# Patient Record
Sex: FEMALE | Race: WHITE | NOT HISPANIC OR LATINO | Employment: UNEMPLOYED | URBAN - METROPOLITAN AREA
[De-identification: names, ages, dates, MRNs, and addresses within clinical notes are randomized per-mention and may not be internally consistent; named-entity substitution may affect disease eponyms.]

---

## 2017-01-12 ENCOUNTER — OFFICE VISIT (OUTPATIENT)
Dept: SURGERY | Facility: CLINIC | Age: 47
End: 2017-01-12
Payer: COMMERCIAL

## 2017-01-12 VITALS
TEMPERATURE: 98 F | DIASTOLIC BLOOD PRESSURE: 81 MMHG | HEIGHT: 68 IN | HEART RATE: 85 BPM | BODY MASS INDEX: 34.86 KG/M2 | WEIGHT: 230 LBS | SYSTOLIC BLOOD PRESSURE: 122 MMHG

## 2017-01-12 DIAGNOSIS — K31.84 GASTROPARESIS: Primary | ICD-10-CM

## 2017-01-12 PROCEDURE — 99999 PR PBB SHADOW E&M-EST. PATIENT-LVL III: CPT | Mod: PBBFAC,,, | Performed by: SURGERY

## 2017-01-12 PROCEDURE — 99024 POSTOP FOLLOW-UP VISIT: CPT | Mod: S$GLB,,, | Performed by: SURGERY

## 2017-01-12 RX ORDER — OXYBUTYNIN CHLORIDE 5 MG/1
TABLET ORAL
COMMUNITY
Start: 2016-06-13 | End: 2017-05-11

## 2017-01-12 RX ORDER — ALBUTEROL SULFATE 90 UG/1
AEROSOL, METERED RESPIRATORY (INHALATION)
COMMUNITY
Start: 2016-07-31 | End: 2017-08-07 | Stop reason: SDUPTHER

## 2017-01-12 RX ORDER — ALPRAZOLAM 1 MG/1
TABLET ORAL
COMMUNITY
Start: 2016-07-31 | End: 2018-03-21

## 2017-01-12 RX ORDER — MOMETASONE FUROATE 50 UG/1
2 SPRAY, METERED NASAL DAILY PRN
COMMUNITY
Start: 2016-07-31 | End: 2019-07-02

## 2017-01-12 RX ORDER — CARISOPRODOL 350 MG/1
TABLET ORAL
COMMUNITY
Start: 2016-07-31 | End: 2017-05-11

## 2017-01-12 RX ORDER — OXYCODONE AND ACETAMINOPHEN 5; 325 MG/1; MG/1
TABLET ORAL
COMMUNITY
Start: 2016-08-03 | End: 2017-05-11

## 2017-01-12 RX ORDER — GABAPENTIN 300 MG/1
CAPSULE ORAL
COMMUNITY
Start: 2016-08-01 | End: 2017-05-11

## 2017-01-12 RX ORDER — CYCLOBENZAPRINE HCL 10 MG
TABLET ORAL
COMMUNITY
Start: 2016-07-01 | End: 2017-05-11

## 2017-01-12 RX ORDER — DEXLANSOPRAZOLE 60 MG/1
CAPSULE, DELAYED RELEASE ORAL
COMMUNITY
Start: 2016-07-14 | End: 2017-05-11

## 2017-01-12 RX ORDER — PANTOPRAZOLE SODIUM 40 MG/1
TABLET, DELAYED RELEASE ORAL
COMMUNITY
Start: 2016-07-02 | End: 2017-05-11

## 2017-01-12 RX ORDER — SIMVASTATIN 20 MG/1
TABLET, FILM COATED ORAL
COMMUNITY
Start: 2016-07-16 | End: 2017-05-11

## 2017-01-12 RX ORDER — GABAPENTIN 100 MG/1
CAPSULE ORAL
COMMUNITY
Start: 2016-07-05 | End: 2017-05-11

## 2017-01-12 RX ORDER — HYDROCODONE BITARTRATE AND ACETAMINOPHEN 5; 325 MG/1; MG/1
TABLET ORAL
COMMUNITY
Start: 2016-06-24 | End: 2017-05-11

## 2017-01-12 RX ORDER — FLAVOXATE HYDROCHLORIDE 100 MG/1
TABLET ORAL
COMMUNITY
Start: 2016-08-04 | End: 2017-05-11

## 2017-01-12 RX ORDER — LOPERAMIDE HYDROCHLORIDE 2 MG/1
CAPSULE ORAL
COMMUNITY
Start: 2016-06-27 | End: 2017-05-11

## 2017-01-12 RX ORDER — PROMETHAZINE HYDROCHLORIDE 25 MG/1
TABLET ORAL
COMMUNITY
Start: 2016-07-31 | End: 2017-05-11

## 2017-01-12 RX ORDER — BACLOFEN 10 MG/1
TABLET ORAL
Refills: 0 | COMMUNITY
Start: 2016-12-15 | End: 2017-05-11

## 2017-01-12 NOTE — PROGRESS NOTES
"Nan Betts is a 46 y.o. female patient.   1. Gastroparesis      Past Medical History   Diagnosis Date    Abnormal Pap smear of vagina     Anxiety     Cataract     Gastroparesis      Idiopathic    GERD (gastroesophageal reflux disease)     H/O abnormal cervical Papanicolaou smear     Hypercholesteremia 6/21/2016    Hyperlipidemia     Internal hemorrhoids     Interstitial cystitis     Irritable bowel syndrome (IBS)     Irritable bowel syndrome with diarrhea 6/21/2016    Spastic colon     TMJ (temporomandibular joint disorder)     TMJ (temporomandibular joint syndrome) 6/21/2016     No past surgical history pertinent negatives on file.  Scheduled Meds:  Continuous Infusions:  PRN Meds:    Review of patient's allergies indicates:   Allergen Reactions    Morphine Anaphylaxis    Toradol [ketorolac] Shortness Of Breath    Ultram [tramadol] Shortness Of Breath    Buprenorphine hcl     Codeine Itching    Flexeril [cyclobenzaprine] Other (See Comments)     GI BLEEDING    Hydrocodone     Hydrocodone-acetaminophen     Ibuprofen     Ketorolac tromethamine     Metoclopramide     Nsaids (non-steroidal anti-inflammatory drug) Other (See Comments)     GI BLEEDING    Reglan [metoclopramide hcl] Other (See Comments)     Headaches and insomnia      Tramadol hcl      There are no hospital problems to display for this patient.    Blood pressure 122/81, pulse 85, temperature 98.1 °F (36.7 °C), height 5' 8" (1.727 m), weight 104.3 kg (230 lb).    Subjective S/p gastric stimulator 11/2016 (prior pyloroplasty).  She is taking pain medication for jaw surgery but is having no other pain.  Her nausea, vomiting and bloating are mild (2/4) and controlled with phenergan.  She does better on small portion meals.  She is on a soft diet for her jaw surgery and is mostly eating soups.  Objective abdomen benign, stimulator interrogated and settings unchanged.   Assessment & Plan She is happy with the stimulator procedure but " concerned that she will have more abdominal pain when she gets off narcotics.  Follow up 3 months.  She wants home health for PT and I have asked her to get that from her pcp as she is 2 months after my procedure.       David Celeste MD  1/12/2017

## 2017-01-12 NOTE — LETTER
January 12, 2017        Rosi Carias MD  [address] [facility] [address]  [place] [address]             [facility] - General Surgery  [address]  [place] [address]  Phone: [phone]   Patient: Nan Betts   MR Number: [id number]   YOB: 1970   Date of Visit: 1/12/2017       Dear Dr. Carias:    Thank you for referring Nan Betts to me for evaluation. Below are the relevant portions of my assessment and plan of care.    Assessment & Plan She is happy with the stimulator procedure but concerned that she will have more abdominal pain when she gets off narcotics.  Follow up 3 months.         If you have questions, please do not hesitate to call me. I look forward to following Nan along with you.    Sincerely,      MD TAMRA Diaz MD James W Smith, MD

## 2017-01-15 ENCOUNTER — PATIENT MESSAGE (OUTPATIENT)
Dept: SURGERY | Facility: CLINIC | Age: 47
End: 2017-01-15

## 2017-01-19 DIAGNOSIS — K52.9 CHRONIC DIARRHEA: ICD-10-CM

## 2017-01-19 RX ORDER — LOPERAMIDE HYDROCHLORIDE 2 MG/1
2 CAPSULE ORAL DAILY PRN
Qty: 90 CAPSULE | Refills: 3 | Status: SHIPPED | OUTPATIENT
Start: 2017-01-19 | End: 2017-10-02 | Stop reason: SDUPTHER

## 2017-01-20 DIAGNOSIS — R11.0 POSTOPERATIVE NAUSEA: ICD-10-CM

## 2017-01-20 DIAGNOSIS — Z98.890 POSTOPERATIVE NAUSEA: ICD-10-CM

## 2017-01-20 RX ORDER — PROMETHAZINE HYDROCHLORIDE 25 MG/1
25 TABLET ORAL EVERY 6 HOURS PRN
Qty: 180 TABLET | Refills: 2 | Status: SHIPPED | OUTPATIENT
Start: 2017-01-20 | End: 2017-05-11

## 2017-03-14 ENCOUNTER — PATIENT MESSAGE (OUTPATIENT)
Dept: SURGERY | Facility: CLINIC | Age: 47
End: 2017-03-14

## 2017-04-13 ENCOUNTER — HOSPITAL ENCOUNTER (OUTPATIENT)
Dept: CARDIOLOGY | Facility: CLINIC | Age: 47
Discharge: HOME | End: 2017-04-13
Payer: COMMERCIAL

## 2017-04-13 ENCOUNTER — OFFICE VISIT (OUTPATIENT)
Dept: SURGERY | Facility: CLINIC | Age: 47
End: 2017-04-13
Payer: COMMERCIAL

## 2017-04-13 ENCOUNTER — TELEPHONE (OUTPATIENT)
Dept: ENDOSCOPY | Facility: HOSPITAL | Age: 47
End: 2017-04-13

## 2017-04-13 VITALS
TEMPERATURE: 98 F | DIASTOLIC BLOOD PRESSURE: 71 MMHG | WEIGHT: 231 LBS | HEART RATE: 67 BPM | BODY MASS INDEX: 35.01 KG/M2 | HEIGHT: 68 IN | SYSTOLIC BLOOD PRESSURE: 120 MMHG

## 2017-04-13 DIAGNOSIS — K31.84 GASTROPARESIS: Primary | ICD-10-CM

## 2017-04-13 DIAGNOSIS — R07.9 CHEST PAIN, UNSPECIFIED TYPE: ICD-10-CM

## 2017-04-13 PROCEDURE — 93000 ELECTROCARDIOGRAM COMPLETE: CPT | Mod: S$GLB,,, | Performed by: INTERNAL MEDICINE

## 2017-04-13 PROCEDURE — 99213 OFFICE O/P EST LOW 20 MIN: CPT | Mod: S$GLB,,, | Performed by: SURGERY

## 2017-04-13 PROCEDURE — 1160F RVW MEDS BY RX/DR IN RCRD: CPT | Mod: S$GLB,,, | Performed by: SURGERY

## 2017-04-13 PROCEDURE — 99999 PR PBB SHADOW E&M-EST. PATIENT-LVL III: CPT | Mod: PBBFAC,,, | Performed by: SURGERY

## 2017-04-13 RX ORDER — GABAPENTIN 250 MG/5ML
SOLUTION ORAL
Refills: 0 | COMMUNITY
Start: 2017-03-17 | End: 2018-03-21

## 2017-04-13 NOTE — LETTER
[facility] - General Surgery  [address]  [facility] [address]  Phone: [phone] April 13, 2017      Rosi Carias MD  [address] [place] [address]    Patient: Nan Betts   MR Number: [id number]   YOB: 1970   Date of Visit: 4/13/2017     Dear Dr. Carias:    Thank you for referring Nan Betts to me for evaluation. Below are the relevant portions of my assessment and plan of care.    1. Gastroparesis    Possibly worsened by narcotics with epigastric burning, possible ulcer. One lead is likely not working correctly.  2. Chest pain with palpitations and sob. Possible heart disease  3. Swelling in hands and fingers    PLAN: EKG, EGD. RTC after studies. May have to replace lead.  To see PCP re: swelling in hands.    If you have questions, please do not hesitate to call me. I look forward to following Nan along with you.    Sincerely,      David Celeste MD   Section Head - General, Laparoscopic, Bariatric  Acute Care and Oncologic Surgery   - Surgical Weight Loss Program  [facility]    WSR/bakarik    CC  MD Bethany Sánchez MD

## 2017-04-13 NOTE — PROGRESS NOTES
Subjective:       Patient ID: Nan Betts is a 47 y.o. female.    Chief Complaint: Follow-up (gastroparesis / stimulator check)    HPI S/p gastric stimulator 11/2016 (prior pyloroplasty).  Overall she is 70% improved.  She has moderate nausea that is worsening, gerd and some fullness.  She has no vomiting.  She is taking phenergan two to three times a day (increasing amounts), ppi bid and narcotics with muscle relaxants (for jaw pain). She gets IBS with diarrhea and this has worsened as well.  Some weight gain with morning swelling in hands but not ankles.  Review of Systems   Constitutional: Negative for fever.   Respiratory: Negative for chest tightness.    Cardiovascular: Positive for chest pain.        With palpitations, left arm pain and sob once (last week).  She has had a recent negative heart work up prior to this pain.   Gastrointestinal: Negative for blood in stool.        Has hemorrhoids and this is likely causing bleeding   Genitourinary: Negative for difficulty urinating.   Hematological: Does not bruise/bleed easily.       Objective:      Physical Exam   Constitutional: She appears well-developed and well-nourished.   Abdominal: Soft. Bowel sounds are normal.   Skin: Skin is warm and dry.   Psychiatric: She has a normal mood and affect. Her behavior is normal. Judgment and thought content normal.   Vitals reviewed.      V 2 us 450 hz 14 c & 2 >00752 c & 3 333 2 & 3 >48590.    Assessment:       1. Gastroparesis      Possibly worsened by narcotics with epigastric burning, possible ulcer.  One lead is likely not working correctly.  2. Chest pain with palpitations and sob.  Possible heart disease  3. Swelling in hands and fingers  Plan:       EKG, egd.  Rtc after studies.  May have to replace lead.   To see pcp re: swelling in hands.

## 2017-04-14 ENCOUNTER — PATIENT MESSAGE (OUTPATIENT)
Dept: SURGERY | Facility: CLINIC | Age: 47
End: 2017-04-14

## 2017-04-17 ENCOUNTER — PATIENT MESSAGE (OUTPATIENT)
Dept: GASTROENTEROLOGY | Facility: CLINIC | Age: 47
End: 2017-04-17

## 2017-04-17 NOTE — TELEPHONE ENCOUNTER
Received message to call patient because she is requesting a sooner appointment.  Called patient to inform her that 5/2/17 was the earliest appointment available.  Instructed her that Dr Lehman wanted her on clear liquids for 24 hours prior to the procedure. Stated understanding.

## 2017-04-18 ENCOUNTER — PATIENT MESSAGE (OUTPATIENT)
Dept: SURGERY | Facility: CLINIC | Age: 47
End: 2017-04-18

## 2017-04-19 ENCOUNTER — PATIENT MESSAGE (OUTPATIENT)
Dept: SURGERY | Facility: CLINIC | Age: 47
End: 2017-04-19

## 2017-04-21 ENCOUNTER — PATIENT MESSAGE (OUTPATIENT)
Dept: SURGERY | Facility: CLINIC | Age: 47
End: 2017-04-21

## 2017-04-21 ENCOUNTER — TELEPHONE (OUTPATIENT)
Dept: SURGERY | Facility: CLINIC | Age: 47
End: 2017-04-21

## 2017-04-21 DIAGNOSIS — K31.84 GASTROPARESIS: Primary | ICD-10-CM

## 2017-04-21 NOTE — TELEPHONE ENCOUNTER
Patient is wanting sx scheduled for 5/15/17 , also needs pre op visit on 5/11/17 @ 9 am. She needs the rep from [employer] to speak with dr. Celeste and her on that date as well.

## 2017-04-24 ENCOUNTER — PATIENT MESSAGE (OUTPATIENT)
Dept: SURGERY | Facility: CLINIC | Age: 47
End: 2017-04-24

## 2017-04-25 ENCOUNTER — PATIENT MESSAGE (OUTPATIENT)
Dept: SURGERY | Facility: CLINIC | Age: 47
End: 2017-04-25

## 2017-04-26 ENCOUNTER — PATIENT MESSAGE (OUTPATIENT)
Dept: SURGERY | Facility: CLINIC | Age: 47
End: 2017-04-26

## 2017-04-26 ENCOUNTER — PATIENT MESSAGE (OUTPATIENT)
Dept: GASTROENTEROLOGY | Facility: CLINIC | Age: 47
End: 2017-04-26

## 2017-04-27 ENCOUNTER — PATIENT MESSAGE (OUTPATIENT)
Dept: SURGERY | Facility: CLINIC | Age: 47
End: 2017-04-27

## 2017-05-02 ENCOUNTER — ANESTHESIA EVENT (OUTPATIENT)
Dept: ENDOSCOPY | Facility: HOSPITAL | Age: 47
End: 2017-05-02
Payer: COMMERCIAL

## 2017-05-02 ENCOUNTER — SURGERY (OUTPATIENT)
Age: 47
End: 2017-05-02

## 2017-05-02 ENCOUNTER — PATIENT MESSAGE (OUTPATIENT)
Dept: SURGERY | Facility: CLINIC | Age: 47
End: 2017-05-02

## 2017-05-02 ENCOUNTER — HOSPITAL ENCOUNTER (OUTPATIENT)
Facility: HOSPITAL | Age: 47
Discharge: HOME | End: 2017-05-02
Attending: INTERNAL MEDICINE | Admitting: INTERNAL MEDICINE
Payer: COMMERCIAL

## 2017-05-02 ENCOUNTER — ANESTHESIA (OUTPATIENT)
Dept: ENDOSCOPY | Facility: HOSPITAL | Age: 47
End: 2017-05-02
Payer: COMMERCIAL

## 2017-05-02 VITALS
WEIGHT: 233 LBS | BODY MASS INDEX: 35.31 KG/M2 | OXYGEN SATURATION: 96 % | HEIGHT: 68 IN | TEMPERATURE: 98 F | RESPIRATION RATE: 17 BRPM | SYSTOLIC BLOOD PRESSURE: 107 MMHG | RESPIRATION RATE: 18 BRPM | HEART RATE: 74 BPM | DIASTOLIC BLOOD PRESSURE: 57 MMHG

## 2017-05-02 DIAGNOSIS — K31.84 GASTROPARESIS: Primary | ICD-10-CM

## 2017-05-02 PROCEDURE — 88305 TISSUE EXAM BY PATHOLOGIST: CPT | Mod: 26,,, | Performed by: PATHOLOGY

## 2017-05-02 PROCEDURE — 25000003 PHARM REV CODE 250: Performed by: NURSE ANESTHETIST, CERTIFIED REGISTERED

## 2017-05-02 PROCEDURE — 63600175 PHARM REV CODE 636 W HCPCS: Performed by: NURSE ANESTHETIST, CERTIFIED REGISTERED

## 2017-05-02 PROCEDURE — D9220A PRA ANESTHESIA: Mod: ANES,,, | Performed by: ANESTHESIOLOGY

## 2017-05-02 PROCEDURE — 37000008 HC ANESTHESIA 1ST 15 MINUTES: Performed by: INTERNAL MEDICINE

## 2017-05-02 PROCEDURE — 27201012 HC FORCEPS, HOT/COLD, DISP: Performed by: INTERNAL MEDICINE

## 2017-05-02 PROCEDURE — 25000003 PHARM REV CODE 250: Performed by: ANESTHESIOLOGY

## 2017-05-02 PROCEDURE — 43239 EGD BIOPSY SINGLE/MULTIPLE: CPT | Mod: 59,,, | Performed by: INTERNAL MEDICINE

## 2017-05-02 PROCEDURE — 88305 TISSUE EXAM BY PATHOLOGIST: CPT | Performed by: PATHOLOGY

## 2017-05-02 PROCEDURE — 43239 EGD BIOPSY SINGLE/MULTIPLE: CPT | Performed by: INTERNAL MEDICINE

## 2017-05-02 PROCEDURE — 37000009 HC ANESTHESIA EA ADD 15 MINS: Performed by: INTERNAL MEDICINE

## 2017-05-02 PROCEDURE — 43245 EGD DILATE STRICTURE: CPT | Mod: ,,, | Performed by: INTERNAL MEDICINE

## 2017-05-02 PROCEDURE — D9220A PRA ANESTHESIA: Mod: CRNA,,, | Performed by: NURSE ANESTHETIST, CERTIFIED REGISTERED

## 2017-05-02 PROCEDURE — 25000003 PHARM REV CODE 250: Performed by: INTERNAL MEDICINE

## 2017-05-02 PROCEDURE — 43245 EGD DILATE STRICTURE: CPT | Performed by: INTERNAL MEDICINE

## 2017-05-02 RX ORDER — SODIUM CHLORIDE 9 MG/ML
INJECTION, SOLUTION INTRAVENOUS CONTINUOUS
Status: DISCONTINUED | OUTPATIENT
Start: 2017-05-02 | End: 2017-05-02 | Stop reason: HOSPADM

## 2017-05-02 RX ORDER — HEPARIN 100 UNIT/ML
5 SYRINGE INTRAVENOUS ONCE
Status: COMPLETED | OUTPATIENT
Start: 2017-05-02 | End: 2017-05-02

## 2017-05-02 RX ORDER — LIDOCAINE HCL/PF 100 MG/5ML
SYRINGE (ML) INTRAVENOUS
Status: DISCONTINUED | OUTPATIENT
Start: 2017-05-02 | End: 2017-05-02

## 2017-05-02 RX ORDER — SODIUM CHLORIDE 9 MG/ML
INJECTION, SOLUTION INTRAVENOUS CONTINUOUS
Status: CANCELLED | OUTPATIENT
Start: 2017-05-02

## 2017-05-02 RX ORDER — ONDANSETRON 2 MG/ML
INJECTION INTRAMUSCULAR; INTRAVENOUS
Status: DISCONTINUED | OUTPATIENT
Start: 2017-05-02 | End: 2017-05-02

## 2017-05-02 RX ORDER — PROPOFOL 10 MG/ML
VIAL (ML) INTRAVENOUS
Status: DISCONTINUED | OUTPATIENT
Start: 2017-05-02 | End: 2017-05-02

## 2017-05-02 RX ADMIN — PROPOFOL 40 MG: 10 INJECTION, EMULSION INTRAVENOUS at 02:05

## 2017-05-02 RX ADMIN — LIDOCAINE HYDROCHLORIDE 100 MG: 20 INJECTION, SOLUTION INTRAVENOUS at 02:05

## 2017-05-02 RX ADMIN — SODIUM CHLORIDE: 0.9 INJECTION, SOLUTION INTRAVENOUS at 02:05

## 2017-05-02 RX ADMIN — HEPARIN 500 UNITS: 100 SYRINGE at 03:05

## 2017-05-02 RX ADMIN — PROPOFOL 30 MG: 10 INJECTION, EMULSION INTRAVENOUS at 02:05

## 2017-05-02 RX ADMIN — PROPOFOL 130 MG: 10 INJECTION, EMULSION INTRAVENOUS at 02:05

## 2017-05-02 RX ADMIN — ONDANSETRON 4 MG: 2 INJECTION INTRAMUSCULAR; INTRAVENOUS at 03:05

## 2017-05-02 NOTE — TRANSFER OF CARE
"Anesthesia Transfer of Care Note    Patient: Nan Betts    Procedure(s) Performed: Procedure(s) (LRB):  ESOPHAGOGASTRODUODENOSCOPY (EGD) (N/A)    Patient location: GI    Anesthesia Type: general    Transport from OR: Transported from OR on room air with adequate spontaneous ventilation    Post pain: adequate analgesia    Post assessment: no apparent anesthetic complications and tolerated procedure well    Post vital signs: stable    Level of consciousness: awake and alert    Nausea/Vomiting: no nausea/vomiting    Complications: none          Last vitals:   Visit Vitals    /82 (BP Location: Right arm, Patient Position: Lying, BP Method: Automatic)    Pulse 84    Temp 36.9 °C (98.4 °F) (Oral)    Resp 12    Ht 5' 8" (1.727 m)    Wt 105.7 kg (233 lb)    SpO2 96%    Breastfeeding No    BMI 35.43 kg/m2     "

## 2017-05-02 NOTE — H&P (VIEW-ONLY)
Subjective:       Patient ID: Nan Betts is a 47 y.o. female.    Chief Complaint: Follow-up (gastroparesis / stimulator check)    HPI S/p gastric stimulator 11/2016 (prior pyloroplasty).  Overall she is 70% improved.  She has moderate nausea that is worsening, gerd and some fullness.  She has no vomiting.  She is taking phenergan two to three times a day (increasing amounts), ppi bid and narcotics with muscle relaxants (for jaw pain). She gets IBS with diarrhea and this has worsened as well.  Some weight gain with morning swelling in hands but not ankles.  Review of Systems   Constitutional: Negative for fever.   Respiratory: Negative for chest tightness.    Cardiovascular: Positive for chest pain.        With palpitations, left arm pain and sob once (last week).  She has had a recent negative heart work up prior to this pain.   Gastrointestinal: Negative for blood in stool.        Has hemorrhoids and this is likely causing bleeding   Genitourinary: Negative for difficulty urinating.   Hematological: Does not bruise/bleed easily.       Objective:      Physical Exam   Constitutional: She appears well-developed and well-nourished.   Abdominal: Soft. Bowel sounds are normal.   Skin: Skin is warm and dry.   Psychiatric: She has a normal mood and affect. Her behavior is normal. Judgment and thought content normal.   Vitals reviewed.      V 2 us 450 hz 14 c & 2 >01620 c & 3 333 2 & 3 >77025.    Assessment:       1. Gastroparesis      Possibly worsened by narcotics with epigastric burning, possible ulcer.  One lead is likely not working correctly.  2. Chest pain with palpitations and sob.  Possible heart disease  3. Swelling in hands and fingers  Plan:       EKG, egd.  Rtc after studies.  May have to replace lead.   To see pcp re: swelling in hands.

## 2017-05-02 NOTE — DISCHARGE INSTRUCTIONS

## 2017-05-02 NOTE — PATIENT INSTRUCTIONS
Discharge Summary/Instructions for after EGD with Biopsy  Patient Name: Nan Betts  Patient MRN: [id number]  Patient YOB: 1970  Tuesday, May 02, 2017    Zack Lehman MD  RESTRICTIONS ON ACTIVITY:  - DO NOT drive a car or operate machinery until the day after the   procedure.  - Following Day:  Return to full activities including work.  - Diet:  Eat and drink normally unless instructed otherwise.  TREATMENT FOR COMMON SIDE EFFECTS:  - Sore Throat - treat with throat lozenges, gargle with warm salt water.  - Mild abdominal pain & bloating - rest and take liquids only.  SYMPTOMS TO WATCH FOR AND REPORT TO YOUR PHYSICIAN:  1.  Chills or fever occurring within 24 hours after procedure.  2.  Pain in chest.  3.  SEVERE abdominal pain or bloating.  4.  Rectal bleeding which would show as maroon or black stools.  Your doctor recommends these additional instructions:  If any biopsies were performed, my office will call you in 5 to 6 business   days with any results.  - Patient has a contact number available for emergencies.  The signs and   symptoms of potential delayed complications were discussed with the   patient.  Return to normal activities tomorrow.  Written discharge   instructions were provided to the patient.   - Discharge patient to home (ambulatory).   - Resume previous diet.   - Continue present medications.   - Await pathology results.   - Return to GI clinic as previously scheduled.  You have a contact number available for emergencies.  The signs and symptoms   of potential delayed complications were discussed with you.  You may return   to normal activities tomorrow.  Written discharge instructions were   provided to you.   You are being discharged to home.   Resume your previous diet.   Continue your present medications.   We are waiting for your pathology results.   Return to your GI clinic as previously scheduled.  None  If you have any questions or problems, please call your physician.  EMERGENCY  PHONE NUMBER: [phone]  LAB RESULTS: [phone]         Zack Lehman MD  5/2/2017 3:02:06 PM  This report has been verified and signed electronically.

## 2017-05-02 NOTE — ANESTHESIA POSTPROCEDURE EVALUATION
"Anesthesia Post Evaluation    Patient: Nan Betts    Procedure(s) Performed: Procedure(s) (LRB):  ESOPHAGOGASTRODUODENOSCOPY (EGD) (N/A)    Final Anesthesia Type: general  Patient location during evaluation: PACU  Patient participation: Yes- Able to Participate  Level of consciousness: awake and alert  Post-procedure vital signs: reviewed and stable  Pain management: adequate  Airway patency: patent  PONV status at discharge: No PONV  Anesthetic complications: no      Cardiovascular status: blood pressure returned to baseline  Respiratory status: unassisted  Hydration status: euvolemic  Follow-up not needed.        Visit Vitals    /62 (BP Location: Right arm, Patient Position: Lying, BP Method: Automatic)    Pulse 72    Temp 36.4 °C (97.5 °F) (Oral)    Resp 16    Ht 5' 8" (1.727 m)    Wt 105.7 kg (233 lb)    SpO2 (!) 94%    Breastfeeding No    BMI 35.43 kg/m2       Pain/Chelsy Score: Pain Assessment Performed: Yes (5/2/2017  3:22 PM)  Presence of Pain: denies (5/2/2017  3:22 PM)  Chelsy Score: 10 (5/2/2017  3:22 PM)      "

## 2017-05-02 NOTE — ANESTHESIA PREPROCEDURE EVALUATION
05/02/2017  Nan Betts is a 47 y.o., female.    Anesthesia Evaluation    I have reviewed the Patient Summary Reports.        Review of Systems  Anesthesia Hx:   Denies Personal Hx of Anesthesia complications.   Hepatic/GI:   GERD        Physical Exam  General:  Obesity    Airway/Jaw/Neck:  Airway Findings: Mouth Opening: Normal Tongue: Normal  General Airway Assessment: Adult  Mallampati: III  Improves to III with phonation.  TM Distance: Normal, at least 6 cm      Dental:  Dental Findings: In tact   Chest/Lungs:  Chest/Lungs Clear    Heart/Vascular:  Heart Findings: Normal            Anesthesia Plan  Type of Anesthesia, risks & benefits discussed:  Anesthesia Type:  general  Patient's Preference:   Intra-op Monitoring Plan:   Intra-op Monitoring Plan Comments:   Post Op Pain Control Plan:   Post Op Pain Control Plan Comments:   Induction:   IV  Beta Blocker:  Patient is not currently on a Beta-Blocker (No further documentation required).       Informed Consent: Patient understands risks and agrees with Anesthesia plan.  Questions answered. Anesthesia consent signed with patient.  ASA Score: 2     Day of Surgery Review of History & Physical:  There are no significant changes.          Ready For Surgery From Anesthesia Perspective.

## 2017-05-04 ENCOUNTER — PATIENT MESSAGE (OUTPATIENT)
Dept: SURGERY | Facility: CLINIC | Age: 47
End: 2017-05-04

## 2017-05-09 ENCOUNTER — TELEPHONE (OUTPATIENT)
Dept: ENDOSCOPY | Facility: HOSPITAL | Age: 47
End: 2017-05-09

## 2017-05-10 ENCOUNTER — PATIENT MESSAGE (OUTPATIENT)
Dept: GASTROENTEROLOGY | Facility: CLINIC | Age: 47
End: 2017-05-10

## 2017-05-10 ENCOUNTER — PATIENT MESSAGE (OUTPATIENT)
Dept: SURGERY | Facility: CLINIC | Age: 47
End: 2017-05-10

## 2017-05-11 ENCOUNTER — TELEPHONE (OUTPATIENT)
Dept: GASTROENTEROLOGY | Facility: CLINIC | Age: 47
End: 2017-05-11

## 2017-05-11 ENCOUNTER — OFFICE VISIT (OUTPATIENT)
Dept: SURGERY | Facility: CLINIC | Age: 47
End: 2017-05-11
Payer: COMMERCIAL

## 2017-05-11 VITALS
BODY MASS INDEX: 35.09 KG/M2 | TEMPERATURE: 99 F | WEIGHT: 231.5 LBS | HEART RATE: 88 BPM | HEIGHT: 68 IN | SYSTOLIC BLOOD PRESSURE: 122 MMHG | DIASTOLIC BLOOD PRESSURE: 82 MMHG

## 2017-05-11 DIAGNOSIS — K31.84 GASTROPARESIS: Primary | ICD-10-CM

## 2017-05-11 PROCEDURE — 99213 OFFICE O/P EST LOW 20 MIN: CPT | Mod: S$GLB,,, | Performed by: SURGERY

## 2017-05-11 PROCEDURE — 99999 PR PBB SHADOW E&M-EST. PATIENT-LVL III: CPT | Mod: PBBFAC,,, | Performed by: SURGERY

## 2017-05-11 PROCEDURE — 1160F RVW MEDS BY RX/DR IN RCRD: CPT | Mod: S$GLB,,, | Performed by: SURGERY

## 2017-05-11 RX ORDER — PROMETHAZINE HYDROCHLORIDE 6.25 MG/5ML
SYRUP ORAL
Refills: 0 | Status: ON HOLD | COMMUNITY
Start: 2017-04-26 | End: 2017-05-15 | Stop reason: HOSPADM

## 2017-05-11 NOTE — LETTER
[facility] - General Surgery  [address]  [facility] [address]  Phone: [phone] May 11, 2017      Rosi Carias MD  [address] [place] [address]    Patient: Nan Betts   MR Number: [id number]   YOB: 1970   Date of Visit: 5/11/2017     Dear Dr. Carias:    Thank you for referring Nan Betts to me for evaluation. Below are the relevant portions of my assessment and plan of care.    1. Gastroparesis        PLAN:  Obtain CT films to see if lead has fractured.  She states there is a one year warranty. She wants it replaced as it is malfunctioning and she is concerned it won't be paid for at a later date (after the warranty expires). I have contacted our representative who says that one of the near lead has pulled out of the box and we will repair this on Monday. I have told her she needs to work on getting off narcotics.     If you have questions, please do not hesitate to call me. I look forward to following Nan along with you.    Sincerely,      David Celeste MD   Section Head - General, Laparoscopic, Bariatric  Acute Care and Oncologic Surgery   - Surgical Weight Loss Program  [facility]    WSR/belinda    CC  Zack Lehman MD

## 2017-05-11 NOTE — TELEPHONE ENCOUNTER
----- Message from Zack Lehman MD sent at 5/11/2017  7:20 AM CDT -----  Please call, stomach biopsy was normal, nothing of concern

## 2017-05-11 NOTE — PROGRESS NOTES
Subjective:       Patient ID: Nan Betts is a 47 y.o. female.    Chief Complaint: Pre-op Exam    HPI S/p gastric stimulator 11/2016 (prior pyloroplasty). Overall she is 70% improved.  At her last visit one of her leads was found to be malfunctioning on interrogation.  She has severe pain, nausea and vomiting.  For pain she is taking percoset tid.  She is also taking phenergan iv qid (zofran doesn't work), ppi bid, and is not on promotility agents.  She has been to the ER twice since I last saw her.  She denies having any falls or trauma.  Review of Systems   Constitutional: Negative for fever.   Respiratory: Positive for shortness of breath. Negative for chest tightness.    Cardiovascular: Negative for chest pain.   Genitourinary: Negative for difficulty urinating and dysuria.       Objective:       CT ok by report    Physical Exam   Constitutional: She is oriented to person, place, and time. She appears well-developed and well-nourished.   Abdominal: Soft.   Neurological: She is alert and oriented to person, place, and time.   Skin: Skin is warm and dry.   Psychiatric: She has a normal mood and affect. Her behavior is normal. Judgment and thought content normal.   Vitals reviewed.      I have discussed this with [employer] and had them help me through the proper adjustment.  Lead 2 was turned off and is malfunctioning.  Imp 372 volt 5.5 cur 14.8 pw 330 rate 14 on 0.1 off 5.  She says she feels a clicking with the change so I changed the setting to volt 4.5 curr 12.1 pw 330 rate 14 on 1 off 4    Assessment:       1. Gastroparesis        Plan:       Obtain ct films to see if lead has fractured.   She states there is a one year warranty.  She wants it replaced as it is malfunctioning and she is concerned it won't be paid for at a later date (after the warranty expires).  I have contacted our rep who says that one of the near lead has pulled out of the box and we will repair this on Monday.  I have told her she needs to  work on getting off narcotics.

## 2017-05-12 ENCOUNTER — TELEPHONE (OUTPATIENT)
Dept: SURGERY | Facility: CLINIC | Age: 47
End: 2017-05-12

## 2017-05-12 RX ORDER — CARISOPRODOL 350 MG/1
350 TABLET ORAL 3 TIMES DAILY PRN
COMMUNITY
End: 2018-03-21

## 2017-05-12 RX ORDER — DEXLANSOPRAZOLE 60 MG/1
60 CAPSULE, DELAYED RELEASE ORAL 2 TIMES DAILY
COMMUNITY
End: 2017-12-09 | Stop reason: SDUPTHER

## 2017-05-13 NOTE — PRE-PROCEDURE INSTRUCTIONS
Spoke with Patient.  NPO, medication, and pre-op instructions reviewed.  Prior history of PONV with Anesthesia.   Verbalized understanding of instructions.

## 2017-05-15 ENCOUNTER — ANESTHESIA EVENT (OUTPATIENT)
Dept: SURGERY | Facility: HOSPITAL | Age: 47
End: 2017-05-15
Payer: COMMERCIAL

## 2017-05-15 ENCOUNTER — ANESTHESIA (OUTPATIENT)
Dept: SURGERY | Facility: HOSPITAL | Age: 47
End: 2017-05-15
Payer: COMMERCIAL

## 2017-05-15 ENCOUNTER — HOSPITAL ENCOUNTER (OUTPATIENT)
Facility: HOSPITAL | Age: 47
Discharge: HOME | End: 2017-05-15
Attending: SURGERY | Admitting: SURGERY
Payer: COMMERCIAL

## 2017-05-15 ENCOUNTER — SURGERY (OUTPATIENT)
Age: 47
End: 2017-05-15

## 2017-05-15 VITALS
HEIGHT: 68 IN | HEART RATE: 82 BPM | OXYGEN SATURATION: 97 % | TEMPERATURE: 98 F | RESPIRATION RATE: 17 BRPM | DIASTOLIC BLOOD PRESSURE: 84 MMHG | SYSTOLIC BLOOD PRESSURE: 132 MMHG

## 2017-05-15 DIAGNOSIS — K31.84 GASTROPARESIS: Primary | ICD-10-CM

## 2017-05-15 PROCEDURE — 71000033 HC RECOVERY, INTIAL HOUR: Performed by: SURGERY

## 2017-05-15 PROCEDURE — 63600175 PHARM REV CODE 636 W HCPCS: Performed by: NURSE ANESTHETIST, CERTIFIED REGISTERED

## 2017-05-15 PROCEDURE — 27201423 OPTIME MED/SURG SUP & DEVICES STERILE SUPPLY: Performed by: SURGERY

## 2017-05-15 PROCEDURE — 36000708 HC OR TIME LEV III 1ST 15 MIN: Performed by: SURGERY

## 2017-05-15 PROCEDURE — 63600175 PHARM REV CODE 636 W HCPCS: Performed by: ANESTHESIOLOGY

## 2017-05-15 PROCEDURE — 88300 SURGICAL PATH GROSS: CPT | Mod: 26,,, | Performed by: PATHOLOGY

## 2017-05-15 PROCEDURE — 37000009 HC ANESTHESIA EA ADD 15 MINS: Performed by: SURGERY

## 2017-05-15 PROCEDURE — D9220A PRA ANESTHESIA: Mod: ANES,,, | Performed by: ANESTHESIOLOGY

## 2017-05-15 PROCEDURE — 64590 INS/RPL PRPH SAC/GSTR NPG/R: CPT | Mod: ,,, | Performed by: SURGERY

## 2017-05-15 PROCEDURE — 25000003 PHARM REV CODE 250: Performed by: SURGERY

## 2017-05-15 PROCEDURE — 63600175 PHARM REV CODE 636 W HCPCS: Performed by: SURGERY

## 2017-05-15 PROCEDURE — D9220A PRA ANESTHESIA: Mod: CRNA,,, | Performed by: NURSE ANESTHETIST, CERTIFIED REGISTERED

## 2017-05-15 PROCEDURE — 63600175 PHARM REV CODE 636 W HCPCS: Performed by: STUDENT IN AN ORGANIZED HEALTH CARE EDUCATION/TRAINING PROGRAM

## 2017-05-15 PROCEDURE — C1767 GENERATOR, NEURO NON-RECHARG: HCPCS | Performed by: SURGERY

## 2017-05-15 PROCEDURE — 27000221 HC OXYGEN, UP TO 24 HOURS

## 2017-05-15 PROCEDURE — 71000015 HC POSTOP RECOV 1ST HR: Performed by: SURGERY

## 2017-05-15 PROCEDURE — 94760 N-INVAS EAR/PLS OXIMETRY 1: CPT

## 2017-05-15 PROCEDURE — 88300 SURGICAL PATH GROSS: CPT | Performed by: PATHOLOGY

## 2017-05-15 PROCEDURE — 25000003 PHARM REV CODE 250: Performed by: NURSE ANESTHETIST, CERTIFIED REGISTERED

## 2017-05-15 PROCEDURE — 37000008 HC ANESTHESIA 1ST 15 MINUTES: Performed by: SURGERY

## 2017-05-15 PROCEDURE — 25000003 PHARM REV CODE 250: Performed by: ANESTHESIOLOGY

## 2017-05-15 PROCEDURE — 36000709 HC OR TIME LEV III EA ADD 15 MIN: Performed by: SURGERY

## 2017-05-15 PROCEDURE — 71000039 HC RECOVERY, EACH ADD'L HOUR: Performed by: SURGERY

## 2017-05-15 DEVICE — GENERATOR NEUROSTIM GASTRIC: Type: IMPLANTABLE DEVICE | Site: STOMACH | Status: FUNCTIONAL

## 2017-05-15 RX ORDER — OXYCODONE HCL 5 MG/5 ML
5 SOLUTION, ORAL ORAL EVERY 4 HOURS PRN
Qty: 473 ML | Refills: 0 | Status: SHIPPED | OUTPATIENT
Start: 2017-05-15 | End: 2017-08-07

## 2017-05-15 RX ORDER — NEOSTIGMINE METHYLSULFATE 1 MG/ML
INJECTION, SOLUTION INTRAVENOUS
Status: DISCONTINUED | OUTPATIENT
Start: 2017-05-15 | End: 2017-05-15

## 2017-05-15 RX ORDER — FLAVOXATE HYDROCHLORIDE 100 MG/1
100 TABLET ORAL 3 TIMES DAILY
COMMUNITY
End: 2017-12-06

## 2017-05-15 RX ORDER — GLYCOPYRROLATE 0.2 MG/ML
INJECTION INTRAMUSCULAR; INTRAVENOUS
Status: DISCONTINUED | OUTPATIENT
Start: 2017-05-15 | End: 2017-05-15

## 2017-05-15 RX ORDER — ROCURONIUM BROMIDE 10 MG/ML
INJECTION, SOLUTION INTRAVENOUS
Status: DISCONTINUED | OUTPATIENT
Start: 2017-05-15 | End: 2017-05-15

## 2017-05-15 RX ORDER — SUCCINYLCHOLINE CHLORIDE 20 MG/ML
INJECTION INTRAMUSCULAR; INTRAVENOUS
Status: DISCONTINUED | OUTPATIENT
Start: 2017-05-15 | End: 2017-05-15

## 2017-05-15 RX ORDER — DEXAMETHASONE SODIUM PHOSPHATE 4 MG/ML
INJECTION, SOLUTION INTRA-ARTICULAR; INTRALESIONAL; INTRAMUSCULAR; INTRAVENOUS; SOFT TISSUE
Status: DISCONTINUED | OUTPATIENT
Start: 2017-05-15 | End: 2017-05-15

## 2017-05-15 RX ORDER — PROMETHAZINE HYDROCHLORIDE 25 MG/ML
25 INJECTION, SOLUTION INTRAMUSCULAR; INTRAVENOUS EVERY 4 HOURS
COMMUNITY
End: 2017-06-08

## 2017-05-15 RX ORDER — SODIUM CHLORIDE 9 MG/ML
INJECTION, SOLUTION INTRAVENOUS CONTINUOUS
Status: DISCONTINUED | OUTPATIENT
Start: 2017-05-15 | End: 2017-05-15 | Stop reason: HOSPADM

## 2017-05-15 RX ORDER — MIDAZOLAM HYDROCHLORIDE 1 MG/ML
INJECTION, SOLUTION INTRAMUSCULAR; INTRAVENOUS
Status: DISCONTINUED | OUTPATIENT
Start: 2017-05-15 | End: 2017-05-15

## 2017-05-15 RX ORDER — FENTANYL CITRATE 50 UG/ML
INJECTION, SOLUTION INTRAMUSCULAR; INTRAVENOUS
Status: DISCONTINUED | OUTPATIENT
Start: 2017-05-15 | End: 2017-05-15

## 2017-05-15 RX ORDER — ONDANSETRON 2 MG/ML
INJECTION INTRAMUSCULAR; INTRAVENOUS
Status: DISCONTINUED | OUTPATIENT
Start: 2017-05-15 | End: 2017-05-15

## 2017-05-15 RX ORDER — PROMETHAZINE HYDROCHLORIDE 25 MG/ML
6.25 INJECTION, SOLUTION INTRAMUSCULAR; INTRAVENOUS
Status: DISCONTINUED | OUTPATIENT
Start: 2017-05-15 | End: 2017-05-15

## 2017-05-15 RX ORDER — HYDROMORPHONE HYDROCHLORIDE 1 MG/ML
0.2 INJECTION, SOLUTION INTRAMUSCULAR; INTRAVENOUS; SUBCUTANEOUS EVERY 5 MIN PRN
Status: DISCONTINUED | OUTPATIENT
Start: 2017-05-15 | End: 2017-05-15 | Stop reason: HOSPADM

## 2017-05-15 RX ORDER — BUPIVACAINE HYDROCHLORIDE 2.5 MG/ML
INJECTION, SOLUTION EPIDURAL; INFILTRATION; INTRACAUDAL
Status: DISCONTINUED | OUTPATIENT
Start: 2017-05-15 | End: 2017-05-15 | Stop reason: HOSPADM

## 2017-05-15 RX ORDER — OXYCODONE AND ACETAMINOPHEN 5; 325 MG/1; MG/1
2 TABLET ORAL EVERY 4 HOURS PRN
Status: DISCONTINUED | OUTPATIENT
Start: 2017-05-15 | End: 2017-05-15

## 2017-05-15 RX ORDER — OXYCODONE AND ACETAMINOPHEN 10; 325 MG/1; MG/1
1 TABLET ORAL 4 TIMES DAILY PRN
Qty: 41 TABLET | Refills: 0 | Status: SHIPPED | OUTPATIENT
Start: 2017-05-15 | End: 2017-05-15 | Stop reason: HOSPADM

## 2017-05-15 RX ORDER — SCOLOPAMINE TRANSDERMAL SYSTEM 1 MG/1
1 PATCH, EXTENDED RELEASE TRANSDERMAL
Status: DISCONTINUED | OUTPATIENT
Start: 2017-05-15 | End: 2017-05-15 | Stop reason: HOSPADM

## 2017-05-15 RX ORDER — DIPHENHYDRAMINE HYDROCHLORIDE 50 MG/ML
INJECTION INTRAMUSCULAR; INTRAVENOUS
Status: DISCONTINUED | OUTPATIENT
Start: 2017-05-15 | End: 2017-05-15

## 2017-05-15 RX ORDER — DIPHENHYDRAMINE HYDROCHLORIDE 50 MG/ML
25 INJECTION INTRAMUSCULAR; INTRAVENOUS EVERY 6 HOURS PRN
Status: DISCONTINUED | OUTPATIENT
Start: 2017-05-15 | End: 2017-05-15 | Stop reason: HOSPADM

## 2017-05-15 RX ORDER — AZITHROMYCIN 200 MG/5ML
500 POWDER, FOR SUSPENSION ORAL DAILY
COMMUNITY
End: 2017-08-07

## 2017-05-15 RX ORDER — OXYCODONE AND ACETAMINOPHEN 10; 325 MG/1; MG/1
2 TABLET ORAL EVERY 4 HOURS PRN
Status: DISCONTINUED | OUTPATIENT
Start: 2017-05-15 | End: 2017-05-15

## 2017-05-15 RX ORDER — OXYCODONE HCL 5 MG/5 ML
10 SOLUTION, ORAL ORAL ONCE
Status: COMPLETED | OUTPATIENT
Start: 2017-05-15 | End: 2017-05-15

## 2017-05-15 RX ORDER — FENTANYL CITRATE 50 UG/ML
25 INJECTION, SOLUTION INTRAMUSCULAR; INTRAVENOUS EVERY 5 MIN PRN
Status: COMPLETED | OUTPATIENT
Start: 2017-05-15 | End: 2017-05-15

## 2017-05-15 RX ORDER — LIDOCAINE HCL/PF 100 MG/5ML
SYRINGE (ML) INTRAVENOUS
Status: DISCONTINUED | OUTPATIENT
Start: 2017-05-15 | End: 2017-05-15

## 2017-05-15 RX ORDER — LIDOCAINE HYDROCHLORIDE 10 MG/ML
1 INJECTION, SOLUTION EPIDURAL; INFILTRATION; INTRACAUDAL; PERINEURAL ONCE
Status: DISCONTINUED | OUTPATIENT
Start: 2017-05-15 | End: 2017-05-15 | Stop reason: HOSPADM

## 2017-05-15 RX ORDER — PROPOFOL 10 MG/ML
VIAL (ML) INTRAVENOUS
Status: DISCONTINUED | OUTPATIENT
Start: 2017-05-15 | End: 2017-05-15

## 2017-05-15 RX ADMIN — DIPHENHYDRAMINE HYDROCHLORIDE 12.5 MG: 50 INJECTION, SOLUTION INTRAMUSCULAR; INTRAVENOUS at 10:05

## 2017-05-15 RX ADMIN — FENTANYL CITRATE 100 MCG: 50 INJECTION, SOLUTION INTRAMUSCULAR; INTRAVENOUS at 09:05

## 2017-05-15 RX ADMIN — ROCURONIUM BROMIDE 5 MG: 10 INJECTION, SOLUTION INTRAVENOUS at 09:05

## 2017-05-15 RX ADMIN — BUPIVACAINE HYDROCHLORIDE 7 ML: 2.5 INJECTION, SOLUTION EPIDURAL; INFILTRATION; INTRACAUDAL; PERINEURAL at 09:05

## 2017-05-15 RX ADMIN — FENTANYL CITRATE 25 MCG: 50 INJECTION INTRAMUSCULAR; INTRAVENOUS at 10:05

## 2017-05-15 RX ADMIN — HYDROMORPHONE HYDROCHLORIDE 0.2 MG: 1 INJECTION, SOLUTION INTRAMUSCULAR; INTRAVENOUS; SUBCUTANEOUS at 11:05

## 2017-05-15 RX ADMIN — CEFAZOLIN SODIUM 150 ML: 2 SOLUTION INTRAVENOUS at 09:05

## 2017-05-15 RX ADMIN — PROPOFOL 200 MG: 10 INJECTION, EMULSION INTRAVENOUS at 09:05

## 2017-05-15 RX ADMIN — ONDANSETRON 4 MG: 2 INJECTION INTRAMUSCULAR; INTRAVENOUS at 09:05

## 2017-05-15 RX ADMIN — PROMETHAZINE HYDROCHLORIDE 6.25 MG: 25 INJECTION INTRAMUSCULAR; INTRAVENOUS at 01:05

## 2017-05-15 RX ADMIN — GLYCOPYRROLATE 0.6 MG: 0.2 INJECTION, SOLUTION INTRAMUSCULAR; INTRAVENOUS at 10:05

## 2017-05-15 RX ADMIN — SODIUM CHLORIDE: 0.9 INJECTION, SOLUTION INTRAVENOUS at 08:05

## 2017-05-15 RX ADMIN — LIDOCAINE HYDROCHLORIDE 50 MG: 20 INJECTION, SOLUTION INTRAVENOUS at 09:05

## 2017-05-15 RX ADMIN — DEXAMETHASONE SODIUM PHOSPHATE 8 MG: 4 INJECTION, SOLUTION INTRAMUSCULAR; INTRAVENOUS at 09:05

## 2017-05-15 RX ADMIN — SUCCINYLCHOLINE CHLORIDE 140 MG: 20 INJECTION, SOLUTION INTRAMUSCULAR; INTRAVENOUS at 09:05

## 2017-05-15 RX ADMIN — DIPHENHYDRAMINE HYDROCHLORIDE 12.5 MG: 50 INJECTION, SOLUTION INTRAMUSCULAR; INTRAVENOUS at 09:05

## 2017-05-15 RX ADMIN — DIPHENHYDRAMINE HYDROCHLORIDE 25 MG: 50 INJECTION, SOLUTION INTRAMUSCULAR; INTRAVENOUS at 12:05

## 2017-05-15 RX ADMIN — PROPOFOL 70 MG: 10 INJECTION, EMULSION INTRAVENOUS at 09:05

## 2017-05-15 RX ADMIN — ROCURONIUM BROMIDE 15 MG: 10 INJECTION, SOLUTION INTRAVENOUS at 09:05

## 2017-05-15 RX ADMIN — NEOSTIGMINE METHYLSULFATE 5 MG: 1 INJECTION INTRAVENOUS at 10:05

## 2017-05-15 RX ADMIN — OXYCODONE HYDROCHLORIDE 10 MG: 5 SOLUTION ORAL at 12:05

## 2017-05-15 RX ADMIN — HYDROMORPHONE HYDROCHLORIDE 0.2 MG: 1 INJECTION, SOLUTION INTRAMUSCULAR; INTRAVENOUS; SUBCUTANEOUS at 12:05

## 2017-05-15 RX ADMIN — SCOPALAMINE 1.5 MG: 1 PATCH, EXTENDED RELEASE TRANSDERMAL at 08:05

## 2017-05-15 RX ADMIN — ROCURONIUM BROMIDE 20 MG: 10 INJECTION, SOLUTION INTRAVENOUS at 09:05

## 2017-05-15 RX ADMIN — PROMETHAZINE HYDROCHLORIDE 6.25 MG: 25 INJECTION INTRAMUSCULAR; INTRAVENOUS at 10:05

## 2017-05-15 RX ADMIN — MIDAZOLAM HYDROCHLORIDE 2 MG: 1 INJECTION, SOLUTION INTRAMUSCULAR; INTRAVENOUS at 09:05

## 2017-05-15 RX ADMIN — PROMETHAZINE HYDROCHLORIDE 6.25 MG: 25 INJECTION INTRAMUSCULAR; INTRAVENOUS at 11:05

## 2017-05-15 NOTE — DISCHARGE SUMMARY
[facility]  General Surgery  Discharge Summary      Patient Name: Nan Betts  MRN: [id number]  Admission Date: 5/15/2017  Hospital Length of Stay: 0 days  Discharge Date and Time:  05/15/2017 10:33 AM  Attending Physician: David Celeste MD   Discharging Provider: Betty Sheth MD  Primary Care Provider: Rosi Carias MD     HPI: Ms. Betts is a 48 yo W with gastroparesis s/p gastric stimulator 11/2016 (prior pyloroplasty). Overall she is 70% improved. At her last visit one of her leads was found to be malfunctioning on interrogation. She has severe pain, nausea and vomiting. For pain she is taking percoset tid. She is also taking phenergan iv qid (zofran doesn't work), ppi bid, and is not on promotility agents. She has been to the ER twice since I last saw her. She denies having any falls or trauma.    Procedure(s) (LRB):  Exchange of interstim battery   (N/A)     Hospital Course: She underwent gastric stimulator battery replacement and tolerated the procedure well. Once she recovers from anesthesia, she will be discharged home.    Consults:     Significant Diagnostic Studies: none    Pending Diagnostic Studies:     None        Final Active Diagnoses:    Diagnosis Date Noted POA    PRINCIPAL PROBLEM:  Gastroparesis [K31.84] 03/10/2016 Yes      Problems Resolved During this Admission:    Diagnosis Date Noted Date Resolved POA      Discharged Condition: good    Disposition: Home or Self Care    Follow Up:  Follow-up Information     Follow up with David Celeste MD. Schedule an appointment as soon as possible for a visit in 2 weeks.    Specialties:  General Surgery, Bariatrics    Why:  For wound re-check    Contact information:    [address] [facility] [address]  [phone]          Patient Instructions:     Diet general     Activity as tolerated     Lifting restrictions   Order Comments: No lifting weights greater than 10 pounds for 2 weeks after surgery.      Weight bearing restrictions (specify)   Order Comments: No carrying weights greater than 10 pounds for 2 weeks after surgery.     Call MD for:  temperature >100.4     Call MD for:  persistent nausea and vomiting or diarrhea     Call MD for:  severe uncontrolled pain     Call MD for:  redness, tenderness, or signs of infection (pain, swelling, redness, odor or green/yellow discharge around incision site)     Call MD for:  worsening rash     Call MD for:  increased confusion or weakness     Remove dressing in 48 hours     Shower on day dressing removed (No bath)   Order Comments: Leave abdominal binder on as needed post-operatively.       Medications:  Reconciled Home Medications:   Current Discharge Medication List      START taking these medications    Details   oxycodone (ROXICODONE) 5 mg/5 mL Soln Take 5 mLs (5 mg total) by mouth every 4 (four) hours as needed (pain).  Qty: 473 mL, Refills: 0         CONTINUE these medications which have NOT CHANGED    Details   alprazolam (XANAX) 1 MG tablet       azithromycin 200 mg/5 ml (ZITHROMAX) 200 mg/5 mL suspension Take 500 mg by mouth once daily.      carisoprodol (SOMA) 350 MG tablet Take 350 mg by mouth 3 (three) times daily as needed for Muscle spasms.      dexlansoprazole (DEXILANT) 60 mg capsule Take 60 mg by mouth 2 (two) times daily.      diphenhydrAMINE (BENADRYL) 50 MG tablet Take 50 mg by mouth every 4 (four) hours as needed for Itching.      !! estradiol (ESTRACE) 1 MG tablet Take 3 mg by mouth once daily.      !! estradiol (ESTRACE) 2 MG tablet       flavoxate (URISPAS) 100 mg Tab Take 100 mg by mouth 3 (three) times daily.      gabapentin (NEURONTIN) 250 mg/5 mL solution take 5 milliliters by mouth three times a day  Refills: 0      loperamide (IMODIUM) 2 mg capsule Take 1 capsule (2 mg total) by mouth daily as needed.  Qty: 90 capsule, Refills: 3    Associated Diagnoses: Chronic diarrhea      mometasone (NASONEX) 50 mcg/actuation nasal spray        pentosan polysulfate (ELMIRON) 100 mg Cap Take 100 mg by mouth 3 (three) times daily.      promethazine (PHENERGAN) 25 mg/mL injection Inject 25 mg into the muscle every 4 (four) hours.      simvastatin (ZOCOR) 40 MG tablet       !! albuterol (PROAIR HFA) 90 mcg/actuation inhaler       !! PROAIR HFA 90 mcg/actuation inhaler every 6 (six) hours as needed.   Refills: 0       !! - Potential duplicate medications found. Please discuss with provider.      STOP taking these medications       oxycodone-acetaminophen (PERCOCET)  mg per tablet Comments:   Reason for Stopping:         promethazine (PHENERGAN) 6.25 mg/5 mL syrup Comments:   Reason for Stopping:               Betty Sheth MD  General Surgery  [facility]

## 2017-05-15 NOTE — TRANSFER OF CARE
"Anesthesia Transfer of Care Note    Patient: Nan Betts    Procedure(s) Performed: Procedure(s) (LRB):  Exchange of interstim battery   (N/A)    Patient location: PACU    Anesthesia Type: general    Transport from OR: Transported from OR on 6-10 L/min O2 by face mask with adequate spontaneous ventilation    Post pain: adequate analgesia    Post assessment: no apparent anesthetic complications and tolerated procedure well    Post vital signs: stable    Level of consciousness: awake    Nausea/Vomiting: nausea and vomiting    Complications: none    Transfer of care protocol was followed      Last vitals:   Visit Vitals    BP (!) 153/91    Pulse 89    Temp 36.4 °C (97.5 °F) (Temporal)    Resp 18    Ht 5' 8" (1.727 m)    SpO2 100%    Breastfeeding No     "

## 2017-05-15 NOTE — PLAN OF CARE
"Discharge instructions reviewed with pt and spouse, handouts and prescription given,  verbalized understanding with no further questions at this time. Post op appointment scheduled per AVS sheet with MD telephone number provided. VSS on RA, pain medication administered in pacu states "6/10" on pain scale, nausea much better with medication administered in PACU- says ok to go home, PICC line left in place (came in with prior to discharge),  Tolerating sips of fluids without difficulty, no other complaints noted. Fall precautions reviewed, consents in chart.   "

## 2017-05-15 NOTE — ANESTHESIA POSTPROCEDURE EVALUATION
"Anesthesia Post Evaluation    Patient: Nan Betts    Procedure(s) Performed: Procedure(s) (LRB):  Exchange of interstim battery   (N/A)    Final Anesthesia Type: general  Patient location during evaluation: PACU  Patient participation: Yes- Able to Participate  Level of consciousness: awake and alert  Post-procedure vital signs: reviewed and stable  Pain management: adequate  Airway patency: patent  PONV status at discharge: No PONV  Anesthetic complications: no      Cardiovascular status: blood pressure returned to baseline  Respiratory status: unassisted, spontaneous ventilation and room air  Hydration status: euvolemic  Follow-up not needed.        Visit Vitals    /89    Pulse 89    Temp 36.5 °C (97.7 °F) (Oral)    Resp 18    Ht 5' 8" (1.727 m)    SpO2 95%    Breastfeeding No       Pain/Chelsy Score: Pain Assessment Performed: Yes (5/15/2017 11:45 AM)  Presence of Pain: complains of pain/discomfort (5/15/2017 11:45 AM)  Pain Rating Prior to Med Admin: 7 (5/15/2017 12:37 PM)  Chelsy Score: 10 (5/15/2017 11:45 AM)      "

## 2017-05-15 NOTE — H&P (VIEW-ONLY)
Subjective:       Patient ID: Nan Betts is a 47 y.o. female.    Chief Complaint: Pre-op Exam    HPI S/p gastric stimulator 11/2016 (prior pyloroplasty). Overall she is 70% improved.  At her last visit one of her leads was found to be malfunctioning on interrogation.  She has severe pain, nausea and vomiting.  For pain she is taking percoset tid.  She is also taking phenergan iv qid (zofran doesn't work), ppi bid, and is not on promotility agents.  She has been to the ER twice since I last saw her.  She denies having any falls or trauma.  Review of Systems   Constitutional: Negative for fever.   Respiratory: Positive for shortness of breath. Negative for chest tightness.    Cardiovascular: Negative for chest pain.   Genitourinary: Negative for difficulty urinating and dysuria.       Objective:       CT ok by report    Physical Exam   Constitutional: She is oriented to person, place, and time. She appears well-developed and well-nourished.   Abdominal: Soft.   Neurological: She is alert and oriented to person, place, and time.   Skin: Skin is warm and dry.   Psychiatric: She has a normal mood and affect. Her behavior is normal. Judgment and thought content normal.   Vitals reviewed.      I have discussed this with [employer] and had them help me through the proper adjustment.  Lead 2 was turned off and is malfunctioning.  Imp 372 volt 5.5 cur 14.8 pw 330 rate 14 on 0.1 off 5.  She says she feels a clicking with the change so I changed the setting to volt 4.5 curr 12.1 pw 330 rate 14 on 1 off 4    Assessment:       1. Gastroparesis        Plan:       Obtain ct films to see if lead has fractured.   She states there is a one year warranty.  She wants it replaced as it is malfunctioning and she is concerned it won't be paid for at a later date (after the warranty expires).  I have contacted our rep who says that one of the near lead has pulled out of the box and we will repair this on Monday.  I have told her she needs to  work on getting off narcotics.

## 2017-05-15 NOTE — INTERVAL H&P NOTE
The patient has been examined and the H&P has been reviewed:    I concur with the findings and no changes have occurred since H&P was written.    Anesthesia/Surgery risks, benefits and alternative options discussed and understood by patient/family.          Active Hospital Problems    Diagnosis  POA    Gastroparesis [K31.84]  Yes      Resolved Hospital Problems    Diagnosis Date Resolved POA   No resolved problems to display.

## 2017-05-15 NOTE — OP NOTE
DATE OF PROCEDURE:  05/15/2017.    DIAGNOSIS:  Malfunctioning gastric stimulator.    PROCEDURE PERFORMED:  Replacement of gastric stimulator.    SURGEON:  David Celeste M.D.    ASSISTANT:  Sigifredo Sheth M.D. (RES).    ANESTHESIA:  General.    PROCEDURE IN DETAIL:  The patient was placed under general anesthesia.  The   abdomen was prepped and draped in the usual manner.  The area was preinjected   with Marcaine.  Incision was made with scalpel.  Subcutaneous tissues were   divided with the Bovie down to the stimulator, which was pulled out on to the   abdominal wall.  We checked it and it was still not functioning.  I took the   leads out of the stimulator and replaced them with a screwdriver and rechecked   and the numbers were consistent with what they were previously with one of the   leads still malfunctioning with CN II and CN III totals greater than 2000.  I   then replaced the stimulator with a new stimulator and rechecked it.  The   impedance was now in the normal range.  I reattached it to the fascia with 2-0   Vicryl sutures and then programmed and turned on the new stimulator.  Impedance   was 529, voltage 4.5, current 8.5, pulse width 330, rate 14, cycle on 1, cycle   off 4.  The area was inspected for hemostasis, closed with 3-0 and then 4-0   Vicryl.  Mastisol, Steri-Strips, sterile dressing and a binder was placed.  The   patient tolerated the procedure well and was brought to Recovery Room in stable   condition.  Sponge and needle counts were correct at the end of the case.    Again, the leads appeared to be appropriately placed in the old stimulator and   then again in the new stimulator and the aspect of the device that was not   functioning almost certainly was the generator itself.    ESTIMATED BLOOD LOSS:  Minimal.    COMPLICATIONS:  None.    PATHOLOGY:  Stimulator, which will be sent to Pathology and then on to   [facility].      SHA/SANDI  dd: 05/15/2017 10:11:26 (CDT)  td: 05/15/2017  12:28:48 (CDT)  Doc ID   #[id number]  Job ID #[id number]    CC:

## 2017-05-15 NOTE — IP AVS SNAPSHOT
[facility]  [address]  [facility] [address]  Phone: [phone]           Patient Discharge Instructions   Our goal is to set you up for success. This packet includes information on your condition, medications, and your home care.  It will help you care for yourself to prevent having to return to the hospital.     Please ask your nurse if you have any questions.      There are many details to remember when preparing to leave the hospital. Here is what you will need to do:    1. Take your medicine. If you are prescribed medications, review your Medication List on the following pages. You may have new medications to  at the pharmacy and others that you'll need to stop taking. Review the instructions for how and when to take your medications. Talk with your doctor or nurses if you are unsure of what to do.     2. Go to your follow-up appointments. Specific follow-up information is listed in the following pages. Your may be contacted by a nurse or clinical provider about future appointments. Be sure we have all of the phone numbers to reach you. Please contact your provider's office if you are unable to make an appointment.     3. Watch for warning signs. Your doctor or nurse will give you detailed warning signs to watch for and when to call for assistance. These instructions may also include educational information about your condition. If you experience any of warning signs to your health, call your doctor.           [facility]  Unless otherwise directed by your provider, please   contact [facility], our nurse care line   that is available for 24/7 assistance.     [phone] (toll-free)     Registered nurses in the [facility]   provide: appointment scheduling, clinical advisement, health education, and other advisory services.                  ** Verify the list of medication(s) below is accurate and up to date. Carry this with you in case of  emergency. If your medications have changed, please notify your healthcare provider.             Medication List      START taking these medications        Additional Info                      oxycodone 5 mg/5 mL Soln   Commonly known as:  ROXICODONE   Quantity:  473 mL   Refills:  0   Dose:  5 mg    Last time this was given:  10 mg on 5/15/2017 12:37 PM   Instructions:  Take 5 mLs (5 mg total) by mouth every 4 (four) hours as needed (pain).     Begin Date    AM    Noon    PM    Bedtime         CHANGE how you take these medications        Additional Info                      PHENERGAN 25 mg/mL injection   Refills:  0   Dose:  25 mg   Indications:  Nausea and Vomiting   What changed:  Another medication with the same name was removed. Continue taking this medication, and follow the directions you see here.   Generic drug:  promethazine    Last time this was given:  6.25 mg on 5/15/2017  1:45 PM   Instructions:  Inject 25 mg into the muscle every 4 (four) hours.     Begin Date    AM    Noon    PM    Bedtime         CONTINUE taking these medications        Additional Info                      alprazolam 1 MG tablet   Commonly known as:  XANAX   Refills:  0      Begin Date    AM    Noon    PM    Bedtime       azithromycin 200 mg/5 ml 200 mg/5 mL suspension   Commonly known as:  ZITHROMAX   Refills:  0   Dose:  500 mg    Instructions:  Take 500 mg by mouth once daily.     Begin Date    AM    Noon    PM    Bedtime       carisoprodol 350 MG tablet   Commonly known as:  SOMA   Refills:  0   Dose:  350 mg    Instructions:  Take 350 mg by mouth 3 (three) times daily as needed for Muscle spasms.     Begin Date    AM    Noon    PM    Bedtime       DEXILANT 60 mg capsule   Refills:  0   Dose:  60 mg   Generic drug:  dexlansoprazole    Instructions:  Take 60 mg by mouth 2 (two) times daily.     Begin Date    AM    Noon    PM    Bedtime       diphenhydrAMINE 50 MG tablet   Commonly known as:  BENADRYL   Refills:  0   Dose:  50 mg     Instructions:  Take 50 mg by mouth every 4 (four) hours as needed for Itching.     Begin Date    AM    Noon    PM    Bedtime       * estradiol 1 MG tablet   Commonly known as:  ESTRACE   Refills:  0   Dose:  3 mg    Instructions:  Take 3 mg by mouth once daily.     Begin Date    AM    Noon    PM    Bedtime       * estradiol 2 MG tablet   Commonly known as:  ESTRACE   Refills:  0      Begin Date    AM    Noon    PM    Bedtime       flavoxate 100 mg Tab   Commonly known as:  URISPAS   Refills:  0   Dose:  100 mg   Indications:  Interstical Cystitis and Bladder spasams    Instructions:  Take 100 mg by mouth 3 (three) times daily.     Begin Date    AM    Noon    PM    Bedtime       gabapentin 250 mg/5 mL solution   Commonly known as:  NEURONTIN   Refills:  0    Instructions:  take 5 milliliters by mouth three times a day     Begin Date    AM    Noon    PM    Bedtime       loperamide 2 mg capsule   Commonly known as:  IMODIUM   Quantity:  90 capsule   Refills:  3   Dose:  2 mg    Instructions:  Take 1 capsule (2 mg total) by mouth daily as needed.     Begin Date    AM    Noon    PM    Bedtime       mometasone 50 mcg/actuation nasal spray   Commonly known as:  NASONEX   Refills:  0      Begin Date    AM    Noon    PM    Bedtime       pentosan polysulfate 100 mg Cap   Commonly known as:  ELMIRON   Refills:  0   Dose:  100 mg   Indications:  Interstitial Cystitis    Instructions:  Take 100 mg by mouth 3 (three) times daily.     Begin Date    AM    Noon    PM    Bedtime       * PROAIR HFA 90 mcg/actuation inhaler   Refills:  0   Generic drug:  albuterol      Begin Date    AM    Noon    PM    Bedtime       * PROAIR HFA 90 mcg/actuation inhaler   Refills:  0   Generic drug:  albuterol    Instructions:  every 6 (six) hours as needed.     Begin Date    AM    Noon    PM    Bedtime       simvastatin 40 MG tablet   Commonly known as:  ZOCOR   Refills:  0      Begin Date    AM    Noon    PM    Bedtime       * Notice:  This list  has 4 medication(s) that are the same as other medications prescribed for you. Read the directions carefully, and ask your doctor or other care provider to review them with you.      STOP taking these medications     oxycodone-acetaminophen  mg per tablet   Commonly known as:  PERCOCET            Where to Get Your Medications      You can get these medications from any pharmacy     Bring a paper prescription for each of these medications     oxycodone 5 mg/5 mL Soln                  Please bring to all follow up appointments:    1. A copy of your discharge instructions.  2. All medicines you are currently taking in their original bottles.  3. Identification and insurance card.    Please arrive 15 minutes ahead of scheduled appointment time.    Please call 24 hours in advance if you must reschedule your appointment and/or time.        Your Scheduled Appointments     May 17, 2017  8:00 AM CDT   GASTROENTEROLOGY ESTABLISHED PATIENT with MD Gabino Sánchez [facility] - Gastroenterology ([facility] )    [address]  [place] [address]   [phone]            May 30, 2017 11:00 AM CDT   Post OP with MD Gabino Diaz - General Surgery ([facility] )    [address]  [place] [address]   [phone]              Follow-up Information     Follow up with David Celeste MD. Schedule an appointment as soon as possible for a visit in 2 weeks.    Specialties:  General Surgery, Bariatrics    Why:  For wound re-check    Contact information:    [address] [facility] [address]  [phone]          Discharge Instructions     Future Orders    Activity as tolerated     Call MD for:  increased confusion or weakness     Call MD for:  persistent nausea and vomiting or diarrhea     Call MD for:  redness, tenderness, or signs of infection (pain, swelling, redness, odor or green/yellow discharge around incision site)     Call MD for:  severe  "uncontrolled pain     Call MD for:  temperature >100.4     Call MD for:  worsening rash     Diet general     Questions:    Total calories:      Fat restriction, if any:      Protein restriction, if any:      Na restriction, if any:      Fluid restriction:      Additional restrictions:      Lifting restrictions     Comments:    No lifting weights greater than 10 pounds for 2 weeks after surgery.    Shower on day dressing removed (No bath)     Comments:    Leave abdominal binder on as needed post-operatively.    Weight bearing restrictions (specify)     Comments:    No carrying weights greater than 10 pounds for 2 weeks after surgery.        Primary Diagnosis     Your primary diagnosis was:  Digestive System Disorder      Admission Information     Date & Time Provider Department CSN    5/15/2017  6:26 AM David Celeste MD [facility] [id number]      Care Providers     Provider Role Specialty Primary office phone    David Celeste MD Attending Provider General Surgery [phone]    David Celeste MD Surgeon  General Surgery [phone]      Your Vitals Were     BP Pulse Temp Resp Height SpO2    127/85 (BP Location: Right arm, Patient Position: Sitting, BP Method: Automatic) 84 97.5 °F (36.4 °C) (Oral) 16 5' 8" (1.727 m) 97%      Recent Lab Values     No lab values to display.      Pending Labs     Order Current Status    Specimen to Pathology - Surgery In process      Allergies as of 5/15/2017        Reactions    Morphine Anaphylaxis    Toradol [Ketorolac] Shortness Of Breath    Ultram [Tramadol] Shortness Of Breath    Buprenorphine Hcl     Codeine Itching    Flexeril [Cyclobenzaprine] Other (See Comments)    GI BLEEDING    Hydrocodone     Hydrocodone-acetaminophen     Ibuprofen     Ketorolac Tromethamine     Metoclopramide     Nsaids (Non-steroidal Anti-inflammatory Drug) Other (See Comments)    GI BLEEDING    Opioids - Morphine Analogues     Reglan [Metoclopramide Hcl] " Other (See Comments)    Headaches and insomnia    Tramadol Hcl       Advance Directives     An advance directive is a document which, in the event you are no longer able to make decisions for yourself, tells your healthcare team what kind of treatment you do or do not want to receive, or who you would like to make those decisions for you.  If you do not currently have an advance directive, [facility] encourages you to create one.  For more information call:  [phone]), [phone] ([phone]),  or log on to [email or web address].        Smoking Cessation     If you would like to quit smoking:   You may be eligible for free services if you are a [place] resident and started smoking cigarettes before September 1, 1988.  Call the Smoking Cessation Trust (SCT) toll free at (280) 624-4305 or (470) 263-5657.   Call 5-746-QUIT-NOW if you do not meet the above criteria.   Contact us via email: [email or web address]   View our website for more information: [email or web address]        Language Assistance Services     ATTENTION: Language assistance services are available, free of charge. Please call [phone].      ATENCIÓN: Si habla unique, tiene a wan disposición servicios gratuitos de asistencia lingüística. Llame al [phone].     CHÚ Ý: N?u b?n nói Ti?ng Vi?t, có các d?ch v? h? tr? ngôn ng? mi?n phí dành cho b?n. G?i s? [phone].         [facility] complies with applicable Federal civil rights laws and does not discriminate on the basis of race, color, national origin, age, disability, or sex.

## 2017-05-15 NOTE — BRIEF OP NOTE
Operative Note       Surgery Date: 5/15/2017     Surgeon(s) and Role:     * Betty Sheth MD - Resident - Assisting     * David Celeste MD - Primary    Pre-op Diagnosis:  Gastroparesis, malfunctioning gastric neurostimlator    Post-op Diagnosis:  Gastroparesis, malfunctioning gastric neurostimulator    Procedure:  Replacement gastric neurostimulator generator  Intraoperative programming gastric neurostimulator    Anesthesia: General    Procedure in Detail/Findings:  Battery replaced and now functioning.  It did not seem as if the leads were the problem or their attachement to the old stimulator.    Estimated Blood Loss: Minimal           Specimens     None        Implants:   Implant Name Type Inv. Item Serial No.  Lot No. LRB No. Used   GENERATOR NEUROSTIM GASTRIC - [id number]   GENERATOR NEUROSTIM GASTRIC [id number] [employer] [facility]   N/A 1              Disposition: PACU - hemodynamically stable.           Condition: Good    Attestation:  I was present and scrubbed for the entire procedure.

## 2017-05-15 NOTE — ANESTHESIA RELEASE NOTE
"Anesthesia Release from PACU Note    Patient: Nan Betts    Procedure(s) Performed: Procedure(s) (LRB):  Exchange of interstim battery   (N/A)    Anesthesia type: GEN    Post pain: Adequate analgesia reported    Post assessment: no apparent anesthetic complications, tolerated procedure well and no evidence of recall    Post vital signs: /89  Pulse 89  Temp 36.5 °C (97.7 °F) (Oral)   Resp 18  Ht 5' 8" (1.727 m)  SpO2 95%  Breastfeeding? No    Level of consciousness: awake, alert and oriented    Nausea/Vomiting: no nausea/no vomiting    Complications: none    Airway Patency: patent    Respiratory: unassisted, spontaneous ventilation, room air    Cardiovascular: stable and blood pressure at baseline    Hydration: euvolemic    "

## 2017-05-15 NOTE — ANESTHESIA PREPROCEDURE EVALUATION
05/15/2017  Nan Betts is a 47 y.o., female.    Anesthesia Evaluation    I have reviewed the Patient Summary Reports.     I have reviewed the Medications.     Review of Systems      Physical Exam  General:  Well nourished    Airway/Jaw/Neck:  Airway Findings: Mouth Opening: Normal Mallampati: II  Improves to II with phonation.  TM Distance: Normal, at least 6 cm      Dental:  Dental Findings: In tact   Chest/Lungs:  Chest/Lungs Findings: Normal Respiratory Rate     Heart/Vascular:  Heart Findings: Rate: Normal  Rhythm: Regular Rhythm        Mental Status:  Mental Status Findings:  Cooperative         Anesthesia Plan  Type of Anesthesia, risks & benefits discussed:  Anesthesia Type:  general  Patient's Preference:   Intra-op Monitoring Plan:   Intra-op Monitoring Plan Comments:   Post Op Pain Control Plan:   Post Op Pain Control Plan Comments:   Induction:   IV  Beta Blocker:  Patient is not currently on a Beta-Blocker (No further documentation required).       Informed Consent: Patient understands risks and agrees with Anesthesia plan.  Questions answered. Anesthesia consent signed with patient.  ASA Score: 2     Day of Surgery Review of History & Physical:    H&P update referred to the surgeon.     Anesthesia Plan Notes: Patient Active Problem List:     Gastroesophageal reflux disease without esophagitis     Gastroparesis     Urge urinary incontinence     Interstitial cystitis     Anxiety     Vaginal atrophy     Vaginal pain     Dyspareunia     Intractable nausea and vomiting     Hypercholesteremia     Irritable bowel syndrome with diarrhea     Left lower quadrant pain     Diarrhea              Ready For Surgery From Anesthesia Perspective.

## 2017-05-16 ENCOUNTER — PATIENT MESSAGE (OUTPATIENT)
Dept: GASTROENTEROLOGY | Facility: CLINIC | Age: 47
End: 2017-05-16

## 2017-05-30 ENCOUNTER — PATIENT MESSAGE (OUTPATIENT)
Dept: GASTROENTEROLOGY | Facility: CLINIC | Age: 47
End: 2017-05-30

## 2017-06-01 ENCOUNTER — TELEPHONE (OUTPATIENT)
Dept: GASTROENTEROLOGY | Facility: CLINIC | Age: 47
End: 2017-06-01

## 2017-06-01 NOTE — TELEPHONE ENCOUNTER
----- Message from Lily Lew sent at 6/1/2017  1:27 PM CDT -----  Contact: [phone] - silvia Lehman - returning your call re labwork - please call patient at

## 2017-06-01 NOTE — TELEPHONE ENCOUNTER
----- Message from Zack Lehman MD sent at 6/1/2017 12:45 PM CDT -----  Contact: self - [phone]  i will not have anything next week, already overbooked  ----- Message -----  From: Bernice Blackburn MA  Sent: 5/31/2017  11:33 AM  To: Zack Lehman MD    2nd message.  She would like to be seen 6/7.  ----- Message -----  From: Lily Lew  Sent: 5/31/2017  11:08 AM  To: Doimnik Lehman - needs to resched appt - is in hospital in [place] - states sent over info on [facility] home health - please call patient at

## 2017-06-01 NOTE — TELEPHONE ENCOUNTER
----- Message from Zack Lehman MD sent at 6/1/2017 12:46 PM CDT -----  Contact: self - [phone]  i will look for records  ----- Message -----  From: Bernice Balckburn MA  Sent: 5/31/2017  11:29 AM  To: Zack Lehman MD    Was admitted this AM thru ED at home for SOB, CP, dizziness, L arm and L leg hurting. Stated she is not being given fluids.  Being asked to drink fluids.  Stated she is waiting for orders to be sent to her home health for fluids.  Stated you asked her if she was being given fluids with the phenergan and she is not.  She will have the hospital where she has been admitted to send the medical records.  ----- Message -----  From: Lily Lew  Sent: 5/31/2017  11:08 AM  To: Dominik Lehman - needs to resched appt - is in hospital in [place] - states sent over info on [facility] health - please call patient at

## 2017-06-02 ENCOUNTER — PATIENT MESSAGE (OUTPATIENT)
Dept: GASTROENTEROLOGY | Facility: CLINIC | Age: 47
End: 2017-06-02

## 2017-06-05 ENCOUNTER — PATIENT MESSAGE (OUTPATIENT)
Dept: GASTROENTEROLOGY | Facility: CLINIC | Age: 47
End: 2017-06-05

## 2017-06-05 ENCOUNTER — TELEPHONE (OUTPATIENT)
Dept: GASTROENTEROLOGY | Facility: CLINIC | Age: 47
End: 2017-06-05

## 2017-06-05 ENCOUNTER — PATIENT MESSAGE (OUTPATIENT)
Dept: SURGERY | Facility: CLINIC | Age: 47
End: 2017-06-05

## 2017-06-05 NOTE — TELEPHONE ENCOUNTER
----- Message from Zack Lehman MD sent at 6/5/2017 12:40 PM CDT -----  Contact: Self- [phone]  rx written, can fax in  ----- Message -----  From: Bernice Blackburn MA  Sent: 6/5/2017  10:45 AM  To: MD Leda Sánchez, if you decide to start IV fluids the home health agency said we would have to send them an order with what you want given.    ----- Message -----  From: Marisol Saldivar  Sent: 6/5/2017  10:38 AM  To: Dominik Lehman- pt called to speak with Angela about getting her iv fluid started- says she is having a difficult time keeping liquids down now- please call pt back at [phone]

## 2017-06-06 ENCOUNTER — TELEPHONE (OUTPATIENT)
Dept: GASTROENTEROLOGY | Facility: CLINIC | Age: 47
End: 2017-06-06

## 2017-06-06 NOTE — TELEPHONE ENCOUNTER
Aware we sent her back a message yesterday. I did fax to her home health agency the orders from Dr.James Lehman for fluids.

## 2017-06-06 NOTE — TELEPHONE ENCOUNTER
----- Message from Zack Lehman MD sent at 6/6/2017  1:55 PM CDT -----  Contact: Yudy Dominik with [facility] [phone]  Yes, let's eliminate thepotssium, jsut 1 liter of 1/2 NS  ----- Message -----  From: Bernice Blackburn MA  Sent: 6/6/2017   1:48 PM  To: Zack Lehman MD    Stated they received your order for fluid with Potassium.  They can do the fluids but do not administer the Potassium in the home.  Wanted to know if you want to change the order?

## 2017-06-06 NOTE — TELEPHONE ENCOUNTER
----- Message from Kinza Bray MA sent at 6/6/2017 11:43 AM CDT -----  Contact: [phone]  Dominik  Following up on a message she left yesterday, said no one has called her back yet.    [phone]

## 2017-06-08 ENCOUNTER — OFFICE VISIT (OUTPATIENT)
Dept: SURGERY | Facility: CLINIC | Age: 47
End: 2017-06-08
Payer: COMMERCIAL

## 2017-06-08 VITALS
BODY MASS INDEX: 35.61 KG/M2 | WEIGHT: 235 LBS | DIASTOLIC BLOOD PRESSURE: 74 MMHG | HEIGHT: 68 IN | SYSTOLIC BLOOD PRESSURE: 112 MMHG | HEART RATE: 81 BPM | TEMPERATURE: 99 F

## 2017-06-08 DIAGNOSIS — K31.84 GASTROPARESIS: Primary | ICD-10-CM

## 2017-06-08 PROCEDURE — 99024 POSTOP FOLLOW-UP VISIT: CPT | Mod: S$GLB,,, | Performed by: SURGERY

## 2017-06-08 PROCEDURE — 99999 PR PBB SHADOW E&M-EST. PATIENT-LVL III: CPT | Mod: PBBFAC,,, | Performed by: SURGERY

## 2017-06-08 RX ORDER — GABAPENTIN 250 MG/5ML
250 SOLUTION ORAL 3 TIMES DAILY
Qty: 450 ML | Refills: 11 | Status: SHIPPED | OUTPATIENT
Start: 2017-06-08 | End: 2017-08-07 | Stop reason: SDUPTHER

## 2017-06-08 NOTE — PROGRESS NOTES
"Nan Betts is a 47 y.o. female patient.   1. Gastroparesis      Past Medical History:   Diagnosis Date    Abnormal Pap smear of vagina     Anxiety     Cataract     Epilepsy     as a child    Gastroparesis     Idiopathic    GERD (gastroesophageal reflux disease)     H/O abnormal cervical Papanicolaou smear     Hypercholesteremia 6/21/2016    Hyperlipidemia     Internal hemorrhoids     Interstitial cystitis     Irritable bowel syndrome (IBS)     Irritable bowel syndrome with diarrhea 6/21/2016    Spastic colon     TMJ (temporomandibular joint disorder)     TMJ (temporomandibular joint syndrome) 6/21/2016     No past surgical history pertinent negatives on file.  Scheduled Meds:  Continuous Infusions:  PRN Meds:    Review of patient's allergies indicates:   Allergen Reactions    Morphine Anaphylaxis    Toradol [ketorolac] Shortness Of Breath    Ultram [tramadol] Shortness Of Breath    Buprenorphine hcl     Codeine Itching    Flexeril [cyclobenzaprine] Other (See Comments)     GI BLEEDING    Hydrocodone     Hydrocodone-acetaminophen     Ibuprofen     Ketorolac tromethamine     Metoclopramide     Nsaids (non-steroidal anti-inflammatory drug) Other (See Comments)     GI BLEEDING    Opioids - morphine analogues     Reglan [metoclopramide hcl] Other (See Comments)     Headaches and insomnia      Tramadol hcl      There are no hospital problems to display for this patient.    Blood pressure 112/74, pulse 81, temperature 98.6 °F (37 °C), height 5' 8" (1.727 m), weight 106.6 kg (235 lb).    Subjective S/p replacement gastric stimulator replacement 5/15/17.  She has 4/10 pain in her incision.  She also has mild improvement in her gastroparesis symptoms with the change (bloating and nausea particularly).  She takes phenergan 6 times a day.  She went to the ER with pain and SOB and workup was negative: cxr, cta chest, u/s legs, cmp, bmp.  She is on narcotics for her jaw pain.  Objective  Wound " clear, abdomen benign, stimulator adjustment: imp 548 volt 5.5 curr 10 pw 330 rate 14 on 1 off 4   Assessment & Plan She is doing well with surgery but only has mild improvement.  Wear binder for pain and will start neurontin.  Rtc one month.  She will have to get further narcotics from her jaw surgeon or pain management doctor.       David Celeste MD  6/8/2017

## 2017-06-08 NOTE — LETTER
[facility]  [address]  [facility] [address]  Phone: [phone] June 8, 2017      Rosi Carias MD  [address] [place] [address]    Patient: Nan Betts   MR Number: [id number]   YOB: 1970   Date of Visit: 6/8/2017     Dear Dr. Carias:    Thank you for referring Nan Betts to me for evaluation. Below are the relevant portions of my assessment and plan of care.    Nan Betts is a 47-year-old female patient status post replacement gastric stimulator replacement 5/15/17.  She has 4/10 pain in her incision.  She also has mild improvement in her gastroparesis symptoms with the change (bloating and nausea particularly).  She takes Phenergan 6 times a day.  She went to the ER with pain and shortness of breath and workup was negative: CXR, CTA chest, ultrasound legs, CMP,  And BMP.  She is on narcotics for her jaw pain.  1. Gastroparesis      PLAN: She is doing well with surgery, but only has mild improvement. To wear binder for pain and will start Neurontin.  RTC one month.  She will have to get further narcotics from her jaw surgeon or pain management doctor.     If you have questions, please do not hesitate to call me. I look forward to following Nan along with you.    Sincerely,      David Celeste MD   Section Head - General, Laparoscopic, Bariatric  Acute Care and Oncologic Surgery   - Surgical Weight Loss Program  [facility]    WSR/belinda    CC  Zack Lehman MD

## 2017-06-26 ENCOUNTER — PATIENT MESSAGE (OUTPATIENT)
Dept: SURGERY | Facility: CLINIC | Age: 47
End: 2017-06-26

## 2017-06-28 ENCOUNTER — PATIENT MESSAGE (OUTPATIENT)
Dept: SURGERY | Facility: CLINIC | Age: 47
End: 2017-06-28

## 2017-06-29 ENCOUNTER — OFFICE VISIT (OUTPATIENT)
Dept: SURGERY | Facility: CLINIC | Age: 47
End: 2017-06-29
Payer: COMMERCIAL

## 2017-06-29 VITALS — TEMPERATURE: 98 F | BODY MASS INDEX: 36.68 KG/M2 | HEIGHT: 68 IN | WEIGHT: 242 LBS

## 2017-06-29 DIAGNOSIS — K31.84 GASTROPARESIS: Primary | ICD-10-CM

## 2017-06-29 PROCEDURE — 99999 PR PBB SHADOW E&M-EST. PATIENT-LVL II: CPT | Mod: PBBFAC,,, | Performed by: SURGERY

## 2017-06-29 PROCEDURE — 99213 OFFICE O/P EST LOW 20 MIN: CPT | Mod: S$GLB,,, | Performed by: SURGERY

## 2017-06-29 RX ORDER — OXYCODONE AND ACETAMINOPHEN 10; 325 MG/1; MG/1
TABLET ORAL EVERY 12 HOURS PRN
COMMUNITY
Start: 2017-06-10 | End: 2018-03-21

## 2017-06-29 RX ORDER — OXYCODONE AND ACETAMINOPHEN 5; 325 MG/1; MG/1
TABLET ORAL
COMMUNITY
Start: 2017-06-10 | End: 2017-12-06

## 2017-06-29 NOTE — LETTER
[facility] - General Surgery  [address]  [facility] [address]  Phone: [phone] June 29, 2017      Rosi Carias MD  [address] [place] [address]    Patient: Nan Betts   MR Number: [id number]   YOB: 1970   Date of Visit: 6/29/2017     Dear Dr. Carias:    Thank you for referring Nan Betts to me for evaluation. Below are the relevant portions of my assessment and plan of care.    Patient is status post replacement gastric stimulator replacement 5/15/17 and robotic pyloroplasty 3/16.  Her main complaint is pain at her stimulator site after eating.  She finds it debilitating as it comes on fast and sharp, lasting 30 minutes to all night.  She has almost fallen with the pain.    PLAN:  She is having pain in her stimulator site and we will turn it off for a week and see if that makes her better.  If not consider pocket revision.     If you have questions, please do not hesitate to call me. I look forward to following Nan along with you.    Sincerely,      David Celeste MD   Section Head - General, Laparoscopic, Bariatric  Acute Care and Oncologic Surgery   - Surgical Weight Loss Program  [facility]    WSR/belinda    CC:  Zack Lehman MD

## 2017-06-29 NOTE — PROGRESS NOTES
"Nan Betts is a 47 y.o. female patient.   1. Gastroparesis      Past Medical History:   Diagnosis Date    Abnormal Pap smear of vagina     Anxiety     Cataract     Epilepsy     as a child    Gastroparesis     Idiopathic    GERD (gastroesophageal reflux disease)     H/O abnormal cervical Papanicolaou smear     Hypercholesteremia 6/21/2016    Hyperlipidemia     Internal hemorrhoids     Interstitial cystitis     Irritable bowel syndrome (IBS)     Irritable bowel syndrome with diarrhea 6/21/2016    Spastic colon     TMJ (temporomandibular joint disorder)     TMJ (temporomandibular joint syndrome) 6/21/2016     No past surgical history pertinent negatives on file.  Scheduled Meds:  Continuous Infusions:  PRN Meds:    Review of patient's allergies indicates:   Allergen Reactions    Morphine Anaphylaxis    Toradol [ketorolac] Shortness Of Breath    Ultram [tramadol] Shortness Of Breath    Buprenorphine hcl     Codeine Itching    Flexeril [cyclobenzaprine] Other (See Comments)     GI BLEEDING    Hydrocodone     Hydrocodone-acetaminophen     Ibuprofen     Ketorolac tromethamine     Metoclopramide     Nsaids (non-steroidal anti-inflammatory drug) Other (See Comments)     GI BLEEDING    Opioids - morphine analogues     Reglan [metoclopramide hcl] Other (See Comments)     Headaches and insomnia      Tramadol hcl      There are no hospital problems to display for this patient.    Temperature 98.3 °F (36.8 °C), height 5' 8" (1.727 m), weight 109.8 kg (242 lb).    Subjective S/p replacement gastric stimulator replacement 5/15/17 and robotic pyloroplasty 3/16.  Her main complaint is pain at her stimulator site after eating.  She finds it debilitating as it comes on fast and sharp lasting 30 minutes to all night.  She has almost fallen with the pain.  Objective Abdomen benign, wounds clear.  She has no tenderness at the battery site and it is not floating in the pocket.  She has slowed speech and " she is walking slowly. Stimulator turned off, imp 547 volt 5.5 curr 10.1 pw 330 rate 14 on 1 off 4.   Assessment & Plan  She is having pain in her stimulator site and we will turn it off for a week and see if that makes her better.  If not consider pocket revision.       David Celeste MD  6/29/2017

## 2017-06-29 NOTE — MEDICAL/APP STUDENT
Subjective:       Patient ID: Nan Betts is a 47 y.o. female.    Chief Complaint: Follow-up    HPI Patient complaining of bloating, SOB, and incision pain after eating or drinking. Patient is still suffering from sever incisional pain. She is concerned because she did not have this pain with her previous insertion site. The pain is occasionally  brought on by eating. Pain is debilitating. Patient is taking neurotin and percocet for other problems and is finding they help, but she still has this pain. Pain lasts anywhere from 30 minutes to all night long. Bloating is so sever is causes SOB.     Review of Systems   Respiratory: Positive for shortness of breath.    Gastrointestinal: Positive for abdominal distention and nausea. Negative for constipation, diarrhea and vomiting.        Taking IV phenergan for nausea.        Objective:      Physical Exam    Assessment:       1. Gastroparesis        Plan:

## 2017-07-06 ENCOUNTER — OFFICE VISIT (OUTPATIENT)
Dept: SURGERY | Facility: CLINIC | Age: 47
End: 2017-07-06
Payer: COMMERCIAL

## 2017-07-06 VITALS
HEIGHT: 68 IN | SYSTOLIC BLOOD PRESSURE: 121 MMHG | DIASTOLIC BLOOD PRESSURE: 93 MMHG | HEART RATE: 94 BPM | WEIGHT: 240 LBS | BODY MASS INDEX: 36.37 KG/M2 | TEMPERATURE: 98 F

## 2017-07-06 DIAGNOSIS — K31.84 GASTROPARESIS: Primary | ICD-10-CM

## 2017-07-06 PROCEDURE — 99999 PR PBB SHADOW E&M-EST. PATIENT-LVL III: CPT | Mod: PBBFAC,,, | Performed by: SURGERY

## 2017-07-06 PROCEDURE — 95982 IO GA N-STIM SUBSQ W/REPROG: CPT | Mod: S$GLB,,, | Performed by: SURGERY

## 2017-07-06 NOTE — LETTER
[facility] - General Surgery  [address]  [facility] [address]  Phone: [phone] June 6, 2017      Rosi Carias MD  [address] [place] [address]    Patient: Nan Betts   MR Number: [id number]   YOB: 1970   Date of Visit: 7/6/2017     Dear Dr. Carias:    Thank you for referring Nan Betts to me for evaluation. Below are the relevant portions of my assessment and plan of care.    Patient is status post replacement gastric stimulator replacement 5/15/17 and robotic pyloroplasty 3/16.  Turning off the stimulator has resolved the incisional pain, but her dyspepsia has been worse, along with other gastroparesis symptoms.    PLAN: If pain recurs we will further lower the voltage and increase other settings.  If she needs to increase stimulation for gastroparesis symptoms we will see her in a month.    If you have questions, please do not hesitate to call me. I look forward to following Nan along with you.    Sincerely,      David Celeste MD   Section Head - General, Laparoscopic, Bariatric  Acute Care and Oncologic Surgery   - Surgical Weight Loss Program  [facility]    WSR/belinda    CC  Zack Lehman MD

## 2017-07-06 NOTE — PROGRESS NOTES
"Nan Betts is a 47 y.o. female patient.   1. Gastroparesis      Past Medical History:   Diagnosis Date    Abnormal Pap smear of vagina     Anxiety     Cataract     Epilepsy     as a child    Gastroparesis     Idiopathic    GERD (gastroesophageal reflux disease)     H/O abnormal cervical Papanicolaou smear     Hypercholesteremia 6/21/2016    Hyperlipidemia     Internal hemorrhoids     Interstitial cystitis     Irritable bowel syndrome (IBS)     Irritable bowel syndrome with diarrhea 6/21/2016    Spastic colon     TMJ (temporomandibular joint disorder)     TMJ (temporomandibular joint syndrome) 6/21/2016     No past surgical history pertinent negatives on file.  Scheduled Meds:  Continuous Infusions:  PRN Meds:    Review of patient's allergies indicates:   Allergen Reactions    Morphine Anaphylaxis    Toradol [ketorolac] Shortness Of Breath    Ultram [tramadol] Shortness Of Breath    Buprenorphine hcl     Codeine Itching    Flexeril [cyclobenzaprine] Other (See Comments)     GI BLEEDING    Hydrocodone     Hydrocodone-acetaminophen     Ibuprofen     Ketorolac tromethamine     Metoclopramide     Nsaids (non-steroidal anti-inflammatory drug) Other (See Comments)     GI BLEEDING    Opioids - morphine analogues     Reglan [metoclopramide hcl] Other (See Comments)     Headaches and insomnia      Tramadol hcl      There are no hospital problems to display for this patient.    Blood pressure (!) 121/93, pulse 94, temperature 98.3 °F (36.8 °C), height 5' 8" (1.727 m), weight 108.9 kg (240 lb).    Subjective S/p replacement gastric stimulator replacement 5/15/17 and robotic pyloroplasty 3/16.  Turning off the stimulator has resolved the incisional pain but her dyspepsia has been worse, along with other gastroparesis symptoms.  Objective Stimulator adjustment: turned on, imp 536, volt 4.5, curr 8.4 pw 330 rate 28 on 1 off 4   Assessment & Plan If pain recurs we will further lower the voltage " and increase other settings.  If she needs to increase stimulation for gastroparesis symptoms we will see her in a month.       David Celeste MD  7/6/2017

## 2017-08-07 ENCOUNTER — OFFICE VISIT (OUTPATIENT)
Dept: GASTROENTEROLOGY | Facility: CLINIC | Age: 47
End: 2017-08-07
Payer: COMMERCIAL

## 2017-08-07 ENCOUNTER — HOSPITAL ENCOUNTER (OUTPATIENT)
Dept: RADIOLOGY | Facility: HOSPITAL | Age: 47
Discharge: HOME | End: 2017-08-07
Attending: INTERNAL MEDICINE
Payer: COMMERCIAL

## 2017-08-07 VITALS
SYSTOLIC BLOOD PRESSURE: 121 MMHG | HEIGHT: 68 IN | WEIGHT: 244.69 LBS | HEART RATE: 97 BPM | BODY MASS INDEX: 37.08 KG/M2 | DIASTOLIC BLOOD PRESSURE: 82 MMHG

## 2017-08-07 DIAGNOSIS — K31.84 GASTROPARESIS: ICD-10-CM

## 2017-08-07 DIAGNOSIS — K31.84 GASTROPARESIS: Primary | ICD-10-CM

## 2017-08-07 DIAGNOSIS — G89.29 CHRONIC GENERALIZED ABDOMINAL PAIN: ICD-10-CM

## 2017-08-07 DIAGNOSIS — R10.84 CHRONIC GENERALIZED ABDOMINAL PAIN: ICD-10-CM

## 2017-08-07 PROCEDURE — 71020 XR CHEST PA AND LATERAL: CPT | Mod: TC

## 2017-08-07 PROCEDURE — 99999 PR PBB SHADOW E&M-EST. PATIENT-LVL III: CPT | Mod: PBBFAC,,, | Performed by: INTERNAL MEDICINE

## 2017-08-07 PROCEDURE — 99215 OFFICE O/P EST HI 40 MIN: CPT | Mod: S$GLB,,, | Performed by: INTERNAL MEDICINE

## 2017-08-07 PROCEDURE — 71020 XR CHEST PA AND LATERAL: CPT | Mod: 26,,, | Performed by: RADIOLOGY

## 2017-08-07 PROCEDURE — 3008F BODY MASS INDEX DOCD: CPT | Mod: S$GLB,,, | Performed by: INTERNAL MEDICINE

## 2017-08-07 NOTE — PROGRESS NOTES
Subjective:       Patient ID: Nan Betts is a 47 y.o. female.    Chief Complaint: Follow-up (gastroparesis)    HPI  Review of Systems   Constitutional: Positive for activity change, appetite change and fatigue. Negative for chills, diaphoresis, fever and unexpected weight change.   HENT: Positive for dental problem. Negative for congestion, ear pain, mouth sores, nosebleeds, postnasal drip, rhinorrhea, sinus pressure, sore throat, trouble swallowing and voice change.    Eyes: Negative for pain.   Respiratory: Positive for shortness of breath. Negative for cough and wheezing.    Cardiovascular: Negative for chest pain, palpitations and leg swelling.   Genitourinary: Positive for difficulty urinating and dysuria. Negative for flank pain, hematuria and menstrual problem.   Musculoskeletal: Positive for arthralgias and myalgias. Negative for back pain, gait problem, joint swelling and neck pain.   Skin: Negative for rash.        Hair falling out   Neurological: Negative for dizziness, tremors, syncope, numbness and headaches.   Hematological: Negative for adenopathy. Does not bruise/bleed easily.   Psychiatric/Behavioral: Negative for agitation, behavioral problems, confusion, decreased concentration and dysphoric mood. The patient is not nervous/anxious.        Objective:      Physical Exam   Constitutional: She is oriented to person, place, and time. She appears well-developed and well-nourished. No distress.   HENT:   Head: Normocephalic and atraumatic.   Right Ear: External ear normal.   Left Ear: External ear normal.   Nose: Nose normal.   Mouth/Throat: Oropharynx is clear and moist. No oropharyngeal exudate.   Eyes: Conjunctivae are normal. Pupils are equal, round, and reactive to light. No scleral icterus.   Neck: Normal range of motion. Neck supple. No thyromegaly present.   Cardiovascular: Normal rate, regular rhythm, normal heart sounds and intact distal pulses.  Exam reveals no gallop.    No murmur  heard.  Pulmonary/Chest: Effort normal and breath sounds normal. She has no wheezes. She has no rales.   Abdominal: Soft. Normal appearance and bowel sounds are normal. She exhibits no distension, no fluid wave and no mass. There is no hepatosplenomegaly. There is generalized tenderness.   Musculoskeletal: Normal range of motion. She exhibits no edema or tenderness.   Lymphadenopathy:     She has no cervical adenopathy.   Neurological: She is alert and oriented to person, place, and time. No cranial nerve deficit.   Skin: Skin is warm and dry. No rash noted.   Psychiatric: She has a normal mood and affect. Her behavior is normal. Judgment and thought content normal.       Assessment:       1. Gastroparesis    2. Chronic nonspecific abdominal pain        Plan:         HISTORY OF PRESENT ILLNESS:  This is a followup visit and counseling session for   Nan Roxane.  She is a 47-year-old lady that was first referred to me as a second   opinion in March 2016.  I noted at that time, she had lifelong nearly GI   problems.  She had multiple abdominal and pelvic surgeries maybe 10 at all.  She   experienced chronic pain.  She has been diagnosed with gastroparesis, but had   side effects with Reglan.  My impression at that time was gastroparesis, IBS,   and reflux.  We confirmed the gastroparesis with a gastric emptying scan.  I   discussed with her experimental medicines for that, but she preferred a surgical   approach, so in 2016, she had a laparoscopic pyloromyotomy.  That did not give   her much relief.  So in November 2016, she underwent laparoscopic gastric   pacemaker placement.  She did have problems with the pacemaker, both in local   pain as well as function and so in May 2017, she had a surgical exchange of the   battery.    I have had a chance to review her symptoms.  Since her last surgery, she had   problems with localized pain at the battery site.  This is worse if there is   activity or pain.  Initially, it was  a shocking pain, but that shocking pain   resolved with reduction in voltage.  She continues with diffuse abdominal pain.    This is very similar what she has had in the past a generalized abdominal pain.    It does not radiate.  No particular pattern of the pain.  She still has nausea   on a daily basis.  She requires IV Phenergan for this.  The nausea is worse   after eating.  She did feel like the pacemaker was starting to help before its   malfunction.  She also complains of a lot of bloating.  In fact, she says the   bloating is so severe that she feels short of breath sometimes.  She is on   Dexilant for reflux.  She feels the reflux is fairly well controlled with though   some breakthrough symptoms.    SOCIAL HISTORY:  She is on a PICC line.  She is getting some home care with IV   fluids and IV Phenergan.    I interrogated the pacemaker battery.  The voltage is 4.5.  It is cycle 1 on and   4 seconds off.    She requested admission to the hospital the day, but I told her that she really   does not meet any criteria, although I will check labs today and see if there   are any electrolyte abnormalities that could benefit.    ASSESSMENT AND DECISION MAKIN.  Gastroparesis.  She has had now the two different surgical procedures for   her gastroparesis, she continues with symptoms, but the symptoms are more than   just the gastroparesis.  I am not sure at this point, there is much I can do to   help them besides use the medications as she is using.  I do not think any   further intervention will help.  I really do not see any value in any   medications helping her either.  2.  Chronic abdominal pain.  This is very difficult.  I do not think it is all   related to the gastroparesis.  I think it is more complicated that.  She has had   you know pain for many years and I am not sure there is really much I can do to   help that.  Other factors are making this worse as far as her deconditioning,   chronic pain  medication use, and obesity.  I am concerned about the possibility   for electrolyte abnormalities.  I also wonder about vitamin and micronutrient   abnormalities with her hair falling out.  I am going to order labs today.  I   think a Nutrition consult is reasonable.  I think getting liquid vitamins might   be beneficial as well.  I wonder whether with the bloating, there could be a   component of pancreatic insufficiency, so we will try some Creon for now.  In   the future, we might even try some rifaximin.  3.  Chronic pain medicine use.  I wonder if she might benefit from a liquid pain   medicine or even a cutaneous fentanyl patch, maybe that will be better absorbed   with the gastroparesis, she might be having trouble with the absorption of her   medicines.    RECOMMENDATIONS:  1.  Forms filled out today.  2.  In the meantime, I have helped coordinate her homecare.  3.  CBC, CMP, magnesium, phosphorus.  4.  Chest x-ray today because of shortness of breath.  5.  Consider Nutrition consult.  6.  Prescription for Creon.  7.  Consider future prescription rifaximin.  8.  Recommend that she talk to her pharmacist about liquid vitamin that includes   minerals.  9.  Copy of this letter to the pain specialist to see if they might change her   pain formulation.    This was an one-hour appointment with greater than half the time face-to-face   counseling with significant time in coordination of care as well.  Future   appointments should be one hour.      RANDA  dd: 08/07/2017 15:45:23 (CDT)  td: 08/08/2017 02:03:14 (CDT)  Doc ID   #[id number]  Job ID #[id number]    CC:

## 2017-08-07 NOTE — LETTER
August 8, 2017        Jorje Verma  [address] [place] [address]             [facility] - Gastroenterology  [address]  [place] [address]  Phone: [phone]  Fax: [phone]   Patient: Nan Betts   MR Number: [id number]   YOB: 1970   Date of Visit: 8/7/2017       Dear Dr. Verma:    Thank you for referring Nan Betts to me for evaluation. Attached you will find relevant portions of my assessment and plan of care.    If you have questions, please do not hesitate to call me. I look forward to following Nan Betts along with you.    Sincerely,      Zack Lehman MD          CC  No Recipients    Enclosure

## 2017-08-08 ENCOUNTER — PATIENT MESSAGE (OUTPATIENT)
Dept: GASTROENTEROLOGY | Facility: CLINIC | Age: 47
End: 2017-08-08

## 2017-08-08 NOTE — TELEPHONE ENCOUNTER
Creon Rx was sent and confirmed to [facility] pharmacy listed in [employer].

## 2017-08-10 ENCOUNTER — OFFICE VISIT (OUTPATIENT)
Dept: SURGERY | Facility: CLINIC | Age: 47
End: 2017-08-10
Payer: COMMERCIAL

## 2017-08-10 ENCOUNTER — TELEPHONE (OUTPATIENT)
Dept: ENDOSCOPY | Facility: HOSPITAL | Age: 47
End: 2017-08-10

## 2017-08-10 VITALS
WEIGHT: 244 LBS | HEART RATE: 84 BPM | BODY MASS INDEX: 36.98 KG/M2 | SYSTOLIC BLOOD PRESSURE: 122 MMHG | HEIGHT: 68 IN | TEMPERATURE: 99 F | DIASTOLIC BLOOD PRESSURE: 80 MMHG

## 2017-08-10 DIAGNOSIS — R11.0 NAUSEA: ICD-10-CM

## 2017-08-10 DIAGNOSIS — K31.84 GASTROPARESIS: Primary | ICD-10-CM

## 2017-08-10 PROCEDURE — 99024 POSTOP FOLLOW-UP VISIT: CPT | Mod: S$GLB,,, | Performed by: SURGERY

## 2017-08-10 PROCEDURE — 99999 PR PBB SHADOW E&M-EST. PATIENT-LVL IV: CPT | Mod: PBBFAC,,, | Performed by: SURGERY

## 2017-08-10 NOTE — LETTER
[facility] - General Surgery  [address]  [facility] [address]  Phone: [phone] August 10, 2017      Rosi Carias MD  [address] [place] [address] [place] [address]    Patient: Nan Betts   MR Number: [id number]   YOB: 1970   Date of Visit: 8/10/2017     Dear Dr. Carias:    Thank you for referring Nan Betts to me for evaluation. Below are the relevant portions of my assessment and plan of care.    Assessment:       1. Gastroparesis      Shocking resolved but there is pain in the stimulator site.  Symptoms only slightly   improved.  Plan:   Adjusted today.   RTC in one month.  I don't think there is much I can do about the pain in the pocket.  Will obtain EGD with possible dilation of pylorus.    If you have questions, please do not hesitate to call me. I look forward to following Nan along with you.    Sincerely,      David Celeste MD   Section Head - General, Laparoscopic, Bariatric  Acute Care and Oncologic Surgery   - Surgical Weight Loss Program  [facility]    WSR/belinda    CC  Zack Lehman MD

## 2017-08-10 NOTE — PROGRESS NOTES
Subjective:       Patient ID: Nan Betts is a 47 y.o. female.    Chief Complaint: Follow-up    HPI  S/p replacement gastric stimulator replacement 5/15/17 and robotic pyloroplasty 3/16.  Her shocking sensations have resolved but she has increased nausea, bloating and pain over the stimulator site.  Regarding her gastroparesis symptoms she is still improved from preop.  Review of Systems   Constitutional: Negative for fever and unexpected weight change.   Respiratory: Negative for chest tightness and shortness of breath.    Cardiovascular: Negative for chest pain.   Gastrointestinal: Positive for diarrhea.        Has ibs       Objective:      Physical Exam   Constitutional: She appears well-developed and well-nourished.   Abdominal: Soft. Bowel sounds are normal. There is no tenderness.   Vitals reviewed.      Stimulator adjustment: imp 502 volt 4.5 cur 9 pw 330 rate 28 on 2 off 3    Assessment:       1. Gastroparesis      Shocking resolved but there is pain in the stimulator site.  Symptoms only slightly improved.  Plan:       Adjusted today.   Rtc one month.  I don't think there is much I can do about the pain in the pocket.  Will obtain egd with possible dilation of pylorus.

## 2017-08-15 ENCOUNTER — PATIENT MESSAGE (OUTPATIENT)
Dept: ENDOSCOPY | Facility: HOSPITAL | Age: 47
End: 2017-08-15

## 2017-08-22 DIAGNOSIS — K31.1 PYLORIC STENOSIS: Primary | ICD-10-CM

## 2017-08-31 ENCOUNTER — ANESTHESIA EVENT (OUTPATIENT)
Dept: ENDOSCOPY | Facility: HOSPITAL | Age: 47
End: 2017-08-31
Payer: COMMERCIAL

## 2017-08-31 ENCOUNTER — ANESTHESIA (OUTPATIENT)
Dept: ENDOSCOPY | Facility: HOSPITAL | Age: 47
End: 2017-08-31
Payer: COMMERCIAL

## 2017-08-31 ENCOUNTER — SURGERY (OUTPATIENT)
Age: 47
End: 2017-08-31

## 2017-08-31 ENCOUNTER — HOSPITAL ENCOUNTER (OUTPATIENT)
Facility: HOSPITAL | Age: 47
Discharge: HOME | End: 2017-08-31
Attending: INTERNAL MEDICINE | Admitting: INTERNAL MEDICINE
Payer: COMMERCIAL

## 2017-08-31 VITALS
SYSTOLIC BLOOD PRESSURE: 122 MMHG | RESPIRATION RATE: 16 BRPM | HEIGHT: 68 IN | WEIGHT: 243 LBS | HEART RATE: 82 BPM | DIASTOLIC BLOOD PRESSURE: 64 MMHG | OXYGEN SATURATION: 96 % | BODY MASS INDEX: 36.83 KG/M2 | TEMPERATURE: 98 F

## 2017-08-31 VITALS — RESPIRATION RATE: 66 BRPM

## 2017-08-31 DIAGNOSIS — K31.84 GASTROPARESIS: Primary | ICD-10-CM

## 2017-08-31 PROCEDURE — 27201693 HC INFLATION SYSTEM, NVISION ULE: Performed by: INTERNAL MEDICINE

## 2017-08-31 PROCEDURE — 43245 EGD DILATE STRICTURE: CPT | Mod: ,,, | Performed by: INTERNAL MEDICINE

## 2017-08-31 PROCEDURE — 25000003 PHARM REV CODE 250: Performed by: INTERNAL MEDICINE

## 2017-08-31 PROCEDURE — 63600175 PHARM REV CODE 636 W HCPCS: Performed by: NURSE ANESTHETIST, CERTIFIED REGISTERED

## 2017-08-31 PROCEDURE — C1726 CATH, BAL DIL, NON-VASCULAR: HCPCS | Performed by: INTERNAL MEDICINE

## 2017-08-31 PROCEDURE — 43245 EGD DILATE STRICTURE: CPT | Performed by: INTERNAL MEDICINE

## 2017-08-31 PROCEDURE — 37000009 HC ANESTHESIA EA ADD 15 MINS: Performed by: INTERNAL MEDICINE

## 2017-08-31 PROCEDURE — D9220A PRA ANESTHESIA: Mod: ANES,,, | Performed by: ANESTHESIOLOGY

## 2017-08-31 PROCEDURE — D9220A PRA ANESTHESIA: Mod: CRNA,,, | Performed by: NURSE ANESTHETIST, CERTIFIED REGISTERED

## 2017-08-31 PROCEDURE — 37000008 HC ANESTHESIA 1ST 15 MINUTES: Performed by: INTERNAL MEDICINE

## 2017-08-31 RX ORDER — SODIUM CHLORIDE 9 MG/ML
INJECTION, SOLUTION INTRAVENOUS CONTINUOUS
Status: DISCONTINUED | OUTPATIENT
Start: 2017-08-31 | End: 2017-08-31 | Stop reason: HOSPADM

## 2017-08-31 RX ORDER — PROPOFOL 10 MG/ML
VIAL (ML) INTRAVENOUS
Status: DISCONTINUED | OUTPATIENT
Start: 2017-08-31 | End: 2017-08-31

## 2017-08-31 RX ORDER — LIDOCAINE HCL/PF 100 MG/5ML
SYRINGE (ML) INTRAVENOUS
Status: DISCONTINUED | OUTPATIENT
Start: 2017-08-31 | End: 2017-08-31

## 2017-08-31 RX ADMIN — PROPOFOL 50 MG: 10 INJECTION, EMULSION INTRAVENOUS at 11:08

## 2017-08-31 RX ADMIN — LIDOCAINE HYDROCHLORIDE 100 MG: 20 INJECTION, SOLUTION INTRAVENOUS at 11:08

## 2017-08-31 RX ADMIN — SODIUM CHLORIDE: 0.9 INJECTION, SOLUTION INTRAVENOUS at 11:08

## 2017-08-31 RX ADMIN — PROPOFOL 150 MG: 10 INJECTION, EMULSION INTRAVENOUS at 11:08

## 2017-08-31 NOTE — ANESTHESIA PREPROCEDURE EVALUATION
08/31/2017  Nan Betts is a 47 y.o., female.    Anesthesia Evaluation    I have reviewed the Patient Summary Reports.        Review of Systems  Anesthesia Hx:  No problems with previous Anesthesia    Social:  Smoker    Hematology/Oncology:  Hematology Normal   Oncology Normal     EENT/Dental:EENT/Dental Normal   Cardiovascular:  Cardiovascular Normal     Pulmonary:  Pulmonary Normal    Renal/:  Renal/ Normal     Hepatic/GI:   GERD Gastroparesis   Musculoskeletal:  Musculoskeletal Normal    Neurological:  Neurology Normal    Endocrine:  Endocrine Normal    Dermatological:  Skin Normal    Psych:   anxiety          Physical Exam  General:  Well nourished    Airway/Jaw/Neck:  Airway Findings: Mouth Opening: Normal Tongue: Normal  General Airway Assessment: Adult  Mallampati: II  Improves to II with phonation.  TM Distance: Normal, at least 6 cm  Jaw/Neck Findings:  Neck ROM: Normal ROM      Dental:  Dental Findings: In tact   Chest/Lungs:  Chest/Lungs Findings: Clear to auscultation, Normal Respiratory Rate     Heart/Vascular:  Heart Findings: Rate: Normal  Rhythm: Regular Rhythm  Sounds: Normal             Anesthesia Plan  Type of Anesthesia, risks & benefits discussed:  Anesthesia Type:  general  Patient's Preference: General  Intra-op Monitoring Plan:   Intra-op Monitoring Plan Comments:   Post Op Pain Control Plan:   Post Op Pain Control Plan Comments:   Induction:   IV  Beta Blocker:  Patient is not currently on a Beta-Blocker (No further documentation required).       Informed Consent: Patient understands risks and agrees with Anesthesia plan.  Questions answered. Anesthesia consent signed with patient.  ASA Score: 3     Day of Surgery Review of History & Physical: I have interviewed and examined the patient. I have reviewed the patient's H&P dated:  There are no significant changes.          Ready  For Surgery From Anesthesia Perspective.

## 2017-08-31 NOTE — PLAN OF CARE
Discharge instructions reviewed with patient and spouse.  PICC line flushed and hep locked per patient's request and patient's heparin flush supply.

## 2017-08-31 NOTE — PATIENT INSTRUCTIONS
Discharge Summary/Instructions after an Endoscopic Procedure  Patient Name: Nan Betts  Patient MRN: [id number]  Patient YOB: 1970 Thursday, August 31, 2017  Zack Lehman MD  RESTRICTIONS:  During your procedure today, you received medications for sedation.  These   medications may affect your judgment, balance and coordination.  Therefore,   for 24 hours, you have the following restrictions:   - DO NOT drive a car, operate machinery, make legal/financial decisions,   sign important papers or drink alcohol.    ACTIVITY:  The following day: return to full activity including work, except no heavy   lifting, straining or running for 3 days if polyps were removed.  DIET:  Eat and drink normally unless instructed otherwise.  TREATMENT FOR COMMON SIDE EFFECTS:  - Mild abdominal pain, belching, bloating or excessive gas: rest, eat   lightly and use a heating pad.  - Sore Throat: treat with throat lozenges and/or gargle with warm salt   water.  SYMPTOMS TO WATCH FOR AND REPORT TO YOUR PHYSICIAN:  1. Abdominal pain or bloating, other than gas cramps.  2. Chest pain.  3. Back pain.  4. Chills or fever occurring within 24 hours after the procedure.  5. Rectal bleeding, which would show as bright red, maroon, or black stools.   (A tablespoon of blood from the rectum is not serious, especially if   hemorrhoids are present.)  6. Vomiting.  7. Weakness or dizziness.  8. Because air was used during the procedure, expelling large amounts of air   from your rectum or belching is normal.  9. If a bowel prep was taken, you may not have a bowel movement for 1-3   days.  This is normal.  GO DIRECTLY TO THE EMERGENCY ROOM IF YOU HAVE ANY OF THE FOLLOWING:   Difficulty breathing   Chills and/or fever over 101 F   Persistent vomiting and/or vomiting blood   Severe abdominal pain   Severe chest pain   Black, tarry stools   Bleeding- more than one tablespoon  Your doctor recommends these additional instructions:  If any biopsies  were taken, your doctors clinic will call you in 1 to 2   weeks with any results.  You have a contact number available for emergencies.  The signs and symptoms   of potential delayed complications were discussed with you.  You may return   to normal activities tomorrow.  Written discharge instructions were   provided to you.   You are being discharged to home.   Resume your previous diet.   Continue your present medications.   Return to your GI clinic as previously scheduled.  For questions, problems or results please call your physician - Zack Lehman MD at Work:  [phone].  [facility], EMERGENCY ROOM PHONE NUMBER: [phone]  IF A COMPLICATION OR EMERGENCY SITUATION ARISES AND YOU ARE UNABLE TO REACH   YOUR PHYSICIAN - GO DIRECTLY TO THE EMERGENCY ROOM.  Zack Lehman MD  8/31/2017 11:56:16 AM  This report has been verified and signed electronically.

## 2017-08-31 NOTE — TRANSFER OF CARE
"Anesthesia Transfer of Care Note    Patient: Nan Betts    Procedure(s) Performed: Procedure(s) (LRB):  ESOPHAGOGASTRODUODENOSCOPY (EGD) w/ poss. pyloric stenosis (N/A)    Patient location: PACU    Anesthesia Type: general    Transport from OR: Transported from OR on room air with adequate spontaneous ventilation    Post pain: adequate analgesia    Post assessment: no apparent anesthetic complications    Post vital signs: stable    Level of consciousness: awake    Nausea/Vomiting: no nausea/vomiting    Complications: none    Transfer of care protocol was followed      Last vitals:   Visit Vitals  BP (!) 148/67 (BP Location: Right arm, Patient Position: Lying)   Pulse 92   Temp 36.8 °C (98.3 °F) (Temporal)   Resp 20   Ht 5' 8" (1.727 m)   Wt 110.2 kg (243 lb)   SpO2 (!) 94%   Breastfeeding? No   BMI 36.95 kg/m²     "

## 2017-08-31 NOTE — PROGRESS NOTES
Dr Bella and Dr Reagan aware of patient vomiting 1hour PTA, ok to proceed with procedure on 4th floor

## 2017-08-31 NOTE — ANESTHESIA POSTPROCEDURE EVALUATION
"Anesthesia Post Evaluation    Patient: Nan Betts    Procedure(s) Performed: Procedure(s) (LRB):  ESOPHAGOGASTRODUODENOSCOPY (EGD) w/ poss. pyloric stenosis (N/A)    Final Anesthesia Type: general  Patient location during evaluation: PACU  Patient participation: Yes- Able to Participate  Level of consciousness: awake and alert  Post-procedure vital signs: reviewed and stable  Pain management: adequate  Airway patency: patent  PONV status at discharge: No PONV  Anesthetic complications: no      Cardiovascular status: blood pressure returned to baseline and stable  Respiratory status: unassisted  Hydration status: euvolemic  Follow-up not needed.        Visit Vitals  /64 (BP Location: Left arm, Patient Position: Lying)   Pulse 82   Temp 36.7 °C (98.1 °F) (Temporal)   Resp 16   Ht 5' 8" (1.727 m)   Wt 110.2 kg (243 lb)   SpO2 96%   Breastfeeding? No   BMI 36.95 kg/m²       Pain/Chelsy Score: Pain Assessment Performed: Yes (8/31/2017 12:25 PM)  Presence of Pain: denies (8/31/2017 12:25 PM)  Chelsy Score: 10 (8/31/2017 12:25 PM)      "

## 2017-08-31 NOTE — H&P
Short Stay Endoscopy History and Physical    PCP - Rosi Carias MD  Referring Physician - David Celeste MD  [address] [place] [address]    Procedure - EGD with possible dilation  ASA - per anesthesia  Mallampati - per anesthesia  History of Anesthesia problems - see anesthesia note  Family history Anesthesia problems -  see anesthesia note  Plan of anesthesia - as per anesthesia    HPI  47 y.o. female    Reason for procedure: gastroparesis, nausea/vomiting, pyloric stenosis     Abdominal/epigastric pain: No  Reflux: No   Dysphagia: No  Change in bowel habits: No    ROS:  Constitutional: No fevers, chills  CV: No chest pain  Pulm: No shortness of breath  GI: see HPI    Medical History:  has a past medical history of Abnormal Pap smear of vagina; Anxiety; Cataract; Chronic pain; Colon polyp; Epilepsy; Gastroparesis; GERD (gastroesophageal reflux disease); H/O abnormal cervical Papanicolaou smear; Hypercholesteremia (6/21/2016); Hyperlipidemia; IC (interstitial cystitis); Internal hemorrhoids; Interstitial cystitis; Irritable bowel syndrome (IBS); Irritable bowel syndrome with diarrhea (6/21/2016); Spastic colon; TMJ (temporomandibular joint disorder); and TMJ (temporomandibular joint syndrome) (6/21/2016).    Surgical History:  has a past surgical history that includes Cholecystectomy; Appendectomy; Colonoscopy; Upper gastrointestinal endoscopy; Cataract extraction; LASIK (Bilateral, 2006); Total abdominal hysterectomy (2005); Tonsillectomy; Pyloroplasty (03/2016); Mandible surgery (Bilateral, 10/17/2016); gastric stimulator placement; Temporomandibular joint surgery (12/2016); and Stomach surgery.    Family History: family history includes Coronary artery disease in her father, mother, and paternal grandfather; Glaucoma in her maternal grandmother; Hodgkin's lymphoma in her mother; Lymphoma in her sister; Pancreatic cancer in her paternal aunt.. Otherwise no colon cancer, inflammatory  bowel disease, or GI malignancies.    Social History:  reports that she has been smoking.  She has a 10.00 pack-year smoking history. She has never used smokeless tobacco. She reports that she does not drink alcohol or use drugs.    Review of patient's allergies indicates:   Allergen Reactions    Ketorolac tromethamine Shortness Of Breath and Swelling    Morphine Anaphylaxis    Toradol [ketorolac] Shortness Of Breath    Ultram [tramadol] Shortness Of Breath    Buprenorphine hcl     Codeine Itching    Flexeril [cyclobenzaprine] Other (See Comments)     GI BLEEDING    Hydrocodone     Hydrocodone-acetaminophen     Ibuprofen     Metoclopramide     Nsaids (non-steroidal anti-inflammatory drug) Other (See Comments)     GI BLEEDING    Opioids - morphine analogues     Reglan [metoclopramide hcl] Other (See Comments)     Headaches and insomnia      Tramadol hcl        Medications:   Prescriptions Prior to Admission   Medication Sig Dispense Refill Last Dose    alprazolam (XANAX) 1 MG tablet    8/31/2017 at Unknown time    carisoprodol (SOMA) 350 MG tablet Take 350 mg by mouth 3 (three) times daily as needed for Muscle spasms.   8/31/2017 at Unknown time    dexlansoprazole (DEXILANT) 60 mg capsule Take 60 mg by mouth 2 (two) times daily.   8/31/2017 at Unknown time    diphenhydrAMINE (BENADRYL) 50 MG tablet Take 50 mg by mouth every 4 (four) hours as needed for Itching.   8/31/2017 at Unknown time    estradiol (ESTRACE) 1 MG tablet Take 3 mg by mouth once daily.   8/30/2017 at Unknown time    estradiol (ESTRACE) 2 MG tablet    8/30/2017 at Unknown time    flavoxate (URISPAS) 100 mg Tab Take 100 mg by mouth 3 (three) times daily.   8/31/2017 at Unknown time    gabapentin (NEURONTIN) 250 mg/5 mL solution take 5 milliliters by mouth three times a day  0 8/31/2017 at Unknown time    loperamide (IMODIUM) 2 mg capsule Take 1 capsule (2 mg total) by mouth daily as needed. 90 capsule 3 8/31/2017 at Unknown  time    mometasone (NASONEX) 50 mcg/actuation nasal spray    8/31/2017 at Unknown time    oxycodone-acetaminophen (PERCOCET)  mg per tablet    8/31/2017 at Unknown time    oxycodone-acetaminophen (PERCOCET) 5-325 mg per tablet    8/30/2017 at Unknown time    pentosan polysulfate (ELMIRON) 100 mg Cap Take 100 mg by mouth 3 (three) times daily.   8/31/2017 at Unknown time    simvastatin (ZOCOR) 40 MG tablet    8/30/2017 at Unknown time    SODIUM CHLORIDE 0.45 % IV Inject into the vein once daily.   8/31/2017 at Unknown time    sodium chloride 0.9% 0.9 % SolP 50 mL with promethazine 25 mg/mL Soln Inject 25 mg into the vein every 4 (four) hours. Thru IV   8/31/2017 at 0930    lipase-protease-amylase 24,000-76,000-120,000 units (CREON) 24,000-76,000 -120,000 unit capsule Take 1 capsule by mouth 3 (three) times daily with meals. 90 capsule 11 Unknown at Unknown time    PROAIR HFA 90 mcg/actuation inhaler every 6 (six) hours as needed.   0 More than a month at Unknown time       Physical Exam:    Vital Signs:   Vitals:    08/31/17 1104   BP: (!) 148/67   Pulse: 92   Resp: 20   Temp: 98.3 °F (36.8 °C)       General Appearance: Well appearing in no acute distress  Lungs: No respiratory distress.   Heart:  Regular rate, S1, S2 normal.  Abdomen: Soft, non tender, non distended with normal bowel sounds, no masses    Labs:  Lab Results   Component Value Date    WBC 8.58 08/07/2017    HGB 13.3 08/07/2017    HCT 38.8 08/07/2017    MCV 91 08/07/2017     08/07/2017     No results found for: INR  No results found for: IRON, FERRITIN, TIBC, FESATURATED  Lab Results   Component Value Date     08/07/2017    K 3.6 08/07/2017     08/07/2017    CO2 22 (L) 08/07/2017    BUN 12 08/07/2017    CREATININE 0.8 08/07/2017       I have explained the risks and benefits of this endoscopic procedure to the patient/family including but not limited to bleeding, inflammation, infection, perforation, hypoxia/respiratory  failure, and death.       Bhargav Lopez MD  Gastroenterology & Hepatology Fellow  Pager: [phone]

## 2017-09-07 ENCOUNTER — PATIENT MESSAGE (OUTPATIENT)
Dept: GASTROENTEROLOGY | Facility: CLINIC | Age: 47
End: 2017-09-07

## 2017-09-07 ENCOUNTER — OFFICE VISIT (OUTPATIENT)
Dept: SURGERY | Facility: CLINIC | Age: 47
End: 2017-09-07
Payer: COMMERCIAL

## 2017-09-07 ENCOUNTER — TELEPHONE (OUTPATIENT)
Dept: ENDOSCOPY | Facility: HOSPITAL | Age: 47
End: 2017-09-07

## 2017-09-07 VITALS
HEART RATE: 85 BPM | HEIGHT: 68 IN | BODY MASS INDEX: 37.28 KG/M2 | DIASTOLIC BLOOD PRESSURE: 83 MMHG | SYSTOLIC BLOOD PRESSURE: 133 MMHG | TEMPERATURE: 99 F | WEIGHT: 246 LBS

## 2017-09-07 DIAGNOSIS — K31.84 GASTROPARESIS: Primary | ICD-10-CM

## 2017-09-07 PROCEDURE — 3008F BODY MASS INDEX DOCD: CPT | Mod: S$GLB,,, | Performed by: SURGERY

## 2017-09-07 PROCEDURE — 99999 PR PBB SHADOW E&M-EST. PATIENT-LVL III: CPT | Mod: PBBFAC,,, | Performed by: SURGERY

## 2017-09-07 PROCEDURE — 99213 OFFICE O/P EST LOW 20 MIN: CPT | Mod: S$GLB,,, | Performed by: SURGERY

## 2017-09-07 NOTE — PROGRESS NOTES
I have seen the patient, reviewed the Resident's history and physical, assessment and plan. I have personally interviewed and examined the patient at bedside and: agree with the findings.     S/p replacement gastric stimulator replacement 5/15/17 and robotic pyloroplasty 3/16.  She had a pyloric dilation 8/31/17.  Stimulator adjustment last month: imp 502 volt 4.5 cur 9 pw 330 rate 28 on 2 off 3.  Since the dilation her bloating is less severe and her gerd is minimal.  Her interstitial cystitis is worse than usual and may be contributing to her severe nausea (she is going to have a bladder procedure).  She has no vomiting and no epigastric pain.  She takes phenergan every 4 hours, not taking zofran, ppi bid, and is taking percoset for pain 2-3 times a day.    Plan: If she does well with the dilation Dr. Lehman will send her back to me to consider re-do pyloroplasty.  She is also getting a bladder procedure.  Will wait on stimulator adjustment for now.

## 2017-09-07 NOTE — LETTER
[facility] - General Surgery  [address]  [facility] [address]  Phone: [phone] September 7, 2017      oRsi Carias MD  [address] [place] [address] [place] [address]    Patient: Nan Betts   MR Number: [id number]   YOB: 1970   Date of Visit: 9/7/2017     Dear Dr. Carias:    Thank you for referring Nan Betts to me for evaluation. Below are the relevant portions of my assessment and plan of care.     Assessment: Gastroparesis - Symptoms have not noticeably improved.    Plan:      Monitor for symptom improvement following dilation of pylorus. Follow with symptom sheet. Dr. Lehman will see her again in three months and determine whether he recommends a re-do pyloroplasty from his perspective    If you have questions, please do not hesitate to call me. I look forward to following Nan along with you.    Sincerely,      David Celeste MD   Section Head - General, Laparoscopic, Bariatric  Acute Care and Oncologic Surgery   - Surgical Weight Loss Program  [facility]    WSR/belinda    CC  Zack Lehman MD

## 2017-09-07 NOTE — PROGRESS NOTES
"Subjective:       Patient ID: Nan Betts is a 47 y.o. female.    Chief Complaint: Follow-up    HPI   Patient is a 47 y.o. female with gastroparesis s/p robotic pyloroplasty (3/30/16), gastric stimulator placement (11/23/16), and gastric stimulator replacement (5/15/17) after stimulator malfunction. She reports continued progressive increase in nausea and bloating. She underwent an EGD with dilation of the pylorus last week (8/31/17) and reports no meaningful improvement in her symptoms to date. She does report that her nausea is often compounded during episodes of her interstitial cystitis due to increased pain, which she is dealing with presently.    Review of Systems   Constitutional: Negative for fever and unexpected weight change.   Respiratory: Negative for cough, chest tightness and shortness of breath.    Cardiovascular: Negative for chest pain and palpitations.   Gastrointestinal: Positive for abdominal distention, abdominal pain and nausea. Negative for constipation and diarrhea.        Has IBS       Objective:     /83   Pulse 85   Temp 98.6 °F (37 °C)   Ht 5' 8" (1.727 m)   Wt 111.6 kg (246 lb)   BMI 37.40 kg/m²     Physical Exam   Constitutional: She is oriented to person, place, and time. She appears well-developed and well-nourished. No distress.   HENT:   Head: Normocephalic and atraumatic.   Eyes: EOM are normal. No scleral icterus.   Neck: Normal range of motion. No JVD present.   Cardiovascular: Normal rate and intact distal pulses.    Pulmonary/Chest: Effort normal. No respiratory distress. She has no wheezes.   Abdominal: Soft. She exhibits no distension. There is no tenderness.   Musculoskeletal: She exhibits no edema or deformity.   Neurological: She is alert and oriented to person, place, and time.   Skin: Skin is warm and dry. No rash noted. She is not diaphoretic. No erythema.   Psychiatric: She has a normal mood and affect. Her behavior is normal.   Nursing note and vitals " reviewed.      Assessment:       1. Gastroparesis    Symptoms have not noticeably improved    Plan:     Monitor for symptom improvement following dilation of pylorus. Follow with symptom sheet. Dr. Lehman will see her again in 3 months and determine whether he recommends a re-do pyloroplasty from his perspective.

## 2017-09-11 ENCOUNTER — TELEPHONE (OUTPATIENT)
Dept: GASTROENTEROLOGY | Facility: CLINIC | Age: 47
End: 2017-09-11

## 2017-09-11 DIAGNOSIS — R10.84 ABDOMINAL PAIN, GENERALIZED: Primary | ICD-10-CM

## 2017-09-11 RX ORDER — DICYCLOMINE HYDROCHLORIDE 10 MG/1
10 CAPSULE ORAL 3 TIMES DAILY PRN
Qty: 60 CAPSULE | Refills: 3 | Status: SHIPPED | OUTPATIENT
Start: 2017-09-11 | End: 2017-10-11

## 2017-09-11 NOTE — TELEPHONE ENCOUNTER
"----- Message from Zack Lehman MD sent at 9/11/2017  2:07 PM CDT -----  Contact: Mobile: [phone]   Ok,sent to roc baez  ----- Message -----  From: Bernice Blackburn MA  Sent: 9/11/2017   2:03 PM  To: Zack Lehman MD    She does not think she has tried Bentyl.  ----- Message -----  From: Zack Lehman MD  Sent: 9/11/2017   1:51 PM  To: Bernice Blackburn MA    Has she ever been on or tried bentyl or levsin  Could l try either one  If she has one at home can try it now  Or I could call in  ----- Message -----  From: Bernice Blackburn MA  Sent: 9/11/2017  12:21 PM  To: Zack Lehman MD    Spoke with patient. She states it is not the same pain she has had in the past.  Stated her home health nurse advised her to contact you.   ----- Message -----  From: Zack Lehman MD  Sent: 9/11/2017  12:13 PM  To: Bernice Blackburn MA    It isn't a complication, as I would have known that at the time  I think it just represents the symptoms that go along with her disease  ----- Message -----  From: Bernice Blackburn MA  Sent: 9/11/2017  10:21 AM  To: Zack Lehman MD        ----- Message -----  From: Kinza Bray MA  Sent: 9/11/2017  10:04 AM  To: Dominik Lehman  Pt states that she has been having mild stomach pain on and off since her procedure on 8/31 but over the weekend the pain became much worse and her home health nurse suggested she call because it may have something to do with the  "dilation" Please call pt at Mobile: [phone]             "

## 2017-09-18 ENCOUNTER — TELEPHONE (OUTPATIENT)
Dept: GASTROENTEROLOGY | Facility: CLINIC | Age: 47
End: 2017-09-18

## 2017-09-18 NOTE — TELEPHONE ENCOUNTER
----- Message from Zack Lehman MD sent at 9/18/2017 12:41 PM CDT -----  Contact: Dr Martines [facility] [phone]   have called twice , message left  ----- Message -----  From: Bernice Blackburn MA  Sent: 9/14/2017   2:59 PM  To: Zack Lehman MD        ----- Message -----  From: Zainab Paniagua  Sent: 9/14/2017   2:55 PM  To: Dominik BASS Staff    Dr Martines would like to speak with Dr Lehman regarding the pt.

## 2017-09-24 ENCOUNTER — PATIENT MESSAGE (OUTPATIENT)
Dept: GASTROENTEROLOGY | Facility: CLINIC | Age: 47
End: 2017-09-24

## 2017-10-02 DIAGNOSIS — K52.9 CHRONIC DIARRHEA: ICD-10-CM

## 2017-10-02 RX ORDER — LOPERAMIDE HYDROCHLORIDE 2 MG/1
2 CAPSULE ORAL DAILY PRN
Qty: 180 CAPSULE | Refills: 3 | Status: SHIPPED | OUTPATIENT
Start: 2017-10-02 | End: 2017-12-06 | Stop reason: SDUPTHER

## 2017-10-04 ENCOUNTER — PATIENT MESSAGE (OUTPATIENT)
Dept: GASTROENTEROLOGY | Facility: CLINIC | Age: 47
End: 2017-10-04

## 2017-10-04 ENCOUNTER — TELEPHONE (OUTPATIENT)
Dept: GASTROENTEROLOGY | Facility: CLINIC | Age: 47
End: 2017-10-04

## 2017-10-19 ENCOUNTER — PATIENT MESSAGE (OUTPATIENT)
Dept: GASTROENTEROLOGY | Facility: CLINIC | Age: 47
End: 2017-10-19

## 2017-12-06 ENCOUNTER — OFFICE VISIT (OUTPATIENT)
Dept: GASTROENTEROLOGY | Facility: CLINIC | Age: 47
End: 2017-12-06
Payer: COMMERCIAL

## 2017-12-06 ENCOUNTER — LAB VISIT (OUTPATIENT)
Dept: LAB | Facility: HOSPITAL | Age: 47
End: 2017-12-06
Attending: INTERNAL MEDICINE
Payer: COMMERCIAL

## 2017-12-06 VITALS
BODY MASS INDEX: 37.39 KG/M2 | DIASTOLIC BLOOD PRESSURE: 83 MMHG | WEIGHT: 246.69 LBS | SYSTOLIC BLOOD PRESSURE: 141 MMHG | HEIGHT: 68 IN | HEART RATE: 86 BPM

## 2017-12-06 DIAGNOSIS — K31.84 GASTROPARESIS: Primary | ICD-10-CM

## 2017-12-06 DIAGNOSIS — K52.9 CHRONIC DIARRHEA: ICD-10-CM

## 2017-12-06 DIAGNOSIS — K58.0 IRRITABLE BOWEL SYNDROME WITH DIARRHEA: ICD-10-CM

## 2017-12-06 DIAGNOSIS — K76.0 FATTY LIVER: ICD-10-CM

## 2017-12-06 LAB
ANION GAP SERPL CALC-SCNC: 10 MMOL/L
BUN SERPL-MCNC: 21 MG/DL
CALCIUM SERPL-MCNC: 9.2 MG/DL
CHLORIDE SERPL-SCNC: 109 MMOL/L
CO2 SERPL-SCNC: 20 MMOL/L
CREAT SERPL-MCNC: 0.8 MG/DL
EST. GFR  (AFRICAN AMERICAN): >60 ML/MIN/1.73 M^2
EST. GFR  (NON AFRICAN AMERICAN): >60 ML/MIN/1.73 M^2
GLUCOSE SERPL-MCNC: 94 MG/DL
POTASSIUM SERPL-SCNC: 3.8 MMOL/L
SODIUM SERPL-SCNC: 139 MMOL/L

## 2017-12-06 PROCEDURE — 99999 PR PBB SHADOW E&M-EST. PATIENT-LVL II: CPT | Mod: PBBFAC,,, | Performed by: INTERNAL MEDICINE

## 2017-12-06 PROCEDURE — 99215 OFFICE O/P EST HI 40 MIN: CPT | Mod: S$GLB,,, | Performed by: INTERNAL MEDICINE

## 2017-12-06 PROCEDURE — 36415 COLL VENOUS BLD VENIPUNCTURE: CPT

## 2017-12-06 PROCEDURE — 80048 BASIC METABOLIC PNL TOTAL CA: CPT

## 2017-12-06 RX ORDER — TOPIRAMATE 100 MG/1
100 TABLET, FILM COATED ORAL 2 TIMES DAILY
COMMUNITY
End: 2018-04-12

## 2017-12-06 RX ORDER — LOPERAMIDE HYDROCHLORIDE 2 MG/1
4 CAPSULE ORAL DAILY PRN
Qty: 180 CAPSULE | Refills: 3 | Status: SHIPPED | OUTPATIENT
Start: 2017-12-06 | End: 2018-10-05 | Stop reason: SDUPTHER

## 2017-12-06 NOTE — PROGRESS NOTES
HISTORY OF PRESENT ILLNESS:  This is a followup visit and counseling session for   Nan Betts.  She is here with her .  I saw her in early  as a second   opinion.  She had lifelong GI symptoms.  She had diagnosis of IBS and   gastroparesis.  We documented an abnormal gastric emptying scan.  She therefore   went for a laparoscopic pyloromyotomy.  She had incomplete relief of symptoms;   therefore, went for a laparoscopic gastric stimulator placement.  In May of this   year, she had a battery reinsertion.  I have done three EGDs, the last two with   pyloric dilations.    She comes in today and she has multiple GI complaints.  She complains of   worsening reflux and regurgitation despite taking Dexilant twice a day.  She   complains of abdominal bloating.  She feels dehydrated.  She has intermittent   diarrhea.  She feels like the diarrhea is worse now than it has been in the   past.  She has associated abdominal cramps.  She actually feels like emptying   out her stomach after meals is okay.  She does have occasional nausea.  No   recent vomiting.    She takes loperamide and that has given her some relief from diarrhea.  She   takes a GI cocktail from her doctor in [place] and that gives her dramatic relief   of her reflux symptoms.    REVIEW OF SYSTEMS:  Positive for a number of things including headache, chronic   jaw pain that necessitates opiates and anxiety.    PHYSICAL EXAMINATION:  ABDOMEN:  Soft and nontender.  The battery was checked and it is functioning   normal.  The voltage is 4.5 and is 2 seconds on with 3 seconds off cycle time.    ASSESSMENT AND DECISION MAKIN.  Gastroparesis.  We had a long talk today about gastroparesis.  She wanted to   have another surgery to open up the pylorus more.  I explained that on my   recent endoscopy, I think the pylorus is appropriately open after her surgery.    I cannot imagine that any other surgical intervention would benefit her or make   the pylorus  more open.  2.  Gastroesophageal reflux.  She has been maximally treated with Dexilant twice   a day.  I am not sure what else can be done to help with this.  I think it is   more of a functional heartburn rather than true acid reflux as I think that the   acid is being totally treated.  3.  IBS.  She does have IBS that probably explains the diarrhea.  She says the   diarrhea is worse than usual.  I think we should check for C. difficile.  She   wants to have IV fluids at home prescribed by me on a regular basis.  She does   have an indwelling PICC line.  She says this is because she is frequently   dehydrated.  I told her I could really only prescribe that if I have good   documentation that she is dehydrated.    RECOMMENDATION:  1.  She may need some help with anxiety as she is being weaned off her Xanax.  2.  I do not want to use Lomotil for her diarrhea because of her other opiates.  3.  BMP today to check for dehydration.  4.  I can refill the Magic Mouthwash.  5.  Check for C. difficile.  6.  New order for loperamide to be taken two per day.  7.  I will check for when she is due for a colonoscopy as she says she has had   polyps before and has a strong family history of colon polyps.  8.  She should probably look into lactose-free milk as I wonder if that could be   contributing to her bloating.    This was an over one-hour appointment with greater than half the time   face-to-face counseling, which does include the outside communications which   probably brings the appointment time to two hours.  Future appointments with our   nurse practitioner here should probably be one hour.  .      RANDA  dd: 12/06/2017 12:19:59 (CST)  td: 12/07/2017 10:13:00 (CST)  Doc ID   #[id number]  Job ID #[id number]    CC:

## 2017-12-07 ENCOUNTER — PATIENT MESSAGE (OUTPATIENT)
Dept: GASTROENTEROLOGY | Facility: CLINIC | Age: 47
End: 2017-12-07

## 2017-12-07 ENCOUNTER — TELEPHONE (OUTPATIENT)
Dept: GASTROENTEROLOGY | Facility: CLINIC | Age: 47
End: 2017-12-07

## 2017-12-07 ENCOUNTER — PATIENT MESSAGE (OUTPATIENT)
Dept: OBSTETRICS AND GYNECOLOGY | Facility: CLINIC | Age: 47
End: 2017-12-07

## 2017-12-07 NOTE — TELEPHONE ENCOUNTER
----- Message from Mimi Dalton MA sent at 12/7/2017 10:45 AM CST -----  Contact: self  [phone]  States the mouth wash was not sent to the pharmacy.  States she will wait.

## 2017-12-09 RX ORDER — DEXLANSOPRAZOLE 60 MG/1
60 CAPSULE, DELAYED RELEASE ORAL 2 TIMES DAILY
Qty: 180 CAPSULE | Refills: 3 | Status: SHIPPED | OUTPATIENT
Start: 2017-12-09 | End: 2018-10-22 | Stop reason: SDUPTHER

## 2017-12-11 ENCOUNTER — TELEPHONE (OUTPATIENT)
Dept: OBSTETRICS AND GYNECOLOGY | Facility: CLINIC | Age: 47
End: 2017-12-11

## 2017-12-11 NOTE — TELEPHONE ENCOUNTER
----- Message from Gely Roque MD sent at 12/8/2017  3:20 PM CST -----  Please call this pt and make appointment for HRT and yearly exam.

## 2017-12-28 ENCOUNTER — TELEPHONE (OUTPATIENT)
Dept: OBSTETRICS AND GYNECOLOGY | Facility: CLINIC | Age: 47
End: 2017-12-28

## 2017-12-28 NOTE — TELEPHONE ENCOUNTER
----- Message from Maddy Rowe sent at 12/28/2017  2:33 PM CST -----  Contact: self  x_  1st Request  _  2nd Request  _  3rd Request    Who: pt    Why: pt returning call... Please advise    What Number to Call Back: [phone]    When to Expect a call back: (Before the end of the day)   -- if call after 3:00 call back will be tomorrow.

## 2018-01-05 ENCOUNTER — TELEPHONE (OUTPATIENT)
Dept: OBSTETRICS AND GYNECOLOGY | Facility: CLINIC | Age: 48
End: 2018-01-05

## 2018-01-05 DIAGNOSIS — Z12.31 VISIT FOR SCREENING MAMMOGRAM: Primary | ICD-10-CM

## 2018-01-05 NOTE — TELEPHONE ENCOUNTER
----- Message from Dwayne Ruiz sent at 1/4/2018  3:17 PM CST -----  Contact: Pt  X_ 1st Request  _ 2nd Request  _ 3rd Request    Who: JENN BERMUDEZ [[id number]]    Why: Returning a call.    What Number to Call Back: [phone]    When to Expect a call back: (Before the end of the day)  -- if call after 3:00 call back will be tomorrow.

## 2018-01-05 NOTE — TELEPHONE ENCOUNTER
----- Message from Dwayne Ruiz sent at 1/4/2018  3:17 PM CST -----  Contact: Pt  X_ 1st Request  _ 2nd Request  _ 3rd Request    Who: JENN BERMUDEZ [[id number]]    Why: Returning a call.    What Number to Call Back: [phone]    When to Expect a call back: (Before the end of the day)  -- if call after 3:00 call back will be tomorrow.

## 2018-01-15 ENCOUNTER — PATIENT MESSAGE (OUTPATIENT)
Dept: GASTROENTEROLOGY | Facility: CLINIC | Age: 48
End: 2018-01-15

## 2018-01-19 ENCOUNTER — PATIENT MESSAGE (OUTPATIENT)
Dept: OBSTETRICS AND GYNECOLOGY | Facility: CLINIC | Age: 48
End: 2018-01-19

## 2018-01-29 ENCOUNTER — PATIENT MESSAGE (OUTPATIENT)
Dept: GASTROENTEROLOGY | Facility: CLINIC | Age: 48
End: 2018-01-29

## 2018-01-30 NOTE — TELEPHONE ENCOUNTER
Her appointment has been cancelled at her request.  Did try to call, no answer, left message. Will reschedule appointment when I speak with her on the phone.

## 2018-02-05 ENCOUNTER — TELEPHONE (OUTPATIENT)
Dept: GASTROENTEROLOGY | Facility: CLINIC | Age: 48
End: 2018-02-05

## 2018-02-05 NOTE — TELEPHONE ENCOUNTER
----- Message from Marisol Saldivar sent at 2/5/2018  2:39 PM CST -----  Contact: Self- [phone]  Dominik- pt states she is returning a missed call to Angela- please call pt back at [phone]

## 2018-02-08 ENCOUNTER — TELEPHONE (OUTPATIENT)
Dept: GASTROENTEROLOGY | Facility: CLINIC | Age: 48
End: 2018-02-08

## 2018-02-08 NOTE — TELEPHONE ENCOUNTER
----- Message from Marisol Saldivar sent at 2/5/2018  2:39 PM CST -----  Contact: Self- [phone]  Dominik- pt states she is returning a missed call to Angela- please call pt back at [phone]

## 2018-02-26 ENCOUNTER — TELEPHONE (OUTPATIENT)
Dept: INTERNAL MEDICINE | Facility: CLINIC | Age: 48
End: 2018-02-26

## 2018-02-26 NOTE — TELEPHONE ENCOUNTER
----- Message from Mary Glasgow sent at 2/26/2018  3:34 PM CST -----  Contact: Irina from [facility] [phone]  Irina was calling to speak with nurse about Pt care.    Irina would like a call back at [phone].    Thank You

## 2018-02-26 NOTE — TELEPHONE ENCOUNTER
Mary Baxter Staff   Caller: Irina from [facility] [phone] (Today,  3:34 PM)             Irina was calling to speak with nurse about Pt care.     Irina would like a call back at [phone].     Thank You            shara Orellana MA

## 2018-02-27 NOTE — TELEPHONE ENCOUNTER
Spoke with Irina from [facility].  Wanted to clarify if patient was one that Dr Johnston had seen.

## 2018-03-02 ENCOUNTER — TELEPHONE (OUTPATIENT)
Dept: SURGERY | Facility: CLINIC | Age: 48
End: 2018-03-02

## 2018-03-02 ENCOUNTER — TELEPHONE (OUTPATIENT)
Dept: GASTROENTEROLOGY | Facility: CLINIC | Age: 48
End: 2018-03-02

## 2018-03-02 ENCOUNTER — PATIENT MESSAGE (OUTPATIENT)
Dept: GASTROENTEROLOGY | Facility: CLINIC | Age: 48
End: 2018-03-02

## 2018-03-02 DIAGNOSIS — K31.84 GASTROPARESIS: Primary | ICD-10-CM

## 2018-03-02 NOTE — TELEPHONE ENCOUNTER
----- Message from Marisol Yariel sent at 3/2/2018 12:17 PM CST -----  Contact: Dr. Stevie Verma- [phone] or [phone]  Dominik- Dr. Johnson called to speak with Dr. Lehman regarding the pts gastroparesis- trying to determine her long term care for the pt since she has a pic line- please call Dr. Verma back at [phone] or [phone]

## 2018-03-21 ENCOUNTER — OFFICE VISIT (OUTPATIENT)
Dept: OBSTETRICS AND GYNECOLOGY | Facility: CLINIC | Age: 48
End: 2018-03-21
Payer: COMMERCIAL

## 2018-03-21 ENCOUNTER — OFFICE VISIT (OUTPATIENT)
Dept: GASTROENTEROLOGY | Facility: CLINIC | Age: 48
End: 2018-03-21
Payer: COMMERCIAL

## 2018-03-21 ENCOUNTER — HOSPITAL ENCOUNTER (OUTPATIENT)
Dept: RADIOLOGY | Facility: HOSPITAL | Age: 48
Discharge: HOME | End: 2018-03-21
Attending: OBSTETRICS & GYNECOLOGY
Payer: COMMERCIAL

## 2018-03-21 ENCOUNTER — OFFICE VISIT (OUTPATIENT)
Dept: PAIN MEDICINE | Facility: CLINIC | Age: 48
End: 2018-03-21
Attending: ANESTHESIOLOGY
Payer: COMMERCIAL

## 2018-03-21 ENCOUNTER — HOSPITAL ENCOUNTER (OUTPATIENT)
Dept: RADIOLOGY | Facility: HOSPITAL | Age: 48
Discharge: HOME | End: 2018-03-21
Attending: NURSE PRACTITIONER
Payer: COMMERCIAL

## 2018-03-21 VITALS
TEMPERATURE: 97 F | HEART RATE: 85 BPM | DIASTOLIC BLOOD PRESSURE: 83 MMHG | BODY MASS INDEX: 37.46 KG/M2 | WEIGHT: 247.13 LBS | SYSTOLIC BLOOD PRESSURE: 132 MMHG | HEIGHT: 68 IN

## 2018-03-21 VITALS
SYSTOLIC BLOOD PRESSURE: 140 MMHG | BODY MASS INDEX: 37.32 KG/M2 | HEIGHT: 68 IN | WEIGHT: 246.25 LBS | DIASTOLIC BLOOD PRESSURE: 86 MMHG

## 2018-03-21 VITALS
BODY MASS INDEX: 37.59 KG/M2 | WEIGHT: 248 LBS | DIASTOLIC BLOOD PRESSURE: 72 MMHG | HEIGHT: 68 IN | SYSTOLIC BLOOD PRESSURE: 112 MMHG | HEART RATE: 86 BPM

## 2018-03-21 DIAGNOSIS — R68.84 JAW PAIN: ICD-10-CM

## 2018-03-21 DIAGNOSIS — K21.9 GASTROESOPHAGEAL REFLUX DISEASE WITHOUT ESOPHAGITIS: ICD-10-CM

## 2018-03-21 DIAGNOSIS — K58.0 IRRITABLE BOWEL SYNDROME WITH DIARRHEA: ICD-10-CM

## 2018-03-21 DIAGNOSIS — Z79.899 CHRONIC PRESCRIPTION BENZODIAZEPINE USE: ICD-10-CM

## 2018-03-21 DIAGNOSIS — Z01.419 WELL WOMAN EXAM WITH ROUTINE GYNECOLOGICAL EXAM: Primary | ICD-10-CM

## 2018-03-21 DIAGNOSIS — M62.830 MUSCLE SPASM OF BACK: ICD-10-CM

## 2018-03-21 DIAGNOSIS — G43.909 MIGRAINE WITHOUT STATUS MIGRAINOSUS, NOT INTRACTABLE, UNSPECIFIED MIGRAINE TYPE: ICD-10-CM

## 2018-03-21 DIAGNOSIS — K31.84 GASTROPARESIS: ICD-10-CM

## 2018-03-21 DIAGNOSIS — Z12.4 PAP SMEAR FOR CERVICAL CANCER SCREENING: ICD-10-CM

## 2018-03-21 DIAGNOSIS — Z12.31 VISIT FOR SCREENING MAMMOGRAM: ICD-10-CM

## 2018-03-21 DIAGNOSIS — Z96.89 PRESENCE OF GASTRIC PACEMAKER: ICD-10-CM

## 2018-03-21 DIAGNOSIS — Z78.0 MENOPAUSE: ICD-10-CM

## 2018-03-21 DIAGNOSIS — M79.10 MYALGIA: ICD-10-CM

## 2018-03-21 DIAGNOSIS — M54.6 THORACIC SPINE PAIN: ICD-10-CM

## 2018-03-21 DIAGNOSIS — R14.0 ABDOMINAL BLOATING: ICD-10-CM

## 2018-03-21 DIAGNOSIS — S03.00XS DISLOCATION OF TEMPOROMANDIBULAR JOINT, SEQUELA: ICD-10-CM

## 2018-03-21 DIAGNOSIS — Z01.419 PAP TEST, AS PART OF ROUTINE GYNECOLOGICAL EXAMINATION: ICD-10-CM

## 2018-03-21 DIAGNOSIS — G89.4 CHRONIC PAIN DISORDER: Primary | ICD-10-CM

## 2018-03-21 DIAGNOSIS — K31.84 GASTROPARESIS: Primary | ICD-10-CM

## 2018-03-21 PROCEDURE — 77063 BREAST TOMOSYNTHESIS BI: CPT | Mod: 26,,, | Performed by: RADIOLOGY

## 2018-03-21 PROCEDURE — 77067 SCR MAMMO BI INCL CAD: CPT | Mod: TC,PO

## 2018-03-21 PROCEDURE — 74019 RADEX ABDOMEN 2 VIEWS: CPT | Mod: TC

## 2018-03-21 PROCEDURE — 77067 SCR MAMMO BI INCL CAD: CPT | Mod: 26,,, | Performed by: RADIOLOGY

## 2018-03-21 PROCEDURE — 88175 CYTOPATH C/V AUTO FLUID REDO: CPT

## 2018-03-21 PROCEDURE — 99999 PR PBB SHADOW E&M-EST. PATIENT-LVL V: CPT | Mod: PBBFAC,,, | Performed by: NURSE PRACTITIONER

## 2018-03-21 PROCEDURE — 99999 PR PBB SHADOW E&M-EST. PATIENT-LVL III: CPT | Mod: PBBFAC,,, | Performed by: ANESTHESIOLOGY

## 2018-03-21 PROCEDURE — 99215 OFFICE O/P EST HI 40 MIN: CPT | Mod: S$GLB,,, | Performed by: NURSE PRACTITIONER

## 2018-03-21 PROCEDURE — 20553 NJX 1/MLT TRIGGER POINTS 3/>: CPT | Mod: S$GLB,,, | Performed by: ANESTHESIOLOGY

## 2018-03-21 PROCEDURE — 74019 RADEX ABDOMEN 2 VIEWS: CPT | Mod: 26,,, | Performed by: RADIOLOGY

## 2018-03-21 PROCEDURE — 99205 OFFICE O/P NEW HI 60 MIN: CPT | Mod: 25,S$GLB,, | Performed by: ANESTHESIOLOGY

## 2018-03-21 PROCEDURE — 99396 PREV VISIT EST AGE 40-64: CPT | Mod: S$GLB,,, | Performed by: OBSTETRICS & GYNECOLOGY

## 2018-03-21 PROCEDURE — 99999 PR PBB SHADOW E&M-EST. PATIENT-LVL II: CPT | Mod: PBBFAC,,, | Performed by: OBSTETRICS & GYNECOLOGY

## 2018-03-21 RX ORDER — FLAVOXATE HYDROCHLORIDE 100 MG/1
100 TABLET ORAL 3 TIMES DAILY PRN
COMMUNITY
Start: 2017-12-12 | End: 2018-11-19 | Stop reason: SDUPTHER

## 2018-03-21 RX ORDER — ESTRADIOL 1 MG/1
3 TABLET ORAL DAILY
Qty: 90 TABLET | Refills: 3 | Status: SHIPPED | OUTPATIENT
Start: 2018-03-21 | End: 2018-08-07 | Stop reason: SDUPTHER

## 2018-03-21 RX ORDER — ESTRADIOL 2 MG/1
2 TABLET ORAL DAILY
Qty: 90 TABLET | Refills: 3 | Status: SHIPPED | OUTPATIENT
Start: 2018-03-21 | End: 2018-08-07 | Stop reason: SDUPTHER

## 2018-03-21 RX ORDER — TIZANIDINE 4 MG/1
4 TABLET ORAL EVERY 8 HOURS PRN
Qty: 90 TABLET | Refills: 5 | Status: SHIPPED | OUTPATIENT
Start: 2018-03-21 | End: 2018-09-17

## 2018-03-21 RX ORDER — BUPIVACAINE HYDROCHLORIDE 5 MG/ML
5 INJECTION, SOLUTION EPIDURAL; INTRACAUDAL
Status: COMPLETED | OUTPATIENT
Start: 2018-03-21 | End: 2018-03-21

## 2018-03-21 RX ORDER — GABAPENTIN 300 MG/1
CAPSULE ORAL
Qty: 120 CAPSULE | Refills: 5 | Status: SHIPPED | OUTPATIENT
Start: 2018-03-21 | End: 2018-04-12

## 2018-03-21 RX ADMIN — BUPIVACAINE HYDROCHLORIDE 25 MG: 5 INJECTION, SOLUTION EPIDURAL; INTRACAUDAL at 01:03

## 2018-03-21 NOTE — PROGRESS NOTES
"    [facility] Established Patient Visit    Reason for Visit:  The primary encounter diagnosis was Gastroparesis. Diagnoses of Presence of gastric pacemaker, Irritable bowel syndrome with diarrhea, Abdominal bloating, and Gastroesophageal reflux disease without esophagitis were also pertinent to this visit.    PCP: Rosi Carias    HPI:  This is a 48 y.o. female here for f/u gastroparesis  Followed by Dr. Lehman. Last visit with him in 12/2017.  Has been doing overall well.    Nausea. Well controlled with phenergan 25 mg q4 hrs via  picc at home. No vomiting. Has gastric stimulator. Concerned that leads may have migrated or malfunction d/t was feeling shocking sensations to skin over stimulator few weeks back     Abdominal pain- Has not been having problems with abd pain. Reports occasional bloating-once weekly usually associated with diarrhea. Some tenderness at site of gastric stimulator if bloated.   Consumes diary. Does not consume artifical sweeteners.     Bowel habits- h/o ibs-D. Usually with 3 formed BM/day. Loose stools (4-5 loose BM/day) once every two weeks.Takes imodium 1-2 tabs PRN with improvement, but can lead to constipation at times.    Reflux - + h/o GERD. Has been well controlled with Dexilant 60 mg BID. And GI cocktail PRN.  Dysphagia/odynophagia - no   GI bleeding - denies hematochezia, hematemesis, melena, BRBPR, black/tarry stools, and coffee ground emesis  NSAID usage - none    Entera interegated:   Amplitude: 4.5V   Pulse width 330 us  Rate 28Hz  On 2s  Off 3s  Battery "okay"    Is weaning off of narcotic pain meds (neck and back pain). Is s/p injections per pain management.     ROS:  Constitutional: No fevers, no chills, No unintentional weight loss, no fatigue,   ENT: No allergies  CV: No chest pain, no palpitations, no perif. edema, no sob on exertion  Pulm: No cough, No shortness of breath, no wheezes, no sputum  Ophtho: No vision changes  GI: see HPI; no change in " appetite  Derm: No rash  Heme: No lymphadenopathy, No bruising  MSK: No arthritis, no muscle pain, no muscle weakness  : No dysuria, No hematuria  Endo: No hot or cold intolerance  Neuro: No syncope, No seizure,     PMHX:  has a past medical history of Abnormal Pap smear of vagina; Anxiety; Cataract; Chronic pain; Colon polyp; Epilepsy; Gastroparesis; GERD (gastroesophageal reflux disease); H/O abnormal cervical Papanicolaou smear; Hypercholesteremia (6/21/2016); Hyperlipidemia; IC (interstitial cystitis); Internal hemorrhoids; Interstitial cystitis; Irritable bowel syndrome (IBS); Irritable bowel syndrome with diarrhea (6/21/2016); Spastic colon; TMJ (temporomandibular joint disorder); and TMJ (temporomandibular joint syndrome) (6/21/2016).    PSHX:  has a past surgical history that includes Cholecystectomy; Appendectomy; Colonoscopy; Upper gastrointestinal endoscopy; Cataract extraction; LASIK (Bilateral, 2006); Total abdominal hysterectomy (2005); Tonsillectomy; Pyloroplasty (03/2016); Mandible surgery (Bilateral, 10/17/2016); gastric stimulator placement; Temporomandibular joint surgery (12/2016); Stomach surgery; and Breast biopsy (Bilateral).    The patient's social and family histories were reviewed by me and updated in the appropriate section of the electronic medical record.    Review of patient's allergies indicates:   Allergen Reactions    Ketorolac tromethamine Shortness Of Breath and Swelling    Morphine Anaphylaxis    Toradol [ketorolac] Shortness Of Breath    Ultram [tramadol] Shortness Of Breath    Buprenorphine hcl     Codeine Itching    Flexeril [cyclobenzaprine] Other (See Comments)     GI BLEEDING    Hydrocodone     Hydrocodone-acetaminophen     Ibuprofen     Metoclopramide     Nsaids (non-steroidal anti-inflammatory drug) Other (See Comments)     GI BLEEDING    Opioids - morphine analogues     Reglan [metoclopramide hcl] Other (See Comments)     Headaches and insomnia       "Topamax [topiramate] Other (See Comments)     Ulcers in the mouth and dry mouth    Tramadol hcl        Current Outpatient Prescriptions   Medication Sig    dexlansoprazole (DEXILANT) 60 mg capsule Take 1 capsule (60 mg total) by mouth 2 (two) times daily.    diphenhydrAMINE (BENADRYL) 50 MG tablet Take 50 mg by mouth every 4 (four) hours as needed for Itching.    estradiol (ESTRACE) 1 MG tablet Take 3 tablets (3 mg total) by mouth once daily. Take one 1mg pill daily along with the 2mg pill daily for total of 3mg daily    estradiol (ESTRACE) 2 MG tablet Take 1 tablet (2 mg total) by mouth once daily. Take one 2mg tab and one 1mg tab for total of 3 daily    flavoxATE (URISPAS) 100 mg Tab     gabapentin (NEURONTIN) 300 MG capsule Take 300 mg QAM and QNoon, and 600 mg QHS    lipase-protease-amylase 24,000-76,000-120,000 units (CREON) 24,000-76,000 -120,000 unit capsule Take 1 capsule by mouth 3 (three) times daily with meals.    loperamide (IMODIUM) 2 mg capsule Take 2 capsules (4 mg total) by mouth daily as needed.    mometasone (NASONEX) 50 mcg/actuation nasal spray     pentosan polysulfate (ELMIRON) 100 mg Cap Take 100 mg by mouth 3 (three) times daily.    PROAIR HFA 90 mcg/actuation inhaler every 6 (six) hours as needed.     simvastatin (ZOCOR) 40 MG tablet     SODIUM CHLORIDE 0.45 % IV Inject into the vein once daily.    sodium chloride 0.9% 0.9 % SolP 50 mL with promethazine 25 mg/mL Soln Inject 25 mg into the vein every 4 (four) hours. Thru IV    tiZANidine (ZANAFLEX) 4 MG tablet Take 1 tablet (4 mg total) by mouth every 8 (eight) hours as needed (pain).    topiramate (TOPAMAX) 100 MG tablet Take 100 mg by mouth 2 (two) times daily.     No current facility-administered medications for this visit.          Objective Findings:    Vital Signs:  /72   Pulse 86   Ht 5' 8" (1.727 m)   Wt 112.5 kg (248 lb 0.3 oz)   BMI 37.71 kg/m²  Body mass index is 37.71 kg/m².    Physical Exam:  General " Appearance: Well appearing in no acute distress  Head:   Normocephalic, without obvious abnormality  Eyes:    No scleral icterus, EOMI  Throat: Lips, mucosa, and tongue normal; teeth and gums normal  Lungs: CTA bilaterally in anterior and posterior fields, no wheezes, no crackles.  Heart:  Regular rate and rhythm, S1, S2 normal, no murmurs heard  Abdomen: Soft, non tender, non distended with positive bowel sounds in all four quadrants. No hepatosplenomegaly, ascites, or mass  Extremities:    2+ radial pulses, no clubbing, cyanosis or edema  Skin: No rash to exposed areas  Neurologic: A&Ox4      Labs:pt brought in recent labs which were reviewed during clinic visit today: CBC good, CMP good, vitals and temp good. Sent to be scanned into pt EMR.    Lab Results   Component Value Date    WBC 8.58 08/07/2017    HGB 13.3 08/07/2017    HCT 38.8 08/07/2017     08/07/2017    ALT 33 08/07/2017    AST 40 08/07/2017     12/06/2017    K 3.8 12/06/2017     12/06/2017    CREATININE 0.8 12/06/2017    BUN 21 (H) 12/06/2017    CO2 20 (L) 12/06/2017    TSH 2.086 11/07/2016       Endoscopy:   8/31/2017 EGD  5/2/2017 EGD  1/15/2016 EGD  6/22/2016 flex sig  Assessment:    1. Gastroparesis    2. Presence of gastric pacemaker    3. Irritable bowel syndrome with diarrhea    4. Abdominal bloating    5. Gastroesophageal reflux disease without esophagitis          Recommendations:  1,2. gastroparesis- good control with IV phenergan and gastric stimulator. Setting and battery good. abd xray d/t pt concerned for lead migration. GP diet as per handout provided.  3,4.  IBS-D- with abd bloating. Send out sibo testing. Lactose elimination trial as per handout provided.  5. GERD- well controlled. Continue Dexilant BID and gi cocktail PRN.    Follow-up in about 2 months (around 5/21/2018) for gastroparesis wtih Dr. Lehman.    Order summary:  Orders Placed This Encounter    X-Ray Abdomen Flat And Erect     Visit time: 45 minutes with  greater than 50% of the time spent in face to face pt  discussing the aforementioned diagnoses, recommendations, test results, and answering pt questions.    Thank you for allowing me to participate in the care of MILDRED Domingo, FNP-C

## 2018-03-21 NOTE — PROGRESS NOTES
Subjective:       Patient ID: Nan Betts is a 48 y.o. female.    Chief Complaint:  Well Woman      History of Present Illness  HPI  This 48 yr old P0 female is here with history of hyst supracervical then had to have cervix removed for pre cancer cells so will do pap yearly for while.  She is on estradiol 3mg. Still some hot flashes but not as bad. Was on testosterone but made her grow hair.  Sees urogyn for bladder issues.  Mammogram is ordered needs scheduled.  She has been taken off of her pain meds and xanax and has new pain regimen.    She has multiple aches and pains and  Had recent ultrasound bilaterally for breast pain.  Neg ultrasound and recommned get routine mammogram    GYN & OB History  No LMP recorded. Patient has had a hysterectomy.   Date of Last Pap: 2016    OB History    Para Term  AB Living   0 0 0 0 0 0   SAB TAB Ectopic Multiple Live Births   0 0 0 0               Review of Systems  Review of Systems   Constitutional: Positive for fatigue. Negative for chills and fever.   Respiratory: Negative for shortness of breath.    Cardiovascular: Negative for chest pain.   Gastrointestinal: Negative for abdominal pain, nausea and vomiting.   Genitourinary: Negative for difficulty urinating, dyspareunia, genital sores, menstrual problem, pelvic pain, vaginal bleeding, vaginal discharge and vaginal pain.   Musculoskeletal: Positive for arthralgias.   Skin: Negative for wound.   Hematological: Negative for adenopathy.           Objective:    Physical Exam:   Constitutional: She is oriented to person, place, and time. She appears well-developed and well-nourished.    HENT:   Head: Normocephalic.    Eyes: EOM are normal.    Neck: Normal range of motion.    Cardiovascular: Normal rate.     Pulmonary/Chest: Effort normal. She exhibits no mass and no tenderness. Right breast exhibits no inverted nipple, no mass, no skin change and no tenderness. Left breast exhibits no inverted nipple, no  mass, no skin change and no tenderness.        Abdominal: Soft. She exhibits no distension. There is no tenderness.     Genitourinary: Vagina normal. There is no rash, tenderness or lesion on the right labia. There is no rash, tenderness or lesion on the left labia. Uterus is absent. Uterus is not tender. Cervix is normal. Right adnexum displays no mass, no tenderness and no fullness. Left adnexum displays no mass, no tenderness and no fullness. Cervix exhibits no discharge.           Musculoskeletal: Normal range of motion.       Neurological: She is alert and oriented to person, place, and time.    Skin: Skin is warm and dry.    Psychiatric: She has a normal mood and affect.          Assessment:        1. Well woman exam with routine gynecological exam    2. Pap smear for cervical cancer screening    3. Menopause               Plan:      Continue ERT  Pap yearly  mammogram

## 2018-03-21 NOTE — PATIENT INSTRUCTIONS
Lactose Intolerance Tips    The testing for lactose intolerance is not very reliable. The best indicator if you are lactose intolerant is by cutting all lactose out of your diet for 2 weeks and noting if you see less gas, bloating, cramps, and diarrhea.    Alternatives: Lactose free milk (lactaid), almond milk, coconut milk, rice milk, hemp milk lactose free cottage cheese. Yogurt with live and active cultures may slowly be reintroduced after all lactose has been eliminated if tolerated.    Avoid: Ice cream, milk, condensed milk, soft cheeses (cottage cheese), butter, buttermilk, cream, whey products    Low lactose cheeses: Swiss, Parmesan, Gouda, Juan, Provolone, Cheddar, [place], [place], and Maximo Cid.         More detailed information courtesy of [facility] Website:    [facility]   General Guidelines for Managing Lactose Intolerance    Lactose is a sugar found in certain dairy products. Many adults have trouble digesting foods containing lactose. This is often due to low levels of the enzyme (lactase) in the intestine that is needed to break down lactose. Patients with intestinal disease or injury may also experience lactose intolerance. Symptoms of lactose intolerance include nausea, bloating, gas, diarrhea and/or abdominal pain/cramping. These symptoms generally occur 30 minutes to 2 hours after eating a food containing lactose.      The amount of lactose an individual can tolerate varies from person to person. Many people with lactose intolerance can tolerate some lactose-containing foods by adjusting the type, amount and timing of these foods. Some patients may need to (or may choose to) limit or eliminate these foods completely. If you wish to include lactose-containing foods in your diet, the following suggestions may help. Always talk with your doctor before making changes to your prescribed diet.   ??Add new foods one at a time; decrease the  amount, or eliminate the food, if symptoms occur.   ??Most people with lactose intolerance do not need to avoid all dairy products, for example:   o Cultured yogurt contains live cultures that naturally help digest lactose. Many people with lactose intolerance tolerate cultured yogurt well. Check labels to see if a yogurt contains live cultures.   o Hard cheese is low in lactose and is usually well tolerated   ??If you wish to drink milk, try taking small amounts (1/2 cup at a time). Many people can tolerate up to 2 cups of milk per day when taken in smaller servings spread out over the course of the day.   ??Foods that contain lactose may be better tolerated if they are eaten with a meal.      For those who need to limit or eliminate lactose, the [facility] handout on Lactose Content of Common Foods (available at [email or web address]) can help identify sources of lactose. Note that many people with lactose intolerance can tolerate 12 grams or more of lactose per day, particularly if the suggestions above are followed.      Foods made from certain dairy products (such as pudding, cream soups, cream or cheese sauces, etc.) also contain lactose. The amount of lactose in a product will depend on the amount of dairy products used. Other foods such as baked items, instant mixes, salad dressings, etc. may also contain lactose. The following ingredients suggest a product contains lactose:   Butter   Caseinates   Cheese   Cream  Curds   Dry milk solids   Lactose   Milk  Milk by-products   Milk solids   Milk sugar   Non-fat dry milk powder  Skim milk solids   Whey   Yogurt        ?Lactose can also be found in medications. Check for lactose on the label, although it does not have to be listed; if you are very sensitive to lactose and have persistent symptoms, ask your pharmacist to help you. Ask your doctor to prescribe a lactose-free alternative if one exists.      Specialty Products: If you are not able to tolerate  lactose-containing foods using the above suggestions, special products are available. Keep in mind, not everyone with lactose intolerance needs special products; many people can tolerate regular dairy products by adjusting the type and amount consumed. You may want to try the tips provided in this handout before trying the more costly specialty products.   100% Lactose Reduced Milk    100% lactose reduced milk is available in the dairy section of most grocery stores.    Available in nonfat, 1%, 2%, and whole milk varieties.    Lactose reduced milk contains the same nutrition, including calcium and vitamin D as regular milk.    Lactose reduced milk does cost a bit more than regular milk.    Lactose reduced milk may taste sweeter than regular milk.     Lactase Enzyme Supplements    These products contain the enzyme lactase, which is needed for the digestion of lactose.    Available in caplet or chewable form.    Lactase enzyme supplements may not be needed with some dairy products, such as cultured yogurt and cheese.    Most products recommend a dose of 9000 lactase units be taken with each dairy product. This amount may not always be needed; you may want to start with a smaller dose and increase only if symptoms persist.    Both brand name and store brand varieties are available. Store brands are often more cost-effective than name brands, however, prices will vary depending on store, location, and quantity purchased.     Other Products    Soy milk, rice milk and almond milk are lactose free. If you plan to use these products as an alternative calcium and/or vitamin D source, read labels carefully and choose a brand which specifically states it contains these nutrients and in what amounts.     Calcium and Vitamin D    If you are on a low lactose diet, discuss your calcium and vitamin D intake with your physician or dietitian. Studies have shown that individuals with lactose intolerance often do not take  in enough of these nutrients. Inadequate calcium and vitamin D intake increases the risk of osteoporosis. A dietitian can help you determine whether you are getting enough of these nutrients in your diet.

## 2018-03-21 NOTE — PROGRESS NOTES
Subjective:     Patient ID: Nan Betts is a 48 y.o. female.    Chief Complaint: Pain    Consulted by: Self referral     Disclaimer: This note was generated using voice recognition software.  There may be a typographical errors that were missed during proofreading.      HPI:    Nan Betts is a 48 y.o. female who presents today with chest pain x 3 months, Jaw pain since she was 22 years old.  This pain is described in detail below.    Pain located ribs and upper back. Described as aching, and is intermittent. She reports this pain began after bronchitis. Worse with bending, touching and lifting. No alleviating factors. Took Zanaflex 2mg - helped a little bit.     Pain located in jaw is described as sharp, tight, and is constant. Worse with coughing, sneezing, eating and talking. Surgery in 2015 for TMJ on the right side - removal of disc with subsequent graft. Left side improved after surgery. Has TMJ injections in the past which made pain worse.     She is also diagnosed with interstitial cystitis    Physical Therapy: no    Non-pharmacologic Treatment:     · Ice/Heat: both - mild relief  · TENS: does not use this  · Massage: none  · Chiropractic care: none  · Acupuncture: none  · Other: none         Pain Medications:         · Currently taking: Zanaflex 2mg, Xanax, Soma, Percocet 10/325mg BID (Dr. Serrano - pain management in [place], was given 14 day supply and told he would not continue this medication), Gabapentin 250 mg/5 mL TID    · Has tried in the past:  NSAIDs - naproxen; flexeril & naproxen combination - helpful in the past, but patient had GI bleeds.  Topamax (caused other problems)     · Has not tried: TCAs, SNRIs, topical creams    Blood thinners: none    Interventional Therapies: injections to TMJ - made pain worse     Relevant Surgeries: surgery in 2015    Affecting sleep? Yes, her sleep schedule is completely switched    Affecting daily activities? Yes    Depressive symptoms? Not addressed today           · SI/HI? No    Work status: Not currently working due to surgeries starting in 2016.    Prescription Monitoring Program database:  Reviewed and consistent with medication use as prescribed.    Pain Scales  Best: 4/10  Worst: 10/10  Usually: 6/10  Today: 6/10    Facial Pain    The pain radiates to the right jaw. This is a chronic problem. The current episode started more than 1 year ago. The problem occurs constantly. The problem has been gradually worsening. The quality of the pain is described as aching, sharp and tightness. The pain is at a severity of 5/10. The symptoms are aggravated by bending, coughing, eating and lifting. She has tried oral narcotics for the symptoms.       Last 3 PDI Scores 3/21/2018   Pain Disability Index (PDI) 59       Review of Systems   Constitution: Positive for malaise/fatigue.   Musculoskeletal: Positive for back pain, muscle cramps, myalgias and neck pain.   Gastrointestinal: Positive for abdominal pain and diarrhea.   Neurological: Positive for headaches.   All other systems reviewed and are negative.            Past Medical History:   Diagnosis Date    Abnormal Pap smear of vagina     Anxiety     Cataract     Chronic pain     TMJ and abdomen    Colon polyp     Epilepsy     as a child    Gastroparesis     Idiopathic    GERD (gastroesophageal reflux disease)     H/O abnormal cervical Papanicolaou smear     Hypercholesteremia 6/21/2016    Hyperlipidemia     IC (interstitial cystitis)     Internal hemorrhoids     Interstitial cystitis     Irritable bowel syndrome (IBS)     Irritable bowel syndrome with diarrhea 6/21/2016    Spastic colon     TMJ (temporomandibular joint disorder)     TMJ (temporomandibular joint syndrome) 6/21/2016       Past Surgical History:   Procedure Laterality Date    APPENDECTOMY      BREAST BIOPSY Bilateral     CATARACT EXTRACTION      CHOLECYSTECTOMY      COLONOSCOPY      gastric stimulator placement      LASIK Bilateral 2006     MANDIBLE SURGERY Bilateral 10/17/2016    PYLOROPLASTY  03/2016    STOMACH SURGERY      gastric pacemaker    TEMPOROMANDIBULAR JOINT SURGERY  12/2016    TONSILLECTOMY      TOTAL ABDOMINAL HYSTERECTOMY  2005    EUGENIA/BSO, Trachelectomy 7 years ago    UPPER GASTROINTESTINAL ENDOSCOPY         Review of patient's allergies indicates:   Allergen Reactions    Ketorolac tromethamine Shortness Of Breath and Swelling    Morphine Anaphylaxis    Toradol [ketorolac] Shortness Of Breath    Ultram [tramadol] Shortness Of Breath    Buprenorphine hcl     Codeine Itching    Flexeril [cyclobenzaprine] Other (See Comments)     GI BLEEDING    Hydrocodone     Hydrocodone-acetaminophen     Ibuprofen     Metoclopramide     Nsaids (non-steroidal anti-inflammatory drug) Other (See Comments)     GI BLEEDING    Opioids - morphine analogues     Reglan [metoclopramide hcl] Other (See Comments)     Headaches and insomnia      Tramadol hcl        Current Outpatient Prescriptions   Medication Sig Dispense Refill    dexlansoprazole (DEXILANT) 60 mg capsule Take 1 capsule (60 mg total) by mouth 2 (two) times daily. 180 capsule 3    diphenhydrAMINE (BENADRYL) 50 MG tablet Take 50 mg by mouth every 4 (four) hours as needed for Itching.      lipase-protease-amylase 24,000-76,000-120,000 units (CREON) 24,000-76,000 -120,000 unit capsule Take 1 capsule by mouth 3 (three) times daily with meals. 90 capsule 11    loperamide (IMODIUM) 2 mg capsule Take 2 capsules (4 mg total) by mouth daily as needed. 180 capsule 3    mometasone (NASONEX) 50 mcg/actuation nasal spray       pentosan polysulfate (ELMIRON) 100 mg Cap Take 100 mg by mouth 3 (three) times daily.      PROAIR HFA 90 mcg/actuation inhaler every 6 (six) hours as needed.   0    simvastatin (ZOCOR) 40 MG tablet       SODIUM CHLORIDE 0.45 % IV Inject into the vein once daily.      sodium chloride 0.9% 0.9 % SolP 50 mL with promethazine 25 mg/mL Soln Inject 25 mg into the  "vein every 4 (four) hours. Thru IV      topiramate (TOPAMAX) 100 MG tablet Take 100 mg by mouth 2 (two) times daily.      estradiol (ESTRACE) 1 MG tablet Take 3 tablets (3 mg total) by mouth once daily. Take one 1mg pill daily along with the 2mg pill daily for total of 3mg daily 90 tablet 3    estradiol (ESTRACE) 2 MG tablet Take 1 tablet (2 mg total) by mouth once daily. Take one 2mg tab and one 1mg tab for total of 3 daily 90 tablet 3    flavoxATE (URISPAS) 100 mg Tab       gabapentin (NEURONTIN) 300 MG capsule Take 300 mg QAM and QNoon, and 600 mg  capsule 5    tiZANidine (ZANAFLEX) 4 MG tablet Take 1 tablet (4 mg total) by mouth every 8 (eight) hours as needed (pain). 90 tablet 5     No current facility-administered medications for this visit.        Family History   Problem Relation Age of Onset    Coronary artery disease Mother     Hodgkin's lymphoma Mother     Coronary artery disease Father     Lymphoma Sister     Coronary artery disease Paternal Grandfather     Glaucoma Maternal Grandmother     Pancreatic cancer Paternal Aunt     Colon cancer Neg Hx     Breast cancer Neg Hx     Ovarian cancer Neg Hx     Cervical cancer Neg Hx     Endometrial cancer Neg Hx     Vaginal cancer Neg Hx        Social History     Social History    Marital status:      Spouse name: N/A    Number of children: N/A    Years of education: N/A     Occupational History    Not on file.     Social History Main Topics    Smoking status: Current Every Day Smoker     Packs/day: 0.50     Years: 20.00    Smokeless tobacco: Never Used    Alcohol use No    Drug use: No    Sexual activity: Yes     Partners: Male     Other Topics Concern    Not on file     Social History Narrative    No narrative on file       Objective:     Vitals:    03/21/18 0735   BP: 132/83   Pulse: 85   Temp: 97.3 °F (36.3 °C)   Weight: 112.1 kg (247 lb 2.2 oz)   Height: 5' 8" (1.727 m)   PainSc:   5        GEN:  Well developed, " well nourished.  No acute distress. no pain behavior.  HEENT:  No trauma.  Mucous membranes moist.  Nares patent bilaterally.  PSYCH: Normal affect. Thought content appropriate.  CHEST:  Breathing symmetric.  No audible wheezing.  ABD: Soft, non-distended.  SKIN:  Warm, pink, dry.  No rash on exposed areas.    EXT:  No cyanosis, clubbing, or edema.  No color change or changes in nail or hair growth.  NEURO/MUSCULOSKELETAL:  Fully alert, oriented, and appropriate. Speech normal abran. No cranial nerve deficits.   Gait: ambulates independently  Motor Strength: 5/5 motor strength throughout upper and lower extremities.   Sensory: no sensory deficit in the lower extremities.   Reflexes:  2+ and symmetric throughout.  Downgoing Babinski's bilaterally.  No clonus or spasticity.  T-Spine:  Mildly limited ROM with pain at extremes of twisting.   + TTP over thoracic paraspinals left > right      Imaging:        The imaging studies listed below were independently reviewed by me, and I agree with the findings as documented below.     CXR:  Findings:  X-ray beam attenuation and scatter occur in generous overlying soft tissues.  Intrathoracic structures are magnified by the patient's thick body wall.      PICC line placed in the LEFT upper extremity has its tip in the cephalad aspect of the RIGHT atrium.    Lung volumes are normal and symmetric.  Mediastinal structures are midline.    I detect no convincing evidence of pulmonary disease, pleural fluid, lymph node enlargement, cardiac decompensation, pneumothorax, pneumomediastinum, pneumoperitoneum or significant osseous abnormality.   Impression       No convincing evidence of intrathoracic disease identified.         Assessment:     Encounter Diagnoses   Name Primary?    Chronic pain disorder Yes    Muscle spasm of back     Thoracic spine pain     Jaw pain     Dislocation of temporomandibular joint, sequela     Chronic prescription benzodiazepine use     Migraine  without status migrainosus, not intractable, unspecified migraine type     Myalgia        Plan:     Diagnoses and all orders for this visit:    Chronic pain disorder  -     gabapentin (NEURONTIN) 300 MG capsule; Take 300 mg QAM and QNoon, and 600 mg QHS    Muscle spasm of back  -     tiZANidine (ZANAFLEX) 4 MG tablet; Take 1 tablet (4 mg total) by mouth every 8 (eight) hours as needed (pain).  -     gabapentin (NEURONTIN) 300 MG capsule; Take 300 mg QAM and QNoon, and 600 mg QHS    Thoracic spine pain  -     gabapentin (NEURONTIN) 300 MG capsule; Take 300 mg QAM and QNoon, and 600 mg QHS    Jaw pain  -     gabapentin (NEURONTIN) 300 MG capsule; Take 300 mg QAM and QNoon, and 600 mg QHS    Dislocation of temporomandibular joint, sequela  -     gabapentin (NEURONTIN) 300 MG capsule; Take 300 mg QAM and QNoon, and 600 mg QHS    Chronic prescription benzodiazepine use    Migraine without status migrainosus, not intractable, unspecified migraine type  -     gabapentin (NEURONTIN) 300 MG capsule; Take 300 mg QAM and QNoon, and 600 mg QHS  -     Ambulatory consult to Neurology    Myalgia  -     bupivacaine (PF) 0.5% (5 mg/ml) injection 25 mg; Inject 5 mLs (25 mg total) into the muscle one time.         Her pain is consistent with the above.    She presents with a complex past pain history.  She has multiple diagnoses along the fibromyalgia spectrum, including TMJ, migraines, and interstitial cystitis.  She does not have a history of psychological treatment.  I do think she would benefit from this in the future, though I did not discuss this with her today.      She presents with a past pain clinic notes suggesting possible misuse of her narcotic medications.  This would not change my management in any way, though it is helpful to have.  I do not recommend opioids for the chronic treatment of her non-malignant pain at this time.  Short courses for acute flares are appropriate.  This is especially important as I suspect she  has an underlying central pain syndrome.  If that is the case, narcotics would make this worse.  She endorses not wanting to take these medications.  I did not discuss this with her today.    She has multiple complaints today, the worst of which is her TMJ pain, which I do not treat.  Any medications would be symptom management, but she really needs someone who can help with the underlying disease.      She reports a past history of GI bleed, presumably due to NSAIDs.  I do see reports of hematochezia in 2016.  I will confer with her GI specialist, Dr. Lehman, Re: Is reinitiation of NSAID therapy would be possible at this point.    We discussed the assessment and recommendations.  All available images were reviewed. We discussed the disease process, prognosis, treatment plan, and risks and benefits. The patient is aware of the risks and benefits of the medications being prescribed, common side effects, and proper usage. The following is the plan we agreed on:     1. Had long discussion with patient regarding her pain complaints. We discussed that we do not recommend her current regimen of Percocet, Soma and Xanax. We mentioned that this is a dangerous combination of medications and none of which are indicated for long term use in chronic pain.   2. I personally do not treat TMJ. Will discuss with St[facility] Gonzalez and [facility] to see if they offer an interventional procedure for patient's facial pain; however, [facility] or dentists usually treat this condition.  3. Referral to neurology for treatment for her migraines.  We were unable to schedule this consult visit today as she had to run to another appointment.  4. No documentation of GI bleed exists. Will ask PCP if patient is able to take NSAID as Naproxen was very helpful in the past. If so, will give NSAID with less GI side effects.   5. Zanaflex 4mg TID PRN rx given today  6. Increase gabapentin from current dose. Patient to take 300mg qAM and noon with 600mg qHS. Will  increase as tolerated.   7. Trigger point injections given today  8. Consider psychology referral in the future  9. She will call to schedule appointment that is convenient with her other appointments, given the distance that she has to drive      Trigger Point Injection:   The procedure was discussed with the patient including complications of damage, bleeding, infection, and failure of pain relief.     All medications, allergies, and relevant histories were reviewed. No recent antibiotics or infections.  A time-out was taken to verify the correct patient, procedure, laterality, and appropriate medications/allergies.    Trigger points were identified by palpation and marked. CHG prep of sites done. A 27-gauge needle was advanced to the point of maximal tenderness, and 1 mL of a mixture of 0.5% bupivacaine with betamethasone 3 mg was injected after negative aspiration. All sites done in the same manner. Patient tolerated the procedure well and without complications. Sites injected included: 2x left thoracic paraspinals; 1x right thoracic paraspinal      The patient tolerated the procedure well and was discharged in excellent condition.    Thank you for allowing me to participate in the care of this patient.   Please do not hesitate to call me at [phone] with any questions or concerns.    Greater than 60 minutes spent in total in todays visit with the patient, with more than half that time direct face to face counseling and education with the patient today. We discussed the disease process, prognosis, treatment plan, and risks and benefits.    I have seen the patient with the fellow physician.  I have performed my own history and physical exam and we have come up with the above plan.  The patient is in agreement with our plan.    The above plan and management options were discussed at length with patient. Patient is in agreement with the above and verbalized understanding. It will be communicated with the  referring physician via electronic record, fax, or mail.

## 2018-03-21 NOTE — Clinical Note
Dr. Lehman, I saw Mrs. Betts today for her pain complaints.  She reports that, in the past, she received great relief with naproxen.  However, she was told to stop these due to a GI bleed per her report.  Does she have a contraindication to NSAID therapy?  If not, would you be okay with me reinitiating this class of medications, possibly considering meloxicam,  as it is commonly more well tolerated then naproxen?    Thank you for your time,  Sayra Ross

## 2018-03-22 ENCOUNTER — PATIENT MESSAGE (OUTPATIENT)
Dept: PAIN MEDICINE | Facility: CLINIC | Age: 48
End: 2018-03-22

## 2018-03-26 ENCOUNTER — PATIENT MESSAGE (OUTPATIENT)
Dept: PAIN MEDICINE | Facility: CLINIC | Age: 48
End: 2018-03-26

## 2018-04-02 ENCOUNTER — PATIENT MESSAGE (OUTPATIENT)
Dept: GASTROENTEROLOGY | Facility: CLINIC | Age: 48
End: 2018-04-02

## 2018-04-06 ENCOUNTER — PATIENT MESSAGE (OUTPATIENT)
Dept: PAIN MEDICINE | Facility: CLINIC | Age: 48
End: 2018-04-06

## 2018-04-06 DIAGNOSIS — Z87.19 HISTORY OF GASTRITIS: Primary | ICD-10-CM

## 2018-04-09 RX ORDER — NABUMETONE 500 MG/1
500 TABLET, FILM COATED ORAL 2 TIMES DAILY PRN
Qty: 30 TABLET | Refills: 3 | Status: SHIPPED | OUTPATIENT
Start: 2018-04-09 | End: 2018-11-14

## 2018-04-09 NOTE — TELEPHONE ENCOUNTER
Victorina, can you call her to ask her if she is taking the dexlansoprazole (Dexilant)?  If so, she does not need the Nexium as well.  Thanks! LW

## 2018-04-10 ENCOUNTER — PATIENT MESSAGE (OUTPATIENT)
Dept: GASTROENTEROLOGY | Facility: CLINIC | Age: 48
End: 2018-04-10

## 2018-04-11 DIAGNOSIS — K58.0 IRRITABLE BOWEL SYNDROME WITH DIARRHEA: Primary | ICD-10-CM

## 2018-04-12 ENCOUNTER — OFFICE VISIT (OUTPATIENT)
Dept: PAIN MEDICINE | Facility: CLINIC | Age: 48
End: 2018-04-12
Attending: ANESTHESIOLOGY
Payer: COMMERCIAL

## 2018-04-12 VITALS
DIASTOLIC BLOOD PRESSURE: 85 MMHG | WEIGHT: 241.63 LBS | BODY MASS INDEX: 36.62 KG/M2 | HEART RATE: 73 BPM | HEIGHT: 68 IN | TEMPERATURE: 98 F | SYSTOLIC BLOOD PRESSURE: 131 MMHG

## 2018-04-12 DIAGNOSIS — R68.84 JAW PAIN: ICD-10-CM

## 2018-04-12 DIAGNOSIS — M79.10 MYALGIA: ICD-10-CM

## 2018-04-12 DIAGNOSIS — G89.4 CHRONIC PAIN DISORDER: Primary | ICD-10-CM

## 2018-04-12 DIAGNOSIS — S03.00XS DISLOCATION OF TEMPOROMANDIBULAR JOINT, SEQUELA: ICD-10-CM

## 2018-04-12 DIAGNOSIS — M62.838 CERVICAL PARASPINAL MUSCLE SPASM: ICD-10-CM

## 2018-04-12 DIAGNOSIS — M62.830 MUSCLE SPASM OF BACK: ICD-10-CM

## 2018-04-12 DIAGNOSIS — M54.6 THORACIC SPINE PAIN: ICD-10-CM

## 2018-04-12 DIAGNOSIS — G43.909 MIGRAINE WITHOUT STATUS MIGRAINOSUS, NOT INTRACTABLE, UNSPECIFIED MIGRAINE TYPE: ICD-10-CM

## 2018-04-12 DIAGNOSIS — G89.0 CENTRAL PAIN SYNDROME: ICD-10-CM

## 2018-04-12 PROCEDURE — 99999 PR PBB SHADOW E&M-EST. PATIENT-LVL IV: CPT | Mod: PBBFAC,,, | Performed by: ANESTHESIOLOGY

## 2018-04-12 PROCEDURE — 99215 OFFICE O/P EST HI 40 MIN: CPT | Mod: 25,S$GLB,, | Performed by: ANESTHESIOLOGY

## 2018-04-12 PROCEDURE — 20552 NJX 1/MLT TRIGGER POINT 1/2: CPT | Mod: S$GLB,,, | Performed by: ANESTHESIOLOGY

## 2018-04-12 RX ORDER — BUPIVACAINE HYDROCHLORIDE 2.5 MG/ML
4.5 INJECTION, SOLUTION EPIDURAL; INFILTRATION; INTRACAUDAL ONCE
Status: COMPLETED | OUTPATIENT
Start: 2018-04-12 | End: 2018-04-12

## 2018-04-12 RX ORDER — PREGABALIN 25 MG/1
25 CAPSULE ORAL 3 TIMES DAILY
Qty: 90 CAPSULE | Refills: 5 | Status: SHIPPED | OUTPATIENT
Start: 2018-04-12 | End: 2018-11-14

## 2018-04-12 RX ORDER — BETAMETHASONE SODIUM PHOSPHATE AND BETAMETHASONE ACETATE 3; 3 MG/ML; MG/ML
3 INJECTION, SUSPENSION INTRA-ARTICULAR; INTRALESIONAL; INTRAMUSCULAR; SOFT TISSUE
Status: COMPLETED | OUTPATIENT
Start: 2018-04-12 | End: 2018-04-12

## 2018-04-12 RX ORDER — PREGABALIN 25 MG/1
25 CAPSULE ORAL 3 TIMES DAILY
Qty: 21 CAPSULE | Refills: 0 | Status: SHIPPED | OUTPATIENT
Start: 2018-04-12 | End: 2018-04-12 | Stop reason: SDUPTHER

## 2018-04-12 RX ORDER — NYSTATIN 100000 [USP'U]/ML
SUSPENSION ORAL NIGHTLY PRN
COMMUNITY
Start: 2018-01-21 | End: 2019-07-02

## 2018-04-12 RX ORDER — ALBUTEROL SULFATE 0.83 MG/ML
2.5 SOLUTION RESPIRATORY (INHALATION)
COMMUNITY
Start: 2018-01-31 | End: 2018-12-03 | Stop reason: CLARIF

## 2018-04-12 RX ADMIN — BETAMETHASONE SODIUM PHOSPHATE AND BETAMETHASONE ACETATE 3 MG: 3; 3 INJECTION, SUSPENSION INTRA-ARTICULAR; INTRALESIONAL; INTRAMUSCULAR; SOFT TISSUE at 11:04

## 2018-04-12 RX ADMIN — BUPIVACAINE HYDROCHLORIDE 11.25 MG: 2.5 INJECTION, SOLUTION EPIDURAL; INFILTRATION; INTRACAUDAL at 11:04

## 2018-04-12 NOTE — PROGRESS NOTES
Subjective:     Patient ID: Nan Betts is a 48 y.o. female.    Chief Complaint: Pain    Consulted by: Self referral     Disclaimer: This note was generated using voice recognition software.  There may be a typographical errors that were missed during proofreading.      HPI:    Nan Betts is a 48 y.o. female who presents today with chest pain x 3 months, Jaw pain since she was 22 years old.  This pain is described in detail below.    Pain located ribs and upper back. Described as aching, and is intermittent. She reports this pain began after bronchitis. Worse with bending, touching and lifting. No alleviating factors. Took Zanaflex 2mg - helped a little bit.     Pain located in jaw is described as sharp, tight, and is constant. Worse with coughing, sneezing, eating and talking. Surgery in 2015 for TMJ on the right side - removal of disc with subsequent graft. Left side improved after surgery. Has TMJ injections in the past which made pain worse.     She is also diagnosed with interstitial cystitis    Interval History (4/12/2018):  She returns today for follow up.  She reports that she stop the gabapentin due to the increased dose causing suicidal ideations.  She has not noticed a benefit with nabumetone, but then she is not taking the gabapentin either.  She did take some last night of the liquid form, which was helpful.     Physical Therapy: no    Non-pharmacologic Treatment:     · Ice/Heat: both - mild relief  · TENS: does not use this  · Massage: none  · Chiropractic care: none  · Acupuncture: none  · Other: none         Pain Medications:         · Currently taking: Nabumetone 500 mg BID, Zanaflex 2mg, Soma, Gabapentin 250 mg/5 mL TID (taking infrequently due to side effects)    · Has tried in the past:  NSAIDs - naproxen; flexeril & naproxen combination - helpful in the past, but patient had GI distress.  Topamax (caused other problems) Percocet 10/325mg BID (Dr. Serrano - pain management in [place], was  given 14 day supply and told he would not continue this medication), Xanax     · Has not tried: TCAs, SNRIs, topical creams    Blood thinners: none    Interventional Therapies:  · injections to TMJ - made pain worse   · Thoracic TPI's: 70-80% relief following 2 days of worsening pain    Relevant Surgeries: surgery in 2015    Affecting sleep? Yes, her sleep schedule is completely switched    Affecting daily activities? Yes    Depressive symptoms? Not addressed today          · SI/HI? No    Work status: Not currently working due to surgeries starting in 2016.    Prescription Monitoring Program database:  Reviewed and consistent with medication use as prescribed.    Pain Scales  Best: 2/10  Worst: 9/10  Usually: 5/10  Today: 7/10    Facial Pain    The pain radiates to the right jaw. This is a chronic problem. The current episode started more than 1 year ago. The problem occurs constantly. The problem has been gradually worsening. The quality of the pain is described as aching, sharp and tightness. The pain is at a severity of 5/10. The symptoms are aggravated by bending, coughing, eating and lifting. She has tried oral narcotics for the symptoms.   Back Pain   Associated symptoms include abdominal pain and headaches.   Neck Pain    Associated symptoms include headaches.       Last 3 PDI Scores 4/12/2018 3/21/2018   Pain Disability Index (PDI) 53 59       Review of Systems   Constitution: Positive for malaise/fatigue.   Musculoskeletal: Positive for back pain, muscle cramps, myalgias and neck pain.   Gastrointestinal: Positive for abdominal pain and diarrhea.   Neurological: Positive for headaches.   All other systems reviewed and are negative.            Past Medical History:   Diagnosis Date    Abnormal Pap smear of vagina     Anxiety     Cataract     Chronic pain     TMJ and abdomen    Colon polyp     Epilepsy     as a child    Gastroparesis     Idiopathic    GERD (gastroesophageal reflux disease)     H/O  abnormal cervical Papanicolaou smear     Hypercholesteremia 6/21/2016    Hyperlipidemia     IC (interstitial cystitis)     Internal hemorrhoids     Interstitial cystitis     Irritable bowel syndrome (IBS)     Irritable bowel syndrome with diarrhea 6/21/2016    Spastic colon     TMJ (temporomandibular joint disorder)     TMJ (temporomandibular joint syndrome) 6/21/2016       Past Surgical History:   Procedure Laterality Date    APPENDECTOMY      BREAST BIOPSY Bilateral     CATARACT EXTRACTION      CHOLECYSTECTOMY      COLONOSCOPY      gastric stimulator placement      LASIK Bilateral 2006    MANDIBLE SURGERY Bilateral 10/17/2016    PYLOROPLASTY  03/2016    STOMACH SURGERY      gastric pacemaker    TEMPOROMANDIBULAR JOINT SURGERY  12/2016    TONSILLECTOMY      TOTAL ABDOMINAL HYSTERECTOMY  2005    EUGENIA/BSO, Trachelectomy 7 years ago    UPPER GASTROINTESTINAL ENDOSCOPY         Review of patient's allergies indicates:   Allergen Reactions    Ketorolac tromethamine Shortness Of Breath and Swelling    Morphine Anaphylaxis    Toradol [ketorolac] Shortness Of Breath    Ultram [tramadol] Shortness Of Breath    Buprenorphine hcl     Codeine Itching    Flexeril [cyclobenzaprine] Other (See Comments)     GI BLEEDING    Hydrocodone     Hydrocodone-acetaminophen     Ibuprofen     Metoclopramide     Nsaids (non-steroidal anti-inflammatory drug) Other (See Comments)     GI BLEEDING    Opioids - morphine analogues     Reglan [metoclopramide hcl] Other (See Comments)     Headaches and insomnia      Tramadol hcl        Current Outpatient Prescriptions   Medication Sig Dispense Refill    albuterol (PROVENTIL) 2.5 mg /3 mL (0.083 %) nebulizer solution       dexlansoprazole (DEXILANT) 60 mg capsule Take 1 capsule (60 mg total) by mouth 2 (two) times daily. 180 capsule 3    diphenhydrAMINE (BENADRYL) 50 MG tablet Take 50 mg by mouth every 4 (four) hours as needed for Itching.      estradiol  (ESTRACE) 1 MG tablet Take 3 tablets (3 mg total) by mouth once daily. Take one 1mg pill daily along with the 2mg pill daily for total of 3mg daily 90 tablet 3    estradiol (ESTRACE) 2 MG tablet Take 1 tablet (2 mg total) by mouth once daily. Take one 2mg tab and one 1mg tab for total of 3 daily 90 tablet 3    flavoxATE (URISPAS) 100 mg Tab       loperamide (IMODIUM) 2 mg capsule Take 2 capsules (4 mg total) by mouth daily as needed. 180 capsule 3    mometasone (NASONEX) 50 mcg/actuation nasal spray       nabumetone (RELAFEN) 500 MG tablet Take 1 tablet (500 mg total) by mouth 2 (two) times daily as needed for Pain. 30 tablet 3    nystatin (MYCOSTATIN) 100,000 unit/mL suspension       pentosan polysulfate (ELMIRON) 100 mg Cap Take 100 mg by mouth 3 (three) times daily.      PROAIR HFA 90 mcg/actuation inhaler every 6 (six) hours as needed.   0    rifAXIMin (XIFAXAN) 550 mg Tab Take 1 tablet (550 mg total) by mouth 3 (three) times daily. 42 tablet 3    simvastatin (ZOCOR) 40 MG tablet       SODIUM CHLORIDE 0.45 % IV Inject into the vein once daily.      sodium chloride 0.9% 0.9 % SolP 50 mL with promethazine 25 mg/mL Soln Inject 25 mg into the vein every 4 (four) hours. Thru IV      tiZANidine (ZANAFLEX) 4 MG tablet Take 1 tablet (4 mg total) by mouth every 8 (eight) hours as needed (pain). 90 tablet 5    pregabalin (LYRICA) 25 MG capsule Take 1 capsule (25 mg total) by mouth 3 (three) times daily. 90 capsule 5     No current facility-administered medications for this visit.        Family History   Problem Relation Age of Onset    Coronary artery disease Mother     Hodgkin's lymphoma Mother     Coronary artery disease Father     Lymphoma Sister     Coronary artery disease Paternal Grandfather     Glaucoma Maternal Grandmother     Pancreatic cancer Paternal Aunt     Colon cancer Neg Hx     Breast cancer Neg Hx     Ovarian cancer Neg Hx     Cervical cancer Neg Hx     Endometrial cancer Neg Hx   "   Vaginal cancer Neg Hx        Social History     Social History    Marital status:      Spouse name: N/A    Number of children: N/A    Years of education: N/A     Occupational History    Not on file.     Social History Main Topics    Smoking status: Current Every Day Smoker     Packs/day: 0.50     Years: 20.00    Smokeless tobacco: Never Used    Alcohol use No    Drug use: No    Sexual activity: Yes     Partners: Male     Other Topics Concern    Not on file     Social History Narrative    No narrative on file       Objective:     Vitals:    04/12/18 1101   BP: 131/85   Pulse: 73   Temp: 98 °F (36.7 °C)   Weight: 109.6 kg (241 lb 10 oz)   Height: 5' 8" (1.727 m)   PainSc:   7        GEN:  Well developed, well nourished.  No acute distress. no pain behavior.  HEENT:  No trauma.  Mucous membranes moist.  Nares patent bilaterally.  PSYCH: Normal affect. Thought content appropriate.  CHEST:  Breathing symmetric.  No audible wheezing.  ABD: Soft, non-distended.  SKIN:  Warm, pink, dry.  No rash on exposed areas.    EXT:  No cyanosis, clubbing, or edema.  No color change or changes in nail or hair growth.  NEURO/MUSCULOSKELETAL:  Fully alert, oriented, and appropriate. Speech normal abran. No cranial nerve deficits.   Gait: ambulates independently  Motor Strength: 5/5 motor strength throughout upper and lower extremities.   Sensory: no sensory deficit in the lower extremities.   Reflexes:  2+ and symmetric throughout.  Downgoing Babinski's bilaterally.  No clonus or spasticity.  T-Spine:  Mildly limited ROM with pain at extremes of twisting.   + TTP over thoracic paraspinals right > left  Exquisite TTP over cervical paraspinals on the right    Widespread pain index: 6  Symptom severity score 7    Imaging:        The imaging studies listed below were independently reviewed by me, and I agree with the findings as documented below.     CXR:  Findings:  X-ray beam attenuation and scatter occur in " generous overlying soft tissues.  Intrathoracic structures are magnified by the patient's thick body wall.      PICC line placed in the LEFT upper extremity has its tip in the cephalad aspect of the RIGHT atrium.    Lung volumes are normal and symmetric.  Mediastinal structures are midline.    I detect no convincing evidence of pulmonary disease, pleural fluid, lymph node enlargement, cardiac decompensation, pneumothorax, pneumomediastinum, pneumoperitoneum or significant osseous abnormality.   Impression       No convincing evidence of intrathoracic disease identified.         Assessment:     Encounter Diagnoses   Name Primary?    Chronic pain disorder Yes    Central pain syndrome     Muscle spasm of back     Cervical paraspinal muscle spasm     Myalgia     Thoracic spine pain     Migraine without status migrainosus, not intractable, unspecified migraine type     Dislocation of temporomandibular joint, sequela     Jaw pain        Plan:     Nan was seen today for back pain, jaw pain and neck pain.    Diagnoses and all orders for this visit:    Chronic pain disorder  -     Discontinue: pregabalin (LYRICA) 25 MG capsule; Take 1 capsule (25 mg total) by mouth 3 (three) times daily.  -     pregabalin (LYRICA) 25 MG capsule; Take 1 capsule (25 mg total) by mouth 3 (three) times daily.  -     Ambulatory consult to Psychology  -     Ambulatory consult to Physical Therapy    Central pain syndrome  -     Discontinue: pregabalin (LYRICA) 25 MG capsule; Take 1 capsule (25 mg total) by mouth 3 (three) times daily.  -     pregabalin (LYRICA) 25 MG capsule; Take 1 capsule (25 mg total) by mouth 3 (three) times daily.  -     Ambulatory consult to Psychology  -     Ambulatory consult to Physical Therapy    Muscle spasm of back  -     Discontinue: pregabalin (LYRICA) 25 MG capsule; Take 1 capsule (25 mg total) by mouth 3 (three) times daily.  -     pregabalin (LYRICA) 25 MG capsule; Take 1 capsule (25 mg total) by mouth 3  (three) times daily.  -     Ambulatory consult to Psychology  -     Ambulatory consult to Physical Therapy    Cervical paraspinal muscle spasm  -     Discontinue: pregabalin (LYRICA) 25 MG capsule; Take 1 capsule (25 mg total) by mouth 3 (three) times daily.  -     pregabalin (LYRICA) 25 MG capsule; Take 1 capsule (25 mg total) by mouth 3 (three) times daily.  -     Ambulatory consult to Psychology  -     Ambulatory consult to Physical Therapy    Myalgia  -     Discontinue: pregabalin (LYRICA) 25 MG capsule; Take 1 capsule (25 mg total) by mouth 3 (three) times daily.  -     pregabalin (LYRICA) 25 MG capsule; Take 1 capsule (25 mg total) by mouth 3 (three) times daily.  -     Ambulatory consult to Psychology  -     Ambulatory consult to Physical Therapy  -     betamethasone acetate-betamethasone sodium phosphate injection 3 mg; Inject 0.5 mLs (3 mg total) into the muscle one time.  -     bupivacaine (PF) 0.25% (2.5 mg/ml) injection 11.25 mg; Inject 4.5 mLs (11.25 mg total) into the muscle once.    Thoracic spine pain  -     Discontinue: pregabalin (LYRICA) 25 MG capsule; Take 1 capsule (25 mg total) by mouth 3 (three) times daily.  -     pregabalin (LYRICA) 25 MG capsule; Take 1 capsule (25 mg total) by mouth 3 (three) times daily.  -     Ambulatory consult to Psychology  -     Ambulatory consult to Physical Therapy    Migraine without status migrainosus, not intractable, unspecified migraine type  -     Discontinue: pregabalin (LYRICA) 25 MG capsule; Take 1 capsule (25 mg total) by mouth 3 (three) times daily.  -     pregabalin (LYRICA) 25 MG capsule; Take 1 capsule (25 mg total) by mouth 3 (three) times daily.  -     Ambulatory consult to Psychology  -     Ambulatory consult to Physical Therapy    Dislocation of temporomandibular joint, sequela  -     Ambulatory consult to Psychology  -     Ambulatory consult to Physical Therapy    Jaw pain  -     Ambulatory consult to Psychology         Her pain is consistent  with the above.    She presents with a complex past pain history.  She has multiple diagnoses along the fibromyalgia spectrum, including TMJ, migraines, and interstitial cystitis.  She does not have a history of psychological treatment.   I had an extensive discussion with her today regarding the role of psychological treatment for her.    Her pain is consistent with diffuse myofascial pain as well as chronic pain. We had a long extenstive discussion regarding her chronic pain.  I am concerned that she has an underlying central pain syndrome.  She is developing almost as a fibromyaglia patient, but she missed the cutoff for the criteria by 1 point.  However, the treatment for this disorder is the same as the treatment for fibromyalgia.  I had an extensive discussion regarding the pathophysiology of central pain syndromes.  Also, we discussed treatment options and expected outcomes.  I recommend a multidisciplinary approach to this pain, employing non-narcotic pharmacologic agents, physical therapy, interventional techniques, and behavior/lifestyle modification techniques.  Narcotics are not indicated in the treatment of central pain syndromes.    She presents with a past pain clinic notes suggesting possible misuse of her narcotic medications.  This would not change my management in any way, though it is helpful to have.  I do not recommend opioids for the chronic treatment of her non-malignant pain at this time.  Short courses for acute flares are appropriate.  This is especially important as she has an underlying central pain syndrome.  If that is the case, narcotics would make this worse.  She endorses not wanting to take these medications.  I did not discuss this with her today.    She has multiple complaints today, the worst of which is her TMJ pain, which I do not treat.  Any medications would be symptom management, but she really needs someone who can help with the underlying disease.      We discussed the  assessment and recommendations.  All available images were reviewed. We discussed the disease process, prognosis, treatment plan, and risks and benefits. The patient is aware of the risks and benefits of the medications being prescribed, common side effects, and proper usage. The following is the plan we agreed on:     1. Start Lyrica 25 mg daily, titrating up to an initial dose of 75 mg daily.  We'll continue to titrate as tolerated/needed to a goal dose of 450 mgs daily.   2. We will try to facilitate an appointment with neurology for treatment for her migraines.  No documentation of GI bleed exists. Will ask PCP if patient is able to take NSAID as Naproxen was very helpful in the past. If so, will give NSAID with less GI side effects.   3. Zanaflex 4mg TID PRN rx given today   4. Trigger point injections given today  5. Referral for psychology given today  6. Referral to physical therapy given today.  She wants to do home health physical therapy, which I do not recommend but, if this is the only way, we'll see.  7. She will be a good candidate for FRP in the future.  8. She will call to schedule appointment that is convenient with her other appointments, given the distance that she has to drive      Trigger Point Injection:   The procedure was discussed with the patient including complications of damage, bleeding, infection, and failure of pain relief.     All medications, allergies, and relevant histories were reviewed. No recent antibiotics or infections.  A time-out was taken to verify the correct patient, procedure, laterality, and appropriate medications/allergies.    Trigger points were identified by palpation and marked. CHG prep of sites done. A 27-gauge needle was advanced to the point of maximal tenderness, and 2.5 mL of a mixture of 0.25% bupivacaine with betamethasone 3 mg was injected after negative aspiration. All sites done in the same manner. Patient tolerated the procedure well and without  complications. Sites injected included: Right cervical paraspinal ×2      The patient tolerated the procedure well and was discharged in excellent condition.    Greater than 60 minutes spent in total in todays visit with the patient, with more than half that time direct face to face counseling and education with the patient today. We discussed the disease process, prognosis, treatment plan, and risks and benefits.    The above plan and management options were discussed at length with patient. Patient is in agreement with the above and verbalized understanding. It will be communicated with the referring physician via electronic record, fax, or mail.

## 2018-04-12 NOTE — PATIENT INSTRUCTIONS
I recommended looking at www.psychologytoday.com for therapists in your area that treat chronic pain.  I recommend cognitive behavior therapy, biofeedback techniques, and psychotherapeutic options for chronic pain.    I am also writing a prescription for physical therapy.  You can use this to get home health PT.    Lyrica dose schedule:  - Start with one capsule at night for 7 days   - Then increase to one capsule twice a day for 7 days  - Then increase to one capsule three times daily

## 2018-04-13 ENCOUNTER — TELEPHONE (OUTPATIENT)
Dept: PAIN MEDICINE | Facility: CLINIC | Age: 48
End: 2018-04-13

## 2018-04-13 ENCOUNTER — PATIENT MESSAGE (OUTPATIENT)
Dept: PAIN MEDICINE | Facility: CLINIC | Age: 48
End: 2018-04-13

## 2018-04-13 ENCOUNTER — PATIENT MESSAGE (OUTPATIENT)
Dept: GASTROENTEROLOGY | Facility: CLINIC | Age: 48
End: 2018-04-13

## 2018-04-13 NOTE — TELEPHONE ENCOUNTER
"elsi Lauren, this is Jory I've received your request I'm returning your call from the Pain-Management clinic at [facility]. I just wanted to let you know that I'm working on getting an answer for you Monday when Dr. Ross returns to clinic.      Elsi states, "Dr. Tran will return to clinic on Tuesday 4/17/18.     Staff informed Elsi that a message will be presented to you infoming you of this information.     Elsi verbalized understanding and expressed thanks.   "

## 2018-04-13 NOTE — TELEPHONE ENCOUNTER
----- Message from Dwayne Ruiz sent at 4/12/2018 10:15 AM CDT -----  Contact: Dr. Tran  Would like to speak with Dr. Ross in regards to the patient. Can be reached at [phone].

## 2018-04-20 ENCOUNTER — PATIENT MESSAGE (OUTPATIENT)
Dept: PAIN MEDICINE | Facility: CLINIC | Age: 48
End: 2018-04-20

## 2018-04-24 ENCOUNTER — TELEPHONE (OUTPATIENT)
Dept: NEUROLOGY | Facility: CLINIC | Age: 48
End: 2018-04-24

## 2018-04-24 ENCOUNTER — PATIENT MESSAGE (OUTPATIENT)
Dept: PAIN MEDICINE | Facility: CLINIC | Age: 48
End: 2018-04-24

## 2018-04-24 DIAGNOSIS — F41.9 ANXIETY: Primary | ICD-10-CM

## 2018-04-24 RX ORDER — DIAZEPAM 5 MG/1
TABLET ORAL
Qty: 2 TABLET | Refills: 0 | Status: SHIPPED | OUTPATIENT
Start: 2018-04-24 | End: 2018-11-14

## 2018-04-24 NOTE — TELEPHONE ENCOUNTER
Last office visit:4/12/18  Last filled:  -Valium:11/18/16     Staff contacted the patient to get her scheduled for a follow up with Dr. Ross.     Patient agreed to be scheduled for Wednesday, 5/9/18 at 7 am with Dr. Ross. Patient also requested that a refill for Valium be sent to [facility] [phone] [place], [place].     Staff informed the patient that her request will be forwarded to you for approval and that a response is pending your approval. Medication is not attached pending your approval.     Please review and advise.

## 2018-04-24 NOTE — TELEPHONE ENCOUNTER
Per clinic supervisor called to schedule evaluation for migraine as referred by Dr. Ross.    Called and offered first available with LAURA Mckeon on 5.8. Ms. Betts declined stating she wants to see Dr. Rosales. Scheduled first available on 6/7 and put on wait list.

## 2018-04-24 NOTE — TELEPHONE ENCOUNTER
2 valium sent for pre-procedure anxiety for TPIs.  Can you please schedule her for a follow up at her convenience?    Thanks!  LW

## 2018-06-01 ENCOUNTER — PATIENT MESSAGE (OUTPATIENT)
Dept: PAIN MEDICINE | Facility: CLINIC | Age: 48
End: 2018-06-01

## 2018-06-07 ENCOUNTER — OFFICE VISIT (OUTPATIENT)
Dept: NEUROLOGY | Facility: CLINIC | Age: 48
End: 2018-06-07
Payer: COMMERCIAL

## 2018-06-07 VITALS
HEIGHT: 68 IN | HEART RATE: 82 BPM | BODY MASS INDEX: 36.52 KG/M2 | WEIGHT: 240.94 LBS | DIASTOLIC BLOOD PRESSURE: 71 MMHG | SYSTOLIC BLOOD PRESSURE: 133 MMHG

## 2018-06-07 DIAGNOSIS — G43.709 CHRONIC MIGRAINE WITHOUT AURA WITHOUT STATUS MIGRAINOSUS, NOT INTRACTABLE: Primary | ICD-10-CM

## 2018-06-07 DIAGNOSIS — M26.609 TMJ (TEMPOROMANDIBULAR JOINT SYNDROME): ICD-10-CM

## 2018-06-07 PROCEDURE — 99999 PR PBB SHADOW E&M-EST. PATIENT-LVL III: CPT | Mod: PBBFAC,,, | Performed by: PSYCHIATRY & NEUROLOGY

## 2018-06-07 PROCEDURE — 99244 OFF/OP CNSLTJ NEW/EST MOD 40: CPT | Mod: S$GLB,,, | Performed by: PSYCHIATRY & NEUROLOGY

## 2018-06-07 RX ORDER — NORTRIPTYLINE HYDROCHLORIDE 25 MG/1
25 CAPSULE ORAL NIGHTLY
Qty: 30 CAPSULE | Refills: 3 | Status: SHIPPED | OUTPATIENT
Start: 2018-06-07 | End: 2018-11-14

## 2018-06-07 RX ORDER — CARISOPRODOL 350 MG/1
350 TABLET ORAL 3 TIMES DAILY PRN
COMMUNITY
End: 2021-04-14

## 2018-06-07 NOTE — PROGRESS NOTES
Subjective:       Patient ID: Nan Betts is a 48 y.o. female.    Chief Complaint:  Headache      Consultation Requested by:   Patel Ross Sr.  [address] [place], [place] [id number]    History of Present Illness  48-year-old right-handed female presents for evaluation of headaches.  She has a complex headache history including significant problems with temporomandibular joint disease.  She notes that the last 2 years she had a surgery for her temporomandibular joint disease and has had significant problems.  She has tried and failed Tizanidine, Percocet, Soma, Phenergan, Topamax, gabapentin, Flexeril, Elavil, Reglan, naproxen, Xanax, hydrocodone.  She has severe GI issues and is getting Phenergan IV via PICC line.  She notes that hers previous TMJ surgeries revealed that she had no disc left on the right.  She is research on the Internet and ask for nerve blocks and discussion about it.  She had severe side effects Topamax and Elavil was unable to tolerate either of these medications.    Past Medical History:   Diagnosis Date    Abnormal Pap smear of vagina     Anxiety     Cataract     Chronic pain     TMJ and abdomen    Colon polyp     Epilepsy     as a child    Gastroparesis     Idiopathic    GERD (gastroesophageal reflux disease)     H/O abnormal cervical Papanicolaou smear     Hypercholesteremia 6/21/2016    Hyperlipidemia     IC (interstitial cystitis)     Internal hemorrhoids     Interstitial cystitis     Irritable bowel syndrome (IBS)     Irritable bowel syndrome with diarrhea 6/21/2016    Spastic colon     TMJ (temporomandibular joint disorder)     TMJ (temporomandibular joint syndrome) 6/21/2016       Past Surgical History:   Procedure Laterality Date    APPENDECTOMY      BREAST BIOPSY Bilateral     CATARACT EXTRACTION      CHOLECYSTECTOMY      COLONOSCOPY      gastric stimulator placement      LASIK Bilateral 2006    MANDIBLE SURGERY Bilateral 10/17/2016     PYLOROPLASTY  03/2016    STOMACH SURGERY      gastric pacemaker    TEMPOROMANDIBULAR JOINT SURGERY  12/2016    TONSILLECTOMY      TOTAL ABDOMINAL HYSTERECTOMY  2005    EUGENIA/BSO, Trachelectomy 7 years ago    UPPER GASTROINTESTINAL ENDOSCOPY         Family History   Problem Relation Age of Onset    Coronary artery disease Mother     Hodgkin's lymphoma Mother     Coronary artery disease Father     Lymphoma Sister     Coronary artery disease Paternal Grandfather     Glaucoma Maternal Grandmother     Pancreatic cancer Paternal Aunt     Colon cancer Neg Hx     Breast cancer Neg Hx     Ovarian cancer Neg Hx     Cervical cancer Neg Hx     Endometrial cancer Neg Hx     Vaginal cancer Neg Hx        Social History     Social History    Marital status:      Spouse name: N/A    Number of children: N/A    Years of education: N/A     Social History Main Topics    Smoking status: Current Every Day Smoker     Packs/day: 0.50     Years: 20.00    Smokeless tobacco: Never Used    Alcohol use No    Drug use: No    Sexual activity: Yes     Partners: Male     Other Topics Concern    None     Social History Narrative    None       Review of Systems  Review of Systems   Constitutional: Positive for activity change and fatigue.   Eyes: Positive for visual disturbance.   Gastrointestinal: Positive for nausea.   Musculoskeletal: Positive for neck stiffness.   Neurological: Positive for headaches.   Psychiatric/Behavioral: Positive for sleep disturbance.   All other systems reviewed and are negative.      Objective:     Vitals:    06/07/18 1024   BP: 133/71   Pulse: 82      Physical Exam   Constitutional: She is oriented to person, place, and time. She appears well-developed and well-nourished. No distress.   HENT:   Head: Normocephalic and atraumatic.       Right Ear: Hearing normal.   Left Ear: Hearing normal.   +clicking   Eyes: EOM are normal. Pupils are equal, round, and reactive to light. Right eye  exhibits normal extraocular motion and no nystagmus. Left eye exhibits normal extraocular motion and no nystagmus.   Neck: Normal range of motion. Neck supple. No spinous process tenderness and no muscular tenderness present. Carotid bruit is not present. No neck rigidity.   Cardiovascular: Exam reveals no S4.    Musculoskeletal: Normal range of motion.   Neurological: She is alert and oriented to person, place, and time. She has normal strength and normal reflexes. She displays no atrophy and no tremor. No cranial nerve deficit or sensory deficit. She exhibits normal muscle tone. She displays a negative Romberg sign. Gait normal. Coordination and gait normal. GCS eye subscore is 4. GCS verbal subscore is 5. GCS motor subscore is 6.   Reflex Scores:       Tricep reflexes are 2+ on the right side and 2+ on the left side.       Bicep reflexes are 2+ on the right side and 2+ on the left side.       Brachioradialis reflexes are 2+ on the right side and 2+ on the left side.       Patellar reflexes are 2+ on the right side and 2+ on the left side.       Achilles reflexes are 2+ on the right side and 2+ on the left side.  Fundoscopic exam shows no papilledema, no hemorrhage, no exudates bilaterally.    Cranial nerves 2-12 are without deficit.   Psychiatric: She has a normal mood and affect. Her speech is normal and behavior is normal. Thought content normal.   Vitals reviewed.      Neurologic Exam     Mental Status   Oriented to person, place, and time.   Attention: normal. Concentration: normal.   Speech: speech is normal   Level of consciousness: alert  Knowledge: good.   Normal comprehension.     Cranial Nerves   Cranial nerves II through XII intact.     CN II   Visual fields full to confrontation.     CN III, IV, VI   Pupils are equal, round, and reactive to light.  Extraocular motions are normal.     CN V   Facial sensation intact.     CN VII   Facial expression full, symmetric.     CN VIII   CN VIII normal.     CN  IX, X   CN IX normal.   CN X normal.     CN XI   CN XI normal.     CN XII   CN XII normal.     Motor Exam   Muscle bulk: normal  Overall muscle tone: normal    Strength   Strength 5/5 throughout.     Sensory Exam   Light touch normal.   Pinprick normal.     Gait, Coordination, and Reflexes     Gait  Gait: normal    Reflexes   Right brachioradialis: 2+  Left brachioradialis: 2+  Right biceps: 2+  Left biceps: 2+  Right triceps: 2+  Left triceps: 2+  Right patellar: 2+  Left patellar: 2+  Right achilles: 2+  Left achilles: 2+    I have spent over 50% of a 45 min visit in guidance, counseling discussion treatment options..  Assessment/Plan:     Problem List Items Addressed This Visit     None      Visit Diagnoses     Chronic migraine without aura without status migrainosus, not intractable    -  Primary    Relevant Medications    nortriptyline (PAMELOR) 25 MG capsule    TMJ (temporomandibular joint syndrome)               48-year-old right-handed female presents for evaluation of complex multifactorial headaches.  I have explained to the patient I believe her headaches are absolutely related to her TMJ issues.  We have discussed options of self injectable antibody therapy for headaches which is newly approved by the FDA, Botox and nerve blocks for the irregular temple nerve and nerve blocks for the greater and lesser occipital nerves however the patient has deferred all of these options citing a fear of needles.  We have discussed preventive medications.  She has tried and failed antihypertensives, antidepressants and antiepileptic medications and is still having headaches despite that.  She has also failed narcotic medications anti-inflammatory medications and triptans in the past.  At this time the patient agrees to a conservative course of nortriptyline 25 mg nightly.  I will see her back in 6-8 weeks.  At that time we will revisit the options of nerve block, Botox, self injectable antibody therapy.  I have  explained to the patient at length that I think the proper treatment for her would address both her TMJ and migraines which may be Botox for migraine.  I have given her reading information on this particular treatment.      The patient verbalizes understanding and agreement with the treatment plan. Questions were sought and answered to her stated verbal satisfaction.        Shauna Rosales MD    This note is dictated on Dragon Natural Speaking word recognition program. There are word recognition mistakes that are occasionally missed on review.

## 2018-06-07 NOTE — LETTER
June 7, 2018      Patel Ross Sr. DO  [address] [place] [id number]           [facility]  [address] [place] [id number]  Phone: [phone]  Fax: [phone]          Patient: Nan Betts   MR Number: [id number]   YOB: 1970   Date of Visit: 6/7/2018       Dear Patel Ross Sr.:    Thank you for referring Nan Betts to me for evaluation. Attached you will find relevant portions of my assessment and plan of care.    If you have questions, please do not hesitate to call me. I look forward to following Nan Betts along with you.    Sincerely,    Michoacano Rosales III, MD    Enclosure  CC:  No Recipients    If you would like to receive this communication electronically, please contact [email or web address] or [phone] to request more information on [facility] access.    For providers and/or their staff who would like to refer a patient to [facility], please contact us through our one-stop-shop provider referral line, [facility], at [phone].    If you feel you have received this communication in error or would no longer like to receive these types of communications, please e-mail [email or web address]

## 2018-06-13 ENCOUNTER — PATIENT MESSAGE (OUTPATIENT)
Dept: GASTROENTEROLOGY | Facility: CLINIC | Age: 48
End: 2018-06-13

## 2018-06-14 ENCOUNTER — TELEPHONE (OUTPATIENT)
Dept: GASTROENTEROLOGY | Facility: CLINIC | Age: 48
End: 2018-06-14

## 2018-06-14 NOTE — TELEPHONE ENCOUNTER
----- Message from Marisol Saldivar sent at 6/14/2018  4:31 PM CDT -----  Contact: Self- [phone]  Dominik- pt called to speak with Angela- states she is still experiencing the problems she sent a [facility] message about- would like advice on what she should do- please contact pt at [phone]

## 2018-06-14 NOTE — TELEPHONE ENCOUNTER
Spoke with patient.  Aware Dr.James Lehman does have her messages and will be getting back with her.  Aware  has been out of the office for the past two and half weeks.

## 2018-06-15 ENCOUNTER — TELEPHONE (OUTPATIENT)
Dept: GASTROENTEROLOGY | Facility: CLINIC | Age: 48
End: 2018-06-15

## 2018-06-18 ENCOUNTER — TELEPHONE (OUTPATIENT)
Dept: GASTROENTEROLOGY | Facility: CLINIC | Age: 48
End: 2018-06-18

## 2018-06-18 DIAGNOSIS — R10.84 GENERALIZED ABDOMINAL PAIN: ICD-10-CM

## 2018-06-18 RX ORDER — SUCRALFATE 1 G/10ML
1 SUSPENSION ORAL
Qty: 414 ML | Refills: 1 | Status: SHIPPED | OUTPATIENT
Start: 2018-06-18 | End: 2018-07-26

## 2018-06-20 ENCOUNTER — PATIENT MESSAGE (OUTPATIENT)
Dept: GASTROENTEROLOGY | Facility: CLINIC | Age: 48
End: 2018-06-20

## 2018-06-25 ENCOUNTER — PATIENT MESSAGE (OUTPATIENT)
Dept: GASTROENTEROLOGY | Facility: CLINIC | Age: 48
End: 2018-06-25

## 2018-06-25 NOTE — TELEPHONE ENCOUNTER
Spoke with patient regarding her appointment with  for this Wednesday.  Stated since she cannot get another appointment with him anytime soon she will keep the one she has for this Wednesday.

## 2018-06-27 ENCOUNTER — TELEPHONE (OUTPATIENT)
Dept: ENDOSCOPY | Facility: HOSPITAL | Age: 48
End: 2018-06-27

## 2018-06-27 ENCOUNTER — OFFICE VISIT (OUTPATIENT)
Dept: GASTROENTEROLOGY | Facility: CLINIC | Age: 48
End: 2018-06-27
Payer: COMMERCIAL

## 2018-06-27 VITALS
HEIGHT: 68 IN | BODY MASS INDEX: 36.45 KG/M2 | HEART RATE: 90 BPM | SYSTOLIC BLOOD PRESSURE: 120 MMHG | WEIGHT: 240.5 LBS | DIASTOLIC BLOOD PRESSURE: 78 MMHG

## 2018-06-27 DIAGNOSIS — Z12.11 SPECIAL SCREENING FOR MALIGNANT NEOPLASMS, COLON: Primary | ICD-10-CM

## 2018-06-27 DIAGNOSIS — G89.29 CHRONIC ABDOMINAL PAIN: ICD-10-CM

## 2018-06-27 DIAGNOSIS — K31.84 GASTROPARESIS: Primary | ICD-10-CM

## 2018-06-27 DIAGNOSIS — K52.9 CHRONIC DIARRHEA: ICD-10-CM

## 2018-06-27 DIAGNOSIS — R10.9 CHRONIC ABDOMINAL PAIN: ICD-10-CM

## 2018-06-27 PROCEDURE — 3008F BODY MASS INDEX DOCD: CPT | Mod: CPTII,S$GLB,, | Performed by: INTERNAL MEDICINE

## 2018-06-27 PROCEDURE — 99999 PR PBB SHADOW E&M-EST. PATIENT-LVL III: CPT | Mod: PBBFAC,,, | Performed by: INTERNAL MEDICINE

## 2018-06-27 PROCEDURE — 99215 OFFICE O/P EST HI 40 MIN: CPT | Mod: S$GLB,,, | Performed by: INTERNAL MEDICINE

## 2018-06-27 RX ORDER — SODIUM, POTASSIUM,MAG SULFATES 17.5-3.13G
SOLUTION, RECONSTITUTED, ORAL ORAL
Qty: 1 BOTTLE | Refills: 0 | Status: ON HOLD | OUTPATIENT
Start: 2018-06-27 | End: 2018-07-31 | Stop reason: ALTCHOICE

## 2018-06-27 NOTE — PROGRESS NOTES
Subjective:       Patient ID: Nan Betts is a 48 y.o. female.    Chief Complaint: GI Problem    HPI  Review of Systems   Constitutional: Negative for unexpected weight change.   HENT:        Chr jaw pain     Respiratory: Negative.    Cardiovascular: Negative.        Objective:      Physical Exam   Abdominal: Soft. Normal appearance and bowel sounds are normal. She exhibits no shifting dullness, no distension, no fluid wave, no abdominal bruit and no mass. There is no hepatosplenomegaly. There is no tenderness.           Assessment:       1. Gastroparesis    2. Chronic abdominal pain    3. Chronic diarrhea        Plan:         HISTORY OF PRESENT ILLNESS:  This is a followup visit, my first visit about six   months for Nan.  I had originally seen her two years ago as a second opinion   for her gastroparesis and chronic abdominal pain.  Since that time, she has been   through a lot.  She has had a gastric stimulator placed that may have helped   with the nausea, a little bit that has not really affected the chronic abdominal   pain that much.  She has had laparoscopic pyloromyotomy performed nor did that   seemed to help her symptoms in the long run.    She comes in today and we have had several contacts via phone and E-mail.    Recently, she had epigastric pain that was different from her usual pain.  That   is now resolved but lasted for about two weeks.  It was a band across the   epigastric area.  It would get worse with any intake of food even water.  It did   not radiate to the back.  She continues with other abdominal pain.  She   continues with the right-sided abdominal soreness in the area of her gastric   stimulator battery that is a positional situation when she bends or turns it   gets worse.  She continues with chronic diarrhea.  She has had the diarrhea now   for several months.  This seems to be different from how it was in the past.  In   the past, she had alternating diarrhea and constipation, now  it is more of a   predominant diarrhea pattern.  She has up to three stools per day.  Often it can   be just watery.  We recently ordered a stool for C. diff to her home and that   was negative.  She continues with chronic nausea.  She takes Phenergan for that.    She has bloating after all of her meals.  She is now off of her chronic   opiates, which she was on for years.    SOCIAL HISTORY:  I queried about her diet.  Again, it turns out that she drinks   a of 1% milk, but has never tried lactose-free products.    ASSESSMENT AND DECISION MAKIN.  Gastroparesis.  She has had documented gastroparesis.  She really has not   responded overall with the pyloromyotomy or the gastric stimulator.  She thinks   that the stimulator battery may be causing the soreness and so after a long   discussion, we decided to turn off the battery.  If after turning it off her   nausea and vomiting gets substantially worse, then we will know that the   stimulator was helping in the long run and will know to turn it back on.  If,   however, over the next two months, the nausea does not get any worse and none of   her symptoms get worse, then she wants to consider having the battery taken out   and I think that is very reasonable.  2.  Chronic abdominal pain.  This is very difficult.  She has had chronic pain   for years.  I do not know what role the gastroparesis place.  Her recent   epigastric pain was different.  I think in the future if that recurs, I think we   should get amylase and lipase.  I cannot see any more aggressive evaluation   like an EUS right now.  I think there is a significant functional component to   her pain.  I am not sure there is much else I can do for that.  3.  Chronic diarrhea.  This probably just IBS, it seems different from the past.    She has not had a colonoscopy in three years.  I have recommended that we do a   colonoscopy with random biopsies of the colon, especially now the C. diff is    negative.    RECOMMENDATION:  1.  Change to lactose-free milk.  2.  EGD and colon.  3.  If epigastric pain recurs, amylase and lipase.  4.  If the nausea and vomiting recur and she wants the battery turns back on.  I   told her I would be able to work her in as long she knows it will not be a full   appointment.  It will be just be an appointment to turn the battery back on.  5.  She wanted an appointment in four weeks.  We do not have any available   appointments for several months, so I think in the future, we will have to again   use the [facility] to provide her followup care.    A 40-minute appointment, greater than half time face-to-face counseling.      RANDA  dd: 06/27/2018 16:27:43 (CDT)  td: 06/28/2018 10:36:26 (CDT)  Doc ID   #[id number]  Job ID #[id number]    CC:

## 2018-07-17 ENCOUNTER — PATIENT MESSAGE (OUTPATIENT)
Dept: ENDOSCOPY | Facility: HOSPITAL | Age: 48
End: 2018-07-17

## 2018-07-19 ENCOUNTER — PATIENT MESSAGE (OUTPATIENT)
Dept: GASTROENTEROLOGY | Facility: CLINIC | Age: 48
End: 2018-07-19

## 2018-07-19 ENCOUNTER — TELEPHONE (OUTPATIENT)
Dept: GASTROENTEROLOGY | Facility: CLINIC | Age: 48
End: 2018-07-19

## 2018-07-19 NOTE — TELEPHONE ENCOUNTER
Spoke with patient.  Will have procedure with Dr.James Lehman on 7/31 and will have pacemaker turned back on at that time.

## 2018-07-19 NOTE — TELEPHONE ENCOUNTER
----- Message from Mimi Dalton MA sent at 7/19/2018 12:30 PM CDT -----  Contact: self  [phone]  Patient Returning Call from [facility]    Who Left Message for Patient:  Angela  Communication Preference:  Phone# above  Additional Information:  na

## 2018-07-23 ENCOUNTER — PATIENT MESSAGE (OUTPATIENT)
Dept: ENDOSCOPY | Facility: HOSPITAL | Age: 48
End: 2018-07-23

## 2018-07-26 ENCOUNTER — OFFICE VISIT (OUTPATIENT)
Dept: GASTROENTEROLOGY | Facility: CLINIC | Age: 48
End: 2018-07-26
Payer: COMMERCIAL

## 2018-07-26 VITALS
SYSTOLIC BLOOD PRESSURE: 136 MMHG | HEART RATE: 84 BPM | HEIGHT: 68 IN | DIASTOLIC BLOOD PRESSURE: 84 MMHG | WEIGHT: 240.5 LBS | BODY MASS INDEX: 36.45 KG/M2

## 2018-07-26 DIAGNOSIS — K31.84 GASTROPARESIS: Primary | ICD-10-CM

## 2018-07-26 PROCEDURE — 99211 OFF/OP EST MAY X REQ PHY/QHP: CPT | Mod: S$GLB,,, | Performed by: INTERNAL MEDICINE

## 2018-07-26 PROCEDURE — 99999 PR PBB SHADOW E&M-EST. PATIENT-LVL III: CPT | Mod: PBBFAC,,, | Performed by: INTERNAL MEDICINE

## 2018-07-26 NOTE — PROGRESS NOTES
Nan was seen as an urgent add-on today.  She had her gastric stimulator turned   off.  Since having it turned off, she had a prompt increase in her symptoms.    She noticed increased bloating, discomfort and fullness.  She had increased   nausea and 1 episode of vomiting and she noticed an increase in reflux symptoms   that were not controlled by her medication.  It was our plan to turn the   stimulator back on anyway.    I interrogated the stimulator today.  I turned it back on.  It is on 2-second   cycle on with 3-second cycle off.  Amplitude is 4.5.  Battery indicates adequate   storage.    I asked her to call me if there is no improvement in symptoms now.      DARNELL/IN  dd: 07/26/2018 09:27:33 (CDT)  td: 07/26/2018 15:25:33 (CDT)  Doc ID   #[id number]  Job ID #[id number]    CC:

## 2018-07-31 ENCOUNTER — ANESTHESIA (OUTPATIENT)
Dept: ENDOSCOPY | Facility: HOSPITAL | Age: 48
End: 2018-07-31
Payer: COMMERCIAL

## 2018-07-31 ENCOUNTER — ANESTHESIA EVENT (OUTPATIENT)
Dept: ENDOSCOPY | Facility: HOSPITAL | Age: 48
End: 2018-07-31
Payer: COMMERCIAL

## 2018-07-31 ENCOUNTER — HOSPITAL ENCOUNTER (OUTPATIENT)
Facility: HOSPITAL | Age: 48
Discharge: HOME | End: 2018-07-31
Attending: INTERNAL MEDICINE | Admitting: INTERNAL MEDICINE
Payer: COMMERCIAL

## 2018-07-31 ENCOUNTER — SURGERY (OUTPATIENT)
Age: 48
End: 2018-07-31

## 2018-07-31 VITALS
DIASTOLIC BLOOD PRESSURE: 70 MMHG | WEIGHT: 240 LBS | OXYGEN SATURATION: 96 % | TEMPERATURE: 98 F | HEIGHT: 68 IN | BODY MASS INDEX: 36.37 KG/M2 | HEART RATE: 81 BPM | SYSTOLIC BLOOD PRESSURE: 119 MMHG | RESPIRATION RATE: 16 BRPM

## 2018-07-31 DIAGNOSIS — K21.9 GASTROESOPHAGEAL REFLUX DISEASE WITHOUT ESOPHAGITIS: Primary | ICD-10-CM

## 2018-07-31 DIAGNOSIS — R19.7 DIARRHEA, UNSPECIFIED TYPE: ICD-10-CM

## 2018-07-31 PROCEDURE — 27201012 HC FORCEPS, HOT/COLD, DISP: Performed by: INTERNAL MEDICINE

## 2018-07-31 PROCEDURE — C1726 CATH, BAL DIL, NON-VASCULAR: HCPCS | Performed by: INTERNAL MEDICINE

## 2018-07-31 PROCEDURE — 43245 EGD DILATE STRICTURE: CPT | Performed by: INTERNAL MEDICINE

## 2018-07-31 PROCEDURE — 63600175 PHARM REV CODE 636 W HCPCS: Performed by: NURSE ANESTHETIST, CERTIFIED REGISTERED

## 2018-07-31 PROCEDURE — 43245 EGD DILATE STRICTURE: CPT | Mod: 51,,, | Performed by: INTERNAL MEDICINE

## 2018-07-31 PROCEDURE — 37000009 HC ANESTHESIA EA ADD 15 MINS: Performed by: INTERNAL MEDICINE

## 2018-07-31 PROCEDURE — 25000003 PHARM REV CODE 250: Performed by: INTERNAL MEDICINE

## 2018-07-31 PROCEDURE — 45380 COLONOSCOPY AND BIOPSY: CPT | Mod: ,,, | Performed by: INTERNAL MEDICINE

## 2018-07-31 PROCEDURE — 88305 TISSUE EXAM BY PATHOLOGIST: CPT | Performed by: PATHOLOGY

## 2018-07-31 PROCEDURE — 37000008 HC ANESTHESIA 1ST 15 MINUTES: Performed by: INTERNAL MEDICINE

## 2018-07-31 PROCEDURE — 88305 TISSUE EXAM BY PATHOLOGIST: CPT | Mod: 26,,, | Performed by: PATHOLOGY

## 2018-07-31 PROCEDURE — E9220 PRA ENDO ANESTHESIA: HCPCS | Mod: ,,, | Performed by: NURSE ANESTHETIST, CERTIFIED REGISTERED

## 2018-07-31 PROCEDURE — 45380 COLONOSCOPY AND BIOPSY: CPT | Performed by: INTERNAL MEDICINE

## 2018-07-31 RX ORDER — SODIUM CHLORIDE 9 MG/ML
INJECTION, SOLUTION INTRAVENOUS CONTINUOUS
Status: DISCONTINUED | OUTPATIENT
Start: 2018-07-31 | End: 2018-07-31 | Stop reason: HOSPADM

## 2018-07-31 RX ORDER — PROPOFOL 10 MG/ML
VIAL (ML) INTRAVENOUS
Status: DISCONTINUED | OUTPATIENT
Start: 2018-07-31 | End: 2018-07-31

## 2018-07-31 RX ORDER — LIDOCAINE HCL/PF 100 MG/5ML
SYRINGE (ML) INTRAVENOUS
Status: DISCONTINUED | OUTPATIENT
Start: 2018-07-31 | End: 2018-07-31

## 2018-07-31 RX ORDER — PROPOFOL 10 MG/ML
VIAL (ML) INTRAVENOUS CONTINUOUS PRN
Status: DISCONTINUED | OUTPATIENT
Start: 2018-07-31 | End: 2018-07-31

## 2018-07-31 RX ORDER — SODIUM CHLORIDE 0.9 % (FLUSH) 0.9 %
3 SYRINGE (ML) INJECTION
Status: DISCONTINUED | OUTPATIENT
Start: 2018-07-31 | End: 2018-07-31 | Stop reason: HOSPADM

## 2018-07-31 RX ADMIN — LIDOCAINE HYDROCHLORIDE 100 MG: 20 INJECTION, SOLUTION INTRAVENOUS at 01:07

## 2018-07-31 RX ADMIN — PROPOFOL 150 MCG/KG/MIN: 10 INJECTION, EMULSION INTRAVENOUS at 01:07

## 2018-07-31 RX ADMIN — SODIUM CHLORIDE: 0.9 INJECTION, SOLUTION INTRAVENOUS at 01:07

## 2018-07-31 RX ADMIN — PROPOFOL 70 MG: 10 INJECTION, EMULSION INTRAVENOUS at 01:07

## 2018-07-31 NOTE — [employer]OVATION PATIENT INSTRUCTIONS
Discharge Summary/Instructions after an Endoscopic Procedure  Patient Name: Nan Betts  Patient MRN: [id number]  Patient YOB: 1970 Tuesday, July 31, 2018  Zack Lehman MD  RESTRICTIONS:  During your procedure today, you received medications for sedation.  These   medications may affect your judgment, balance and coordination.  Therefore,   for 24 hours, you have the following restrictions:   - DO NOT drive a car, operate machinery, make legal/financial decisions,   sign important papers or drink alcohol.    ACTIVITY:  Today: no heavy lifting, straining or running due to procedural   sedation/anesthesia.  The following day: return to full activity including work.  DIET:  Eat and drink normally unless instructed otherwise.     TREATMENT FOR COMMON SIDE EFFECTS:  - Mild abdominal pain, nausea, belching, bloating or excessive gas:  rest,   eat lightly and use a heating pad.  - Sore Throat: treat with throat lozenges and/or gargle with warm salt   water.  - Because air was used during the procedure, expelling large amounts of air   from your rectum or belching is normal.  - If a bowel prep was taken, you may not have a bowel movement for 1-3 days.    This is normal.  SYMPTOMS TO WATCH FOR AND REPORT TO YOUR PHYSICIAN:  1. Abdominal pain or bloating, other than gas cramps.  2. Chest pain.  3. Back pain.  4. Signs of infection such as: chills or fever occurring within 24 hours   after the procedure.  5. Rectal bleeding, which would show as bright red, maroon, or black stools.   (A tablespoon of blood from the rectum is not serious, especially if   hemorrhoids are present.)  6. Vomiting.  7. Weakness or dizziness.  GO DIRECTLY TO THE NEAREST EMERGENCY ROOM IF YOU HAVE ANY OF THE FOLLOWING:      Difficulty breathing              Chills and/or fever over 101 F   Persistent vomiting and/or vomiting blood   Severe abdominal pain   Severe chest pain   Black, tarry stools   Bleeding- more than one tablespoon   Any  other symptom or condition that you feel may need urgent attention  Your doctor recommends these additional instructions:  If any biopsies were taken, your doctors clinic will contact you in 1 to 2   weeks with any results.  - Patient has a contact number available for emergencies.  The signs and   symptoms of potential delayed complications were discussed with the   patient.  Return to normal activities tomorrow.  Written discharge   instructions were provided to the patient.   - Discharge patient to home (ambulatory).   - Resume previous diet.   - Continue present medications.   - Await pathology results.   - Repeat colonoscopy in 10 years for screening purposes.  For questions, problems or results please call your physician - Zack Lehman MD at Work:  [phone].  [facility], EMERGENCY ROOM PHONE NUMBER: [phone]  IF A COMPLICATION OR EMERGENCY SITUATION ARISES AND YOU ARE UNABLE TO REACH   YOUR PHYSICIAN - GO DIRECTLY TO THE EMERGENCY ROOM.  Zack Lehman MD  7/31/2018 1:50:28 PM  This report has been verified and signed electronically.  [employer]

## 2018-07-31 NOTE — PROVATION PATIENT INSTRUCTIONS
Discharge Summary/Instructions after an Endoscopic Procedure  Patient Name: Nan Betts  Patient MRN: [id number]  Patient YOB: 1970 Tuesday, July 31, 2018  Zack Lehman MD  RESTRICTIONS:  During your procedure today, you received medications for sedation.  These   medications may affect your judgment, balance and coordination.  Therefore,   for 24 hours, you have the following restrictions:   - DO NOT drive a car, operate machinery, make legal/financial decisions,   sign important papers or drink alcohol.    ACTIVITY:  Today: no heavy lifting, straining or running due to procedural   sedation/anesthesia.  The following day: return to full activity including work.  DIET:  Eat and drink normally unless instructed otherwise.     TREATMENT FOR COMMON SIDE EFFECTS:  - Mild abdominal pain, nausea, belching, bloating or excessive gas:  rest,   eat lightly and use a heating pad.  - Sore Throat: treat with throat lozenges and/or gargle with warm salt   water.  - Because air was used during the procedure, expelling large amounts of air   from your rectum or belching is normal.  - If a bowel prep was taken, you may not have a bowel movement for 1-3 days.    This is normal.  SYMPTOMS TO WATCH FOR AND REPORT TO YOUR PHYSICIAN:  1. Abdominal pain or bloating, other than gas cramps.  2. Chest pain.  3. Back pain.  4. Signs of infection such as: chills or fever occurring within 24 hours   after the procedure.  5. Rectal bleeding, which would show as bright red, maroon, or black stools.   (A tablespoon of blood from the rectum is not serious, especially if   hemorrhoids are present.)  6. Vomiting.  7. Weakness or dizziness.  GO DIRECTLY TO THE NEAREST EMERGENCY ROOM IF YOU HAVE ANY OF THE FOLLOWING:      Difficulty breathing              Chills and/or fever over 101 F   Persistent vomiting and/or vomiting blood   Severe abdominal pain   Severe chest pain   Black, tarry stools   Bleeding- more than one tablespoon   Any  other symptom or condition that you feel may need urgent attention  Your doctor recommends these additional instructions:  If any biopsies were taken, your doctors clinic will contact you in 1 to 2   weeks with any results.  - Patient has a contact number available for emergencies.  The signs and   symptoms of potential delayed complications were discussed with the   patient.  Return to normal activities tomorrow.  Written discharge   instructions were provided to the patient.   - Discharge patient to home (ambulatory).   - Resume previous diet.   - Continue present medications.   - Return to GI clinic PRN.  For questions, problems or results please call your physician - Zack Lehman MD at Work:  [phone].  [facility], EMERGENCY ROOM PHONE NUMBER: [phone]  IF A COMPLICATION OR EMERGENCY SITUATION ARISES AND YOU ARE UNABLE TO REACH   YOUR PHYSICIAN - GO DIRECTLY TO THE EMERGENCY ROOM.  Zack Lehman MD  7/31/2018 1:35:18 PM  This report has been verified and signed electronically.  [employer]

## 2018-07-31 NOTE — TRANSFER OF CARE
"Anesthesia Transfer of Care Note    Patient: Nna Betts    Procedure(s) Performed: Procedure(s) (LRB):  EGD (ESOPHAGOGASTRODUODENOSCOPY) (N/A)  COLONOSCOPY (N/A)    Patient location: GI    Anesthesia Type: general    Transport from OR: Transported from OR on room air with adequate spontaneous ventilation    Post pain: adequate analgesia    Post assessment: no apparent anesthetic complications    Post vital signs: stable    Level of consciousness: awake    Nausea/Vomiting: no nausea/vomiting    Complications: none    Transfer of care protocol was followed      Last vitals:   Visit Vitals  /68 (BP Location: Right arm, Patient Position: Lying)   Pulse 86   Temp 36.7 °C (98.1 °F) (Temporal)   Resp 16   Ht 5' 8" (1.727 m)   Wt 108.9 kg (240 lb)   SpO2 96%   Breastfeeding? No   BMI 36.49 kg/m²     "

## 2018-07-31 NOTE — H&P
[facility]  History & Physical    Subjective:      Chief Complaint/Reason for Admission: abd pain    Nan Betts is a 48 y.o. female.    Past Medical History:   Diagnosis Date    Abnormal Pap smear of vagina     Anxiety     Cataract     Chronic pain     TMJ and abdomen    Colon polyp     Epilepsy     as a child    Gastroparesis     Idiopathic    GERD (gastroesophageal reflux disease)     H/O abnormal cervical Papanicolaou smear     Hypercholesteremia 6/21/2016    Hyperlipidemia     IC (interstitial cystitis)     Internal hemorrhoids     Interstitial cystitis     Irritable bowel syndrome (IBS)     Irritable bowel syndrome with diarrhea 6/21/2016    Spastic colon     TMJ (temporomandibular joint disorder)     TMJ (temporomandibular joint syndrome) 6/21/2016     Past Surgical History:   Procedure Laterality Date    APPENDECTOMY      BREAST BIOPSY Bilateral     CATARACT EXTRACTION      CHOLECYSTECTOMY      COLONOSCOPY      gastric stimulator placement      LASIK Bilateral 2006    MANDIBLE SURGERY Bilateral 10/17/2016    PYLOROPLASTY  03/2016    STOMACH SURGERY      gastric pacemaker    TEMPOROMANDIBULAR JOINT SURGERY  12/2016    TONSILLECTOMY      TOTAL ABDOMINAL HYSTERECTOMY  2005    EUGENIA/BSO, Trachelectomy 7 years ago    UPPER GASTROINTESTINAL ENDOSCOPY       Family History   Problem Relation Age of Onset    Coronary artery disease Mother     Hodgkin's lymphoma Mother     Coronary artery disease Father     Lymphoma Sister     Coronary artery disease Paternal Grandfather     Glaucoma Maternal Grandmother     Pancreatic cancer Paternal Aunt     Colon cancer Neg Hx     Breast cancer Neg Hx     Ovarian cancer Neg Hx     Cervical cancer Neg Hx     Endometrial cancer Neg Hx     Vaginal cancer Neg Hx      Social History   Substance Use Topics    Smoking status: Current Every Day Smoker     Packs/day: 1.00     Years: 20.00    Smokeless tobacco: Never Used     Alcohol use No       PTA Medications   Medication Sig    carisoprodol (SOMA) 350 MG tablet Take 350 mg by mouth 3 (three) times daily as needed for Muscle spasms.    dexlansoprazole (DEXILANT) 60 mg capsule Take 1 capsule (60 mg total) by mouth 2 (two) times daily.    diphenhydrAMINE (BENADRYL) 50 MG tablet Take 50 mg by mouth every 4 (four) hours as needed for Itching.    estradiol (ESTRACE) 1 MG tablet Take 3 tablets (3 mg total) by mouth once daily. Take one 1mg pill daily along with the 2mg pill daily for total of 3mg daily    estradiol (ESTRACE) 2 MG tablet Take 1 tablet (2 mg total) by mouth once daily. Take one 2mg tab and one 1mg tab for total of 3 daily    flavoxATE (URISPAS) 100 mg Tab     loperamide (IMODIUM) 2 mg capsule Take 2 capsules (4 mg total) by mouth daily as needed.    mometasone (NASONEX) 50 mcg/actuation nasal spray     nystatin (MYCOSTATIN) 100,000 unit/mL suspension nightly as needed.     pentosan polysulfate (ELMIRON) 100 mg Cap Take 100 mg by mouth 3 (three) times daily.    pregabalin (LYRICA) 25 MG capsule Take 1 capsule (25 mg total) by mouth 3 (three) times daily.    simvastatin (ZOCOR) 40 MG tablet     SODIUM CHLORIDE 0.45 % IV Inject 1,000 mLs into the vein as needed.     sodium chloride 0.9% 0.9 % SolP 50 mL with promethazine 25 mg/mL Soln Inject 25 mg into the vein every 4 (four) hours. Thru IV    SUPREP BOWEL PREP KIT 17.5-3.13-1.6 gram SolR Please dispense as directed/ 1 kit    tiZANidine (ZANAFLEX) 4 MG tablet Take 1 tablet (4 mg total) by mouth every 8 (eight) hours as needed (pain).    albuterol (PROVENTIL) 2.5 mg /3 mL (0.083 %) nebulizer solution     diazePAM (VALIUM) 5 MG tablet Take once 1 hour prior to procedure.  May repeat x 1 after 30 min if needed.    nabumetone (RELAFEN) 500 MG tablet Take 1 tablet (500 mg total) by mouth 2 (two) times daily as needed for Pain.    nortriptyline (PAMELOR) 25 MG capsule Take 1 capsule (25 mg total) by mouth every  evening.    PROAIR HFA 90 mcg/actuation inhaler every 6 (six) hours as needed.      Review of patient's allergies indicates:   Allergen Reactions    Ketorolac tromethamine Shortness Of Breath and Swelling    Morphine Anaphylaxis    Toradol [ketorolac] Shortness Of Breath    Ultram [tramadol] Shortness Of Breath    Buprenorphine hcl     Codeine Itching    Flexeril [cyclobenzaprine] Other (See Comments)     GI BLEEDING    Hydrocodone     Hydrocodone-acetaminophen     Ibuprofen     Metoclopramide     Nsaids (non-steroidal anti-inflammatory drug) Other (See Comments)     GI BLEEDING    Opioids - morphine analogues     Reglan [metoclopramide hcl] Other (See Comments)     Headaches and insomnia      Topamax [topiramate] Other (See Comments)     Ulcers in the mouth and dry mouth    Tramadol hcl         Review of Systems   Respiratory: Negative.    Cardiovascular: Negative.        Objective:      Vital Signs (Most Recent)  Temp: 98.2 °F (36.8 °C) (07/31/18 1241)  Pulse: 96 (07/31/18 1241)  Resp: 14 (07/31/18 1241)  BP: 130/74 (07/31/18 1241)  SpO2: 97 % (07/31/18 1241)    Vital Signs Range (Last 24H):  Temp:  [98.2 °F (36.8 °C)]   Pulse:  [96]   Resp:  [14]   BP: (130)/(74)   SpO2:  [97 %]     Physical Exam   Constitutional: She is oriented to person, place, and time.   Neck: Normal range of motion.   Cardiovascular: Normal rate and regular rhythm.    Pulmonary/Chest: Effort normal and breath sounds normal.   Neurological: She is alert and oriented to person, place, and time.       Data Review:     ECG: .     Assessment:      There are no hospital problems to display for this patient.      Plan:    Indication for procedure:    ASA:II  Airway normal  Malampati class:per anes    Personal and family history negative for anesthesia problems    Plan:egd/colon  Anesthesia plan: general

## 2018-07-31 NOTE — ANESTHESIA POSTPROCEDURE EVALUATION
"Anesthesia Post Evaluation    Patient: Nan Betts    Procedure(s) Performed: Procedure(s) (LRB):  EGD (ESOPHAGOGASTRODUODENOSCOPY) (N/A)  COLONOSCOPY (N/A)    Final Anesthesia Type: general  Patient location during evaluation: PACU  Patient participation: Yes- Able to Participate  Level of consciousness: awake and alert and oriented  Post-procedure vital signs: reviewed and stable  Pain management: adequate  Airway patency: patent  PONV status at discharge: No PONV  Anesthetic complications: no      Cardiovascular status: blood pressure returned to baseline  Respiratory status: unassisted, room air and spontaneous ventilation  Hydration status: euvolemic  Follow-up not needed.        Visit Vitals  BP (!) 130/58 (BP Location: Right arm, Patient Position: Lying)   Pulse 78   Temp 36.7 °C (98.1 °F) (Temporal)   Resp 16   Ht 5' 8" (1.727 m)   Wt 108.9 kg (240 lb)   SpO2 96%   Breastfeeding? No   BMI 36.49 kg/m²       Pain/Chelsy Score: Pain Assessment Performed: Yes (7/31/2018  1:59 PM)  Presence of Pain: denies (7/31/2018  1:59 PM)  Chelsy Score: 9 (7/31/2018  2:08 PM)      "

## 2018-07-31 NOTE — ANESTHESIA PREPROCEDURE EVALUATION
07/31/2018  Nan Betts is a 48 y.o., female.   Past Medical History:   Diagnosis Date    Abnormal Pap smear of vagina     Anxiety     Cataract     Chronic pain     TMJ and abdomen    Colon polyp     Epilepsy     as a child    Gastroparesis     Idiopathic    GERD (gastroesophageal reflux disease)     H/O abnormal cervical Papanicolaou smear     Hypercholesteremia 6/21/2016    Hyperlipidemia     IC (interstitial cystitis)     Internal hemorrhoids     Interstitial cystitis     Irritable bowel syndrome (IBS)     Irritable bowel syndrome with diarrhea 6/21/2016    Spastic colon     TMJ (temporomandibular joint disorder)     TMJ (temporomandibular joint syndrome) 6/21/2016     Past Surgical History:   Procedure Laterality Date    APPENDECTOMY      BREAST BIOPSY Bilateral     CATARACT EXTRACTION      CHOLECYSTECTOMY      COLONOSCOPY      gastric stimulator placement      LASIK Bilateral 2006    MANDIBLE SURGERY Bilateral 10/17/2016    PYLOROPLASTY  03/2016    STOMACH SURGERY      gastric pacemaker    TEMPOROMANDIBULAR JOINT SURGERY  12/2016    TONSILLECTOMY      TOTAL ABDOMINAL HYSTERECTOMY  2005    EUGENIA/BSO, Trachelectomy 7 years ago    UPPER GASTROINTESTINAL ENDOSCOPY         Anesthesia Evaluation    I have reviewed the Patient Summary Reports.     I have reviewed the Medications.     Review of Systems  Anesthesia Hx:  Denies Hx of Anesthetic complications  Neg history of prior surgery. Denies Family Hx of Anesthesia complications.   Denies Personal Hx of Anesthesia complications.   Cardiovascular:   Exercise tolerance: good        Physical Exam  General:  Well nourished, Obesity    Airway/Jaw/Neck:  Airway Findings: Mouth Opening: Normal Tongue: Normal  General Airway Assessment: Adult  Mallampati: II  TM Distance: Normal, at least 6 cm         Dental:  DENTAL FINDINGS: Normal    Chest/Lungs:  Chest/Lungs Clear    Heart/Vascular:  Heart Findings: Normal       Mental Status:  Mental Status Findings:  Alert and Oriented         Anesthesia Plan  Type of Anesthesia, risks & benefits discussed:  Anesthesia Type:  general  Patient's Preference:   Intra-op Monitoring Plan: standard ASA monitors  Intra-op Monitoring Plan Comments:   Post Op Pain Control Plan:   Post Op Pain Control Plan Comments:   Induction:   IV  Beta Blocker:  Patient is not currently on a Beta-Blocker (No further documentation required).       Informed Consent: Patient understands risks and agrees with Anesthesia plan.  Questions answered. Anesthesia consent signed with patient.  ASA Score: 3     Day of Surgery Review of History & Physical:    H&P update referred to the provider.         Ready For Surgery From Anesthesia Perspective.

## 2018-08-02 ENCOUNTER — TELEPHONE (OUTPATIENT)
Dept: GASTROENTEROLOGY | Facility: CLINIC | Age: 48
End: 2018-08-02

## 2018-08-02 NOTE — TELEPHONE ENCOUNTER
----- Message from Sung Koch MD sent at 8/2/2018 12:40 PM CDT -----  Contact: self [phone]  Let her know since she had 2 procedures (egd/colon) she may be feeling some of the extra air and that can cause cramping, it should start easing off in the next day or two, let us know if not improving  ----- Message -----  From: Ellie Bliss MA  Sent: 8/2/2018  12:33 PM  To: MD Dr August Galindo Dr Smith did an EGD and colonoscopy on patient 7/30/18. Patient called today complaining of some lower cramping and abd pain level 4. Wants to if this is normal.  Ellie   ----- Message -----  From: Mimi Rai  Sent: 8/2/2018  11:05 AM  To: Dominik BASS Staff    Needs Advice    Reason for call: Pt called stating she just had surgery, and she is having some complications  Communication Preference: self [phone]  Additional Information:

## 2018-08-02 NOTE — TELEPHONE ENCOUNTER
----- Message from Mimi Rai sent at 8/2/2018 11:05 AM CDT -----  Contact: self [phone]  Needs Advice    Reason for call: Pt called stating she just had surgery, and she is having some complications  Communication Preference: self [phone]  Additional Information:

## 2018-08-06 ENCOUNTER — PATIENT MESSAGE (OUTPATIENT)
Dept: OBSTETRICS AND GYNECOLOGY | Facility: CLINIC | Age: 48
End: 2018-08-06

## 2018-08-07 ENCOUNTER — TELEPHONE (OUTPATIENT)
Dept: ENDOSCOPY | Facility: HOSPITAL | Age: 48
End: 2018-08-07

## 2018-08-07 RX ORDER — ESTRADIOL 2 MG/1
2 TABLET ORAL DAILY
Qty: 90 TABLET | Refills: 3 | Status: SHIPPED | OUTPATIENT
Start: 2018-08-07 | End: 2019-06-06 | Stop reason: SDUPTHER

## 2018-08-07 RX ORDER — ESTRADIOL 1 MG/1
3 TABLET ORAL DAILY
Qty: 90 TABLET | Refills: 3 | Status: SHIPPED | OUTPATIENT
Start: 2018-08-07 | End: 2019-06-06 | Stop reason: SDUPTHER

## 2018-08-13 ENCOUNTER — TELEPHONE (OUTPATIENT)
Dept: GASTROENTEROLOGY | Facility: CLINIC | Age: 48
End: 2018-08-13

## 2018-08-13 NOTE — TELEPHONE ENCOUNTER
----- Message from Zack Lehman MD sent at 8/13/2018  8:51 AM CDT -----  Polyp was benign, no colitis found

## 2018-08-15 ENCOUNTER — TELEPHONE (OUTPATIENT)
Dept: GASTROENTEROLOGY | Facility: CLINIC | Age: 48
End: 2018-08-15

## 2018-08-15 NOTE — TELEPHONE ENCOUNTER
----- Message from Kinza Bray MA sent at 8/15/2018  8:59 AM CDT -----  Contact: [phone]  Needs Advice    Reason for call: pt is waiting at the dentist office to have her teeth cleaned but because she has a gastric pacemaker they need to get the ok from Dr Lehman (or anyone) to say that it's ok to  proceed     Communication Preference: pt's # [phone]

## 2018-08-15 NOTE — TELEPHONE ENCOUNTER
Spoke with Yuliana.  Aware no antibiotics are needed prior to procedures and patient will have to have gastric pacemaker turn off before Capatron, a tool a hygienist uses, is used.

## 2018-08-15 NOTE — TELEPHONE ENCOUNTER
----- Message from Mimi Dalton MA sent at 8/15/2018  8:59 AM CDT -----  Contact: Yuliana rivera/Dr. Yobany Dunham  [phone]  Needs Advice    Reason for call:    The patient is in the office now for a dental procedure and has a gastric pace maker and they need medical clearance.  Communication Preference:  Phone# above  Additional Information:  She states any type of pace maker they need clearance on

## 2018-09-21 ENCOUNTER — TELEPHONE (OUTPATIENT)
Dept: PAIN MEDICINE | Facility: CLINIC | Age: 48
End: 2018-09-21

## 2018-09-21 NOTE — TELEPHONE ENCOUNTER
Staff contacted patient regarding her message. She was informed that Dr. Ross has no availability on the day she requested. She can see a NP on that day or she can be scheduled with Dr. Ross at the [facility] office. She was asked to contact the office to be scheduled.

## 2018-09-21 NOTE — TELEPHONE ENCOUNTER
----- Message from Loretta Ruth sent at 9/21/2018  9:33 AM CDT -----  ----- Message from [employer] Message sent at 9/20/2018 10:57 PM CDT -----    Appointment Request From: Nan Betts    With Provider: Sayra Ross MD [[facility]]    Preferred Date Range: 9/27/2018 - 9/27/2018    Preferred Times: Thursday Afternoon    Reason for visit: Existing Patient    Comments:  Follow up for injections with Dr. Ross

## 2018-09-27 ENCOUNTER — OFFICE VISIT (OUTPATIENT)
Dept: GASTROENTEROLOGY | Facility: CLINIC | Age: 48
End: 2018-09-27
Payer: COMMERCIAL

## 2018-09-27 ENCOUNTER — PATIENT MESSAGE (OUTPATIENT)
Dept: PAIN MEDICINE | Facility: CLINIC | Age: 48
End: 2018-09-27

## 2018-09-27 ENCOUNTER — TELEPHONE (OUTPATIENT)
Dept: GASTROENTEROLOGY | Facility: CLINIC | Age: 48
End: 2018-09-27

## 2018-09-27 ENCOUNTER — PATIENT MESSAGE (OUTPATIENT)
Dept: GASTROENTEROLOGY | Facility: CLINIC | Age: 48
End: 2018-09-27

## 2018-09-27 VITALS
BODY MASS INDEX: 35.75 KG/M2 | SYSTOLIC BLOOD PRESSURE: 135 MMHG | HEART RATE: 78 BPM | DIASTOLIC BLOOD PRESSURE: 86 MMHG | HEIGHT: 68 IN | WEIGHT: 235.88 LBS

## 2018-09-27 DIAGNOSIS — R14.0 FLATULENCE/GAS PAIN/BELCHING: ICD-10-CM

## 2018-09-27 DIAGNOSIS — K58.0 IRRITABLE BOWEL SYNDROME WITH DIARRHEA: ICD-10-CM

## 2018-09-27 DIAGNOSIS — K31.84 GASTROPARESIS: Primary | ICD-10-CM

## 2018-09-27 DIAGNOSIS — R14.0 BLOATING: ICD-10-CM

## 2018-09-27 DIAGNOSIS — K21.9 GASTROESOPHAGEAL REFLUX DISEASE WITHOUT ESOPHAGITIS: ICD-10-CM

## 2018-09-27 PROCEDURE — 3008F BODY MASS INDEX DOCD: CPT | Mod: CPTII,S$GLB,, | Performed by: NURSE PRACTITIONER

## 2018-09-27 PROCEDURE — 99213 OFFICE O/P EST LOW 20 MIN: CPT | Mod: S$GLB,,, | Performed by: NURSE PRACTITIONER

## 2018-09-27 PROCEDURE — 99999 PR PBB SHADOW E&M-EST. PATIENT-LVL V: CPT | Mod: PBBFAC,,, | Performed by: NURSE PRACTITIONER

## 2018-09-27 RX ORDER — TIZANIDINE HYDROCHLORIDE 4 MG/1
4 CAPSULE, GELATIN COATED ORAL NIGHTLY
COMMUNITY
End: 2021-04-14

## 2018-09-27 NOTE — PROGRESS NOTES
"    [facility] Established Patient Visit    Reason for Visit:  The primary encounter diagnosis was Gastroparesis. Diagnoses of Flatulence/gas pain/belching, Bloating, Irritable bowel syndrome with diarrhea, and Gastroesophageal reflux disease without esophagitis were also pertinent to this visit.    PCP: Rosi Carias    HPI:  This is a 48 y.o. female here for f/u gastroparesis.  Followed by Dr. Saldivar. Last visit with him in 7/2018.  Also previously seen by me for routine f/u.     Gastroparesis has been going well. Did awake with nausea two nights in the past few weeks. Does endorse recent stress. Taking phenergan 25mg via IV piggyback over 15-30 min q 4 hrs PRN (via picc) -needs Dr saldivar to start refilling it d/t pcp will no longer RX.   Feels like gas bubbles through to back daily x few weeks. Some improvement with belching. Also with increased flatus. No improvement with eliminating dairy.     Two nights ago with colonic spasms/cramping. Severe. Had been occurring infrequently until a few days ago.     Diarrhea. H/o IBS-D.  2-3 loose-watery BM/day. Had one accident d/t urgency. Some improvement with imodium almost daily. Previous  c diff testing negative. No improvement with bentyl or levsin.    Reflux - + h/o GERD. Has been well controlled with Dexilant 60 mg BID. And GI cocktail PRN.  Dysphagia/odynophagia - no   GI bleeding - denies hematochezia, hematemesis, melena, BRBPR, black/tarry stools, and coffee ground emesis  NSAID usage - none    Entera interegated:   Amplitude: 4.5V   Pulse width 330 us  Rate 28Hz  On 2s  Off 3s  Battery "okay"    Is now off of narcotic pain meds (neck and back pain).    ROS:  Constitutional: No fevers, no chills, No unintentional weight loss, no fatigue,   ENT: No allergies  CV: No chest pain, no palpitations, no perif. edema, no sob on exertion  Pulm: No cough, No shortness of breath, no wheezes, no sputum  Ophtho: No vision changes  GI: see HPI; no change in " appetite  Derm: No rash  Heme: No lymphadenopathy, No bruising  MSK: No arthritis, no muscle pain, no muscle weakness  : No dysuria, No hematuria  Endo: No hot or cold intolerance  Neuro: No syncope, No seizure,     PMHX:  has a past medical history of Abnormal Pap smear of vagina, Anxiety, Cataract, Chronic pain, Colon polyp, Epilepsy, Gastroparesis, GERD (gastroesophageal reflux disease), H/O abnormal cervical Papanicolaou smear, Hypercholesteremia (6/21/2016), Hyperlipidemia, IC (interstitial cystitis), Internal hemorrhoids, Interstitial cystitis, Irritable bowel syndrome (IBS), Irritable bowel syndrome with diarrhea (6/21/2016), Spastic colon, TMJ (temporomandibular joint disorder), and TMJ (temporomandibular joint syndrome) (6/21/2016).    PSHX:  has a past surgical history that includes Cholecystectomy; Appendectomy; Colonoscopy; Upper gastrointestinal endoscopy; Cataract extraction; LASIK (Bilateral, 2006); Total abdominal hysterectomy (2005); Tonsillectomy; Pyloroplasty (03/2016); Mandible surgery (Bilateral, 10/17/2016); gastric stimulator placement; Temporomandibular joint surgery (12/2016); Stomach surgery; Breast biopsy (Bilateral); EGD (ESOPHAGOGASTRODUODENOSCOPY) (N/A, 7/31/2018); COLONOSCOPY (N/A, 7/31/2018); ESOPHAGOGASTRODUODENOSCOPY (EGD) w/ poss. pyloric stenosis (N/A, 8/31/2017); Exchange of interstim battery (N/A, 5/15/2017); EXCHANGE-STIMULATOR-GASTRIC (N/A, 5/15/2017); ESOPHAGOGASTRODUODENOSCOPY (EGD) (N/A, 5/2/2017); LAPAROSCOPIC PLACEMENT GASTRIC NEUROSTIMULATOR (N/A, 11/23/2016); INSERTION-INTERSTIM STAGE II GENERATOR IMPLANT (N/A, 11/23/2016); INSERTION-INTERSTIM STAGE I LEAD IMPLANT (N/A, 11/23/2016); ESOPHAGOGASTRODUODENOSCOPY (EGD) intraop (N/A, 11/23/2016); ESOPHAGOGASTRODUODENOSCOPY (EGD) (N/A, 11/15/2016); SIGMOIDOSCOPY-FLEXIBLE (N/A, 6/22/2016); CYSTOSCOPY WITH HYDRODISTENSION (N/A, 6/13/2016); ROBOTIC ASSISTED LAPAROSCOPIC PYLOROPLASTY (N/A, 3/30/2016); and LAPAROSCOPY-DIAGNOSTIC  (N/A, 3/30/2016).    The patient's social and family histories were reviewed by me and updated in the appropriate section of the electronic medical record.    Review of patient's allergies indicates:   Allergen Reactions    Ketorolac tromethamine Shortness Of Breath and Swelling    Morphine Anaphylaxis    Toradol [ketorolac] Shortness Of Breath    Ultram [tramadol] Shortness Of Breath    Buprenorphine hcl     Codeine Itching    Flexeril [cyclobenzaprine] Other (See Comments)     GI BLEEDING    Hydrocodone     Hydrocodone-acetaminophen     Ibuprofen     Metoclopramide     Nsaids (non-steroidal anti-inflammatory drug) Other (See Comments)     GI BLEEDING    Opioids - morphine analogues     Reglan [metoclopramide hcl] Other (See Comments)     Headaches and insomnia      Topamax [topiramate] Other (See Comments)     Ulcers in the mouth and dry mouth    Tramadol hcl        Current Outpatient Medications   Medication Sig    albuterol (PROVENTIL) 2.5 mg /3 mL (0.083 %) nebulizer solution     carisoprodol (SOMA) 350 MG tablet Take 350 mg by mouth 3 (three) times daily as needed for Muscle spasms.    dexlansoprazole (DEXILANT) 60 mg capsule Take 1 capsule (60 mg total) by mouth 2 (two) times daily.    diphenhydrAMINE (BENADRYL) 50 MG tablet Take 50 mg by mouth every 4 (four) hours as needed for Itching.    estradiol (ESTRACE) 1 MG tablet Take 3 tablets (3 mg total) by mouth once daily. Take one 1mg pill daily along with the 2mg pill daily for total of 3mg daily    estradiol (ESTRACE) 2 MG tablet Take 1 tablet (2 mg total) by mouth once daily. Take one 2mg tab and one 1mg tab for total of 3 daily    flavoxATE (URISPAS) 100 mg Tab     loperamide (IMODIUM) 2 mg capsule Take 2 capsules (4 mg total) by mouth daily as needed.    mometasone (NASONEX) 50 mcg/actuation nasal spray     pentosan polysulfate (ELMIRON) 100 mg Cap Take 100 mg by mouth 3 (three) times daily.    PROAIR HFA 90 mcg/actuation inhaler  "every 6 (six) hours as needed.     SODIUM CHLORIDE 0.45 % IV Inject 1,000 mLs into the vein as needed.     sodium chloride 0.9% 0.9 % SolP 50 mL with promethazine 25 mg/mL Soln Inject 25 mg into the vein every 4 (four) hours. Thru IV    tiZANidine 4 mg Cap Take 4 mg by mouth once daily.    diazePAM (VALIUM) 5 MG tablet Take once 1 hour prior to procedure.  May repeat x 1 after 30 min if needed.    nabumetone (RELAFEN) 500 MG tablet Take 1 tablet (500 mg total) by mouth 2 (two) times daily as needed for Pain.    nortriptyline (PAMELOR) 25 MG capsule Take 1 capsule (25 mg total) by mouth every evening.    nystatin (MYCOSTATIN) 100,000 unit/mL suspension nightly as needed.     pregabalin (LYRICA) 25 MG capsule Take 1 capsule (25 mg total) by mouth 3 (three) times daily.    simvastatin (ZOCOR) 40 MG tablet      No current facility-administered medications for this visit.          Objective Findings:    Vital Signs:  /86   Pulse 78   Ht 5' 8" (1.727 m)   Wt 107 kg (235 lb 14.3 oz)   BMI 35.87 kg/m²  Body mass index is 35.87 kg/m².    Physical Exam:  General Appearance: Well appearing in no acute distress  Head:   Normocephalic, without obvious abnormality  Eyes:    No scleral icterus, EOMI  Throat: Lips, mucosa, and tongue normal; teeth and gums normal  Lungs: CTA bilaterally in anterior and posterior fields, no wheezes, no crackles.  Heart:  Regular rate and rhythm, S1, S2 normal, no murmurs heard  Abdomen: Soft, diffuse tenderness, non distended with positive bowel sounds in all four quadrants. No hepatosplenomegaly, ascites, or mass, gastric stim palpable in right abd.  Extremities:    2+ radial pulses, no clubbing, cyanosis or edema  Skin: No rash to exposed areas  Neurologic: A&Ox4      Labs:    Lab Results   Component Value Date    WBC 8.58 08/07/2017    HGB 13.3 08/07/2017    HCT 38.8 08/07/2017     08/07/2017    ALT 33 08/07/2017    AST 40 08/07/2017     12/06/2017    K 3.8 " 12/06/2017     12/06/2017    CREATININE 0.8 12/06/2017    BUN 21 (H) 12/06/2017    CO2 20 (L) 12/06/2017    TSH 2.086 11/07/2016       Endoscopy:   EGD 7/31/18:nL esophagus. Pyloric deformity dilated w/ pyloric balloon to 18 mm. Nl duodenum.  Colon 7/31/18:GPTTI. 4 mm sigmoid colon polyp (hp). Nl colon (-). Rpt 10 yrs  8/31/2017 EGD: nl esophagus. Nl stomach. Pyloric deformity dilated w/ pyloric balloon to 18 mm. Nl duodenum.  5/2/2017 EGD: nl esophagus. Nl stomach (-). Pyloric stenosis dilated to 18 mm. Nl duodenum.   11/15/2016 EGD: nl esophagus. Gastric polyp (fgp). Gastric erythema (reactive changes).  S/p pyloromyotomy. Nl duodenum  6/22/2016 flex sig: GPTTC. Ext Hemorrhoids. Nl examined colon (mild congestion and reactive change).   Assessment:    1. Gastroparesis    2. Flatulence/gas pain/belching    3. Bloating    4. Irritable bowel syndrome with diarrhea    5. Gastroesophageal reflux disease without esophagitis          Recommendations:  1. Gastroparesis-continue present regimen.  To GI dietitian for GP diet.    2. Gas- take gaviscon. Send out sibo testing  3. Bloating- send out sibo testing  4. ibs-D- Ibgard. Consider xifaxan. Pt concerned for dumping syndrome. States she s/w Dr. Lehman about this and they discussed f/u with gen sx if s/s persist. Pt will f/u with general surgeon.   5. GERD- continue present regimen.  Follow-up in about 3 months (around 12/27/2018) for gastroparesis w/ Dr. Lehman.    Order summary:       Thank you for allowing me to participate in the care of Nan Gunn, APRN, FNP-C

## 2018-09-27 NOTE — TELEPHONE ENCOUNTER
----- Message from Zack Lehman MD sent at 9/27/2018 11:26 AM CDT -----  Contact: self [phone]  If bree can send over orders , I can sign them  ----- Message -----  From: Bernice Blackburn MA  Sent: 9/27/2018  10:33 AM  To: Zack Lehman MD    Patient stated you agrees to take over her pic line care and orders.   She is needing a refill on her Sodium Chloride.  It would be called to [facility] at [phone].  Pharmacist name if .  ----- Message -----  From: Mimi Rai  Sent: 9/27/2018  10:18 AM  To: Dominik BASS Staff    Pt called inquiring about her SODIUM CHLORIDE 0.45 % IV     Contact: self [phone]

## 2018-09-27 NOTE — PATIENT INSTRUCTIONS
Try over the counter gaviscon with alginic acid for gas.  Try over the counter IBgard for abdominal discomfort.

## 2018-09-28 ENCOUNTER — PATIENT MESSAGE (OUTPATIENT)
Dept: SURGERY | Facility: CLINIC | Age: 48
End: 2018-09-28

## 2018-09-28 NOTE — TELEPHONE ENCOUNTER
I apologize that we were not able to get her in.  Please offer to schedule her for any day with myself or NP.  We can all do TPIs

## 2018-10-02 ENCOUNTER — TELEPHONE (OUTPATIENT)
Dept: ENDOSCOPY | Facility: HOSPITAL | Age: 48
End: 2018-10-02

## 2018-10-02 NOTE — TELEPHONE ENCOUNTER
----- Message from Kinza Bray MA sent at 10/2/2018  3:42 PM CDT -----  Contact: [phone]  Needs Advice    Reason for call: pt has an appt on 10/9 to see the dietician at 8:30, she would like something closer to 10:00 or a little later possible. She lives in [place]     Communication Preference: [phone]    Additional Information: I do not have access to book on this providers schedule

## 2018-10-05 ENCOUNTER — PATIENT MESSAGE (OUTPATIENT)
Dept: OBSTETRICS AND GYNECOLOGY | Facility: CLINIC | Age: 48
End: 2018-10-05

## 2018-10-05 DIAGNOSIS — K52.9 CHRONIC DIARRHEA: ICD-10-CM

## 2018-10-05 RX ORDER — LOPERAMIDE HYDROCHLORIDE 2 MG/1
4 CAPSULE ORAL DAILY PRN
Qty: 180 CAPSULE | Refills: 3 | Status: SHIPPED | OUTPATIENT
Start: 2018-10-05 | End: 2018-12-17 | Stop reason: SDUPTHER

## 2018-10-15 ENCOUNTER — TELEPHONE (OUTPATIENT)
Dept: GASTROENTEROLOGY | Facility: CLINIC | Age: 48
End: 2018-10-15

## 2018-10-15 ENCOUNTER — PATIENT MESSAGE (OUTPATIENT)
Dept: GASTROENTEROLOGY | Facility: CLINIC | Age: 48
End: 2018-10-15

## 2018-10-22 ENCOUNTER — PATIENT MESSAGE (OUTPATIENT)
Dept: GASTROENTEROLOGY | Facility: CLINIC | Age: 48
End: 2018-10-22

## 2018-10-22 RX ORDER — DEXLANSOPRAZOLE 60 MG/1
60 CAPSULE, DELAYED RELEASE ORAL 2 TIMES DAILY
Qty: 180 CAPSULE | Refills: 3 | Status: SHIPPED | OUTPATIENT
Start: 2018-10-22 | End: 2019-01-17 | Stop reason: SDUPTHER

## 2018-11-09 ENCOUNTER — TELEPHONE (OUTPATIENT)
Dept: UROLOGY | Facility: CLINIC | Age: 48
End: 2018-11-09

## 2018-11-09 NOTE — TELEPHONE ENCOUNTER
"I spoke with patient, who stated she really needs appt with nalini on 11-14-18 for IC,Bladder spasms,  Because she will already be here for her mom's appt and they live in [place], I advised patient that I will send "s nurse to see if she can get her in on that day.   "

## 2018-11-14 ENCOUNTER — OFFICE VISIT (OUTPATIENT)
Dept: UROLOGY | Facility: CLINIC | Age: 48
End: 2018-11-14
Payer: COMMERCIAL

## 2018-11-14 VITALS
BODY MASS INDEX: 34.95 KG/M2 | SYSTOLIC BLOOD PRESSURE: 139 MMHG | HEIGHT: 68 IN | HEART RATE: 77 BPM | DIASTOLIC BLOOD PRESSURE: 86 MMHG | WEIGHT: 230.63 LBS

## 2018-11-14 DIAGNOSIS — N30.10 INTERSTITIAL CYSTITIS: Primary | ICD-10-CM

## 2018-11-14 PROCEDURE — 81002 URINALYSIS NONAUTO W/O SCOPE: CPT | Mod: S$GLB,,, | Performed by: UROLOGY

## 2018-11-14 PROCEDURE — 99999 PR PBB SHADOW E&M-EST. PATIENT-LVL IV: CPT | Mod: PBBFAC,,, | Performed by: UROLOGY

## 2018-11-14 PROCEDURE — 3008F BODY MASS INDEX DOCD: CPT | Mod: CPTII,S$GLB,, | Performed by: UROLOGY

## 2018-11-14 PROCEDURE — 99205 OFFICE O/P NEW HI 60 MIN: CPT | Mod: 25,S$GLB,, | Performed by: UROLOGY

## 2018-11-14 NOTE — H&P (VIEW-ONLY)
CHIEF COMPLAINT:    Mrs. Betts is a 48 y.o. female presenting as a self referred patient for interstitial cystitis.    PRESENTING ILLNESS:    Nan Betts is a 48 y.o. female who has a long history of interstitial cystitis.  She is treated with flavoxate and Elmiron.  She states that she has cystoscopy with hydrodistension in the past and it works well for her.  The last time she had one done was 2-3 years ago.  She states she only needs them occasionally.  When she has a flare, she has bladder pain, frequency increases and nocturia is > 1.  When she does not have a flare, she has nocturia x 1.  She cannot quantify her frequency.  She states that it varies from day to day.  She lives in [place], [place] and yesterday her pain was bad but today she is doing well.  She finds that stress and heavy lifting bring on her symptoms.    She has gastroparesis, status post gastric stimulator and she has to have a PICC line for IV fluids.  She states these treatments help her considerably as she is able to manage her life much better.  She states when she is getting a liter of fluids, she has to urinate 3 times.     G0, hysterectomy for endometriosis, not sexually active (both she and her  have had medical issues), she has diarrhea predominant IBS.      REVIEW OF SYSTEMS:    Review of Systems   Constitutional: Negative.    HENT: Negative.    Eyes: Negative.    Respiratory: Negative.    Cardiovascular: Negative.    Gastrointestinal: Positive for diarrhea and nausea.   Genitourinary: Positive for frequency and urgency.        Bladder pain   Skin: Negative.    Neurological: Negative.    Endo/Heme/Allergies: Negative.    Psychiatric/Behavioral: Negative.        PATIENT HISTORY:    Past Medical History:   Diagnosis Date    Abnormal Pap smear of vagina     Anxiety     Cataract     Chronic pain     TMJ and abdomen    Colon polyp     Epilepsy     as a child    Gastroparesis     Idiopathic    GERD (gastroesophageal reflux  disease)     H/O abnormal cervical Papanicolaou smear     Hypercholesteremia 6/21/2016    Hyperlipidemia     IC (interstitial cystitis)     Internal hemorrhoids     Interstitial cystitis     Irritable bowel syndrome (IBS)     Irritable bowel syndrome with diarrhea 6/21/2016    Spastic colon     TMJ (temporomandibular joint disorder)     TMJ (temporomandibular joint syndrome) 6/21/2016       Past Surgical History:   Procedure Laterality Date    APPENDECTOMY      BREAST BIOPSY Bilateral     CATARACT EXTRACTION      CHOLECYSTECTOMY      COLONOSCOPY      COLONOSCOPY N/A 7/31/2018    Procedure: COLONOSCOPY;  Surgeon: Zack Lehman MD;  Location: [facility] (4TH FLR);  Service: Endoscopy;  Laterality: N/A;    COLONOSCOPY N/A 7/31/2018    Performed by Zack Lehman MD at [facility] (4TH FLR)    CYSTOSCOPY WITH HYDRODISTENSION N/A 6/13/2016    Performed by Bethany John MD at [facility] OR 2ND FLR    EGD (ESOPHAGOGASTRODUODENOSCOPY) N/A 7/31/2018    Performed by Zack Lehman MD at [facility] (4TH FLR)    ESOPHAGOGASTRODUODENOSCOPY N/A 7/31/2018    Procedure: EGD (ESOPHAGOGASTRODUODENOSCOPY);  Surgeon: Zack Lehman MD;  Location: [facility] (4TH FLR);  Service: Endoscopy;  Laterality: N/A;  RUQ gastric pacemaker    Left upper arm PICC line    PONV    ESOPHAGOGASTRODUODENOSCOPY (EGD) N/A 5/2/2017    Performed by Zack Lehman MD at [facility] (4TH FLR)    ESOPHAGOGASTRODUODENOSCOPY (EGD) N/A 11/15/2016    Performed by Zack Lehman MD at [facility] (4TH FLR)    ESOPHAGOGASTRODUODENOSCOPY (EGD) intraop N/A 11/23/2016    Performed by David Celeste MD at [facility] OR 2ND FLR    ESOPHAGOGASTRODUODENOSCOPY (EGD) w/ poss. pyloric stenosis N/A 8/31/2017    Performed by Zack Lehman MD at [facility] (4TH FLR)    Exchange of interstim battery N/A 5/15/2017    Performed by David Celeste MD at [facility] OR 2ND FLR    EXCHANGE-STIMULATOR-GASTRIC N/A 5/15/2017    Performed by [facility] MD at  [facility] OR 2ND FLR    gastric stimulator placement      INSERTION-INTERSTIM STAGE I LEAD IMPLANT N/A 11/23/2016    Performed by [facility]este MD at [facility] OR 2ND FLR    INSERTION-INTERSTIM STAGE II GENERATOR IMPLANT N/A 11/23/2016    Performed by David [facility]este MD at [facility] OR 2ND FLR    LAPAROSCOPIC PLACEMENT GASTRIC NEUROSTIMULATOR N/A 11/23/2016    Performed by [facility]este MD at [facility] OR 2ND FLR    LAPAROSCOPY-DIAGNOSTIC N/A 3/30/2016    Performed by Alan Rivera MD at [facility] OR 2ND FLR    LASIK Bilateral 2006    MANDIBLE SURGERY Bilateral 10/17/2016    PYLOROPLASTY  03/2016    ROBOTIC ASSISTED LAPAROSCOPIC PYLOROPLASTY N/A 3/30/2016    Performed by David Celeste MD at [facility] OR 2ND FLR    SIGMOIDOSCOPY-FLEXIBLE N/A 6/22/2016    Performed by Zack Lehman MD at [facility] ENDO (2ND FLR)    STOMACH SURGERY      gastric pacemaker    TEMPOROMANDIBULAR JOINT SURGERY  12/2016    TONSILLECTOMY      TOTAL ABDOMINAL HYSTERECTOMY  2005    EUGENIA/BSO, Trachelectomy 7 years ago    UPPER GASTROINTESTINAL ENDOSCOPY         Family History   Problem Relation Age of Onset    Coronary artery disease Mother     Hodgkin's lymphoma Mother     Coronary artery disease Father     Lymphoma Sister     Coronary artery disease Paternal Grandfather     Glaucoma Maternal Grandmother     Pancreatic cancer Paternal Aunt      Socioeconomic History    Marital status:    Tobacco Use    Smoking status: Current Every Day Smoker     Packs/day: 1.00     Years: 20.00     Pack years: 20.00    Smokeless tobacco: Never Used   Substance and Sexual Activity    Alcohol use: No     Alcohol/week: 0.0 oz    Drug use: No    Sexual activity: Yes     Partners: Male       Allergies:  Morphine; Ultram [tramadol]; Buprenorphine hcl; Codeine; Flexeril [cyclobenzaprine]; Hydrocodone; Hydrocodone-acetaminophen; Ibuprofen; Metoclopramide; Nsaids (non-steroidal anti-inflammatory drug); Reglan [metoclopramide hcl];  and Topamax [topiramate]    Medications:  Outpatient Encounter Medications as of 11/14/2018   Medication Sig Dispense Refill    carisoprodol (SOMA) 350 MG tablet Take 350 mg by mouth 3 (three) times daily as needed for Muscle spasms.      dexlansoprazole (DEXILANT) 60 mg capsule Take 1 capsule (60 mg total) by mouth 2 (two) times daily. 180 capsule 3    estradiol (ESTRACE) 1 MG tablet Take 3 tablets (3 mg total) by mouth once daily. Take one 1mg pill daily along with the 2mg pill daily for total of 3mg daily 90 tablet 3    estradiol (ESTRACE) 2 MG tablet Take 1 tablet (2 mg total) by mouth once daily. Take one 2mg tab and one 1mg tab for total of 3 daily 90 tablet 3    flavoxATE (URISPAS) 100 mg Tab 100 mg 3 (three) times daily as needed.       loperamide (IMODIUM) 2 mg capsule Take 2 capsules (4 mg total) by mouth daily as needed. 180 capsule 3    mometasone (NASONEX) 50 mcg/actuation nasal spray       pentosan polysulfate (ELMIRON) 100 mg Cap Take 100 mg by mouth 3 (three) times daily.      simvastatin (ZOCOR) 40 MG tablet       SODIUM CHLORIDE 0.45 % IV Inject 1,000 mLs into the vein as needed.       sodium chloride 0.9% 0.9 % SolP 50 mL with promethazine 25 mg/mL Soln Inject 25 mg into the vein every 4 (four) hours. Thru IV      tiZANidine 4 mg Cap Take 4 mg by mouth once daily.      albuterol (PROVENTIL) 2.5 mg /3 mL (0.083 %) nebulizer solution       diphenhydrAMINE (BENADRYL) 50 MG tablet Take 50 mg by mouth every 4 (four) hours as needed for Itching.      nystatin (MYCOSTATIN) 100,000 unit/mL suspension nightly as needed.       PROAIR HFA 90 mcg/actuation inhaler every 6 (six) hours as needed.   0    [DISCONTINUED] diazePAM (VALIUM) 5 MG tablet Take once 1 hour prior to procedure.  May repeat x 1 after 30 min if needed. 2 tablet 0    [DISCONTINUED] nabumetone (RELAFEN) 500 MG tablet Take 1 tablet (500 mg total) by mouth 2 (two) times daily as needed for Pain. 30 tablet 3    [DISCONTINUED]  nortriptyline (PAMELOR) 25 MG capsule Take 1 capsule (25 mg total) by mouth every evening. 30 capsule 3    [DISCONTINUED] pregabalin (LYRICA) 25 MG capsule Take 1 capsule (25 mg total) by mouth 3 (three) times daily. 90 capsule 5     No facility-administered encounter medications on file as of 11/14/2018.          PHYSICAL EXAMINATION:    The patient generally appears in good health, is appropriately interactive, and is in no apparent distress.    Skin: No lesions.    Mental: Cooperative with normal affect.    Neuro: Grossly intact.    HEENT: Normal. No evidence of lymphadenopathy.    Chest:  normal inspiratory effort.    Abdomen:  Soft, non-tender. No masses or organomegaly. Bladder is not palpable. No evidence of flank discomfort. No evidence of inguinal hernia.    Extremities: No clubbing, cyanosis, or edema    Normal external female genitalia  Urethral meatus is normal  Urethra is normal.  Bladder is tender to bimanual exam  Well supported anteriorly and posteriorly   Uterus and cervix are surgically absent  No adnexal masses    LABS:    Lab Results   Component Value Date    BUN 21 (H) 12/06/2017    CREATININE 0.8 12/06/2017     UA 1.025, pH 5, otherwise, negative    IMPRESSION:    Encounter Diagnoses   Name Primary?    Interstitial cystitis Yes       PLAN:    1.  Discussed InterStim for urgency, frequency symptoms.  Brochure given (she had asked if there were any other treatments for IC.  2.  Consent signed for cystoscopy, hydrodistention, possible biopsy and fulguration  3.  Information from [facility] given on the AVS  4.  She states she has refills of the Elmiron and flavoxate for now.

## 2018-11-15 ENCOUNTER — TELEPHONE (OUTPATIENT)
Dept: UROLOGY | Facility: CLINIC | Age: 48
End: 2018-11-15

## 2018-11-15 ENCOUNTER — PATIENT MESSAGE (OUTPATIENT)
Dept: UROLOGY | Facility: CLINIC | Age: 48
End: 2018-11-15

## 2018-11-15 DIAGNOSIS — R30.0 DYSURIA: Primary | ICD-10-CM

## 2018-11-15 DIAGNOSIS — N30.10 INTERSTITIAL CYSTITIS: Primary | ICD-10-CM

## 2018-11-17 ENCOUNTER — PATIENT MESSAGE (OUTPATIENT)
Dept: SURGERY | Facility: HOSPITAL | Age: 48
End: 2018-11-17

## 2018-11-19 ENCOUNTER — PATIENT MESSAGE (OUTPATIENT)
Dept: SURGERY | Facility: HOSPITAL | Age: 48
End: 2018-11-19

## 2018-11-19 RX ORDER — FLAVOXATE HYDROCHLORIDE 100 MG/1
100 TABLET ORAL 3 TIMES DAILY PRN
Qty: 270 TABLET | Refills: 3 | Status: SHIPPED | OUTPATIENT
Start: 2018-11-19 | End: 2019-11-21 | Stop reason: SDUPTHER

## 2018-11-19 RX ORDER — URINARY ANTISEPTIC ANTISPASMODIC 81.6; 40.8; 10.8; .12 MG/1; MG/1; MG/1; MG/1
1 TABLET ORAL 4 TIMES DAILY PRN
Qty: 120 TABLET | Refills: 3 | Status: SHIPPED | OUTPATIENT
Start: 2018-11-19 | End: 2018-12-17

## 2018-11-24 ENCOUNTER — PATIENT MESSAGE (OUTPATIENT)
Dept: SURGERY | Facility: HOSPITAL | Age: 48
End: 2018-11-24

## 2018-11-24 ENCOUNTER — PATIENT MESSAGE (OUTPATIENT)
Dept: GASTROENTEROLOGY | Facility: CLINIC | Age: 48
End: 2018-11-24

## 2018-12-03 ENCOUNTER — ANESTHESIA EVENT (OUTPATIENT)
Dept: SURGERY | Facility: HOSPITAL | Age: 48
End: 2018-12-03
Payer: COMMERCIAL

## 2018-12-03 RX ORDER — PROMETHAZINE HYDROCHLORIDE 25 MG/ML
25 INJECTION, SOLUTION INTRAMUSCULAR; INTRAVENOUS EVERY 4 HOURS
COMMUNITY

## 2018-12-03 NOTE — PRE ADMISSION SCREENING
Anesthesia Assessment: Preoperative EQUATION    Planned Procedure: Procedure(s) (LRB):  CYSTOSCOPY, WITH BLADDER BIOPSY, WITH FULGURATION IF INDICATED (N/A)  CYSTOSCOPY, WITH BLADDER HYDRODISTENSION (N/A)  Requested Anesthesia Type:General  Surgeon: Christal Khan MD  Service: Urology  Known or anticipated Date of Surgery:12/11/2018  Optimization:  Anesthesia Preop Clinic Assessment  Indicated    Medical Opinion Indicated       Sub-specialist consult indicated:   TBD       Pre-habilitation suggested:   Physical Therapy  /  Nutrition Consult /  Outpatient Case Management Consult      Plan:    Testing:  BMP  Navigation: Tests Scheduled.                           Results will be tracked by Preop Clinic.                             No anesthesia preop clinic visit, or consult required.

## 2018-12-03 NOTE — PRE-PROCEDURE INSTRUCTIONS
Anesthesia review complete, medication instructions given NPO past midnight, Bathe with hibiclens or dial soap the night before & am of surgery. Put nothing on skin -  lotion, deodorant, powder  No metal or jewelry & no valuables pt verbalized understanding and all questions answered.    Letter sent to pt for required  BMP which is needed for surgery. Pt. Does not have a PCP @ present. She stated that she brings letter to hospital , they draw & send results to the # listed which is .

## 2018-12-03 NOTE — ANESTHESIA PREPROCEDURE EVALUATION
12/11/2018  Nan Betts is a 48 y.o., female.   Past Medical History:   Diagnosis Date    Abnormal Pap smear of vagina     Anxiety     Cataract     Chronic pain     TMJ and abdomen    Colon polyp     Epilepsy     as a child    Gastroparesis     Idiopathic    GERD (gastroesophageal reflux disease)     H/O abnormal cervical Papanicolaou smear     Hypercholesteremia 6/21/2016    Hyperlipidemia     IC (interstitial cystitis)     Internal hemorrhoids     Interstitial cystitis     Irritable bowel syndrome (IBS)     Irritable bowel syndrome with diarrhea 6/21/2016    Spastic colon     TMJ (temporomandibular joint disorder)     TMJ (temporomandibular joint syndrome) 6/21/2016     Past Surgical History:   Procedure Laterality Date    APPENDECTOMY      BREAST BIOPSY Bilateral     CATARACT EXTRACTION      CHOLECYSTECTOMY      COLONOSCOPY      COLONOSCOPY N/A 7/31/2018    Procedure: COLONOSCOPY;  Surgeon: Zack Lehman MD;  Location: [facility] ([address] [place] [address]);  Service: Endoscopy;  Laterality: N/A;    COLONOSCOPY N/A 7/31/2018    Performed by Zack Lehman MD at [facility] (4TH FLR)    CYSTOSCOPY WITH HYDRODISTENSION N/A 6/13/2016    Performed by Bethany John MD at [facility] OR 2ND FLR    EGD (ESOPHAGOGASTRODUODENOSCOPY) N/A 7/31/2018    Performed by Zack Lehman MD at [facility] (4TH FLR)    ESOPHAGOGASTRODUODENOSCOPY N/A 7/31/2018    Procedure: EGD (ESOPHAGOGASTRODUODENOSCOPY);  Surgeon: Zack Lehman MD;  Location: [facility] ([address] [place] [address]);  Service: Endoscopy;  Laterality: N/A;  RUQ gastric pacemaker    Left upper arm PICC line    PONV    ESOPHAGOGASTRODUODENOSCOPY (EGD) N/A 5/2/2017    Performed by Zack Lehman MD at [facility] (4TH FLR)    ESOPHAGOGASTRODUODENOSCOPY (EGD) N/A 11/15/2016    Performed by Zack Lehman MD at [facility] (4TH FLR)    ESOPHAGOGASTRODUODENOSCOPY (EGD) intraop  N/A 11/23/2016    [facility] OR 2ND FLR    ESOPHAGOGASTRODUODENOSCOPY (EGD) w/ poss. pyloric stenosis N/A 8/31/2017    Performed by Zack Lehman MD at [facility] END[facility] (4TH FLR)    Exchange of interstim battery N/A 5/15/2017    Performed by [facility] at [facility] OR 2ND FLR    EXCHANGE-STIMULATOR-GASTRIC N/A 5/15/2017    Performed by [facility] at [facility] OR 2ND FLR    gastric stimulator placement      INSERTION-INTERSTIM STAGE I LEAD IMPLANT N/A 11/23/2016    Performed by [facility] at [facility] OR 2ND FLR    INSERTION-INTERSTIM STAGE II GENERATOR IMPLANT N/A 11/23/2016    Performed by [facility] MD at [facility] OR 2ND FLR    LAPAROSCOPIC PLACEMENT GASTRIC NEUROSTIMULATOR N/A 11/23/2016    Performed by [facility] MD at [facility] OR 2ND [facility]    LAPAROSCOPY-DIAGNOSTIC N/A 3/30/2016    Performed by Alan Rivera MD at [facility] OR 2ND FLR    LASIK Bilateral 2006    MANDIBLE SURGERY Bilateral 10/17/2016    PYLOROPLASTY  03/2016    ROBOTIC ASSISTED LAPAROSCOPIC PYLOROPLASTY N/A 3/30/2016    Performed by [facility] at [facility] OR 2ND [facility]    SIGMOIDOSCOPY-FLEXIBLE N/A 6/22/2016    Performed by Zack Lehman MD at [facility] END[facility] (2ND FLR)    STOMACH SURGERY      gastric pacemaker    TEMPOROMANDIBULAR JOINT SURGERY  12/2016    TONSILLECTOMY      TOTAL ABDOMINAL HYSTERECTOMY  2005    EUGENIA/BSO, Trachelectomy 7 years ago    UPPER GASTROINTESTINAL ENDOSCOPY         Anesthesia Evaluation    I have reviewed the Patient Summary Reports.     I have reviewed the Medications.     Review of Systems  Anesthesia Hx:  Denies Hx of Anesthetic complications  Neg history of prior surgery. Denies Family Hx of Anesthesia complications.   Denies Personal Hx of Anesthesia complications.   Cardiovascular:   Exercise tolerance: good        Physical Exam  General:  Well nourished, Obesity    Airway/Jaw/Neck:  Airway Findings: Mouth Opening: Normal  Tongue: Normal  General Airway Assessment: Adult  Mallampati: II  TM Distance: Normal, at least 6 cm         Dental:  DENTAL FINDINGS: Normal   Chest/Lungs:  Chest/Lungs Clear    Heart/Vascular:  Heart Findings: Normal       Mental Status:  Mental Status Findings:  Alert and Oriented         Anesthesia Plan  Type of Anesthesia, risks & benefits discussed:  Anesthesia Type:  general  Patient's Preference:   Intra-op Monitoring Plan: standard ASA monitors  Intra-op Monitoring Plan Comments:   Post Op Pain Control Plan:   Post Op Pain Control Plan Comments:   Induction:   IV  Beta Blocker:  Patient is not currently on a Beta-Blocker (No further documentation required).       Informed Consent: Patient understands risks and agrees with Anesthesia plan.  Questions answered. Anesthesia consent signed with patient.  ASA Score: 3     Day of Surgery Review of History & Physical:    H&P update referred to the provider.         Ready For Surgery From Anesthesia Perspective.        Pham Manjarrez RN   Registered Nurse      Pre Admission Screening   Signed                      Anesthesia Assessment: Preoperative EQUATION     Planned Procedure: Procedure(s) (LRB):  CYSTOSCOPY, WITH BLADDER BIOPSY, WITH FULGURATION IF INDICATED (N/A)  CYSTOSCOPY, WITH BLADDER HYDRODISTENSION (N/A)  Requested Anesthesia Type:General  Surgeon: Christal Khan MD  Service: Urology  Known or anticipated Date of Surgery:12/11/2018  Optimization:  Anesthesia Preop Clinic Assessment  Indicated    Medical Opinion Indicated                            Sub-specialist consult indicated:   TBD                             Pre-habilitation suggested:   Physical Therapy  /  Nutrition Consult /  Outpatient Case Management Consult                Plan:    Testing:  BMP  Navigation: Tests Scheduled.                                                 Results will be tracked by Preop Clinic.                                                No anesthesia preop clinic visit,  or consult required.       LAB ORDER MAILED TO PT TO HAVE BMP DRAWN & SENT BACK TO ME                           Electronically signed by Pham Manjarrez RN at 12/3/2018 10:50 AM       12/7/18 BMP result obtained and scanned to media                                                                                                         12/03/2018  Nan Betts is a 48 y.o., female.    Anesthesia Evaluation         Review of Systems  Anesthesia Hx:  No problems with previous Anesthesia   Social:  Smoker, No Alcohol Use    Hematology/Oncology:  Hematology Normal   Oncology Normal     EENT/Dental:EENT/Dental Normal   Cardiovascular:   Exercise tolerance: good    Pulmonary:  Pulmonary Normal    Renal/:  Renal Symptoms/Infections/Stones:  Urinary Tract Infection (UTI), Cystitis   Hepatic/GI:   GERD GASTRIC PACEMAKER, PIC LINE Esophageal / Stomach Disorders Gerd, Gastric Conditions:, Gastroparesis    Musculoskeletal:   Arthritis     Neurological:   Seizures    Endocrine:  Endocrine Normal    Psych:   anxiety      VINAY MCFADDEN 12/3/18       Anesthesia Plan  Type of Anesthesia, risks & benefits discussed:  Anesthesia Type:  general, MAC  Patient's Preference:   Intra-op Monitoring Plan: standard ASA monitors  Intra-op Monitoring Plan Comments:   Post Op Pain Control Plan:   Post Op Pain Control Plan Comments:   Induction:   IV  Beta Blocker:  Patient is not currently on a Beta-Blocker (No further documentation required).       Informed Consent: Patient understands risks and agrees with Anesthesia plan.  Questions answered. Anesthesia consent signed with patient.  ASA Score: 3     Day of Surgery Review of History & Physical:    H&P update referred to the surgeon.         Ready For Surgery From Anesthesia Perspective.

## 2018-12-10 ENCOUNTER — TELEPHONE (OUTPATIENT)
Dept: UROLOGY | Facility: CLINIC | Age: 48
End: 2018-12-10

## 2018-12-11 ENCOUNTER — ANESTHESIA (OUTPATIENT)
Dept: SURGERY | Facility: HOSPITAL | Age: 48
End: 2018-12-11
Payer: COMMERCIAL

## 2018-12-11 ENCOUNTER — HOSPITAL ENCOUNTER (OUTPATIENT)
Facility: HOSPITAL | Age: 48
Discharge: HOME | End: 2018-12-11
Attending: UROLOGY | Admitting: UROLOGY
Payer: COMMERCIAL

## 2018-12-11 VITALS
DIASTOLIC BLOOD PRESSURE: 70 MMHG | WEIGHT: 230 LBS | BODY MASS INDEX: 34.86 KG/M2 | RESPIRATION RATE: 19 BRPM | SYSTOLIC BLOOD PRESSURE: 125 MMHG | HEIGHT: 68 IN | TEMPERATURE: 98 F | OXYGEN SATURATION: 99 % | HEART RATE: 80 BPM

## 2018-12-11 DIAGNOSIS — N30.10 INTERSTITIAL CYSTITIS: Primary | ICD-10-CM

## 2018-12-11 PROCEDURE — 71000015 HC POSTOP RECOV 1ST HR: Performed by: UROLOGY

## 2018-12-11 PROCEDURE — D9220A PRA ANESTHESIA: Mod: ANES,,, | Performed by: ANESTHESIOLOGY

## 2018-12-11 PROCEDURE — 37000009 HC ANESTHESIA EA ADD 15 MINS: Performed by: UROLOGY

## 2018-12-11 PROCEDURE — 25000003 PHARM REV CODE 250

## 2018-12-11 PROCEDURE — 27200651 HC AIRWAY, LMA: Performed by: NURSE ANESTHETIST, CERTIFIED REGISTERED

## 2018-12-11 PROCEDURE — 52260 CYSTOSCOPY AND TREATMENT: CPT | Mod: ,,, | Performed by: UROLOGY

## 2018-12-11 PROCEDURE — 37000008 HC ANESTHESIA 1ST 15 MINUTES: Performed by: UROLOGY

## 2018-12-11 PROCEDURE — 63600175 PHARM REV CODE 636 W HCPCS: Performed by: NURSE ANESTHETIST, CERTIFIED REGISTERED

## 2018-12-11 PROCEDURE — 63600175 PHARM REV CODE 636 W HCPCS: Performed by: ANESTHESIOLOGY

## 2018-12-11 PROCEDURE — 71000044 HC DOSC ROUTINE RECOVERY FIRST HOUR: Performed by: UROLOGY

## 2018-12-11 PROCEDURE — 63600175 PHARM REV CODE 636 W HCPCS

## 2018-12-11 PROCEDURE — 63600175 PHARM REV CODE 636 W HCPCS: Performed by: STUDENT IN AN ORGANIZED HEALTH CARE EDUCATION/TRAINING PROGRAM

## 2018-12-11 PROCEDURE — 25000003 PHARM REV CODE 250: Performed by: NURSE ANESTHETIST, CERTIFIED REGISTERED

## 2018-12-11 PROCEDURE — 36000707: Performed by: UROLOGY

## 2018-12-11 PROCEDURE — D9220A PRA ANESTHESIA: Mod: CRNA,,, | Performed by: NURSE ANESTHETIST, CERTIFIED REGISTERED

## 2018-12-11 PROCEDURE — 36000706: Performed by: UROLOGY

## 2018-12-11 PROCEDURE — 25000003 PHARM REV CODE 250: Performed by: UROLOGY

## 2018-12-11 RX ORDER — LIDOCAINE HYDROCHLORIDE 10 MG/ML
1 INJECTION, SOLUTION EPIDURAL; INFILTRATION; INTRACAUDAL; PERINEURAL ONCE
Status: DISCONTINUED | OUTPATIENT
Start: 2018-12-11 | End: 2018-12-11 | Stop reason: HOSPADM

## 2018-12-11 RX ORDER — HYOSCYAMINE SULFATE 0.12 MG/1
TABLET SUBLINGUAL
Status: COMPLETED
Start: 2018-12-11 | End: 2018-12-11

## 2018-12-11 RX ORDER — FENTANYL CITRATE 50 UG/ML
INJECTION, SOLUTION INTRAMUSCULAR; INTRAVENOUS
Status: COMPLETED
Start: 2018-12-11 | End: 2018-12-11

## 2018-12-11 RX ORDER — CEFAZOLIN SODIUM 1 G/3ML
2 INJECTION, POWDER, FOR SOLUTION INTRAMUSCULAR; INTRAVENOUS
Status: COMPLETED | OUTPATIENT
Start: 2018-12-11 | End: 2018-12-11

## 2018-12-11 RX ORDER — OXYCODONE HYDROCHLORIDE 5 MG/1
TABLET ORAL
Status: COMPLETED
Start: 2018-12-11 | End: 2018-12-11

## 2018-12-11 RX ORDER — LIDOCAINE HCL/PF 100 MG/5ML
SYRINGE (ML) INTRAVENOUS
Status: DISCONTINUED | OUTPATIENT
Start: 2018-12-11 | End: 2018-12-11

## 2018-12-11 RX ORDER — HYOSCYAMINE SULFATE 0.12 MG/1
0.12 TABLET SUBLINGUAL EVERY 4 HOURS PRN
Status: DISCONTINUED | OUTPATIENT
Start: 2018-12-11 | End: 2018-12-11 | Stop reason: HOSPADM

## 2018-12-11 RX ORDER — MIDAZOLAM HYDROCHLORIDE 1 MG/ML
INJECTION, SOLUTION INTRAMUSCULAR; INTRAVENOUS
Status: DISCONTINUED | OUTPATIENT
Start: 2018-12-11 | End: 2018-12-11

## 2018-12-11 RX ORDER — OXYCODONE AND ACETAMINOPHEN 5; 325 MG/1; MG/1
1 TABLET ORAL EVERY 6 HOURS PRN
Qty: 11 TABLET | Refills: 0 | Status: SHIPPED | OUTPATIENT
Start: 2018-12-11 | End: 2018-12-17 | Stop reason: ALTCHOICE

## 2018-12-11 RX ORDER — FENTANYL CITRATE 50 UG/ML
INJECTION, SOLUTION INTRAMUSCULAR; INTRAVENOUS
Status: DISCONTINUED | OUTPATIENT
Start: 2018-12-11 | End: 2018-12-11

## 2018-12-11 RX ORDER — OXYCODONE HYDROCHLORIDE 5 MG/1
5 TABLET ORAL
Status: DISCONTINUED | OUTPATIENT
Start: 2018-12-11 | End: 2018-12-11 | Stop reason: HOSPADM

## 2018-12-11 RX ORDER — SODIUM CHLORIDE 9 MG/ML
INJECTION, SOLUTION INTRAVENOUS CONTINUOUS PRN
Status: DISCONTINUED | OUTPATIENT
Start: 2018-12-11 | End: 2018-12-11

## 2018-12-11 RX ORDER — PROPOFOL 10 MG/ML
VIAL (ML) INTRAVENOUS
Status: DISCONTINUED | OUTPATIENT
Start: 2018-12-11 | End: 2018-12-11

## 2018-12-11 RX ORDER — FENTANYL CITRATE 50 UG/ML
25 INJECTION, SOLUTION INTRAMUSCULAR; INTRAVENOUS EVERY 5 MIN PRN
Status: COMPLETED | OUTPATIENT
Start: 2018-12-11 | End: 2018-12-11

## 2018-12-11 RX ORDER — DEXAMETHASONE SODIUM PHOSPHATE 4 MG/ML
INJECTION, SOLUTION INTRA-ARTICULAR; INTRALESIONAL; INTRAMUSCULAR; INTRAVENOUS; SOFT TISSUE
Status: DISCONTINUED | OUTPATIENT
Start: 2018-12-11 | End: 2018-12-11

## 2018-12-11 RX ORDER — ONDANSETRON 2 MG/ML
INJECTION INTRAMUSCULAR; INTRAVENOUS
Status: DISCONTINUED | OUTPATIENT
Start: 2018-12-11 | End: 2018-12-11

## 2018-12-11 RX ORDER — LIDOCAINE HYDROCHLORIDE 20 MG/ML
JELLY TOPICAL
Status: DISCONTINUED | OUTPATIENT
Start: 2018-12-11 | End: 2018-12-11 | Stop reason: HOSPADM

## 2018-12-11 RX ADMIN — HYOSCYAMINE SULFATE 0.12 MG: 0.12 TABLET ORAL; SUBLINGUAL at 12:12

## 2018-12-11 RX ADMIN — DEXAMETHASONE SODIUM PHOSPHATE 8 MG: 4 INJECTION, SOLUTION INTRAMUSCULAR; INTRAVENOUS at 10:12

## 2018-12-11 RX ADMIN — SODIUM CHLORIDE: 0.9 INJECTION, SOLUTION INTRAVENOUS at 10:12

## 2018-12-11 RX ADMIN — ONDANSETRON 4 MG: 2 INJECTION INTRAMUSCULAR; INTRAVENOUS at 10:12

## 2018-12-11 RX ADMIN — OXYCODONE HYDROCHLORIDE 5 MG: 5 TABLET ORAL at 11:12

## 2018-12-11 RX ADMIN — FENTANYL CITRATE 25 MCG: 50 INJECTION INTRAMUSCULAR; INTRAVENOUS at 11:12

## 2018-12-11 RX ADMIN — FENTANYL CITRATE 25 MCG: 50 INJECTION INTRAMUSCULAR; INTRAVENOUS at 12:12

## 2018-12-11 RX ADMIN — CEFAZOLIN 2 G: 330 INJECTION, POWDER, FOR SOLUTION INTRAMUSCULAR; INTRAVENOUS at 10:12

## 2018-12-11 RX ADMIN — HYOSCYAMINE SULFATE 0.12 MG: 0.12 TABLET SUBLINGUAL at 12:12

## 2018-12-11 RX ADMIN — FENTANYL CITRATE 100 MCG: 50 INJECTION, SOLUTION INTRAMUSCULAR; INTRAVENOUS at 10:12

## 2018-12-11 RX ADMIN — PROPOFOL 150 MG: 10 INJECTION, EMULSION INTRAVENOUS at 10:12

## 2018-12-11 RX ADMIN — MIDAZOLAM HYDROCHLORIDE 2 MG: 1 INJECTION, SOLUTION INTRAMUSCULAR; INTRAVENOUS at 10:12

## 2018-12-11 RX ADMIN — LIDOCAINE HYDROCHLORIDE 100 MG: 20 INJECTION, SOLUTION INTRAVENOUS at 10:12

## 2018-12-11 NOTE — OP NOTE
[facility]  Operative Note    Date: 12/11/2018    Pre-Op Diagnosis: interstitial cystitis    Patient Active Problem List    Diagnosis Date Noted    Hypercholesteremia 06/21/2016    Irritable bowel syndrome with diarrhea 06/21/2016    Left lower quadrant pain 06/21/2016    Diarrhea 06/21/2016    Intractable nausea and vomiting 06/20/2016    Urge urinary incontinence 06/11/2016    Interstitial cystitis 06/11/2016    Anxiety 06/11/2016    Vaginal atrophy 06/11/2016    Vaginal pain 06/11/2016    Dyspareunia 06/11/2016    Gastroesophageal reflux disease without esophagitis 03/10/2016    Gastroparesis 03/10/2016       Post-Op Diagnosis: same    Procedure(s) Performed:   1.  Cystoscopy with hydrodistension    Specimen(s): None      Staff Surgeon: Christal Khan MD    Assistant Surgeon: Darren Rojas MD; Laz Boswell MD    Anesthesia: General LMA anesthesia    Indications: Nan Betts is a 48 y.o. female with interstitial cystitis presenting for hydrodistension.    Findings:   -Bladder capacity was 900 cc  -Formal cystoscopy revealed no masses or lesions suspicious for malignancy, no trabeculations, no bladder stones and no bladder diverticula.   -Squamous metaplasia was seen throughout the trigone.  -There was terminal hematuria seen. There were no ulcerations seen. There were diffuse glomerulations seen more concentrated around the trigone.    Estimated Blood Loss: min    Drains: None    Procedure in detail: After risks, benefits and possible complications of hydro distention were discussed with the patient informed consent was obtained. All questions were answered in the pre-operative area.  The patient was transferred to the cystoscopy suite and placed in the supine position.  SCDs were applied and working.  Anesthesia was administered.  After adequate anesthesia the patient was placed in the dorsal lithotomy position and prepped and draped in the usual sterile fashion. Time out was  preformed and suhail-procedural antibiotics were confirmed.    A rigid cystoscope in a 22 Fr sheath was introduced into the bladder per urethra. This passed easily. The entire urethra was visualized which showed no masses or strictures. The right and left ureteral orifices were identified in the normal anatomic position. Formal cystoscopy was performed, which revealed no masses or lesions suspicious for malignancy, no trabeculations, no bladder stones and no bladder diverticula. Squamous metaplasia was seen throughout the trigone.    The bladder was then filled with sterile water until equilibrium was reached at 80 cm of water. The capacity of the bladder was 900 cc. This was held for 8 minutes.There was terminal hematuria seen. There were no ulcerations seen. There were diffuse glomerulations seen more concentrated around the trigone.    The bladder was drained and the cystoscope removed.      The patient was removed from lithotomy and transferred to recovery in stable condition.    Disposition:  The patient will follow up with Dr. Khan in 2 weeks.  Prescriptions were given for percocet.    Darren Rojas MD    I was present for the pertinent portion of the case and agree with the above note.

## 2018-12-11 NOTE — INTERVAL H&P NOTE
The patient has been examined and the H&P has been reviewed:    I concur with the findings and no changes have occurred since H&P was written.     Urine negative for blood, nitrites, and leukocytes. To OR for hydrodistension    Anesthesia/Surgery risks, benefits and alternative options discussed and understood by patient/family.          Active Hospital Problems    Diagnosis  POA    Interstitial cystitis [N30.10]  Yes      Resolved Hospital Problems   No resolved problems to display.     Patient seen in holding.  No changes in clinical condition.  Proceed with planned procedure.

## 2018-12-11 NOTE — PLAN OF CARE
Pt is AAOx4. VSS. NAD. Home PICC line saline locked. Discharge instructions given, verbalized understanding. States pain tolerable. Denies nausea. Voiding without difficulty. Discharged home with family.

## 2018-12-11 NOTE — DISCHARGE SUMMARY
[facility]  Discharge Note  Short Stay    Admit Date: 12/11/2018    Discharge Date and Time: 12/11/2018 11:01 AM      Attending Physician: Christal Khan MD     Discharge Provider: Darren Rojas    Diagnoses:  Active Hospital Problems    Diagnosis  POA    Interstitial cystitis [N30.10]  Yes      Resolved Hospital Problems   No resolved problems to display.       Patient Active Problem List    Diagnosis Date Noted    Hypercholesteremia 06/21/2016    Irritable bowel syndrome with diarrhea 06/21/2016    Left lower quadrant pain 06/21/2016    Diarrhea 06/21/2016    Intractable nausea and vomiting 06/20/2016    Urge urinary incontinence 06/11/2016    Interstitial cystitis 06/11/2016    Anxiety 06/11/2016    Vaginal atrophy 06/11/2016    Vaginal pain 06/11/2016    Dyspareunia 06/11/2016    Gastroesophageal reflux disease without esophagitis 03/10/2016    Gastroparesis 03/10/2016       Discharged Condition: good    Hospital Course: Patient was admitted for cystoscopy with hydrodistension and tolerated the procedure well with no complications. The patient was discharged home in good condition on the same day.       Final Diagnoses: Same as principal problem.    Disposition: Home or Self Care    Follow up/Patient Instructions:    Medications:  Reconciled Home Medications:   Current Discharge Medication List      START taking these medications    Details   oxyCODONE-acetaminophen (PERCOCET) 5-325 mg per tablet Take 1 tablet by mouth every 6 (six) hours as needed.  Qty: 11 tablet, Refills: 0         CONTINUE these medications which have NOT CHANGED    Details   carisoprodol (SOMA) 350 MG tablet Take 350 mg by mouth 3 (three) times daily as needed for Muscle spasms.      dexlansoprazole (DEXILANT) 60 mg capsule Take 1 capsule (60 mg total) by mouth 2 (two) times daily.  Qty: 180 capsule, Refills: 3      diphenhydrAMINE (BENADRYL) 50 MG tablet Take 50 mg by mouth every 4 (four) hours as needed for  Itching.      !! estradiol (ESTRACE) 1 MG tablet Take 3 tablets (3 mg total) by mouth once daily. Take one 1mg pill daily along with the 2mg pill daily for total of 3mg daily  Qty: 90 tablet, Refills: 3      !! estradiol (ESTRACE) 2 MG tablet Take 1 tablet (2 mg total) by mouth once daily. Take one 2mg tab and one 1mg tab for total of 3 daily  Qty: 90 tablet, Refills: 3      flavoxATE (URISPAS) 100 mg Tab Take 1 tablet (100 mg total) by mouth 3 (three) times daily as needed.  Qty: 270 tablet, Refills: 3      loperamide (IMODIUM) 2 mg capsule Take 2 capsules (4 mg total) by mouth daily as needed.  Qty: 180 capsule, Refills: 3    Associated Diagnoses: Chronic diarrhea      mometasone (NASONEX) 50 mcg/actuation nasal spray 2 sprays by Nasal route daily as needed.       pentosan polysulfate (ELMIRON) 100 mg Cap Take 100 mg by mouth 3 (three) times daily.      promethazine (PHENERGAN) 25 mg/mL injection Inject 25 mg into the vein every 4 (four) hours.      simvastatin (ZOCOR) 40 MG tablet Take 40 mg by mouth every evening.       SODIUM CHLORIDE 0.45 % IV Inject 1,000 mLs into the vein as needed.       sodium chloride 0.9% 0.9 % SolP 50 mL with promethazine 25 mg/mL Soln Inject 25 mg into the vein every 4 (four) hours. Thru IV      tiZANidine 4 mg Cap Take 4 mg by mouth every evening.       methen-sod phos-meth blue-hyos (UROGESIC-BLUE/URYL) 81.6-40.8-0.12 mg Tab Take 1 tablet by mouth 4 (four) times daily as needed.  Qty: 120 tablet, Refills: 3    Associated Diagnoses: Dysuria      nystatin (MYCOSTATIN) 100,000 unit/mL suspension nightly as needed.        !! - Potential duplicate medications found. Please discuss with provider.        Discharge Procedure Orders   Diet Adult Regular     No driving until:   Order Comments: Off narcotics     Notify your health care provider if you experience any of the following:  temperature >100.4     Notify your health care provider if you experience any of the following:  persistent  nausea and vomiting or diarrhea     Notify your health care provider if you experience any of the following:  severe uncontrolled pain     Notify your health care provider if you experience any of the following:  redness, tenderness, or signs of infection (pain, swelling, redness, odor or green/yellow discharge around incision site)     Notify your health care provider if you experience any of the following:  difficulty breathing or increased cough     Notify your health care provider if you experience any of the following:  severe persistent headache     Notify your health care provider if you experience any of the following:  worsening rash     Notify your health care provider if you experience any of the following:  persistent dizziness, light-headedness, or visual disturbances     Notify your health care provider if you experience any of the following:  increased confusion or weakness     Activity as tolerated     Follow-up Information     Christal Khan MD On 1/3/2019.    Specialty:  Urology  Why:  Post-op follow-up  Contact information:  [address] [facility] [address]  [phone]               As above.

## 2018-12-12 NOTE — ANESTHESIA POSTPROCEDURE EVALUATION
"Anesthesia Post Evaluation    Patient: Nan Betts    Procedure(s) Performed: Procedure(s) (LRB):  CYSTOSCOPY, WITH BLADDER HYDRODISTENSION (N/A)    Final Anesthesia Type: general  Patient location during evaluation: PACU  Patient participation: Yes- Able to Participate  Level of consciousness: awake and alert and oriented  Post-procedure vital signs: reviewed and stable  Pain management: adequate  Airway patency: patent  PONV status at discharge: No PONV  Anesthetic complications: no      Cardiovascular status: stable  Respiratory status: unassisted  Hydration status: euvolemic  Follow-up not needed.        Visit Vitals  /70   Pulse 80   Temp 36.7 °C (98.1 °F) (Temporal)   Resp 19   Ht 5' 8" (1.727 m)   Wt 104.3 kg (230 lb)   SpO2 99%   Breastfeeding? No   BMI 34.97 kg/m²       Pain/Chelsy Score: Pain Rating Prior to Med Admin: 8 (12/11/2018 12:04 PM)  Pain Rating Post Med Admin: 4 (12/11/2018 12:25 PM)        "

## 2018-12-17 ENCOUNTER — OFFICE VISIT (OUTPATIENT)
Dept: GASTROENTEROLOGY | Facility: CLINIC | Age: 48
End: 2018-12-17
Payer: COMMERCIAL

## 2018-12-17 ENCOUNTER — LAB VISIT (OUTPATIENT)
Dept: LAB | Facility: HOSPITAL | Age: 48
End: 2018-12-17
Attending: INTERNAL MEDICINE
Payer: COMMERCIAL

## 2018-12-17 ENCOUNTER — PATIENT MESSAGE (OUTPATIENT)
Dept: UROLOGY | Facility: CLINIC | Age: 48
End: 2018-12-17

## 2018-12-17 ENCOUNTER — OFFICE VISIT (OUTPATIENT)
Dept: UROLOGY | Facility: CLINIC | Age: 48
End: 2018-12-17
Payer: COMMERCIAL

## 2018-12-17 VITALS
DIASTOLIC BLOOD PRESSURE: 93 MMHG | SYSTOLIC BLOOD PRESSURE: 150 MMHG | HEART RATE: 86 BPM | HEIGHT: 68 IN | WEIGHT: 230.63 LBS | BODY MASS INDEX: 34.95 KG/M2

## 2018-12-17 VITALS
HEART RATE: 92 BPM | WEIGHT: 230.19 LBS | HEIGHT: 68 IN | SYSTOLIC BLOOD PRESSURE: 119 MMHG | DIASTOLIC BLOOD PRESSURE: 84 MMHG | BODY MASS INDEX: 34.89 KG/M2

## 2018-12-17 DIAGNOSIS — R10.2 PELVIC PAIN IN FEMALE: ICD-10-CM

## 2018-12-17 DIAGNOSIS — K31.84 GASTROPARESIS: ICD-10-CM

## 2018-12-17 DIAGNOSIS — K52.9 CHRONIC DIARRHEA: ICD-10-CM

## 2018-12-17 DIAGNOSIS — N30.10 INTERSTITIAL CYSTITIS: Primary | ICD-10-CM

## 2018-12-17 DIAGNOSIS — K58.0 IRRITABLE BOWEL SYNDROME WITH DIARRHEA: ICD-10-CM

## 2018-12-17 DIAGNOSIS — K31.84 GASTROPARESIS: Primary | ICD-10-CM

## 2018-12-17 DIAGNOSIS — K21.9 GASTROESOPHAGEAL REFLUX DISEASE WITHOUT ESOPHAGITIS: ICD-10-CM

## 2018-12-17 LAB
ALBUMIN SERPL BCP-MCNC: 3.9 G/DL
ALP SERPL-CCNC: 116 U/L
ALT SERPL W/O P-5'-P-CCNC: 16 U/L
ANION GAP SERPL CALC-SCNC: 9 MMOL/L
AST SERPL-CCNC: 18 U/L
BASOPHILS # BLD AUTO: 0.03 K/UL
BASOPHILS NFR BLD: 0.3 %
BILIRUB SERPL-MCNC: 0.2 MG/DL
BUN SERPL-MCNC: 15 MG/DL
CALCIUM SERPL-MCNC: 9.7 MG/DL
CHLORIDE SERPL-SCNC: 102 MMOL/L
CO2 SERPL-SCNC: 27 MMOL/L
CREAT SERPL-MCNC: 0.8 MG/DL
DIFFERENTIAL METHOD: NORMAL
EOSINOPHIL # BLD AUTO: 0.2 K/UL
EOSINOPHIL NFR BLD: 1.8 %
ERYTHROCYTE [DISTWIDTH] IN BLOOD BY AUTOMATED COUNT: 12.2 %
EST. GFR  (AFRICAN AMERICAN): >60 ML/MIN/1.73 M^2
EST. GFR  (NON AFRICAN AMERICAN): >60 ML/MIN/1.73 M^2
GLUCOSE SERPL-MCNC: 96 MG/DL
HCT VFR BLD AUTO: 44.4 %
HGB BLD-MCNC: 14.9 G/DL
IMM GRANULOCYTES # BLD AUTO: 0.03 K/UL
IMM GRANULOCYTES NFR BLD AUTO: 0.3 %
LYMPHOCYTES # BLD AUTO: 2.8 K/UL
LYMPHOCYTES NFR BLD: 25.8 %
MCH RBC QN AUTO: 30.2 PG
MCHC RBC AUTO-ENTMCNC: 33.6 G/DL
MCV RBC AUTO: 90 FL
MONOCYTES # BLD AUTO: 0.6 K/UL
MONOCYTES NFR BLD: 5.1 %
NEUTROPHILS # BLD AUTO: 7.3 K/UL
NEUTROPHILS NFR BLD: 66.7 %
NRBC BLD-RTO: 0 /100 WBC
PLATELET # BLD AUTO: 331 K/UL
PMV BLD AUTO: 9.5 FL
POTASSIUM SERPL-SCNC: 4.1 MMOL/L
PROT SERPL-MCNC: 7.7 G/DL
RBC # BLD AUTO: 4.93 M/UL
SODIUM SERPL-SCNC: 138 MMOL/L
WBC # BLD AUTO: 10.86 K/UL

## 2018-12-17 PROCEDURE — 99215 OFFICE O/P EST HI 40 MIN: CPT | Mod: S$GLB,,, | Performed by: INTERNAL MEDICINE

## 2018-12-17 PROCEDURE — 36415 COLL VENOUS BLD VENIPUNCTURE: CPT

## 2018-12-17 PROCEDURE — 87086 URINE CULTURE/COLONY COUNT: CPT

## 2018-12-17 PROCEDURE — 80053 COMPREHEN METABOLIC PANEL: CPT

## 2018-12-17 PROCEDURE — 85025 COMPLETE CBC W/AUTO DIFF WBC: CPT

## 2018-12-17 PROCEDURE — 99999 PR PBB SHADOW E&M-EST. PATIENT-LVL V: CPT | Mod: PBBFAC,,, | Performed by: UROLOGY

## 2018-12-17 PROCEDURE — 99999 PR PBB SHADOW E&M-EST. PATIENT-LVL III: CPT | Mod: PBBFAC,,, | Performed by: INTERNAL MEDICINE

## 2018-12-17 PROCEDURE — 99024 POSTOP FOLLOW-UP VISIT: CPT | Mod: S$GLB,,, | Performed by: UROLOGY

## 2018-12-17 PROCEDURE — 3008F BODY MASS INDEX DOCD: CPT | Mod: CPTII,S$GLB,, | Performed by: INTERNAL MEDICINE

## 2018-12-17 RX ORDER — URINARY ANTISEPTIC ANTISPASMODIC 81.6; 40.8; 10.8; .12 MG/1; MG/1; MG/1; MG/1
1 TABLET ORAL EVERY 6 HOURS PRN
Qty: 120 TABLET | Refills: 0 | Status: SHIPPED | OUTPATIENT
Start: 2018-12-17 | End: 2020-07-30 | Stop reason: SDUPTHER

## 2018-12-17 RX ORDER — URINARY ANTISEPTIC ANTISPASMODIC 81.6; 40.8; 10.8; .12 MG/1; MG/1; MG/1; MG/1
1 TABLET ORAL EVERY 6 HOURS PRN
Qty: 360 TABLET | Refills: 3 | Status: SHIPPED | OUTPATIENT
Start: 2018-12-17 | End: 2020-07-30 | Stop reason: SDUPTHER

## 2018-12-17 RX ORDER — LOPERAMIDE HYDROCHLORIDE 2 MG/1
4 CAPSULE ORAL 2 TIMES DAILY PRN
Qty: 270 CAPSULE | Refills: 3 | Status: SHIPPED | OUTPATIENT
Start: 2018-12-17 | End: 2019-10-18 | Stop reason: SDUPTHER

## 2018-12-17 RX ORDER — BUPIVACAINE HYDROCHLORIDE 5 MG/ML
20 INJECTION, SOLUTION EPIDURAL; INTRACAUDAL
Status: COMPLETED | OUTPATIENT
Start: 2018-12-17 | End: 2018-12-17

## 2018-12-17 RX ORDER — HYDROCODONE BITARTRATE AND ACETAMINOPHEN 7.5; 325 MG/1; MG/1
1 TABLET ORAL EVERY 6 HOURS PRN
Qty: 28 TABLET | Refills: 0 | Status: SHIPPED | OUTPATIENT
Start: 2018-12-17 | End: 2019-07-02

## 2018-12-17 RX ORDER — LIDOCAINE HYDROCHLORIDE 10 MG/ML
20 INJECTION INFILTRATION; PERINEURAL
Status: COMPLETED | OUTPATIENT
Start: 2018-12-17 | End: 2018-12-17

## 2018-12-17 RX ADMIN — BUPIVACAINE HYDROCHLORIDE 100 MG: 5 INJECTION, SOLUTION EPIDURAL; INTRACAUDAL at 01:12

## 2018-12-17 RX ADMIN — LIDOCAINE HYDROCHLORIDE 20 ML: 10 INJECTION INFILTRATION; PERINEURAL at 01:12

## 2018-12-17 NOTE — PROGRESS NOTES
Subjective:       Patient ID: Nan Betts is a 48 y.o. female.    Chief Complaint: Follow-up    HPI  Review of Systems    Objective:      Physical Exam   Abdominal: Soft. Normal appearance and bowel sounds are normal. She exhibits no shifting dullness, no distension, no fluid wave, no abdominal bruit and no mass. There is no hepatosplenomegaly. There is generalized tenderness.       Assessment:       1. Gastroparesis    2. Gastroesophageal reflux disease without esophagitis    3. Irritable bowel syndrome with diarrhea    4. Chronic diarrhea        Plan:         HISTORY OF PRESENT ILLNESS:  This is a followup visit for Nan.  A 48-year-old   lady with gastroparesis who was originally referred to me as a second opinion   several years ago.  She is extremely complicated case with multiple   interventions.  For the gastroparesis, she is not on the gastric stimulator, but   also laparoscopic pyloromyotomy.  Her other problems include reflux and IBS.    Last scopes were this past summer, which the EGD was essentially normal.  The   pylorus looked appropriate for the surgery that she had.  Colonoscopy was normal   as well.    All of her symptoms continue in various degrees as they have in the past.  She   has had some recent stress recently and feels like reflux has gotten worse.  She   is on Dexilant for that.  The Magic Mouthwash also seems to help that.  She   feels that nausea increased this past week, especially after having a recent   cystoscopy for interstitial cystitis.  She complains of abdominal pain.  She   complains of generalized bloating of the abdomen.  She has chronic diarrhea.    She will have multiple loose stools and take loperamide.  If she takes two pills   and that usually slows things down and then she may go a day without a bowel   movement for the diarrhea to return later on.  Stool is typically liquid to   poorly formed.  She is on IV Phenergan.    SOCIAL HISTORY:  She has homecare.  She gets  IV fluids almost daily at home.    She also received IV Phenergan.    PAST MEDICATION TRIALS FOR IBS:  1.  Creon, no help.  2.  Questran, no help.    ASSESSMENT AND DECISION MAKIN.  Gastroparesis.  I do not have any other intervention that we can do at this   time.  The symptoms are controlled by eating small meals in general.  She does   take the Phenergan on a chronic basis.  2.  Gastroesophageal reflux.  She is not having esophagitis.  The Dexilant keeps   it under control.  The reflux is worse because of the gastroparesis.  3.  IBS.  This is IBS with diarrhea.  This is a chronic condition.  We have had   C. diff ruled out.  I have had microscopic colitis ruled out.  I think it is   possible there could be a component of SIBO, so I recommended rifaximin.    Otherwise, I refilled her loperamide to help with the symptoms.    RECOMMENDATIONS:  1.  Labs.  2.  Rifaximin trial.  3.  Loperamide.  4.  I interrogated the gastric stimulator and that settings were appropriate and   the battery was adequate as well.    A 40-minute appointment, greater than half time face-to-face counseling.      RANDA  dd: 2018 11:02:37 (CST)  td: 2018 01:53:37 (CST)  Doc ID   #[id number]  Job ID #[id number]    CC:

## 2018-12-17 NOTE — PROGRESS NOTES
CHIEF COMPLAINT:    Mrs. Betts is a 48 y.o. female presenting for an urgent appointment for post op pain.    PRESENTING ILLNESS:    Nan Betts is a 48 y.o. female who states that the bleeding she had after her hydrodistension was worse than she had before. The bleeding lasted a day and a half.  She states she has tried sitting in a tub of warm water, using a heating pad, taking the urogesic blue.  She also states that her insurance company will pay for the brand name and not the generic so this was written for.  '    After the instillation, she still had a fair amount of pain.  So Vicodin was written for, 7 day supply only.      REVIEW OF SYSTEMS:    Review of Systems   Constitutional: Negative.    HENT: Negative.    Eyes: Negative.    Respiratory: Negative.    Cardiovascular: Negative.    Gastrointestinal: Negative.    Genitourinary: Positive for dysuria.        Pelvic pressure   Musculoskeletal: Positive for myalgias.   Skin: Negative.    Neurological: Negative.    Endo/Heme/Allergies: Negative.    Psychiatric/Behavioral: Negative.        PATIENT HISTORY:    Past Medical History:   Diagnosis Date    Abnormal Pap smear of vagina     Anxiety     Cataract     Chronic pain     TMJ and abdomen    Colon polyp     Epilepsy     as a child    Gastroparesis     Idiopathic    GERD (gastroesophageal reflux disease)     H/O abnormal cervical Papanicolaou smear     Hypercholesteremia 6/21/2016    Hyperlipidemia     IC (interstitial cystitis)     Internal hemorrhoids     Interstitial cystitis     Irritable bowel syndrome (IBS)     Irritable bowel syndrome with diarrhea 6/21/2016    Spastic colon     TMJ (temporomandibular joint disorder)     TMJ (temporomandibular joint syndrome) 6/21/2016       Past Surgical History:   Procedure Laterality Date    APPENDECTOMY      BREAST BIOPSY Bilateral     CATARACT EXTRACTION      CHOLECYSTECTOMY      COLONOSCOPY      COLONOSCOPY N/A 7/31/2018    Procedure:  COLONOSCOPY;  Surgeon: Zack Lehman MD;  Location: [facility] (4TH FLR);  Service: Endoscopy;  Laterality: N/A;    COLONOSCOPY N/A 7/31/2018    Performed by Zack Lehman MD at [facility] (4TH FLR)    CYSTOSCOPY WITH HYDRODISTENSION N/A 6/13/2016    Performed by Bethany John MD at [facility] OR 2ND FLR    CYSTOSCOPY, WITH BLADDER HYDRODISTENSION N/A 12/11/2018    Performed by Christal Khan MD at [facility] OR 1ST FLR    EGD (ESOPHAGOGASTRODUODENOSCOPY) N/A 7/31/2018    Performed by Zack Lehman MD at [facility] ENDO (4TH FLR)    ESOPHAGOGASTRODUODENOSCOPY N/A 7/31/2018    Procedure: EGD (ESOPHAGOGASTRODUODENOSCOPY);  Surgeon: Zack Lehman MD;  Location: [facility] (4TH FLR);  Service: Endoscopy;  Laterality: N/A;  RUQ gastric pacemaker    Left upper arm PICC line    PONV    ESOPHAGOGASTRODUODENOSCOPY (EGD) N/A 5/2/2017    Performed by Zack Lehman MD at [facility] ENDO (4TH FLR)    ESOPHAGOGASTRODUODENOSCOPY (EGD) N/A 11/15/2016    Performed by Zack Lehman MD at [facility] (4TH FLR)    ESOPHAGOGASTRODUODENOSCOPY (EGD) intraop N/A 11/23/2016    Performed by David Celeste MD at [facility] OR 2ND FLR    ESOPHAGOGASTRODUODENOSCOPY (EGD) w/ poss. pyloric stenosis N/A 8/31/2017    Performed by Zack Lehman MD at [facility] (4TH FLR)    Exchange of interstim battery N/A 5/15/2017    Performed by David Celeste MD at [facility] OR 2[facility]    EXCHANGE-STIMULATOR-GASTRIC N/A 5/15/2017    Performed by David Celeste MD at [facility] OR [address] [place] [address]    gastric stimulator placement      INSERTION-INTERSTIM STAGE I LEAD IMPLANT N/A 11/23/2016    Performed by David Celeste MD at [facility] OR 2ND FLR    INSERTION-INTERSTIM STAGE II GENERATOR IMPLANT N/A 11/23/2016    [facility] at [facility]    LAPAROSCOPIC PLACEMENT GASTRIC NEUROSTIMULATOR N/A 11/23/2016    Performed by David Celeste MD at [facility] OR 2ND FLR    LAPAROSCOPY-DIAGNOSTIC N/A 3/30/2016    Performed by Alan Rivera,  MD at [facility] OR 2ND FLR    LASIK Bilateral 2006    MANDIBLE SURGERY Bilateral 10/17/2016    PYLOROPLASTY  03/2016    ROBOTIC ASSISTED LAPAROSCOPIC PYLOROPLASTY N/A 3/30/2016    Performed by David Celeste MD at [facility] OR 2ND FLR    SIGMOIDOSCOPY-FLEXIBLE N/A 6/22/2016    Performed by Zack Lehman MD at [facility] ENDO (2ND FLR)    STOMACH SURGERY      gastric pacemaker    TEMPOROMANDIBULAR JOINT SURGERY  12/2016    TONSILLECTOMY      TOTAL ABDOMINAL HYSTERECTOMY  2005    EUGENIA/BSO, Trachelectomy 7 years ago    UPPER GASTROINTESTINAL ENDOSCOPY         Family History   Problem Relation Age of Onset    Coronary artery disease Mother     Hodgkin's lymphoma Mother     Coronary artery disease Father     Lymphoma Sister     Coronary artery disease Paternal Grandfather     Glaucoma Maternal Grandmother     Pancreatic cancer Paternal Aunt      Socioeconomic History    Marital status:    Tobacco Use    Smoking status: Current Every Day Smoker     Packs/day: 1.00     Years: 20.00     Pack years: 20.00    Smokeless tobacco: Never Used   Substance and Sexual Activity    Alcohol use: No     Alcohol/week: 0.0 oz    Drug use: No    Sexual activity: Yes     Partners: Male       Allergies:  Morphine; Ultram [tramadol]; Codeine; Ibuprofen; Nsaids (non-steroidal anti-inflammatory drug); Reglan [metoclopramide hcl]; and Topamax [topiramate]    Medications:  Outpatient Encounter Medications as of 12/17/2018   Medication Sig Dispense Refill    carisoprodol (SOMA) 350 MG tablet Take 350 mg by mouth 3 (three) times daily as needed for Muscle spasms.      dexlansoprazole (DEXILANT) 60 mg capsule Take 1 capsule (60 mg total) by mouth 2 (two) times daily. 180 capsule 3    diphenhydrAMINE (BENADRYL) 50 MG tablet Take 50 mg by mouth every 4 (four) hours as needed for Itching.      estradiol (ESTRACE) 1 MG tablet Take 3 tablets (3 mg total) by mouth once daily. Take one 1mg pill daily along with the 2mg pill daily  for total of 3mg daily 90 tablet 3    estradiol (ESTRACE) 2 MG tablet Take 1 tablet (2 mg total) by mouth once daily. Take one 2mg tab and one 1mg tab for total of 3 daily 90 tablet 3    flavoxATE (URISPAS) 100 mg Tab Take 1 tablet (100 mg total) by mouth 3 (three) times daily as needed. 270 tablet 3    loperamide (IMODIUM) 2 mg capsule Take 2 capsules (4 mg total) by mouth 2 (two) times daily as needed. 270 capsule 3    methen-sod phos-meth blue-hyos (UROGESIC-BLUE/URYL) 81.6-40.8-0.12 mg Tab Take 1 tablet by mouth 4 (four) times daily as needed. 120 tablet 3    mometasone (NASONEX) 50 mcg/actuation nasal spray 2 sprays by Nasal route daily as needed.       nystatin (MYCOSTATIN) 100,000 unit/mL suspension nightly as needed.       oxyCODONE-acetaminophen (PERCOCET) 5-325 mg per tablet Take 1 tablet by mouth every 6 (six) hours as needed. 11 tablet 0    pentosan polysulfate (ELMIRON) 100 mg Cap Take 100 mg by mouth 3 (three) times daily.      promethazine (PHENERGAN) 25 mg/mL injection Inject 25 mg into the vein every 4 (four) hours.      rifAXIMin (XIFAXAN) 550 mg Tab Take 1 tablet (550 mg total) by mouth 3 (three) times daily. 42 tablet 1    simvastatin (ZOCOR) 40 MG tablet Take 40 mg by mouth every evening.       SODIUM CHLORIDE 0.45 % IV Inject 1,000 mLs into the vein as needed.       sodium chloride 0.9% 0.9 % SolP 50 mL with promethazine 25 mg/mL Soln Inject 25 mg into the vein every 4 (four) hours. Thru IV      tiZANidine 4 mg Cap Take 4 mg by mouth every evening.        No facility-administered encounter medications on file as of 12/17/2018.          PHYSICAL EXAMINATION:    The patient generally appears in good health, is appropriately interactive, and is in no apparent distress.    Skin: No lesions.    Mental: Cooperative with normal affect.    Neuro: Grossly intact.    HEENT: Normal. No evidence of lymphadenopathy.    Chest:  normal inspiratory effort.    Abdomen:  Soft, non-tender. No masses  or organomegaly. Bladder is not palpable. No evidence of flank discomfort. No evidence of inguinal hernia.    Extremities: No clubbing, cyanosis, or edema    Normal external female genitalia  Urethral meatus is normal  Urethra and bladder are nontender to bimanual exam  Well supported anteriorly and posteriorly   Uterus and cervix are normal  No adnexal masses  PVR by catheterization was 20 ml    LABS:    Lab Results   Component Value Date    BUN 15 12/17/2018    CREATININE 0.8 12/17/2018       UA + blood, - nitrite, - leuk.    IMPRESSION:    Encounter Diagnoses   Name Primary?    Interstitial cystitis Yes    Pelvic pain in female        PLAN:    1. After prepping with betadine, a 14 Fr catheter was placed and the urine drained.  Bladder cocktail of lidocaine 1%, and 0.5% marcaine, 20 ml of each was instilled in the bladder by gravity drainage.   2.  Not as much relief as I had hoped  3.  Vicodin prescribed  4.  She will keep the normal follow up if she is not better, otherwise, she will communicate through my [facility] if she is better, as she lives in [place], [place]

## 2018-12-18 LAB — BACTERIA UR CULT: NO GROWTH

## 2019-01-17 RX ORDER — DEXLANSOPRAZOLE 60 MG/1
CAPSULE, DELAYED RELEASE ORAL
Qty: 180 CAPSULE | Refills: 2 | Status: SHIPPED | OUTPATIENT
Start: 2019-01-17 | End: 2019-10-18 | Stop reason: SDUPTHER

## 2019-01-28 ENCOUNTER — PATIENT MESSAGE (OUTPATIENT)
Dept: GASTROENTEROLOGY | Facility: CLINIC | Age: 49
End: 2019-01-28

## 2019-02-05 ENCOUNTER — PATIENT MESSAGE (OUTPATIENT)
Dept: GASTROENTEROLOGY | Facility: CLINIC | Age: 49
End: 2019-02-05

## 2019-02-18 ENCOUNTER — OFFICE VISIT (OUTPATIENT)
Dept: GASTROENTEROLOGY | Facility: CLINIC | Age: 49
End: 2019-02-18
Payer: COMMERCIAL

## 2019-02-18 VITALS
HEART RATE: 92 BPM | SYSTOLIC BLOOD PRESSURE: 141 MMHG | HEIGHT: 68 IN | DIASTOLIC BLOOD PRESSURE: 81 MMHG | BODY MASS INDEX: 35.68 KG/M2 | WEIGHT: 235.44 LBS

## 2019-02-18 DIAGNOSIS — R19.7 DIARRHEA, UNSPECIFIED TYPE: ICD-10-CM

## 2019-02-18 DIAGNOSIS — R14.0 BLOATING: ICD-10-CM

## 2019-02-18 DIAGNOSIS — R10.13 EPIGASTRIC PAIN: ICD-10-CM

## 2019-02-18 DIAGNOSIS — K31.84 GASTROPARESIS: Primary | ICD-10-CM

## 2019-02-18 DIAGNOSIS — Z96.89 PRESENCE OF GASTRIC PACEMAKER: ICD-10-CM

## 2019-02-18 DIAGNOSIS — K63.5 POLYP OF COLON, UNSPECIFIED PART OF COLON, UNSPECIFIED TYPE: ICD-10-CM

## 2019-02-18 DIAGNOSIS — Z98.890 H/O PYLOROPLASTY: ICD-10-CM

## 2019-02-18 DIAGNOSIS — K21.9 GASTROESOPHAGEAL REFLUX DISEASE WITHOUT ESOPHAGITIS: ICD-10-CM

## 2019-02-18 PROCEDURE — 99999 PR PBB SHADOW E&M-EST. PATIENT-LVL IV: CPT | Mod: PBBFAC,,, | Performed by: INTERNAL MEDICINE

## 2019-02-18 PROCEDURE — 99999 PR PBB SHADOW E&M-EST. PATIENT-LVL IV: ICD-10-PCS | Mod: PBBFAC,,, | Performed by: INTERNAL MEDICINE

## 2019-02-18 PROCEDURE — 3008F BODY MASS INDEX DOCD: CPT | Mod: CPTII,S$GLB,, | Performed by: INTERNAL MEDICINE

## 2019-02-18 PROCEDURE — 3008F PR BODY MASS INDEX (BMI) DOCUMENTED: ICD-10-PCS | Mod: CPTII,S$GLB,, | Performed by: INTERNAL MEDICINE

## 2019-02-18 PROCEDURE — 99214 OFFICE O/P EST MOD 30 MIN: CPT | Mod: S$GLB,,, | Performed by: INTERNAL MEDICINE

## 2019-02-18 PROCEDURE — 99214 PR OFFICE/OUTPT VISIT, EST, LEVL IV, 30-39 MIN: ICD-10-PCS | Mod: S$GLB,,, | Performed by: INTERNAL MEDICINE

## 2019-02-18 NOTE — PROGRESS NOTES
GASTROENTEROLOGY CLINIC NOTE    Reason for visit: The primary encounter diagnosis was Gastroparesis. Diagnoses of Presence of gastric pacemaker, H/O pyloroplasty, Bloating, Epigastric pain, and Polyp of colon, unspecified part of colon, unspecified type were also pertinent to this visit.  Referring provider/PCP: Rosi Carias MD    CC: follow up gastroparesis    HPI:  Nan Betts is a 48 y.o. female with medical history of idiopathic gastroparesis s/p pyloromyotomy and gastric pacemaker. Here for follow up on multiple issues.     Gastroparesis - reports using IV phenergan daily along with once weekly IV fluids. Reported that it is time to change the PICC line.    Bloating - she reports this has been the same. No change after Rifaxamin.    Epigastric pain - this has been the same but worsening with eating. Radiating from her stomach to lower abdomen.    Diarrhea - this has been chronic. Sx well controlled with Imodium.    Gastroesophageal reflux disease without esophagitis - takes BID Nexium and her sx require intermittent use of mouth wash solution.     Prior GI studies:   E/C - 7/2018 - hyperplastic colon polyp. Biopsies negative for microscopic colitis.     Review of Systems   Constitutional: Negative for chills, fever and weight loss.   HENT: Negative for sore throat.    Eyes: Negative for blurred vision and double vision.   Respiratory: Negative for cough, hemoptysis and stridor.    Cardiovascular: Negative for chest pain and palpitations.   Gastrointestinal: Positive for abdominal pain, diarrhea, heartburn and nausea. Negative for blood in stool, constipation, melena and vomiting.   Genitourinary: Negative for dysuria and flank pain.   Musculoskeletal: Negative for joint pain.   Skin: Negative for rash.   Neurological: Negative for dizziness and seizures.   Endo/Heme/Allergies: Negative for polydipsia.   Psychiatric/Behavioral: Negative for depression and suicidal ideas.       Past Medical  History: has a past medical history of Abnormal Pap smear of vagina, Anxiety, Cataract, Chronic pain, Colon polyp, Epilepsy, Gastroparesis, GERD (gastroesophageal reflux disease), H/O abnormal cervical Papanicolaou smear, Hypercholesteremia, Hyperlipidemia, IC (interstitial cystitis), Internal hemorrhoids, Interstitial cystitis, Irritable bowel syndrome (IBS), Irritable bowel syndrome with diarrhea, Spastic colon, TMJ (temporomandibular joint disorder), and TMJ (temporomandibular joint syndrome).    Past Surgical History: has a past surgical history that includes Cholecystectomy; Appendectomy; Colonoscopy; Upper gastrointestinal endoscopy; Cataract extraction; LASIK (Bilateral, 2006); Total abdominal hysterectomy (2005); Tonsillectomy; Pyloroplasty (03/2016); Mandible surgery (Bilateral, 10/17/2016); gastric stimulator placement; Temporomandibular joint surgery (12/2016); Stomach surgery; Breast biopsy (Bilateral); Esophagogastroduodenoscopy (N/A, 7/31/2018); and Colonoscopy (N/A, 7/31/2018).    Family History:family history includes Coronary artery disease in her father, mother, and paternal grandfather; Glaucoma in her maternal grandmother; Hodgkin's lymphoma in her mother; Lymphoma in her sister; Pancreatic cancer in her paternal aunt.    Allergies:   Review of patient's allergies indicates:   Allergen Reactions    Morphine Anaphylaxis    Ultram [tramadol] Shortness Of Breath    Codeine Itching    Ibuprofen     Nsaids (non-steroidal anti-inflammatory drug) Other (See Comments)     GI BLEEDING    Reglan [metoclopramide hcl] Other (See Comments)     Headaches and insomnia      Topamax [topiramate] Other (See Comments)     Ulcers in the mouth and dry mouth       Social History: reports that she has been smoking.  She has a 20.00 pack-year smoking history. she has never used smokeless tobacco. She reports that she does not drink alcohol or use drugs.    Home medications:   Current Outpatient Medications on  File Prior to Visit   Medication Sig Dispense Refill    carisoprodol (SOMA) 350 MG tablet Take 350 mg by mouth 3 (three) times daily as needed for Muscle spasms.      DEXILANT 60 mg capsule TAKE ONE CAPSULE BY MOUTH TWICE A  capsule 2    diphenhydrAMINE (BENADRYL) 50 MG tablet Take 50 mg by mouth every 4 (four) hours as needed for Itching.      estradiol (ESTRACE) 1 MG tablet Take 3 tablets (3 mg total) by mouth once daily. Take one 1mg pill daily along with the 2mg pill daily for total of 3mg daily 90 tablet 3    estradiol (ESTRACE) 2 MG tablet Take 1 tablet (2 mg total) by mouth once daily. Take one 2mg tab and one 1mg tab for total of 3 daily 90 tablet 3    flavoxATE (URISPAS) 100 mg Tab Take 1 tablet (100 mg total) by mouth 3 (three) times daily as needed. 270 tablet 3    HYDROcodone-acetaminophen (NORCO) 7.5-325 mg per tablet Take 1 tablet by mouth every 6 (six) hours as needed for Pain. 28 tablet 0    loperamide (IMODIUM) 2 mg capsule Take 2 capsules (4 mg total) by mouth 2 (two) times daily as needed. 270 capsule 3    methen-sod phos-meth blue-hyos (UROGESIC-BLUE) 81.6-40.8-0.12 mg Tab Take 1 tablet by mouth every 6 (six) hours as needed. 360 tablet 3    methen-sod phos-meth blue-hyos (UROGESIC-BLUE) 81.6-40.8-0.12 mg Tab Take 1 tablet by mouth every 6 (six) hours as needed. 120 tablet 0    mometasone (NASONEX) 50 mcg/actuation nasal spray 2 sprays by Nasal route daily as needed.       nystatin (MYCOSTATIN) 100,000 unit/mL suspension nightly as needed.       pentosan polysulfate (ELMIRON) 100 mg Cap Take 100 mg by mouth 3 (three) times daily.      promethazine (PHENERGAN) 25 mg/mL injection Inject 25 mg into the vein every 4 (four) hours.      simvastatin (ZOCOR) 40 MG tablet Take 40 mg by mouth every evening.       SODIUM CHLORIDE 0.45 % IV Inject 1,000 mLs into the vein as needed.       sodium chloride 0.9% 0.9 % SolP 50 mL with promethazine 25 mg/mL Soln Inject 25 mg into the vein  "every 4 (four) hours. Thru IV      tiZANidine 4 mg Cap Take 4 mg by mouth every evening.       rifAXIMin (XIFAXAN) 550 mg Tab Take 1 tablet (550 mg total) by mouth 3 (three) times daily. 42 tablet 1     No current facility-administered medications on file prior to visit.        Vital signs:  BP (!) 141/81   Pulse 92   Ht 5' 8" (1.727 m)   Wt 106.8 kg (235 lb 7.2 oz)   BMI 35.80 kg/m²     Physical Exam   Constitutional: She is oriented to person, place, and time. She appears well-developed and well-nourished.   Eyes: No scleral icterus.   Neck: Neck supple.   Cardiovascular: Normal rate and regular rhythm. Exam reveals no gallop.   Pulmonary/Chest: Effort normal. No respiratory distress.   Abdominal: Soft. Bowel sounds are normal. She exhibits no distension and no mass. There is no tenderness. There is no rebound and no guarding. No hernia.   Musculoskeletal: She exhibits no edema.   Neurological: She is alert and oriented to person, place, and time.   Skin: Skin is warm.   Psychiatric: She has a normal mood and affect. Her behavior is normal.       Routine labs:  Lab Results   Component Value Date    WBC 10.86 12/17/2018    HGB 14.9 12/17/2018    HCT 44.4 12/17/2018    MCV 90 12/17/2018     12/17/2018     No results found for: INR  No results found for: IRON, FERRITIN, TIBC, FESATURATED  Lab Results   Component Value Date     12/17/2018    K 4.1 12/17/2018     12/17/2018    CO2 27 12/17/2018    BUN 15 12/17/2018    CREATININE 0.8 12/17/2018     Lab Results   Component Value Date    ALBUMIN 3.9 12/17/2018    ALT 16 12/17/2018    AST 18 12/17/2018    ALKPHOS 116 12/17/2018    BILITOT 0.2 12/17/2018     No results found for: GLUCOSE    Imaging:  As noted in HPI.    Assessment:  Nan Betts is a 48 y.o. female with     1. Gastroparesis    2. Presence of gastric pacemaker    3. H/O pyloroplasty    4. Bloating    5. Epigastric pain    6. Polyp of colon, unspecified part of colon, unspecified " type    7. Diarrhea, unspecified type    8. Gastroesophageal reflux disease without esophagitis      Overall seems like worsening sx. We adjusted the pacemaker settings.    Plan:  1. Gastroparesis - continue supportive care, IV antiemetics and IV fluids at home as needed. Pacemaker setting was adjusted.    2. Presence of gastric pacemaker - adjusted. Voltage increased to 5 from 4.5. Good battery. Cycle 2-3s.   3. H/O pyloroplasty    4. Bloating - s/p xifaxan without improvement   5. Epigastric pain - we will obtain CT A/P to further evaluate   6.  7. Polyp of colon, hyperplastic. Screening colonoscopy, average risk.   Diarrhea - IBS-D. Ruled out microscopic colitis. Continue rx with antidiarrheal agents. So far, well controlled.      Orders Placed This Encounter    (Magic mouthwash) 1:1:1 Benadryl 12.5mg/5ml liq, aluminum & magnesium hydroxide-simehticone (Maalox), lidocaine viscous 2%     Call us as needed if worsening sx.    RTC in 3 months    Bhargav Lopez MD  Gastroenterology & Hepatology Fellow  Pager: [phone]

## 2019-02-20 ENCOUNTER — PATIENT MESSAGE (OUTPATIENT)
Dept: GASTROENTEROLOGY | Facility: CLINIC | Age: 49
End: 2019-02-20

## 2019-03-03 ENCOUNTER — PATIENT MESSAGE (OUTPATIENT)
Dept: GASTROENTEROLOGY | Facility: CLINIC | Age: 49
End: 2019-03-03

## 2019-03-04 ENCOUNTER — TELEPHONE (OUTPATIENT)
Dept: GASTROENTEROLOGY | Facility: CLINIC | Age: 49
End: 2019-03-04

## 2019-03-04 NOTE — TELEPHONE ENCOUNTER
----- Message from Lily Lew sent at 3/4/2019  9:57 AM CST -----  Contact: self   Dominik    Needs Advice    Reason for call: returning your call        Communication Preference: [phone]    Additional Information:

## 2019-03-04 NOTE — TELEPHONE ENCOUNTER
Spoke with patient. Explained will forward message to Dr Lehman when he returns. Verbalized understanding.

## 2019-03-10 DIAGNOSIS — R10.84 GENERALIZED ABDOMINAL PAIN: ICD-10-CM

## 2019-03-12 ENCOUNTER — PATIENT MESSAGE (OUTPATIENT)
Dept: GASTROENTEROLOGY | Facility: CLINIC | Age: 49
End: 2019-03-12

## 2019-05-28 ENCOUNTER — PATIENT MESSAGE (OUTPATIENT)
Dept: GASTROENTEROLOGY | Facility: CLINIC | Age: 49
End: 2019-05-28

## 2019-06-07 ENCOUNTER — HOSPITAL ENCOUNTER (OUTPATIENT)
Dept: RADIOLOGY | Facility: HOSPITAL | Age: 49
Discharge: HOME | End: 2019-06-07
Attending: INTERNAL MEDICINE
Payer: COMMERCIAL

## 2019-06-07 DIAGNOSIS — R10.84 GENERALIZED ABDOMINAL PAIN: ICD-10-CM

## 2019-06-07 PROCEDURE — 25500020 PHARM REV CODE 255: Performed by: INTERNAL MEDICINE

## 2019-06-07 PROCEDURE — 74177 CT ABDOMEN PELVIS WITH CONTRAST: ICD-10-PCS | Mod: 26,,, | Performed by: RADIOLOGY

## 2019-06-07 PROCEDURE — 74177 CT ABD & PELVIS W/CONTRAST: CPT | Mod: TC

## 2019-06-07 PROCEDURE — 74177 CT ABD & PELVIS W/CONTRAST: CPT | Mod: 26,,, | Performed by: RADIOLOGY

## 2019-06-07 RX ADMIN — IOHEXOL 15 ML: 350 INJECTION, SOLUTION INTRAVENOUS at 09:06

## 2019-06-07 RX ADMIN — IOHEXOL 100 ML: 350 INJECTION, SOLUTION INTRAVENOUS at 09:06

## 2019-06-07 NOTE — PROGRESS NOTES
Received patient in  CT-Scan room, AAOX4, with double lumen PICC-Line of max 4ml/sec, right upper arm, assessed, cleaned with chloro prep, blood return noted, flushed with 10ml normal saline, post CT-Scan procedure, flushed with 10ml normal saline, patient tolerated procedure well, post procedure instructions given to patient, patient left in the care of [facility].

## 2019-06-11 RX ORDER — ESTRADIOL 2 MG/1
2 TABLET ORAL DAILY
Qty: 90 TABLET | Refills: 3 | Status: SHIPPED | OUTPATIENT
Start: 2019-06-11 | End: 2020-03-11 | Stop reason: SDUPTHER

## 2019-06-11 RX ORDER — ESTRADIOL 1 MG/1
3 TABLET ORAL DAILY
Qty: 90 TABLET | Refills: 3 | Status: SHIPPED | OUTPATIENT
Start: 2019-06-11 | End: 2020-03-11 | Stop reason: SDUPTHER

## 2019-06-24 ENCOUNTER — PATIENT MESSAGE (OUTPATIENT)
Dept: UROLOGY | Facility: CLINIC | Age: 49
End: 2019-06-24

## 2019-07-02 ENCOUNTER — PATIENT MESSAGE (OUTPATIENT)
Dept: GASTROENTEROLOGY | Facility: CLINIC | Age: 49
End: 2019-07-02

## 2019-07-02 ENCOUNTER — OFFICE VISIT (OUTPATIENT)
Dept: GASTROENTEROLOGY | Facility: CLINIC | Age: 49
End: 2019-07-02
Payer: COMMERCIAL

## 2019-07-02 VITALS
BODY MASS INDEX: 36.12 KG/M2 | DIASTOLIC BLOOD PRESSURE: 80 MMHG | HEART RATE: 76 BPM | HEIGHT: 68 IN | SYSTOLIC BLOOD PRESSURE: 135 MMHG | WEIGHT: 238.31 LBS

## 2019-07-02 DIAGNOSIS — K31.84 GASTROPARESIS: Primary | ICD-10-CM

## 2019-07-02 PROCEDURE — 3008F PR BODY MASS INDEX (BMI) DOCUMENTED: ICD-10-PCS | Mod: CPTII,S$GLB,, | Performed by: INTERNAL MEDICINE

## 2019-07-02 PROCEDURE — 99999 PR PBB SHADOW E&M-EST. PATIENT-LVL III: ICD-10-PCS | Mod: PBBFAC,,, | Performed by: INTERNAL MEDICINE

## 2019-07-02 PROCEDURE — 99212 OFFICE O/P EST SF 10 MIN: CPT | Mod: S$GLB,,, | Performed by: INTERNAL MEDICINE

## 2019-07-02 PROCEDURE — 99999 PR PBB SHADOW E&M-EST. PATIENT-LVL III: CPT | Mod: PBBFAC,,, | Performed by: INTERNAL MEDICINE

## 2019-07-02 PROCEDURE — 3008F BODY MASS INDEX DOCD: CPT | Mod: CPTII,S$GLB,, | Performed by: INTERNAL MEDICINE

## 2019-07-02 PROCEDURE — 99212 PR OFFICE/OUTPT VISIT, EST, LEVL II, 10-19 MIN: ICD-10-PCS | Mod: S$GLB,,, | Performed by: INTERNAL MEDICINE

## 2019-07-02 NOTE — PROGRESS NOTES
Nan was seen today as an urgent add on.  She has a gastric stimulator in place for her gastroparesis.  Over the past week she has noticed severe shocking sensations coming from the battery.  She will have the shocking sensation for minute and that seems to trigger a spasm like pain involving the abdomen and even radiating into the back.  The gastroparesis symptoms are minimal at this time.    I examined Nan and interrogated the stimulator.  Impedance was normal suggesting intact wiring.  I reduced the voltage from 5-3.  I changed the cycle 2 1 sec on with 4 sec off.  The battery life appears appropriate.    I advised her to keep us informed on her symptoms.  If she continues to have shocking sensation that I will have a conference with Dr. Celeste and consider turning the stimulator off.  She becomes very symptomatic when the stimulator is turned off probably developing nausea vomiting and severe reflux.  Therefor we would prefer to keep the device operating since that has provided her so much relief

## 2019-08-15 ENCOUNTER — OFFICE VISIT (OUTPATIENT)
Dept: GASTROENTEROLOGY | Facility: CLINIC | Age: 49
End: 2019-08-15
Payer: COMMERCIAL

## 2019-08-15 ENCOUNTER — LAB VISIT (OUTPATIENT)
Dept: LAB | Facility: HOSPITAL | Age: 49
End: 2019-08-15
Attending: INTERNAL MEDICINE
Payer: COMMERCIAL

## 2019-08-15 ENCOUNTER — TELEPHONE (OUTPATIENT)
Dept: ENDOSCOPY | Facility: HOSPITAL | Age: 49
End: 2019-08-15

## 2019-08-15 VITALS
WEIGHT: 235.88 LBS | DIASTOLIC BLOOD PRESSURE: 85 MMHG | HEIGHT: 68 IN | HEART RATE: 81 BPM | BODY MASS INDEX: 35.75 KG/M2 | SYSTOLIC BLOOD PRESSURE: 129 MMHG

## 2019-08-15 DIAGNOSIS — F41.9 ANXIETY: ICD-10-CM

## 2019-08-15 DIAGNOSIS — K21.9 GASTROESOPHAGEAL REFLUX DISEASE WITHOUT ESOPHAGITIS: ICD-10-CM

## 2019-08-15 DIAGNOSIS — K31.84 GASTROPARESIS: ICD-10-CM

## 2019-08-15 DIAGNOSIS — K31.84 GASTROPARESIS: Primary | ICD-10-CM

## 2019-08-15 DIAGNOSIS — K58.0 IRRITABLE BOWEL SYNDROME WITH DIARRHEA: ICD-10-CM

## 2019-08-15 LAB
ALBUMIN SERPL BCP-MCNC: 3.6 G/DL (ref 3.5–5.2)
ALP SERPL-CCNC: 142 U/L (ref 55–135)
ALT SERPL W/O P-5'-P-CCNC: 16 U/L (ref 10–44)
ANION GAP SERPL CALC-SCNC: 10 MMOL/L (ref 8–16)
AST SERPL-CCNC: 23 U/L (ref 10–40)
BASOPHILS # BLD AUTO: 0.02 K/UL (ref 0–0.2)
BASOPHILS NFR BLD: 0.2 % (ref 0–1.9)
BILIRUB SERPL-MCNC: 0.2 MG/DL (ref 0.1–1)
BUN SERPL-MCNC: 10 MG/DL (ref 6–20)
CALCIUM SERPL-MCNC: 9.5 MG/DL (ref 8.7–10.5)
CHLORIDE SERPL-SCNC: 105 MMOL/L (ref 95–110)
CO2 SERPL-SCNC: 27 MMOL/L (ref 23–29)
CREAT SERPL-MCNC: 0.9 MG/DL (ref 0.5–1.4)
DIFFERENTIAL METHOD: NORMAL
EOSINOPHIL # BLD AUTO: 0.2 K/UL (ref 0–0.5)
EOSINOPHIL NFR BLD: 2.3 % (ref 0–8)
ERYTHROCYTE [DISTWIDTH] IN BLOOD BY AUTOMATED COUNT: 12.1 % (ref 11.5–14.5)
EST. GFR  (AFRICAN AMERICAN): >60 ML/MIN/1.73 M^2
EST. GFR  (NON AFRICAN AMERICAN): >60 ML/MIN/1.73 M^2
GLUCOSE SERPL-MCNC: 83 MG/DL (ref 70–110)
HCT VFR BLD AUTO: 40.4 % (ref 37–48.5)
HGB BLD-MCNC: 13.2 G/DL (ref 12–16)
IMM GRANULOCYTES # BLD AUTO: 0.01 K/UL (ref 0–0.04)
IMM GRANULOCYTES NFR BLD AUTO: 0.1 % (ref 0–0.5)
LYMPHOCYTES # BLD AUTO: 2.7 K/UL (ref 1–4.8)
LYMPHOCYTES NFR BLD: 33.2 % (ref 18–48)
MAGNESIUM SERPL-MCNC: 2 MG/DL (ref 1.6–2.6)
MCH RBC QN AUTO: 28.4 PG (ref 27–31)
MCHC RBC AUTO-ENTMCNC: 32.7 G/DL (ref 32–36)
MCV RBC AUTO: 87 FL (ref 82–98)
MONOCYTES # BLD AUTO: 0.6 K/UL (ref 0.3–1)
MONOCYTES NFR BLD: 7 % (ref 4–15)
NEUTROPHILS # BLD AUTO: 4.6 K/UL (ref 1.8–7.7)
NEUTROPHILS NFR BLD: 57.2 % (ref 38–73)
NRBC BLD-RTO: 0 /100 WBC
PHOSPHATE SERPL-MCNC: 3.3 MG/DL (ref 2.7–4.5)
PLATELET # BLD AUTO: 311 K/UL (ref 150–350)
PMV BLD AUTO: 9.3 FL (ref 9.2–12.9)
POTASSIUM SERPL-SCNC: 4.2 MMOL/L (ref 3.5–5.1)
PROT SERPL-MCNC: 7.1 G/DL (ref 6–8.4)
RBC # BLD AUTO: 4.64 M/UL (ref 4–5.4)
SODIUM SERPL-SCNC: 142 MMOL/L (ref 136–145)
WBC # BLD AUTO: 8.13 K/UL (ref 3.9–12.7)

## 2019-08-15 PROCEDURE — 85025 COMPLETE CBC W/AUTO DIFF WBC: CPT

## 2019-08-15 PROCEDURE — 3008F PR BODY MASS INDEX (BMI) DOCUMENTED: ICD-10-PCS | Mod: CPTII,S$GLB,, | Performed by: INTERNAL MEDICINE

## 2019-08-15 PROCEDURE — 99999 PR PBB SHADOW E&M-EST. PATIENT-LVL III: CPT | Mod: PBBFAC,,, | Performed by: INTERNAL MEDICINE

## 2019-08-15 PROCEDURE — 99215 OFFICE O/P EST HI 40 MIN: CPT | Mod: S$GLB,,, | Performed by: INTERNAL MEDICINE

## 2019-08-15 PROCEDURE — 99215 PR OFFICE/OUTPT VISIT, EST, LEVL V, 40-54 MIN: ICD-10-PCS | Mod: S$GLB,,, | Performed by: INTERNAL MEDICINE

## 2019-08-15 PROCEDURE — 84100 ASSAY OF PHOSPHORUS: CPT

## 2019-08-15 PROCEDURE — 3008F BODY MASS INDEX DOCD: CPT | Mod: CPTII,S$GLB,, | Performed by: INTERNAL MEDICINE

## 2019-08-15 PROCEDURE — 36415 COLL VENOUS BLD VENIPUNCTURE: CPT

## 2019-08-15 PROCEDURE — 80053 COMPREHEN METABOLIC PANEL: CPT

## 2019-08-15 PROCEDURE — 99999 PR PBB SHADOW E&M-EST. PATIENT-LVL III: ICD-10-PCS | Mod: PBBFAC,,, | Performed by: INTERNAL MEDICINE

## 2019-08-15 PROCEDURE — 83735 ASSAY OF MAGNESIUM: CPT

## 2019-08-15 RX ORDER — ALPRAZOLAM 1 MG/1
1 TABLET ORAL 2 TIMES DAILY
Qty: 60 TABLET | Refills: 1 | Status: SHIPPED | OUTPATIENT
Start: 2019-08-15 | End: 2019-09-14

## 2019-08-15 RX ORDER — NYSTATIN 100000 [USP'U]/ML
4 SUSPENSION ORAL 4 TIMES DAILY
Qty: 160 ML | Refills: 0 | Status: SHIPPED | OUTPATIENT
Start: 2019-08-15 | End: 2019-08-25

## 2019-08-15 NOTE — PROGRESS NOTES
Subjective:       Patient ID: Nan Betts is a 49 y.o. female.    Chief Complaint: GI Problem (Gastroparesis)    HPI  Review of Systems   Constitutional: Positive for fatigue. Negative for activity change, appetite change, chills, diaphoresis, fever and unexpected weight change.   HENT: Positive for congestion, ear pain and sore throat. Negative for mouth sores, nosebleeds, postnasal drip, rhinorrhea, sinus pressure, trouble swallowing and voice change.    Eyes: Negative for pain.   Respiratory: Negative for cough, shortness of breath and wheezing.    Cardiovascular: Negative for chest pain, palpitations and leg swelling.   Genitourinary: Negative for difficulty urinating, dysuria, flank pain, hematuria and menstrual problem.   Musculoskeletal: Negative for arthralgias, back pain, gait problem, joint swelling, myalgias and neck pain.   Skin: Negative for rash.   Neurological: Negative for dizziness, tremors, syncope, numbness and headaches.   Hematological: Negative for adenopathy. Does not bruise/bleed easily.   Psychiatric/Behavioral: Negative for agitation, behavioral problems, confusion, decreased concentration and dysphoric mood. The patient is nervous/anxious.        Objective:      Physical Exam   Constitutional: She is oriented to person, place, and time. She appears well-developed and well-nourished. No distress.   HENT:   Head: Normocephalic and atraumatic.   Right Ear: External ear normal.   Left Ear: External ear normal.   Nose: Nose normal.   Mouth/Throat: Oropharynx is clear and moist. No oropharyngeal exudate.   Eyes: Pupils are equal, round, and reactive to light. Conjunctivae are normal. No scleral icterus.   Neck: Normal range of motion. Neck supple. No thyromegaly present.   Cardiovascular: Normal rate, regular rhythm, normal heart sounds and intact distal pulses. Exam reveals no gallop.   No murmur heard.  Pulmonary/Chest: Effort normal and breath sounds normal. She has no wheezes. She has no  rales.   Abdominal: Soft. Normal appearance and bowel sounds are normal. She exhibits no shifting dullness, no distension, no fluid wave, no abdominal bruit and no mass. There is no hepatosplenomegaly. There is generalized tenderness.   Musculoskeletal: Normal range of motion. She exhibits no edema or tenderness.   Lymphadenopathy:     She has no cervical adenopathy.   Neurological: She is alert and oriented to person, place, and time. No cranial nerve deficit.   Skin: Skin is warm and dry. No rash noted.   Psychiatric: She has a normal mood and affect. Her behavior is normal. Judgment and thought content normal.       Assessment:       1. Gastroparesis    2. Irritable bowel syndrome with diarrhea    3. Gastroesophageal reflux disease without esophagitis    4. Anxiety        Plan:         Nan is here today for follow-up.  She is a particularly an uniquely and  complicated gastroparesis patient.  She has had multiple interventions including a surgical pyloroplasty and gastric stimulator placement.  Last month she was here for me to adjust these gastric stimulator.  The battery was causing a shocking sensation.  When I adjust that it downward she has had increase in all of her symptoms.  The shocking sensation is now stopped.  But she is having increased vomiting now.  She is having vomiting on most days.  She has had an increase in her reflux symptoms despite Dexilant.  She describes a substernal discomfort which is constant.  It is unaffected by meals.  It is associated with nausea.  There is no odynophagia. She reports the abdominal pain mainly in the right lower quadrant and this is constant.  She is now taking IV Phenergan every 4 hr.  In addition she does have the home IV fluids.    While she was here I recheck the gastric stimulator.  I adjusted the voltage from 3 back up to 5.  I went to a cycle of 2 sec on and 3 sec off.    Assessment decision making  1.  Gastroparesis: this is a particularly complicated  case.  She has constant symptoms.  The symptoms are so severe her activities are very limited.  She requires IV fluids at home to supplement her intake of fluid and electrolytes.  She also requires IV medications of, particularly the Phenergan.  I have adjusted the battery and I hope that will be effective.  It may roll require that she have another battery placement by Dr. Celeste 1 that can function at a high level without causing the shocking sensation.  2.  IBS diarrhea predominant  3.  Gastroesophageal reflux she has increased symptoms now which often go along with her gastroparesis I am hope that improves I am concerned about the substernal discomfort I wonder if this could even be a Candida infection of the yeast.  I recommended EGD  4.  Anxiety she has previously treated with Xanax and that gave her good relief she asked me to refill her Xanax today because her doctor home will do that I told her I am happy to do that now and can give 1 refill but she will need to have a doctor at home assume this responsibility for treatment of her anxiety    Recommendation 1.  Labs magnesium phosphorus CBC CMP 2.  Considerable time in management of home care  3.  EGD ordered 4.  Nystatin swish and swallow 5.  Xanax perception    40 min appointment greater than half the time face-to-face counseling including significant management of home care

## 2019-08-28 ENCOUNTER — PATIENT MESSAGE (OUTPATIENT)
Dept: ENDOSCOPY | Facility: HOSPITAL | Age: 49
End: 2019-08-28

## 2019-08-29 ENCOUNTER — TELEPHONE (OUTPATIENT)
Dept: GASTROENTEROLOGY | Facility: CLINIC | Age: 49
End: 2019-08-29

## 2019-08-29 NOTE — TELEPHONE ENCOUNTER
----- Message from Lamberto Dobbins sent at 8/29/2019 11:15 AM CDT -----  Contact: Nan  Ms. Betts would like for Angela to contact her in regards to the email she sent concerning her pic line. She can be reached at [phone]

## 2019-09-02 ENCOUNTER — ANESTHESIA EVENT (OUTPATIENT)
Dept: ENDOSCOPY | Facility: HOSPITAL | Age: 49
End: 2019-09-02
Payer: COMMERCIAL

## 2019-09-03 ENCOUNTER — ANESTHESIA (OUTPATIENT)
Dept: ENDOSCOPY | Facility: HOSPITAL | Age: 49
End: 2019-09-03
Payer: COMMERCIAL

## 2019-09-03 ENCOUNTER — PATIENT MESSAGE (OUTPATIENT)
Dept: GASTROENTEROLOGY | Facility: CLINIC | Age: 49
End: 2019-09-03

## 2019-09-03 ENCOUNTER — HOSPITAL ENCOUNTER (OUTPATIENT)
Facility: HOSPITAL | Age: 49
Discharge: HOME | End: 2019-09-03
Attending: INTERNAL MEDICINE | Admitting: INTERNAL MEDICINE
Payer: COMMERCIAL

## 2019-09-03 VITALS
HEIGHT: 68 IN | WEIGHT: 243 LBS | HEART RATE: 84 BPM | RESPIRATION RATE: 12 BRPM | BODY MASS INDEX: 36.83 KG/M2 | DIASTOLIC BLOOD PRESSURE: 72 MMHG | TEMPERATURE: 99 F | OXYGEN SATURATION: 98 % | SYSTOLIC BLOOD PRESSURE: 133 MMHG

## 2019-09-03 DIAGNOSIS — K31.84 GASTROPARESIS: Primary | ICD-10-CM

## 2019-09-03 PROCEDURE — 43245 EGD DILATE STRICTURE: CPT | Performed by: INTERNAL MEDICINE

## 2019-09-03 PROCEDURE — 43450 DILATE ESOPHAGUS 1/MULT PASS: CPT | Mod: 51,,, | Performed by: INTERNAL MEDICINE

## 2019-09-03 PROCEDURE — 43450 DILATE ESOPHAGUS 1/MULT PASS: CPT | Performed by: INTERNAL MEDICINE

## 2019-09-03 PROCEDURE — 25000003 PHARM REV CODE 250: Performed by: NURSE ANESTHETIST, CERTIFIED REGISTERED

## 2019-09-03 PROCEDURE — C1726 CATH, BAL DIL, NON-VASCULAR: HCPCS | Performed by: INTERNAL MEDICINE

## 2019-09-03 PROCEDURE — 43245 EGD DILATE STRICTURE: CPT | Mod: ,,, | Performed by: INTERNAL MEDICINE

## 2019-09-03 PROCEDURE — 63600175 PHARM REV CODE 636 W HCPCS: Performed by: INTERNAL MEDICINE

## 2019-09-03 PROCEDURE — 63600175 PHARM REV CODE 636 W HCPCS: Performed by: NURSE ANESTHETIST, CERTIFIED REGISTERED

## 2019-09-03 PROCEDURE — 43245 PR EGD, FLEX, W/DILATION, GASTR/DUOD STRICTURE: ICD-10-PCS | Mod: ,,, | Performed by: INTERNAL MEDICINE

## 2019-09-03 PROCEDURE — E9220 PRA ENDO ANESTHESIA: HCPCS | Mod: ,,, | Performed by: NURSE ANESTHETIST, CERTIFIED REGISTERED

## 2019-09-03 PROCEDURE — 37000009 HC ANESTHESIA EA ADD 15 MINS: Performed by: INTERNAL MEDICINE

## 2019-09-03 PROCEDURE — 43450 PR DILATE ESOPHAGUS: ICD-10-PCS | Mod: 51,,, | Performed by: INTERNAL MEDICINE

## 2019-09-03 PROCEDURE — E9220 PRA ENDO ANESTHESIA: ICD-10-PCS | Mod: ,,, | Performed by: NURSE ANESTHETIST, CERTIFIED REGISTERED

## 2019-09-03 PROCEDURE — 37000008 HC ANESTHESIA 1ST 15 MINUTES: Performed by: INTERNAL MEDICINE

## 2019-09-03 RX ORDER — LIDOCAINE HCL/PF 100 MG/5ML
SYRINGE (ML) INTRAVENOUS
Status: DISCONTINUED | OUTPATIENT
Start: 2019-09-03 | End: 2019-09-03

## 2019-09-03 RX ORDER — SODIUM CHLORIDE 9 MG/ML
INJECTION, SOLUTION INTRAVENOUS CONTINUOUS
Status: DISCONTINUED | OUTPATIENT
Start: 2019-09-03 | End: 2019-09-03 | Stop reason: HOSPADM

## 2019-09-03 RX ORDER — PROPOFOL 10 MG/ML
VIAL (ML) INTRAVENOUS
Status: DISCONTINUED | OUTPATIENT
Start: 2019-09-03 | End: 2019-09-03

## 2019-09-03 RX ORDER — GLYCOPYRROLATE 0.2 MG/ML
INJECTION INTRAMUSCULAR; INTRAVENOUS
Status: DISCONTINUED | OUTPATIENT
Start: 2019-09-03 | End: 2019-09-03

## 2019-09-03 RX ORDER — SODIUM CHLORIDE 0.9 % (FLUSH) 0.9 %
10 SYRINGE (ML) INJECTION
Status: DISCONTINUED | OUTPATIENT
Start: 2019-09-03 | End: 2019-09-03 | Stop reason: HOSPADM

## 2019-09-03 RX ORDER — ONDANSETRON 2 MG/ML
INJECTION INTRAMUSCULAR; INTRAVENOUS
Status: DISCONTINUED | OUTPATIENT
Start: 2019-09-03 | End: 2019-09-03

## 2019-09-03 RX ORDER — MIDAZOLAM HYDROCHLORIDE 1 MG/ML
INJECTION, SOLUTION INTRAMUSCULAR; INTRAVENOUS
Status: DISCONTINUED | OUTPATIENT
Start: 2019-09-03 | End: 2019-09-03

## 2019-09-03 RX ADMIN — PROPOFOL 50 MG: 10 INJECTION, EMULSION INTRAVENOUS at 09:09

## 2019-09-03 RX ADMIN — MIDAZOLAM HYDROCHLORIDE 1 MG: 1 INJECTION, SOLUTION INTRAMUSCULAR; INTRAVENOUS at 09:09

## 2019-09-03 RX ADMIN — LIDOCAINE HYDROCHLORIDE 100 MG: 20 INJECTION, SOLUTION INTRAVENOUS at 09:09

## 2019-09-03 RX ADMIN — PROPOFOL 150 MG: 10 INJECTION, EMULSION INTRAVENOUS at 09:09

## 2019-09-03 RX ADMIN — SODIUM CHLORIDE: 0.9 INJECTION, SOLUTION INTRAVENOUS at 08:09

## 2019-09-03 RX ADMIN — GLYCOPYRROLATE 0.2 MG: 0.2 INJECTION, SOLUTION INTRAMUSCULAR; INTRAVENOUS at 09:09

## 2019-09-03 RX ADMIN — PROPOFOL 20 MG: 10 INJECTION, EMULSION INTRAVENOUS at 09:09

## 2019-09-03 RX ADMIN — ONDANSETRON 4 MG: 2 INJECTION INTRAMUSCULAR; INTRAVENOUS at 09:09

## 2019-09-03 RX ADMIN — PROPOFOL 30 MG: 10 INJECTION, EMULSION INTRAVENOUS at 09:09

## 2019-09-03 NOTE — ANESTHESIA POSTPROCEDURE EVALUATION
Anesthesia Post Evaluation    Patient: Nan Betts    Procedure(s) Performed: Procedure(s) (LRB):  EGD (ESOPHAGOGASTRODUODENOSCOPY) (N/A)    Final Anesthesia Type: general  Patient location during evaluation: PACU  Patient participation: Yes- Able to Participate  Level of consciousness: awake and alert and oriented  Post-procedure vital signs: reviewed and stable  Pain management: adequate  Airway patency: patent  PONV status at discharge: No PONV  Anesthetic complications: no      Cardiovascular status: blood pressure returned to baseline and hemodynamically stable  Respiratory status: unassisted and spontaneous ventilation  Hydration status: euvolemic  Follow-up not needed.          Vitals Value Taken Time   /72 9/3/2019  9:40 AM   Temp 37 °C (98.6 °F) 9/3/2019  9:21 AM   Pulse 84 9/3/2019  9:40 AM   Resp 12 9/3/2019  9:40 AM   SpO2 98 % 9/3/2019  9:40 AM         Event Time     Out of Recovery 09:47:50          Pain/Chelsy Score: Chelsy Score: 10 (9/3/2019  9:40 AM)

## 2019-09-03 NOTE — ANESTHESIA PREPROCEDURE EVALUATION
09/03/2019  Nan Betts is a 49 y.o., female.    Past Medical History:   Diagnosis Date    Abnormal Pap smear of vagina     Anxiety     Cataract     Chronic pain     TMJ and abdomen    Colon polyp     Epilepsy     as a child    Gastroparesis     Idiopathic    GERD (gastroesophageal reflux disease)     H/O abnormal cervical Papanicolaou smear     Hypercholesteremia 6/21/2016    Hyperlipidemia     IC (interstitial cystitis)     Internal hemorrhoids     Interstitial cystitis     Irritable bowel syndrome (IBS)     Irritable bowel syndrome with diarrhea 6/21/2016    Spastic colon     TMJ (temporomandibular joint disorder)     TMJ (temporomandibular joint syndrome) 6/21/2016     Past Surgical History:   Procedure Laterality Date    APPENDECTOMY      BREAST BIOPSY Bilateral     CATARACT EXTRACTION      CHOLECYSTECTOMY      COLONOSCOPY      COLONOSCOPY N/A 7/31/2018    Performed by [facility] at [facility] (4TH FLR)    CYSTOSCOPY WITH HYDRODISTENSION N/A 6/13/2016    Performed by Bethany John MD at [facility] OR 2ND FLR    CYSTOSCOPY, WITH BLADDER HYDRODISTENSION N/A 12/11/2018    Performed by Christal Khan MD at [facility] OR 1ST FLR    EGD (ESOPHAGOGASTRODUODENOSCOPY) N/A 7/31/2018    Performed by [facility]hman MD at [facility] (4TH FLR)    ESOPHAGOGASTRODUODENOSCOPY (EGD) N/A 5/2/2017    Performed by [facility]hman [facility] at [facility] (4TH FLR)    ESOPHAGOGASTRODUODENOSCOPY (EGD) N/A 11/15/2016    Performed by [facility]hman MD at [facility] (4TH FLR)    ESOPHAGOGASTRODUODENOSCOPY (EGD) intraop N/A 11/23/2016    Performed by David Celeste MD at [facility] OR 2ND FLR    ESOPHAGOGASTRODUODENOSCOPY (EGD) w/ poss. pyloric stenosis N/A 8/31/2017    Performed by [facility]hman [facility] at [facility] (4TH FLR)    Exchange of interstim battery N/A 5/15/2017    [facility]    EXCHANGE-STIMULATOR-GASTRIC N/A 5/15/2017    [facility] at [facility] 2ND FLR    gastric stimulator placement      INSERTION-INTERSTIM STAGE I LEAD IMPLANT N/A 11/23/2016    [facility] at [facility] 2ND FLR    INSERTION-INTERSTIM STAGE II GENERATOR IMPLANT N/A 11/23/2016    [facility] at [facility] 2ND FLR    LAPAROSCOPIC PLACEMENT GASTRIC NEUROSTIMULATOR N/A 11/23/2016    Performed [facility] at [facility] OR 2ND FLR    LAPAROSCOPY-DIAGNOSTIC N/A 3/30/2016    Performed by [facility] Rivera [facility] at [facility] OR [address] [place] [address]    LASIK Bilateral 2006    MANDIBLE SURGERY Bilateral 10/17/2016    PYLOROPLASTY  03/2016    ROBOTIC ASSISTED LAPAROSCOPIC PYLOROPLASTY N/A 3/30/2016    Performed by [facility]este [facility] at [facility] OR 2ND FLR    SIGMOIDOSCOPY-FLEXIBLE N/A 6/22/2016    Performed by [facility]hman [facility] at [facility] (2ND FLR)    STOMACH SURGERY      gastric pacemaker    TEMPOROMANDIBULAR JOINT SURGERY  12/2016    TONSILLECTOMY      TOTAL ABDOMINAL HYSTERECTOMY  2005    EUGENIA/BSO, Trachelectomy 7 years ago    UPPER GASTROINTESTINAL ENDOSCOPY           Anesthesia Evaluation    I have reviewed the Patient Summary Reports.    I have reviewed the Nursing Notes.   I have reviewed the Medications.     Review of Systems  Anesthesia Hx:  No problems with previous Anesthesia Hx of Anesthetic complications PONV   Social:  Smoker, No Alcohol Use    Hepatic/GI:   GERD, poorly controlled Gastroparesis   Musculoskeletal:   TMJ with 2 previous surgeries   Neurological:   Seizures, well controlled    Psych:   anxiety          Physical Exam  General:  Obesity    Airway/Jaw/Neck:  Airway Findings: Mouth Opening: Small, but > 3cm Tongue: Large  General Airway Assessment: Adult  Mallampati: III      Dental:  Dental Findings: In tact   Chest/Lungs:  Chest/Lungs Findings: Clear to auscultation              Anesthesia  Plan  Type of Anesthesia, risks & benefits discussed:  Anesthesia Type:  general  Patient's Preference:   Intra-op Monitoring Plan: standard ASA monitors  Intra-op Monitoring Plan Comments:   Post Op Pain Control Plan:   Post Op Pain Control Plan Comments:   Induction:   IV  Beta Blocker:  Patient is not currently on a Beta-Blocker (No further documentation required).       Informed Consent: Patient understands risks and agrees with Anesthesia plan.  Questions answered. Anesthesia consent signed with patient.  ASA Score: 3     Day of Surgery Review of History & Physical: I have interviewed and examined the patient. I have reviewed the patient's H&P dated:  There are no significant changes.          Ready For Surgery From Anesthesia Perspective.

## 2019-09-03 NOTE — TRANSFER OF CARE
"Anesthesia Transfer of Care Note    Patient: Nan Betts    Procedure(s) Performed: Procedure(s) (LRB):  EGD (ESOPHAGOGASTRODUODENOSCOPY) (N/A)    Patient location: PACU    Anesthesia Type: general    Post pain: adequate analgesia    Post assessment: no apparent anesthetic complications and tolerated procedure well    Post vital signs: stable    Level of consciousness: awake and alert    Nausea/Vomiting: no nausea/vomiting    Complications: none    Transfer of care protocol was followed      Last vitals:   Visit Vitals  /71 (BP Location: Left arm, Patient Position: Lying)   Pulse 87   Temp 37 °C (98.6 °F) (Oral)   Resp 12   Ht 5' 8" (1.727 m)   Wt 110.2 kg (243 lb)   SpO2 98%   BMI 36.95 kg/m²     "

## 2019-09-03 NOTE — PROVATION PATIENT INSTRUCTIONS
Discharge Summary/Instructions after an Endoscopic Procedure  Patient Name: Nan Betts  Patient MRN: [id number]  Patient YOB: 1970 Tuesday, September 03, 2019  Zack Lehman MD  RESTRICTIONS:  During your procedure today, you received medications for sedation.  These   medications may affect your judgment, balance and coordination.  Therefore,   for 24 hours, you have the following restrictions:   - DO NOT drive a car, operate machinery, make legal/financial decisions,   sign important papers or drink alcohol.    ACTIVITY:  Today: no heavy lifting, straining or running due to procedural   sedation/anesthesia.  The following day: return to full activity including work.  DIET:  Eat and drink normally unless instructed otherwise.     TREATMENT FOR COMMON SIDE EFFECTS:  - Mild abdominal pain, nausea, belching, bloating or excessive gas:  rest,   eat lightly and use a heating pad.  - Sore Throat: treat with throat lozenges and/or gargle with warm salt   water.  - Because air was used during the procedure, expelling large amounts of air   from your rectum or belching is normal.  - If a bowel prep was taken, you may not have a bowel movement for 1-3 days.    This is normal.  SYMPTOMS TO WATCH FOR AND REPORT TO YOUR PHYSICIAN:  1. Abdominal pain or bloating, other than gas cramps.  2. Chest pain.  3. Back pain.  4. Signs of infection such as: chills or fever occurring within 24 hours   after the procedure.  5. Rectal bleeding, which would show as bright red, maroon, or black stools.   (A tablespoon of blood from the rectum is not serious, especially if   hemorrhoids are present.)  6. Vomiting.  7. Weakness or dizziness.  GO DIRECTLY TO THE NEAREST EMERGENCY ROOM IF YOU HAVE ANY OF THE FOLLOWING:      Difficulty breathing              Chills and/or fever over 101 F   Persistent vomiting and/or vomiting blood   Severe abdominal pain   Severe chest pain   Black, tarry stools   Bleeding- more than one tablespoon   Any  other symptom or condition that you feel may need urgent attention  Your doctor recommends these additional instructions:  If any biopsies were taken, your doctors clinic will contact you in 1 to 2   weeks with any results.  - Patient has a contact number available for emergencies.  The signs and   symptoms of potential delayed complications were discussed with the   patient.  Return to normal activities tomorrow.  Written discharge   instructions were provided to the patient.   - Discharge patient to home (ambulatory).   - Resume previous diet.   - Continue present medications.   - Return to GI clinic as previously scheduled.  For questions, problems or results please call your physician - Zack Lehman MD at Work:  [phone].  [facility], EMERGENCY ROOM PHONE NUMBER: [phone]  IF A COMPLICATION OR EMERGENCY SITUATION ARISES AND YOU ARE UNABLE TO REACH   YOUR PHYSICIAN - GO DIRECTLY TO THE EMERGENCY ROOM.  Zack Lehman MD  9/3/2019 9:18:37 AM  This report has been verified and signed electronically.  [employer]

## 2019-09-10 ENCOUNTER — TELEPHONE (OUTPATIENT)
Dept: ENDOSCOPY | Facility: HOSPITAL | Age: 49
End: 2019-09-10

## 2019-10-04 ENCOUNTER — PATIENT MESSAGE (OUTPATIENT)
Dept: GASTROENTEROLOGY | Facility: CLINIC | Age: 49
End: 2019-10-04

## 2019-10-07 ENCOUNTER — PATIENT MESSAGE (OUTPATIENT)
Dept: GASTROENTEROLOGY | Facility: CLINIC | Age: 49
End: 2019-10-07

## 2019-10-18 ENCOUNTER — PATIENT MESSAGE (OUTPATIENT)
Dept: GASTROENTEROLOGY | Facility: CLINIC | Age: 49
End: 2019-10-18

## 2019-10-18 DIAGNOSIS — K52.9 CHRONIC DIARRHEA: ICD-10-CM

## 2019-10-18 RX ORDER — LOPERAMIDE HYDROCHLORIDE 2 MG/1
4 CAPSULE ORAL 2 TIMES DAILY PRN
Qty: 270 CAPSULE | Refills: 3 | Status: SHIPPED | OUTPATIENT
Start: 2019-10-18 | End: 2020-06-25

## 2019-10-18 RX ORDER — DEXLANSOPRAZOLE 60 MG/1
1 CAPSULE, DELAYED RELEASE ORAL 2 TIMES DAILY
Qty: 180 CAPSULE | Refills: 2 | Status: SHIPPED | OUTPATIENT
Start: 2019-10-18 | End: 2020-03-03 | Stop reason: SDUPTHER

## 2019-10-31 ENCOUNTER — PATIENT MESSAGE (OUTPATIENT)
Dept: OBSTETRICS AND GYNECOLOGY | Facility: CLINIC | Age: 49
End: 2019-10-31

## 2019-10-31 ENCOUNTER — TELEPHONE (OUTPATIENT)
Dept: OBSTETRICS AND GYNECOLOGY | Facility: CLINIC | Age: 49
End: 2019-10-31

## 2019-10-31 ENCOUNTER — PATIENT MESSAGE (OUTPATIENT)
Dept: GASTROENTEROLOGY | Facility: CLINIC | Age: 49
End: 2019-10-31

## 2019-10-31 DIAGNOSIS — Z12.31 VISIT FOR SCREENING MAMMOGRAM: Primary | ICD-10-CM

## 2019-11-05 ENCOUNTER — PATIENT MESSAGE (OUTPATIENT)
Dept: GASTROENTEROLOGY | Facility: CLINIC | Age: 49
End: 2019-11-05

## 2019-11-21 DIAGNOSIS — N30.10 INTERSTITIAL CYSTITIS: Primary | ICD-10-CM

## 2019-11-21 DIAGNOSIS — R39.15 URINARY URGENCY: ICD-10-CM

## 2019-11-21 RX ORDER — FLAVOXATE HYDROCHLORIDE 100 MG/1
100 TABLET ORAL 3 TIMES DAILY PRN
Qty: 270 TABLET | Refills: 0 | Status: SHIPPED | OUTPATIENT
Start: 2019-11-21 | End: 2020-07-30 | Stop reason: SDUPTHER

## 2019-11-21 NOTE — TELEPHONE ENCOUNTER
Request sent from [facility].  She was last seen in December 2018.  One refill was authorized with a note stating that she is due for an appointment.

## 2019-12-13 ENCOUNTER — PATIENT MESSAGE (OUTPATIENT)
Dept: GASTROENTEROLOGY | Facility: CLINIC | Age: 49
End: 2019-12-13

## 2019-12-13 NOTE — TELEPHONE ENCOUNTER
Informed patient that Dr Lehman is out of office till Monday and that he would handle message on Monday. Patient denies any needs right now , she just would like to come in Wednesday for Dr Lehman to check her stimulator. Will forward message to MD.

## 2019-12-17 ENCOUNTER — PATIENT MESSAGE (OUTPATIENT)
Dept: GASTROENTEROLOGY | Facility: CLINIC | Age: 49
End: 2019-12-17

## 2019-12-18 ENCOUNTER — LAB VISIT (OUTPATIENT)
Dept: LAB | Facility: HOSPITAL | Age: 49
End: 2019-12-18
Attending: INTERNAL MEDICINE
Payer: COMMERCIAL

## 2019-12-18 ENCOUNTER — OFFICE VISIT (OUTPATIENT)
Dept: GASTROENTEROLOGY | Facility: CLINIC | Age: 49
End: 2019-12-18
Payer: COMMERCIAL

## 2019-12-18 DIAGNOSIS — K31.84 GASTROPARESIS: ICD-10-CM

## 2019-12-18 DIAGNOSIS — K31.84 GASTROPARESIS: Primary | ICD-10-CM

## 2019-12-18 LAB
ALBUMIN SERPL BCP-MCNC: 4 G/DL (ref 3.5–5.2)
ALP SERPL-CCNC: 127 U/L (ref 55–135)
ALT SERPL W/O P-5'-P-CCNC: 17 U/L (ref 10–44)
ANION GAP SERPL CALC-SCNC: 9 MMOL/L (ref 8–16)
AST SERPL-CCNC: 23 U/L (ref 10–40)
BASOPHILS # BLD AUTO: 0.04 K/UL (ref 0–0.2)
BASOPHILS NFR BLD: 0.5 % (ref 0–1.9)
BILIRUB SERPL-MCNC: 0.2 MG/DL (ref 0.1–1)
BUN SERPL-MCNC: 10 MG/DL (ref 6–20)
CALCIUM SERPL-MCNC: 9.3 MG/DL (ref 8.7–10.5)
CHLORIDE SERPL-SCNC: 106 MMOL/L (ref 95–110)
CO2 SERPL-SCNC: 25 MMOL/L (ref 23–29)
CREAT SERPL-MCNC: 0.9 MG/DL (ref 0.5–1.4)
DIFFERENTIAL METHOD: NORMAL
EOSINOPHIL # BLD AUTO: 0.3 K/UL (ref 0–0.5)
EOSINOPHIL NFR BLD: 3 % (ref 0–8)
ERYTHROCYTE [DISTWIDTH] IN BLOOD BY AUTOMATED COUNT: 12.1 % (ref 11.5–14.5)
EST. GFR  (AFRICAN AMERICAN): >60 ML/MIN/1.73 M^2
EST. GFR  (NON AFRICAN AMERICAN): >60 ML/MIN/1.73 M^2
GLUCOSE SERPL-MCNC: 87 MG/DL (ref 70–110)
HCT VFR BLD AUTO: 41.7 % (ref 37–48.5)
HGB BLD-MCNC: 14 G/DL (ref 12–16)
IMM GRANULOCYTES # BLD AUTO: 0.02 K/UL (ref 0–0.04)
IMM GRANULOCYTES NFR BLD AUTO: 0.2 % (ref 0–0.5)
LYMPHOCYTES # BLD AUTO: 3.1 K/UL (ref 1–4.8)
LYMPHOCYTES NFR BLD: 37 % (ref 18–48)
MAGNESIUM SERPL-MCNC: 1.8 MG/DL (ref 1.6–2.6)
MCH RBC QN AUTO: 29.2 PG (ref 27–31)
MCHC RBC AUTO-ENTMCNC: 33.6 G/DL (ref 32–36)
MCV RBC AUTO: 87 FL (ref 82–98)
MONOCYTES # BLD AUTO: 0.5 K/UL (ref 0.3–1)
MONOCYTES NFR BLD: 5.4 % (ref 4–15)
NEUTROPHILS # BLD AUTO: 4.6 K/UL (ref 1.8–7.7)
NEUTROPHILS NFR BLD: 53.9 % (ref 38–73)
NRBC BLD-RTO: 0 /100 WBC
PHOSPHATE SERPL-MCNC: 3.2 MG/DL (ref 2.7–4.5)
PLATELET # BLD AUTO: 287 K/UL (ref 150–350)
PMV BLD AUTO: 9.6 FL (ref 9.2–12.9)
POTASSIUM SERPL-SCNC: 4 MMOL/L (ref 3.5–5.1)
PROT SERPL-MCNC: 7.2 G/DL (ref 6–8.4)
RBC # BLD AUTO: 4.8 M/UL (ref 4–5.4)
SODIUM SERPL-SCNC: 140 MMOL/L (ref 136–145)
WBC # BLD AUTO: 8.47 K/UL (ref 3.9–12.7)

## 2019-12-18 PROCEDURE — 80053 COMPREHEN METABOLIC PANEL: CPT

## 2019-12-18 PROCEDURE — 99999 PR PBB SHADOW E&M-EST. PATIENT-LVL II: ICD-10-PCS | Mod: PBBFAC,,, | Performed by: INTERNAL MEDICINE

## 2019-12-18 PROCEDURE — 83735 ASSAY OF MAGNESIUM: CPT

## 2019-12-18 PROCEDURE — 84100 ASSAY OF PHOSPHORUS: CPT

## 2019-12-18 PROCEDURE — 99213 PR OFFICE/OUTPT VISIT, EST, LEVL III, 20-29 MIN: ICD-10-PCS | Mod: S$GLB,,, | Performed by: INTERNAL MEDICINE

## 2019-12-18 PROCEDURE — 36415 COLL VENOUS BLD VENIPUNCTURE: CPT

## 2019-12-18 PROCEDURE — 99213 OFFICE O/P EST LOW 20 MIN: CPT | Mod: S$GLB,,, | Performed by: INTERNAL MEDICINE

## 2019-12-18 PROCEDURE — 85025 COMPLETE CBC W/AUTO DIFF WBC: CPT

## 2019-12-18 PROCEDURE — 99999 PR PBB SHADOW E&M-EST. PATIENT-LVL II: CPT | Mod: PBBFAC,,, | Performed by: INTERNAL MEDICINE

## 2019-12-18 NOTE — PROGRESS NOTES
Subjective:       Patient ID: Nan Betts is a 49 y.o. female.    Chief Complaint: No chief complaint on file.    HPI  Review of Systems   Constitutional: Negative.    Respiratory: Negative.    Cardiovascular: Negative.        Objective:      Physical Exam   Abdominal: Soft. Normal appearance and bowel sounds are normal. She exhibits no shifting dullness, no distension, no fluid wave, no abdominal bruit and no mass. There is no hepatosplenomegaly.       Assessment:       1. Gastroparesis        Plan:         Nan was seen today as an urgent add on.  She 49-year-old lady with severe gastroparesis.  She has a current gastric stimulator in place.  She constantly uses anti nausea medicine and even has a port for IV fluids.    Recently she has had minor trauma to the abdominal wall such that the battery site is more painful and tender.  Also she reports an increase in her gastroparesis symptoms.  She reports increased nausea. She is having significant reflux and regurgitation.  She complains of chest pain which happens during these times.  She has early satiety and prolonged fullness.  She has problems even when just having liquids.  She feels like the way she felt before she had a gastric stimulator placed.    While she was here I interrogated the pacemaker.  It is functionally normal.  The battery appears good.  I adjusted the amplitude from 5-6.5.  I increased cycle on time to 3 sec on into 2nd  Off.     Assessment  1.  Gastroparesis.  Recent increase in symptoms.  We discussed diet.  It is okay to have increased IV fluids at home.  I will check labs today.  I am hopeful that the pacemaker changes will help her symptoms.  I need to see her back for another appointment sometime to over if there are any other things we can do for her.    Recommendation  1.  Pacemaker reprogrammed  2.  Labs today  3.  Increase IV fluids at home  4.  Refill Magic mouthwash.    20 min appointment greater than half the time face-to-face  counseling

## 2020-01-31 ENCOUNTER — PATIENT MESSAGE (OUTPATIENT)
Dept: GASTROENTEROLOGY | Facility: CLINIC | Age: 50
End: 2020-01-31

## 2020-02-10 ENCOUNTER — TELEPHONE (OUTPATIENT)
Dept: GASTROENTEROLOGY | Facility: CLINIC | Age: 50
End: 2020-02-10

## 2020-02-10 NOTE — TELEPHONE ENCOUNTER
----- Message from Zack Lehman MD sent at 2/10/2020  2:59 PM CST -----  For the shocking, she needs to go through dr vazquez  ----- Message -----  From: Bernice Blackburn MA  Sent: 2/10/2020   2:40 PM CST  To: Zack Lehman MD    Spoke with patient.  Aware I will reach out to her insurance company regarding medication.   Stated her pacemaker started shocking her last night when she reached up for something.  Lasted several hours.  ----- Message -----  From: Ariadna Lancaster  Sent: 2/10/2020   1:58 PM CST  To: Dominik BASS Staff    Pt called want to know what the Rx Company said about the prior auth?  Pt also stated that her gastro pacemaker started shocking her. Please advise call back [phone]

## 2020-02-13 ENCOUNTER — IMMUNIZATION (OUTPATIENT)
Dept: PHARMACY | Facility: CLINIC | Age: 50
End: 2020-02-13
Payer: COMMERCIAL

## 2020-02-13 ENCOUNTER — OFFICE VISIT (OUTPATIENT)
Dept: SURGERY | Facility: CLINIC | Age: 50
End: 2020-02-13
Payer: COMMERCIAL

## 2020-02-13 ENCOUNTER — TELEPHONE (OUTPATIENT)
Dept: GASTROENTEROLOGY | Facility: CLINIC | Age: 50
End: 2020-02-13

## 2020-02-13 ENCOUNTER — NURSE TRIAGE (OUTPATIENT)
Dept: ADMINISTRATIVE | Facility: CLINIC | Age: 50
End: 2020-02-13

## 2020-02-13 VITALS
SYSTOLIC BLOOD PRESSURE: 145 MMHG | HEIGHT: 68 IN | BODY MASS INDEX: 38.19 KG/M2 | WEIGHT: 252 LBS | HEART RATE: 89 BPM | DIASTOLIC BLOOD PRESSURE: 80 MMHG

## 2020-02-13 DIAGNOSIS — K31.84 GASTROPARESIS: Primary | ICD-10-CM

## 2020-02-13 PROCEDURE — 3008F BODY MASS INDEX DOCD: CPT | Mod: CPTII,S$GLB,, | Performed by: SURGERY

## 2020-02-13 PROCEDURE — 99999 PR PBB SHADOW E&M-EST. PATIENT-LVL III: ICD-10-PCS | Mod: PBBFAC,,, | Performed by: SURGERY

## 2020-02-13 PROCEDURE — 99999 PR PBB SHADOW E&M-EST. PATIENT-LVL III: CPT | Mod: PBBFAC,,, | Performed by: SURGERY

## 2020-02-13 PROCEDURE — 99214 PR OFFICE/OUTPT VISIT, EST, LEVL IV, 30-39 MIN: ICD-10-PCS | Mod: S$GLB,,, | Performed by: SURGERY

## 2020-02-13 PROCEDURE — 3008F PR BODY MASS INDEX (BMI) DOCUMENTED: ICD-10-PCS | Mod: CPTII,S$GLB,, | Performed by: SURGERY

## 2020-02-13 PROCEDURE — 99214 OFFICE O/P EST MOD 30 MIN: CPT | Mod: S$GLB,,, | Performed by: SURGERY

## 2020-02-13 RX ORDER — HYDROCODONE BITARTRATE AND ACETAMINOPHEN 10; 325 MG/1; MG/1
TABLET ORAL
COMMUNITY
Start: 2019-11-21 | End: 2020-03-11

## 2020-02-13 RX ORDER — PREGABALIN 50 MG/1
50 CAPSULE ORAL 3 TIMES DAILY
COMMUNITY
End: 2022-01-03

## 2020-02-13 RX ORDER — FLAVOXATE HYDROCHLORIDE 100 MG/1
TABLET ORAL
COMMUNITY
End: 2020-07-30 | Stop reason: SDUPTHER

## 2020-02-13 RX ORDER — ALBUTEROL SULFATE 90 UG/1
AEROSOL, METERED RESPIRATORY (INHALATION)
COMMUNITY
End: 2020-03-11

## 2020-02-13 NOTE — PROGRESS NOTES
I have seen the patient, reviewed the Student's history and physical, assessment and plan. I have personally interviewed and examined the patient at bedside and: agree with the findings.     S/p replacement gastric stimulator replacement 5/15/17 and robotic pyloroplasty 3/16.  She had a pyloric dilation 8/31/17.  Her symptoms worsened when her stimulator was turned down for shocking and improved when it was turned back up.  She has mild epigastric pain, moderate to severe nausea and no vomiting.  Her epigastric burning worsened after inhaling a chemical in a family home and after treatment with steroids.  She also has complained of shocking after which went away which resolved after her last adjustment until a week ago after overreaching with her arm and notices she gets a shock with any heavy lifting.  Phenergan usually 2-3 times a day, flavoxate for bladder spasms, ppi bid and pregabulin bid for nerve pain in her arms.  Since I saw her about 2 years ago she has gained 5 pounds (she is at her highest weight).  6/19 ct was basically ok, egd 9/19 ok.  Impression:  Doing ok with gastroparesis but would do better with better diet.  Shocking could be due to the stimulator or due to the pocket.  Plan: Gastric stimulator adjustment: imp 574 voltage 5.5 Current 9.6 Pulse Width 330 Rate 40 Cycle on 3 Cycle off 2  I suggest she see Dr. Gay for weight loss and see the GI nutritionist for gastroparesis diet.  Obtain ges.

## 2020-02-13 NOTE — TELEPHONE ENCOUNTER
----- Message from Shirley Agarwal sent at 2/13/2020 11:54 AM CST -----  Contact: pt [phone]  Returning call from Tenisha. Please call back

## 2020-02-13 NOTE — MEDICAL/APP STUDENT
"  ANAMIKA  Nan Betts, 49 year female, presents with worsening symptoms of gastroparesis. No vomiting. Moderate nausea. Mild epigastric pain. Moderate epigastric burning that started 3 weeks ago after steroid tx in ER due to industrial bronchitis. Her other complaint is that she has had a recurrence of "shocks" originating from her gastric pacemaker that was initiated on movement last Sunday. These symptoms had been recurring on and off for the past year. On 12/18/2019, Dr. Anselmo Lehman readjusted her pacemaker settings after which her symptoms subsided until last Sunday. She is currently taking 25 mg of promethazine 3times/day for gastroparesis, dexilant 60 mg 2 times/day for acid reflux, and pregabalin 50 mg 2 times/day for pain.    Medications  1) Promethazine 25 mg 3 times/day  2) Dexilant 60 mg 2 times/day  3) Pregabalin 50 mg 2 times/day  4) Loperamide 2 mg as needed. Up to 4 pills a day.  5) Simvistatin 40 mg 1/day  6) Estradiol 1 mg 1/day  7) Estradiol 2 mg 1/day  8) SOMA 350 mg as needed  9) Tizanidine 4 mg every 8 hours  10) Flavoxate 100 mg   11) Elmiron 100 mg 3/day  12) Benadryl 50 mg 2/day  13) Probiotic 3/day    Allergies:   None    PMH:  Acid reflux, Gastroparesis, IBS, high cholesterol, bladder spasms, colon spasms, anxiety, TMJ    PSHx:  Gastric Pacemaker, Pyloroplasty, Bilateral Ulnar nerve surgery, PICC line.    SH:  Lives at home with , no travel, smokes 1/2 pack/day, no alcohol use, not much exercise    ROS:  General: Mild weight loss, mild fatigue, no fever/chills, no changes in sleep    HEENT: no headache, no hearing change, no vision change, no vertigo, no congestion, no rhinorrhea, no sore throat    Respiratory: no cough, no hemoptysis, no dyspnea, no wheezing, no pleuritic pain    CV: no palpitations, no diaphoresis, no orthopnea, no edema, no claudication, no pain    GI: No vomiting, diarrhea present, no constipation, no bleeding    : going through menopause for past 3 " years    Urinary: no changes in frequency or urgency, no pain/burning, no hematuria    MSK: chronic back pain, no other muscle/joint pain    Skin: no rashes, no moles, no itching/dryness, no color changes one extremities or abdomen    Endocrine: No hot/cold intolerance, no polyuria/polydipsia, no bleeding, sweating present    Neurologic: No dizziness/fainting, no seizures, mild numbness/tingling along ulnar nerve innervation, no tremor    Psych: No depression, no memory loss, patient reports anxiety    Physical Exam:    Gen: Alert, interactive, well appearing    Neck: No LAD, Normal ROM    Resp: Normal respiratory effort, no wheezing or rales    CV: Normal S1 and S2.    ABD: Soft, mild bloating, no masses    Neuro/Extremities: Normal tone and ROM, strength 5/5, sensation intact, reflexes 3+, CN intact    Skin: Intact, no rashes, no lesions, no erythema    A&P:  1) Work on improving her lifestyle with diet and exercise modifications  2) Adjust gastric pacemaker settings

## 2020-02-13 NOTE — PROGRESS NOTES
"History & Physical    SUBJECTIVE:     History of Present Illness:  Patient is a 49 y.o. female presents with ***. Onset of symptoms was {desc; hpi onset:68241} with {Desc; clinical condition:17::"unchanged"} course since that time. Patient denies ***. Symptoms are aggravated by ***. Symptoms improve with ***. ***     Chief Complaint   Patient presents with    Follow-up       Review of patient's allergies indicates:   Allergen Reactions    Morphine Anaphylaxis    Ultram [tramadol] Shortness Of Breath    Codeine Itching    Gabapentin Other (See Comments)    Ibuprofen     Nsaids (non-steroidal anti-inflammatory drug) Other (See Comments)     GI BLEEDING    Reglan [metoclopramide hcl] Other (See Comments)     Headaches and insomnia      Topamax [topiramate] Other (See Comments)     Ulcers in the mouth and dry mouth       Current Outpatient Medications   Medication Sig Dispense Refill    (Magic mouthwash) 1:1:1 Benadryl 12.5mg/5ml liq, aluminum & magnesium hydroxide-simehticone (Maalox), lidocaine viscous 2% Swish and spit 5 mLs every 4 (four) hours as needed. for mouth sores 90 mL 3    albuterol (PROVENTIL/VENTOLIN HFA) 90 mcg/actuation inhaler ProAir HFA 90 mcg/actuation aerosol inhaler      ALPRAZolam (XANAX) 1 MG tablet Take 1 tablet (1 mg total) by mouth 2 (two) times daily. 60 tablet 1    carisoprodol (SOMA) 350 MG tablet Take 350 mg by mouth 3 (three) times daily as needed for Muscle spasms.      dexlansoprazole (DEXILANT) 60 mg capsule Take 1 capsule (60 mg total) by mouth 2 (two) times daily. 180 capsule 2    diphenhydrAMINE (BENADRYL) 50 MG tablet Take 50 mg by mouth every 4 (four) hours as needed for Itching.      estradiol (ESTRACE) 1 MG tablet Take 3 tablets (3 mg total) by mouth once daily. Take one 1mg pill daily along with the 2mg pill daily for total of 3mg daily 90 tablet 3    estradiol (ESTRACE) 2 MG tablet Take 1 tablet (2 mg total) by mouth once daily. Take one 2mg tab and one 1mg " tab for total of 3 daily 90 tablet 3    flavoxATE (URISPAS) 100 mg Tab Take 1 tablet (100 mg total) by mouth 3 (three) times daily as needed. 270 tablet 0    flavoxATE (URISPAS) 100 mg Tab flavoxate 100 mg tablet      HYDROcodone-acetaminophen (NORCO)  mg per tablet       loperamide (IMODIUM) 2 mg capsule Take 2 capsules (4 mg total) by mouth 2 (two) times daily as needed. 270 capsule 3    methen-sod phos-meth blue-hyos (UROGESIC-BLUE) 81.6-40.8-0.12 mg Tab Take 1 tablet by mouth every 6 (six) hours as needed. 360 tablet 3    methen-sod phos-meth blue-hyos (UROGESIC-BLUE) 81.6-40.8-0.12 mg Tab Take 1 tablet by mouth every 6 (six) hours as needed. 120 tablet 0    pentosan polysulfate (ELMIRON) 100 mg Cap Take 1 capsule (100 mg total) by mouth 3 (three) times daily. 90 capsule 7    pregabalin (LYRICA) 50 MG capsule       promethazine (PHENERGAN) 25 mg/mL injection Inject 25 mg into the vein every 4 (four) hours.      simvastatin (ZOCOR) 40 MG tablet Take 40 mg by mouth every evening.       SODIUM CHLORIDE 0.45 % IV Inject 1,000 mLs into the vein as needed.       sodium chloride 0.9% 0.9 % SolP 50 mL with promethazine 25 mg/mL Soln Inject 25 mg into the vein every 4 (four) hours. Thru IV      tiZANidine 4 mg Cap Take 4 mg by mouth every evening.        No current facility-administered medications for this visit.        Past Medical History:   Diagnosis Date    Abnormal Pap smear of vagina     Anxiety     Cataract     Chronic pain     TMJ and abdomen    Colon polyp     Epilepsy     as a child    Gastroparesis     Idiopathic    GERD (gastroesophageal reflux disease)     H/O abnormal cervical Papanicolaou smear     Hypercholesteremia 6/21/2016    Hyperlipidemia     IC (interstitial cystitis)     Internal hemorrhoids     Interstitial cystitis     Irritable bowel syndrome (IBS)     Irritable bowel syndrome with diarrhea 6/21/2016    Spastic colon     TMJ (temporomandibular joint  disorder)     TMJ (temporomandibular joint syndrome) 6/21/2016     Past Surgical History:   Procedure Laterality Date    APPENDECTOMY      BREAST BIOPSY Bilateral     CATARACT EXTRACTION      CHOLECYSTECTOMY      COLONOSCOPY      COLONOSCOPY N/A 7/31/2018    Procedure: COLONOSCOPY;  Surgeon: Zack Lehman MD;  [facility]  Service: Endoscopy;  Laterality: N/A;    ESOPHAGOGASTRODUODENOSCOPY N/A 7/31/2018    Procedure: EGD (ESOPHAGOGASTRODUODENOSCOPY);  Surgeon: Zack Lehman MD;  [facility]  Service: Endoscopy;  Laterality: N/A;  RUQ gastric pacemaker    Left upper arm PICC line    PONV    ESOPHAGOGASTRODUODENOSCOPY N/A 9/3/2019    Procedure: EGD (ESOPHAGOGASTRODUODENOSCOPY);  Surgeon: Zack Lehman MD;  [facility]  Service: Endoscopy;  Laterality: N/A;  history of gastoparesis, per change in protocol, pt instructed to do full liquid diet x 3 days prior to procedure and clear liquids x 1 day prior to procedure-BB  pt has gastric pacemaker, monitored by Dr. Lehman-BB  hx of epilepsy, last seizure during chi    gastric stimulator placement      LASIK Bilateral 2006    MANDIBLE SURGERY Bilateral 10/17/2016    PYLOROPLASTY  03/2016    STOMACH SURGERY      gastric pacemaker    TEMPOROMANDIBULAR JOINT SURGERY  12/2016    TONSILLECTOMY      TOTAL ABDOMINAL HYSTERECTOMY  2005    EUGENIA/BSO, Trachelectomy 7 years ago    UPPER GASTROINTESTINAL ENDOSCOPY       Family History   Problem Relation Age of Onset    Coronary artery disease Mother     Hodgkin's lymphoma Mother     Coronary artery disease Father     Lymphoma Sister     Coronary artery disease Paternal Grandfather     Glaucoma Maternal Grandmother     Pancreatic cancer Paternal Aunt     Colon cancer Neg Hx     Breast cancer Neg Hx     Ovarian cancer Neg Hx     Cervical cancer Neg Hx     Endometrial cancer Neg Hx     Vaginal cancer Neg Hx      Social History     Tobacco Use     "Smoking status: Current Every Day Smoker     Packs/day: 1.00     Years: 20.00     Pack years: 20.00    Smokeless tobacco: Never Used   Substance Use Topics    Alcohol use: No     Alcohol/week: 0.0 standard drinks    Drug use: No        Review of Systems:  Review of Systems    OBJECTIVE:     Vital Signs (Most Recent)  Pulse: 89 (02/13/20 1511)  BP: (!) 145/80 (02/13/20 1511)  5' 8" (1.727 m)  114.3 kg (252 lb)     Physical Exam:  Physical Exam    Laboratory  {Labs Reviewed:70440::"***"}    Diagnostic Results:  {Results; Diagnostics:28824644::"***"}    ASSESSMENT/PLAN:     ***    PLAN:Plan     {Plan; Diagnostics:26510::"***"}       "

## 2020-02-13 NOTE — TELEPHONE ENCOUNTER
Spoke with patient.  Aware Dexilant was denied.  I have attempted 3 times to get authorization for bid dosing and each time denied.  Patient stated she spoke with the insurance company today and was told it the generic was called in it would be approved.   Ok per Dr. Zack Lehman to call in to pharmacy.

## 2020-02-13 NOTE — PROGRESS NOTES
Subjective:       Patient ID: Nan Betts is a 49 y.o. female.    Chief Complaint: Follow-up    HPI  Review of Systems    Objective:      Physical Exam    Assessment:       No diagnosis found.    Plan:       ***

## 2020-02-13 NOTE — LETTER
[facility] - General Surgery  [address]  [facility] [address]  Phone: [phone] February 13, 2020      Rosi Carias MD  [address] [place] [address] [place] [address]    Patient: Nan Betts   MR Number: [id number]   YOB: 1970   Date of Visit: 2/13/2020     Dear Dr. Carias:    Thank you for referring Nan Betts to me for evaluation. Attached you will find relevant portions of my assessment and plan of care.    Ms. Betts is doing ok with gastroparesis but would do better with better diet.  Shocking could be due to the stimulator or due to the pocket.       Gastric stimulator adjustment: imp 574 voltage 5.5 Current 9.6 Pulse Width 330 Rate 40 Cycle on 3 Cycle off 2      I suggest she see Dr. Gay for weight loss and see the GI nutritionist for gastroparesis diet.  We will obtain GES.    If you have questions, please do not hesitate to call me. I look forward to following Nan Betts along with you.    Sincerely,      David Celeste MD  Professor, [employer]  Section Head, General Surgery  [facility]    WSR/afw    CC  Zack Lehman MD

## 2020-02-14 ENCOUNTER — TELEPHONE (OUTPATIENT)
Dept: SURGERY | Facility: CLINIC | Age: 50
End: 2020-02-14

## 2020-02-14 NOTE — TELEPHONE ENCOUNTER
Patient states that after leaving her appointment, her stimulator continued to shock her. Causing a 10/10 pain across her abdomen. It lasted over 10 minutes and she's asking what her options are surgically and for pain as well? I am relaying this message to Dr. Celeste .

## 2020-02-14 NOTE — TELEPHONE ENCOUNTER
Call dropped during first attempt, no answer on second attempt, LVM with callback number to [facility].    Reason for Disposition   Unable to complete triage due to phone connection issues   Message left on identified voice mail    Protocols used: NO CONTACT OR DUPLICATE CONTACT CALL-A-AH

## 2020-02-17 ENCOUNTER — TELEPHONE (OUTPATIENT)
Dept: SURGERY | Facility: CLINIC | Age: 50
End: 2020-02-17

## 2020-02-17 NOTE — TELEPHONE ENCOUNTER
Patient will try to come in by Thursday to have stimulator turned down or off. Patient asked if the pocket revision can be done prior to her gastric emptying study? Also states she is in pain and takes extra strength tylenol which does not help. Patient has taken liquid form of gabapentin before (which worked), but her o/s pain management took her off liquids and started her on 900 mg per day tabs. Patient is on Lyrica and wants to know if gabapentin would be ok to take with it? I will forward this message to Dr. Celeste . Pt v/u

## 2020-02-17 NOTE — TELEPHONE ENCOUNTER
Patient has continued to be shocked all weekend by device, and is in excruciating pain. I am going to see what options are available for her.

## 2020-02-18 ENCOUNTER — PATIENT MESSAGE (OUTPATIENT)
Dept: SURGERY | Facility: CLINIC | Age: 50
End: 2020-02-18

## 2020-02-20 ENCOUNTER — OFFICE VISIT (OUTPATIENT)
Dept: SURGERY | Facility: CLINIC | Age: 50
End: 2020-02-20
Payer: COMMERCIAL

## 2020-02-20 VITALS
BODY MASS INDEX: 38.19 KG/M2 | HEIGHT: 68 IN | DIASTOLIC BLOOD PRESSURE: 65 MMHG | WEIGHT: 252 LBS | SYSTOLIC BLOOD PRESSURE: 133 MMHG | HEART RATE: 84 BPM

## 2020-02-20 DIAGNOSIS — K31.84 GASTROPARESIS: Primary | ICD-10-CM

## 2020-02-20 PROCEDURE — 99999 PR PBB SHADOW E&M-EST. PATIENT-LVL III: CPT | Mod: PBBFAC,,, | Performed by: SURGERY

## 2020-02-20 PROCEDURE — 99999 PR PBB SHADOW E&M-EST. PATIENT-LVL III: ICD-10-PCS | Mod: PBBFAC,,, | Performed by: SURGERY

## 2020-02-20 PROCEDURE — 95982 PR ELEC ALYS NSTIM PLS GEN GASTRIC SUBSEQUENT W/REPROG: ICD-10-PCS | Mod: S$GLB,,, | Performed by: SURGERY

## 2020-02-20 PROCEDURE — 99213 PR OFFICE/OUTPT VISIT, EST, LEVL III, 20-29 MIN: ICD-10-PCS | Mod: S$GLB,,, | Performed by: SURGERY

## 2020-02-20 PROCEDURE — 3008F BODY MASS INDEX DOCD: CPT | Mod: CPTII,S$GLB,, | Performed by: SURGERY

## 2020-02-20 PROCEDURE — 99213 OFFICE O/P EST LOW 20 MIN: CPT | Mod: S$GLB,,, | Performed by: SURGERY

## 2020-02-20 PROCEDURE — 3008F PR BODY MASS INDEX (BMI) DOCUMENTED: ICD-10-PCS | Mod: CPTII,S$GLB,, | Performed by: SURGERY

## 2020-02-20 PROCEDURE — 95982 IO GA N-STIM SUBSQ W/REPROG: CPT | Mod: S$GLB,,, | Performed by: SURGERY

## 2020-02-20 NOTE — PROGRESS NOTES
I have seen the patient, reviewed the Student's history and physical, assessment and plan. I have personally interviewed and examined the patient at bedside and: agree with the findings.     S/p replacement gastric stimulator replacement 5/15/17 and robotic pyloroplasty 3/16.  She had a pyloric dilation 8/31/17.  After increasing her settings last week her shocking, nausea and abdominal pain have worsened.  She is not taking narcotics.  Gastric stimulator adjustment: imp 580 voltage 4.5 Current 7.8 Pulse Width 330 Rate 40 Cycle on 3 Cycle off 2.  Awaiting appointment with Dr. Gay, GI nutritionist and GES.  She asked for liquid gabapentin but she is already on lyrica.

## 2020-02-20 NOTE — PROGRESS NOTES
"History & Physical    SUBJECTIVE:     History of Present Illness:  Nan Betts, 49 year female, presents with worsening symptoms of gastroparesis. No vomiting. Moderate nausea. Mild epigastric pain. Moderate epigastric burning that started 3 weeks ago after steroid tx in ER due to industrial bronchitis. Her other complaint is that she has had a recurrence of "shocks" originating from her gastric pacemaker that was initiated on movement last Sunday. These symptoms had been recurring on and off for the past year. On 12/18/2019, Dr. Anselmo Lehman readjusted her pacemaker settings after which her symptoms subsided until last Sunday. She is currently taking 25 mg of promethazine 3times/day for gastroparesis, dexilant 60 mg 2 times/day for acid reflux, and pregabalin 50 mg 2 times/day for pain.    Interval Update 2/20/2020:  Since her previous visit on 2/13/2020, she has been experiencing increased frequency in shocks and still experiencing nausea. New band like pain across the abdomenn. New symptom of light-headedness.      Chief Complaint   Patient presents with    Follow-up       Review of patient's allergies indicates:   Allergen Reactions    Morphine Anaphylaxis    Ultram [tramadol] Shortness Of Breath    Codeine Itching    Gabapentin Other (See Comments)    Ibuprofen     Nsaids (non-steroidal anti-inflammatory drug) Other (See Comments)     GI BLEEDING    Reglan [metoclopramide hcl] Other (See Comments)     Headaches and insomnia      Topamax [topiramate] Other (See Comments)     Ulcers in the mouth and dry mouth       Current Outpatient Medications   Medication Sig Dispense Refill    (Magic mouthwash) 1:1:1 Benadryl 12.5mg/5ml liq, aluminum & magnesium hydroxide-simehticone (Maalox), lidocaine viscous 2% Swish and spit 5 mLs every 4 (four) hours as needed. for mouth sores 90 mL 3    albuterol (PROVENTIL/VENTOLIN HFA) 90 mcg/actuation inhaler ProAir HFA 90 mcg/actuation aerosol inhaler      ALPRAZolam " (XANAX) 1 MG tablet Take 1 tablet (1 mg total) by mouth 2 (two) times daily. 60 tablet 1    carisoprodol (SOMA) 350 MG tablet Take 350 mg by mouth 3 (three) times daily as needed for Muscle spasms.      dexlansoprazole (DEXILANT) 60 mg capsule Take 1 capsule (60 mg total) by mouth 2 (two) times daily. 180 capsule 2    diphenhydrAMINE (BENADRYL) 50 MG tablet Take 50 mg by mouth every 4 (four) hours as needed for Itching.      estradiol (ESTRACE) 1 MG tablet Take 3 tablets (3 mg total) by mouth once daily. Take one 1mg pill daily along with the 2mg pill daily for total of 3mg daily 90 tablet 3    estradiol (ESTRACE) 2 MG tablet Take 1 tablet (2 mg total) by mouth once daily. Take one 2mg tab and one 1mg tab for total of 3 daily 90 tablet 3    flavoxATE (URISPAS) 100 mg Tab Take 1 tablet (100 mg total) by mouth 3 (three) times daily as needed. 270 tablet 0    flavoxATE (URISPAS) 100 mg Tab flavoxate 100 mg tablet      HYDROcodone-acetaminophen (NORCO)  mg per tablet       loperamide (IMODIUM) 2 mg capsule Take 2 capsules (4 mg total) by mouth 2 (two) times daily as needed. 270 capsule 3    methen-sod phos-meth blue-hyos (UROGESIC-BLUE) 81.6-40.8-0.12 mg Tab Take 1 tablet by mouth every 6 (six) hours as needed. 360 tablet 3    methen-sod phos-meth blue-hyos (UROGESIC-BLUE) 81.6-40.8-0.12 mg Tab Take 1 tablet by mouth every 6 (six) hours as needed. 120 tablet 0    pentosan polysulfate (ELMIRON) 100 mg Cap Take 1 capsule (100 mg total) by mouth 3 (three) times daily. 90 capsule 7    pregabalin (LYRICA) 50 MG capsule       promethazine (PHENERGAN) 25 mg/mL injection Inject 25 mg into the vein every 4 (four) hours.      simvastatin (ZOCOR) 40 MG tablet Take 40 mg by mouth every evening.       SODIUM CHLORIDE 0.45 % IV Inject 1,000 mLs into the vein as needed.       sodium chloride 0.9% 0.9 % SolP 50 mL with promethazine 25 mg/mL Soln Inject 25 mg into the vein every 4 (four) hours. Thru IV       tiZANidine 4 mg Cap Take 4 mg by mouth every evening.        No current facility-administered medications for this visit.        Past Medical History:   Diagnosis Date    Abnormal Pap smear of vagina     Anxiety     Cataract     Chronic pain     TMJ and abdomen    Colon polyp     Epilepsy     as a child    Gastroparesis     Idiopathic    GERD (gastroesophageal reflux disease)     H/O abnormal cervical Papanicolaou smear     Hypercholesteremia 6/21/2016    Hyperlipidemia     IC (interstitial cystitis)     Internal hemorrhoids     Interstitial cystitis     Irritable bowel syndrome (IBS)     Irritable bowel syndrome with diarrhea 6/21/2016    Spastic colon     TMJ (temporomandibular joint disorder)     TMJ (temporomandibular joint syndrome) 6/21/2016     Past Surgical History:   Procedure Laterality Date    APPENDECTOMY      BREAST BIOPSY Bilateral     CATARACT EXTRACTION      CHOLECYSTECTOMY      COLONOSCOPY      COLONOSCOPY N/A 7/31/2018    Procedure: COLONOSCOPY;  Surgeon: Zack Lehman MD;  [facility] ([address] [place] [address]);  Service: Endoscopy;  Laterality: N/A;    ESOPHAGOGASTRODUODENOSCOPY N/A 7/31/2018    Procedure: EGD (ESOPHAGOGASTRODUODENOSCOPY);  Surgeon: Zack Lehman MD;  Location[facility] ([address] [place] [address]);  Service: Endoscopy;  Laterality: N/A;  RUQ gastric pacemaker    Left upper arm PICC line    PONV    ESOPHAGOGASTRODUODENOSCOPY N/A 9/3/2019    Procedure: EGD (ESOPHAGOGASTRODUODENOSCOPY);  Surgeon: Zack Lehman MD;  Location[facility] ([address] [place] [address]);  Service: Endoscopy;  Laterality: N/A;  history of gastoparesis, per change in protocol, pt instructed to do full liquid diet x 3 days prior to procedure and clear liquids x 1 day prior to procedure-BB  pt has gastric pacemaker, monitored by Dr. Lehman-BB  hx of epilepsy, last seizure during chi    gastric stimulator placement      LASIK Bilateral 2006    MANDIBLE SURGERY Bilateral 10/17/2016    PYLOROPLASTY  03/2016    STOMACH  SURGERY      gastric pacemaker    TEMPOROMANDIBULAR JOINT SURGERY  12/2016    TONSILLECTOMY      TOTAL ABDOMINAL HYSTERECTOMY  2005    EUGENIA/BSO, Trachelectomy 7 years ago    UPPER GASTROINTESTINAL ENDOSCOPY       Family History   Problem Relation Age of Onset    Coronary artery disease Mother     Hodgkin's lymphoma Mother     Coronary artery disease Father     Lymphoma Sister     Coronary artery disease Paternal Grandfather     Glaucoma Maternal Grandmother     Pancreatic cancer Paternal Aunt     Colon cancer Neg Hx     Breast cancer Neg Hx     Ovarian cancer Neg Hx     Cervical cancer Neg Hx     Endometrial cancer Neg Hx     Vaginal cancer Neg Hx      Social History     Tobacco Use    Smoking status: Current Every Day Smoker     Packs/day: 1.00     Years: 20.00     Pack years: 20.00    Smokeless tobacco: Never Used   Substance Use Topics    Alcohol use: No     Alcohol/week: 0.0 standard drinks    Drug use: No        Review of Systems:  General: Mild weight loss, mild fatigue, no fever/chills, no changes in sleep     HEENT: no headache, no hearing change, no vision change, no vertigo, no congestion, no rhinorrhea, no sore throat     Respiratory: no cough, no hemoptysis, no dyspnea, no wheezing, no pleuritic pain     CV: no palpitations, no diaphoresis, no orthopnea, no edema, no claudication, no pain     GI: No vomiting, diarrhea present, no constipation, no bleeding     : going through menopause for past 3 years     Urinary: no changes in frequency or urgency, no pain/burning, no hematuria     MSK: chronic back pain, no other muscle/joint pain     Skin: no rashes, no moles, no itching/dryness, no color changes one extremities or abdomen     Endocrine: No hot/cold intolerance, no polyuria/polydipsia, no bleeding, sweating present     Neurologic: No dizziness/fainting, no seizures, mild numbness/tingling along ulnar nerve innervation, no tremor     Psych: No depression, no memory loss, patient  "reports anxiety     Physical Exam:     Gen: Alert, interactive, well appearing     Neck: No LAD, Normal ROM     Resp: Normal respiratory effort, no wheezing or rales     CV: Normal S1 and S2.     ABD: Soft, mild bloating, no masses     Neuro/Extremities: Normal tone and ROM, strength 5/5, sensation intact, reflexes 3+, CN intact     Skin: Intact, no rashes, no lesions, no erythema    OBJECTIVE:     Vital Signs (Most Recent)  Pulse: 84 (02/20/20 1323)  BP: 133/65 (02/20/20 1323)  5' 8" (1.727 m)  114.3 kg (252 lb)     Physical Exam:  Gen: Alert, interactive, well appearing     Neck: No LAD, Normal ROM     Resp: Normal respiratory effort, no wheezing or rales     CV: Normal S1 and S2.     ABD: Soft, mild bloating, no masses     Neuro/Extremities: Normal tone and ROM, strength 5/5, sensation intact, reflexes 3+, CN intact     Skin: Intact, no rashes, no lesions, no erythema      Laboratory  No recent Labs  Diagnostic Results:  No recent results    ASSESSMENT/PLAN:   Shocking likely due to voltage, lowered voltage today      PLAN:Plan   Await Gastric emptying study  Set up with weight loss nutritionist and Dr Meza         "

## 2020-02-21 ENCOUNTER — TELEPHONE (OUTPATIENT)
Dept: ENDOSCOPY | Facility: HOSPITAL | Age: 50
End: 2020-02-21

## 2020-02-26 ENCOUNTER — PATIENT MESSAGE (OUTPATIENT)
Dept: GASTROENTEROLOGY | Facility: CLINIC | Age: 50
End: 2020-02-26

## 2020-02-26 ENCOUNTER — PATIENT MESSAGE (OUTPATIENT)
Dept: SURGERY | Facility: CLINIC | Age: 50
End: 2020-02-26

## 2020-02-29 ENCOUNTER — PATIENT MESSAGE (OUTPATIENT)
Dept: GASTROENTEROLOGY | Facility: CLINIC | Age: 50
End: 2020-02-29

## 2020-03-02 NOTE — TELEPHONE ENCOUNTER
Spoke with patient.  Scheduled to see Dr.James Lehman on Tuesday 3/3 for 1:00.  Patient is aware  is on hospital service and there may be a wait to see him once she is here.

## 2020-03-03 ENCOUNTER — HOSPITAL ENCOUNTER (OUTPATIENT)
Dept: RADIOLOGY | Facility: HOSPITAL | Age: 50
Discharge: HOME | End: 2020-03-03
Attending: SURGERY
Payer: COMMERCIAL

## 2020-03-03 ENCOUNTER — PATIENT MESSAGE (OUTPATIENT)
Dept: GASTROENTEROLOGY | Facility: CLINIC | Age: 50
End: 2020-03-03

## 2020-03-03 ENCOUNTER — TELEPHONE (OUTPATIENT)
Dept: GASTROENTEROLOGY | Facility: CLINIC | Age: 50
End: 2020-03-03

## 2020-03-03 ENCOUNTER — OFFICE VISIT (OUTPATIENT)
Dept: GASTROENTEROLOGY | Facility: CLINIC | Age: 50
End: 2020-03-03
Payer: COMMERCIAL

## 2020-03-03 VITALS
SYSTOLIC BLOOD PRESSURE: 130 MMHG | BODY MASS INDEX: 38.42 KG/M2 | HEART RATE: 83 BPM | WEIGHT: 253.5 LBS | HEIGHT: 68 IN | DIASTOLIC BLOOD PRESSURE: 81 MMHG

## 2020-03-03 DIAGNOSIS — R11.2 INTRACTABLE NAUSEA AND VOMITING: ICD-10-CM

## 2020-03-03 DIAGNOSIS — K31.84 GASTROPARESIS: Primary | ICD-10-CM

## 2020-03-03 DIAGNOSIS — K31.84 GASTROPARESIS: ICD-10-CM

## 2020-03-03 PROCEDURE — 99999 PR PBB SHADOW E&M-EST. PATIENT-LVL III: ICD-10-PCS | Mod: PBBFAC,,, | Performed by: INTERNAL MEDICINE

## 2020-03-03 PROCEDURE — 3008F PR BODY MASS INDEX (BMI) DOCUMENTED: ICD-10-PCS | Mod: CPTII,S$GLB,, | Performed by: INTERNAL MEDICINE

## 2020-03-03 PROCEDURE — 78264 GASTRIC EMPTYING IMG STUDY: CPT | Mod: TC

## 2020-03-03 PROCEDURE — 99999 PR PBB SHADOW E&M-EST. PATIENT-LVL III: CPT | Mod: PBBFAC,,, | Performed by: INTERNAL MEDICINE

## 2020-03-03 PROCEDURE — 78264 NM GASTRIC EMPTYING: ICD-10-PCS | Mod: 26,,, | Performed by: RADIOLOGY

## 2020-03-03 PROCEDURE — 99214 OFFICE O/P EST MOD 30 MIN: CPT | Mod: S$GLB,,, | Performed by: INTERNAL MEDICINE

## 2020-03-03 PROCEDURE — 78264 GASTRIC EMPTYING IMG STUDY: CPT | Mod: 26,,, | Performed by: RADIOLOGY

## 2020-03-03 PROCEDURE — 3008F BODY MASS INDEX DOCD: CPT | Mod: CPTII,S$GLB,, | Performed by: INTERNAL MEDICINE

## 2020-03-03 PROCEDURE — 99214 PR OFFICE/OUTPT VISIT, EST, LEVL IV, 30-39 MIN: ICD-10-PCS | Mod: S$GLB,,, | Performed by: INTERNAL MEDICINE

## 2020-03-03 RX ORDER — DEXLANSOPRAZOLE 60 MG/1
1 CAPSULE, DELAYED RELEASE ORAL 2 TIMES DAILY
Qty: 180 CAPSULE | Refills: 2 | Status: SHIPPED | OUTPATIENT
Start: 2020-03-03 | End: 2020-05-29 | Stop reason: SDUPTHER

## 2020-03-03 NOTE — PROGRESS NOTES
Reviewing records.  When I saw are for the 1st time in 2016 we had discussed the issue of domperidone.  I think it was excluded at the time because she did have a history of intermittent palpitations and she does have a strong family history of cardiac disease and that dissuaded her from any interest in that trial.

## 2020-03-03 NOTE — PROGRESS NOTES
A teen was seen again as an urgent add on.  This was during non clinic hours.  She has a with gastroparesis.  Has a gastric stimulator.  She has been having lots of problems with the stimulator recently.  Increasing the voltage lead to some shocking sensation.  I saw her last in the office she was having shocking related to the battery.  She reports that she still has the shocking.  The shocking can be positional or it can occur at rest or with activity.  She has particularly noticed it after heavy lifting.  When the stimulator was turned off this led to a distinct increase in her symptoms with more nausea and more vomiting.    Now she reports nausea the nausea seems to be pretty significant now.  She has not had vomiting in the last several days.  Her reflux symptoms a can be severe.  She is on Dexilant for that.    I interrogated the stimulator.  Appropriate impedance.  I changed the amplitude from 4.5-5.5.  The cycling remains the same at 3 sec on into 2nd soft.    Assessment  1.  Gastroparesis.  I think we doing just about everything we can right now.  She has a gastric stimulator.  I will review my notes to see if we ever considered or try domperidone before.  I am not sure with all of her medications whether she would be an exclusion on our trial for that.  Explained that there may not be much else I can do.  She is getting a 2nd opinion from another surgeon injection [place].  She wants to have the stimulator replaced because of the shocking.    25 min appointment greater half time face-to-face counseling

## 2020-03-03 NOTE — TELEPHONE ENCOUNTER
----- Message from Zack Lehman MD sent at 3/3/2020  2:21 PM CST -----  done  ----- Message -----  From: Bernice Blackburn MA  Sent: 3/3/2020   2:13 PM CST  To: MD Leda Sánchez, she is requesting a new Rx for generic Dexilant, bid dosing, 90 day supply, be sent to [facility] pharmacy listed in Epic.

## 2020-03-11 ENCOUNTER — HOSPITAL ENCOUNTER (OUTPATIENT)
Dept: RADIOLOGY | Facility: HOSPITAL | Age: 50
Discharge: HOME | End: 2020-03-11
Attending: OBSTETRICS & GYNECOLOGY
Payer: COMMERCIAL

## 2020-03-11 ENCOUNTER — OFFICE VISIT (OUTPATIENT)
Dept: OBSTETRICS AND GYNECOLOGY | Facility: CLINIC | Age: 50
End: 2020-03-11
Payer: COMMERCIAL

## 2020-03-11 ENCOUNTER — PATIENT MESSAGE (OUTPATIENT)
Dept: SURGERY | Facility: CLINIC | Age: 50
End: 2020-03-11

## 2020-03-11 VITALS
WEIGHT: 250.31 LBS | BODY MASS INDEX: 37.94 KG/M2 | SYSTOLIC BLOOD PRESSURE: 127 MMHG | DIASTOLIC BLOOD PRESSURE: 85 MMHG | HEIGHT: 68 IN

## 2020-03-11 DIAGNOSIS — Z12.31 VISIT FOR SCREENING MAMMOGRAM: ICD-10-CM

## 2020-03-11 DIAGNOSIS — Z01.419 WELL WOMAN EXAM WITH ROUTINE GYNECOLOGICAL EXAM: Primary | ICD-10-CM

## 2020-03-11 DIAGNOSIS — Z78.0 MENOPAUSE: ICD-10-CM

## 2020-03-11 PROCEDURE — 77063 BREAST TOMOSYNTHESIS BI: CPT | Mod: 26,,, | Performed by: RADIOLOGY

## 2020-03-11 PROCEDURE — 99999 PR PBB SHADOW E&M-EST. PATIENT-LVL III: ICD-10-PCS | Mod: PBBFAC,,, | Performed by: OBSTETRICS & GYNECOLOGY

## 2020-03-11 PROCEDURE — 77063 MAMMO DIGITAL SCREENING BILAT WITH TOMOSYNTHESIS_CAD: ICD-10-PCS | Mod: 26,,, | Performed by: RADIOLOGY

## 2020-03-11 PROCEDURE — 77067 MAMMO DIGITAL SCREENING BILAT WITH TOMOSYNTHESIS_CAD: ICD-10-PCS | Mod: 26,,, | Performed by: RADIOLOGY

## 2020-03-11 PROCEDURE — 99396 PR PREVENTIVE VISIT,EST,40-64: ICD-10-PCS | Mod: S$GLB,,, | Performed by: OBSTETRICS & GYNECOLOGY

## 2020-03-11 PROCEDURE — 99999 PR PBB SHADOW E&M-EST. PATIENT-LVL III: CPT | Mod: PBBFAC,,, | Performed by: OBSTETRICS & GYNECOLOGY

## 2020-03-11 PROCEDURE — 77067 SCR MAMMO BI INCL CAD: CPT | Mod: 26,,, | Performed by: RADIOLOGY

## 2020-03-11 PROCEDURE — 99396 PREV VISIT EST AGE 40-64: CPT | Mod: S$GLB,,, | Performed by: OBSTETRICS & GYNECOLOGY

## 2020-03-11 PROCEDURE — 77067 SCR MAMMO BI INCL CAD: CPT | Mod: TC

## 2020-03-11 RX ORDER — ESTRADIOL 2 MG/1
2 TABLET ORAL DAILY
Qty: 90 TABLET | Refills: 3 | Status: SHIPPED | OUTPATIENT
Start: 2020-03-11 | End: 2021-04-15 | Stop reason: SDUPTHER

## 2020-03-11 RX ORDER — ESTRADIOL 1 MG/1
1 TABLET ORAL DAILY
Qty: 90 TABLET | Refills: 3 | Status: SHIPPED | OUTPATIENT
Start: 2020-03-11 | End: 2021-04-15 | Stop reason: DRUGHIGH

## 2020-03-11 RX ORDER — ALPRAZOLAM 1 MG/1
1 TABLET ORAL 2 TIMES DAILY PRN
COMMUNITY
Start: 2020-02-26

## 2020-03-11 NOTE — PROGRESS NOTES
Subjective:       Patient ID: Nan Betts is a 50 y.o. female.    Chief Complaint:  Annual Exam      History of Present Illness  HPI  This 50 yr old P0 female is here for WWE.  She has had a hyst.  She had mammogram today and not read yet.  She had normal pap in 2018.  She is taking estrace 3mg daily.  She has history of endometriosis and we discussed again how estrogen can feed this but she is miserable without it.  She is having her pacemaker for her gastric stimulator changed in [facility] and she is having abdominal pain like she has had in the past.  When she had this initially placed by Dr Celeste in 2016, Dr Rivera was called into the room to eval the pelvis .  She did not have endo per op report but some scar tissue that he removed.  She would like her op report sent to the surgeon in [place] , Dr HERLINDA Joaquin ,so he will know what was found before and might take down any reformed scar tissue.     GYN & OB History  No LMP recorded. Patient has had a hysterectomy.   Date of Last Pap: 3/27/2018    OB History    Para Term  AB Living   0 0 0 0 0 0   SAB TAB Ectopic Multiple Live Births   0 0 0 0         Review of Systems  Review of Systems   Constitutional: Negative for chills and fever.   Respiratory: Negative for shortness of breath.    Cardiovascular: Negative for chest pain.   Gastrointestinal: Negative for abdominal pain, nausea and vomiting.   Genitourinary: Positive for pelvic pain. Negative for difficulty urinating, dyspareunia, genital sores, menstrual problem, vaginal bleeding, vaginal discharge and vaginal pain.   Skin: Negative for wound.   Hematological: Negative for adenopathy.           Objective:   Physical Exam:   Constitutional: She is oriented to person, place, and time. She appears well-developed and well-nourished.    HENT:   Head: Normocephalic.    Eyes: EOM are normal.    Neck: Normal range of motion.    Cardiovascular: Normal rate.     Pulmonary/Chest: Effort  normal. She exhibits no mass and no tenderness. Right breast exhibits no inverted nipple, no mass, no skin change and no tenderness. Left breast exhibits no inverted nipple, no mass, no skin change and no tenderness.        Abdominal: Soft. She exhibits no distension. There is no tenderness.     Genitourinary: Vagina normal. There is no rash, tenderness or lesion on the right labia. There is no rash, tenderness or lesion on the left labia. Uterus is absent. Uterus is not tender. Cervix is normal. Right adnexum displays no mass, no tenderness and no fullness. Left adnexum displays no mass, no tenderness and no fullness. Cervix exhibits no discharge.   Genitourinary Comments: Cervix is surgically removed.           Musculoskeletal: Normal range of motion.       Neurological: She is alert and oriented to person, place, and time.    Skin: Skin is warm and dry.    Psychiatric: She has a normal mood and affect.          Assessment:        1. Well woman exam with routine gynecological exam    2. Menopause               Plan:      Will send her op report to the [facility] in [place] with note for explanation  Mammogram yearly  Pap every 5 yrs.

## 2020-03-12 ENCOUNTER — PATIENT MESSAGE (OUTPATIENT)
Dept: OBSTETRICS AND GYNECOLOGY | Facility: CLINIC | Age: 50
End: 2020-03-12

## 2020-03-16 ENCOUNTER — PATIENT MESSAGE (OUTPATIENT)
Dept: OBSTETRICS AND GYNECOLOGY | Facility: CLINIC | Age: 50
End: 2020-03-16

## 2020-03-26 ENCOUNTER — PATIENT MESSAGE (OUTPATIENT)
Dept: GASTROENTEROLOGY | Facility: CLINIC | Age: 50
End: 2020-03-26

## 2020-04-28 ENCOUNTER — PATIENT MESSAGE (OUTPATIENT)
Dept: GASTROENTEROLOGY | Facility: CLINIC | Age: 50
End: 2020-04-28

## 2020-05-06 ENCOUNTER — TELEPHONE (OUTPATIENT)
Dept: GASTROENTEROLOGY | Facility: CLINIC | Age: 50
End: 2020-05-06

## 2020-05-06 NOTE — TELEPHONE ENCOUNTER
Message left for patient to return my call regarding changing her appointment with Dr.James Lehman from 5/20 to 5/19.   will not be in the office on 5/20.

## 2020-05-24 DIAGNOSIS — N30.10 INTERSTITIAL CYSTITIS: ICD-10-CM

## 2020-05-24 DIAGNOSIS — R39.15 URINARY URGENCY: ICD-10-CM

## 2020-05-25 RX ORDER — FLAVOXATE HYDROCHLORIDE 100 MG/1
100 TABLET ORAL 3 TIMES DAILY PRN
Qty: 270 TABLET | Refills: 0 | OUTPATIENT
Start: 2020-05-25

## 2020-05-27 ENCOUNTER — PATIENT MESSAGE (OUTPATIENT)
Dept: UROLOGY | Facility: CLINIC | Age: 50
End: 2020-05-27

## 2020-05-29 ENCOUNTER — PATIENT MESSAGE (OUTPATIENT)
Dept: GASTROENTEROLOGY | Facility: CLINIC | Age: 50
End: 2020-05-29

## 2020-05-29 RX ORDER — DEXLANSOPRAZOLE 60 MG/1
1 CAPSULE, DELAYED RELEASE ORAL DAILY
Qty: 90 CAPSULE | Refills: 2 | Status: CANCELLED | OUTPATIENT
Start: 2020-05-29

## 2020-05-29 RX ORDER — DEXLANSOPRAZOLE 60 MG/1
1 CAPSULE, DELAYED RELEASE ORAL DAILY
Qty: 90 CAPSULE | Refills: 3 | Status: SHIPPED | OUTPATIENT
Start: 2020-05-29 | End: 2021-01-04 | Stop reason: SDUPTHER

## 2020-06-12 ENCOUNTER — TELEPHONE (OUTPATIENT)
Dept: GASTROENTEROLOGY | Facility: CLINIC | Age: 50
End: 2020-06-12

## 2020-06-12 NOTE — TELEPHONE ENCOUNTER
Spoke with patient. She wanted to let Dr Lehman know that on Tuesday the surgeon had to go in put a drainage tube where the old pacemaker was due to filling up with fluid. She is having problems with no blood draw back from the [facility] and they have problems drawing blood from her. The [facility] team in [place] told her she needs to have a port put in for blood draws and keep the [facility] since she accesses it herself at home. Patient reports that she spoke with Dr Lopez, a general surgeon, in [place] about having a port placed. Dr Lopez office states that they need an order from Dr Lehman for port placement and not to remove [facility] line. If Dr Lehman agrees, the order has to be faxed to [phone].

## 2020-06-12 NOTE — TELEPHONE ENCOUNTER
----- Message from Shirley Agarwal sent at 6/12/2020 11:41 AM CDT -----  Contact: pt#[phone]  Pt wants to speak with regarding referral. She did not want to give details

## 2020-06-16 ENCOUNTER — TELEPHONE (OUTPATIENT)
Dept: GASTROENTEROLOGY | Facility: CLINIC | Age: 50
End: 2020-06-16

## 2020-06-16 NOTE — TELEPHONE ENCOUNTER
Spoke with patient.  Aware Dr.James Lehman's recommendation is for patient to reach out to her PCP or internal medicine doctor to coordinate her care and to refer her to a surgeon, if needed, for a port placement.  Patient expressed understanding.

## 2020-06-16 NOTE — TELEPHONE ENCOUNTER
----- Message from Zack Lehman MD sent at 6/16/2020 11:25 AM CDT -----  No, I don't  ----- Message -----  From: Bernice Blackburn MA  Sent: 6/16/2020  11:02 AM CDT  To: MD Leda Sánchez, do you know what type of port she needs?   Angela  ----- Message -----  From: Shirley Agarwal  Sent: 6/16/2020  10:53 AM CDT  To: Dominik BASS Staff    Crescencio with [facility] Dr Lopez# op#5    She's calling to see what type of port pt needs. Please call

## 2020-06-16 NOTE — TELEPHONE ENCOUNTER
----- Message from Zack Lehman MD sent at 6/16/2020 11:27 AM CDT -----  I let the surgeon make the decision  ----- Message -----  From: Bernice Blackburn MA  Sent: 6/16/2020  11:02 AM CDT  To: MD Leda Sánchez, do you know what type of port she needs?   Angela  ----- Message -----  From: Shirley Agarwal  Sent: 6/16/2020  10:53 AM CDT  To: Dominik BASS Staff    Crescencio with [facility] Dr Lopez# op#5    She's calling to see what type of port pt needs. Please call

## 2020-07-28 ENCOUNTER — TELEPHONE (OUTPATIENT)
Dept: GASTROENTEROLOGY | Facility: CLINIC | Age: 50
End: 2020-07-28

## 2020-07-28 NOTE — TELEPHONE ENCOUNTER
----- Message from Zack Lehman MD sent at 7/28/2020  9:34 AM CDT -----  Contact: pt:  I won't be able to, too busy a day  ----- Message -----  From: Bernice Blackburn MA  Sent: 7/28/2020   9:26 AM CDT  To: Zack Lehman MD    She is scheduled to see you on 8/12 to check her pacemaker.  She has been experiencing pain.  She has appointments on 7/30 for 8:40 and 10:30.  She would like to know if you could see her on 7/30?  Angela  ----- Message -----  From: Mone Pritchett  Sent: 7/27/2020   3:37 PM CDT  To: Dominik BASS Staff    Pt would like to know if possible would she be able to come in on 7/30 to see Dr. Lehman, pt needs to be seen re her pace maker, unable to view schedule           Please contact pt: [phone]

## 2020-07-29 ENCOUNTER — TELEPHONE (OUTPATIENT)
Dept: BARIATRICS | Facility: CLINIC | Age: 50
End: 2020-07-29

## 2020-07-29 NOTE — TELEPHONE ENCOUNTER
Spoke to pt in regards to confirming consult with ,Pt confirmed arrival.  Pt mentioned she currently has a pacemaker, informed pt her weight will not be taken on the [employer] scale.

## 2020-07-29 NOTE — PROGRESS NOTES
Subjective:       Patient ID: Nan Betts is a 50 y.o. female.    Chief Complaint: Consult    CC:    Current attempts at weight loss: New pt to me, referred by David Celeste MD  [address]  [place]  [place] [address] , with Patient Active Problem List:     GERD (gastroesophageal reflux disease)     Gastroparesis     Urge urinary incontinence     Interstitial cystitis     Anxiety     Vaginal atrophy     Vaginal pain     Dyspareunia     Intractable nausea and vomiting     Hypercholesteremia     Irritable bowel syndrome with diarrhea     Left lower quadrant pain     Diarrhea     Presence of gastric pacemaker-    S/p replacement gastric stimulator replacement 5/15/17 and robotic pyloroplasty 3/16.  She had a pyloric dilation 8/31/17.  After increasing her settings last week her shocking, nausea and abdominal pain have worsened.  She is not taking narcotics. She has had pace maker replaced. Has been adjusted and . Stat     H/O pyloroplasty     Bloating     Epigastric pain     Colon polyp     No results found for: HGBA1C  Lab Results       Component                Value               Date                       CREATININE               0.9                 12/18/2019          No exercise as she had a seroma after pacer replaced. She was not exercising regularly anyhow.          Previous diet attempts:    History of medication for loss: was on topiramate in past. Gave her mouth ulcers. Phentermine- in 2012. States did well with it. Was exercising at the time as well.     Heaviest weight: 257#    Lightest weight: 135#    Goal weight: 160#      Last eye exam:     Last year. No glaucoma per pt.                                    Provider:    Typical eating patterns:   she is eating more normal for her now. States varies from day to day.  Sometimes Just liquids- coke, sprite, milk. If solids- fried fish, grilled chicken, broccoli, butter beans, corn.   No protein drinks, etc.     Willingness to change:  "9/10    Cardiac studies:   Normal sinus rhythm   Normal ECG   No previous ECGs available     BMR:1844 (calculator)    PBF:  Inbody deferred 2/2 to gastric pacemaker.     Review of Systems   Constitutional: Positive for fatigue. Negative for chills and fever.   Respiratory: Positive for shortness of breath.         + snoring   Cardiovascular: Negative for chest pain and leg swelling.   Gastrointestinal: Positive for constipation, diarrhea and nausea.   Genitourinary: Positive for dysuria. Negative for difficulty urinating.        S/p hyst. + IC   Musculoskeletal: Positive for back pain. Negative for arthralgias.   Neurological: Negative for dizziness and light-headedness.        + vertigo   Psychiatric/Behavioral: Negative for dysphoric mood. The patient is nervous/anxious.          Objective:     /72   Pulse 82   Ht 5' 8" (1.727 m)   Wt 116.9 kg (257 lb 11.5 oz)   SpO2 95%   BMI 39.19 kg/m²    Physical Exam  Vitals signs reviewed.   Constitutional:       General: She is not in acute distress.     Appearance: She is well-developed. She is obese.   HENT:      Head: Normocephalic and atraumatic.   Eyes:      General: No scleral icterus.     Pupils: Pupils are equal, round, and reactive to light.   Neck:      Musculoskeletal: Normal range of motion and neck supple.      Thyroid: No thyromegaly.   Cardiovascular:      Rate and Rhythm: Normal rate.      Heart sounds: Normal heart sounds. No murmur. No friction rub. No gallop.    Pulmonary:      Effort: Pulmonary effort is normal. No respiratory distress.      Breath sounds: Normal breath sounds. No wheezing.   Musculoskeletal: Normal range of motion.   Lymphadenopathy:      Cervical: No cervical adenopathy.   Skin:     General: Skin is warm and dry.      Findings: No erythema.   Neurological:      Mental Status: She is alert and oriented to person, place, and time.      Cranial Nerves: No cranial nerve deficit.   Psychiatric:         Behavior: Behavior normal.  "        Judgment: Judgment normal.         Assessment:       1. Obesity, Class II, BMI 35-39.9, no comorbidity    2. Gastroparesis        Plan:             1. Obesity, Class II, BMI 35-39.9, no comorbidity  Patient warned of common side effects of phentermine including anxiety, insomnia, palpitations and increased blood pressure. It was also explained that it is for short-term usage along with diet and exercise, and that stopping the medication without making lifestyle changes will result in regain of weight. Patient states understanding.     Weight loss medications are controlled substances.  They require routine follow up. Prescription or pills that are lost or destroyed will not be replaced.       Start phentermine with 1/2 pill a day for at least 1-2 weeks to see if that will control your appetite.  Go up to a full pill when needed.       No soda, sweet tea, juices or lemonade. All drinks should be 5 calories or less.     Bariatric soft diet instructions given. Discussed needing more protein and less sugar. Modified liquid diet (1 small meal a day) instructions also given.     2. Gastroparesis    Bariatric soft diet instructions given. Discussed needing more protein and less sugar. Modified liquid diet (1 small meal a day) instructions also given.     Can see how she is tolerating this at follow up.

## 2020-07-30 ENCOUNTER — TELEPHONE (OUTPATIENT)
Dept: UROLOGY | Facility: CLINIC | Age: 50
End: 2020-07-30

## 2020-07-30 ENCOUNTER — OFFICE VISIT (OUTPATIENT)
Dept: UROLOGY | Facility: CLINIC | Age: 50
End: 2020-07-30
Payer: COMMERCIAL

## 2020-07-30 ENCOUNTER — INITIAL CONSULT (OUTPATIENT)
Dept: BARIATRICS | Facility: CLINIC | Age: 50
End: 2020-07-30
Payer: COMMERCIAL

## 2020-07-30 VITALS
HEART RATE: 82 BPM | SYSTOLIC BLOOD PRESSURE: 120 MMHG | WEIGHT: 257.69 LBS | BODY MASS INDEX: 39.06 KG/M2 | DIASTOLIC BLOOD PRESSURE: 72 MMHG | OXYGEN SATURATION: 95 % | HEIGHT: 68 IN

## 2020-07-30 VITALS
SYSTOLIC BLOOD PRESSURE: 103 MMHG | DIASTOLIC BLOOD PRESSURE: 70 MMHG | WEIGHT: 258.38 LBS | HEART RATE: 83 BPM | BODY MASS INDEX: 39.29 KG/M2

## 2020-07-30 DIAGNOSIS — N30.10 INTERSTITIAL CYSTITIS: Primary | ICD-10-CM

## 2020-07-30 DIAGNOSIS — E66.9 OBESITY, CLASS II, BMI 35-39.9, NO COMORBIDITY: ICD-10-CM

## 2020-07-30 DIAGNOSIS — Z01.818 PREOP EXAMINATION: ICD-10-CM

## 2020-07-30 DIAGNOSIS — R39.15 URINARY URGENCY: ICD-10-CM

## 2020-07-30 DIAGNOSIS — K31.84 GASTROPARESIS: ICD-10-CM

## 2020-07-30 DIAGNOSIS — K31.84 GASTROPARESIS: Primary | ICD-10-CM

## 2020-07-30 PROCEDURE — 99244 PR OFFICE CONSULTATION,LEVEL IV: ICD-10-PCS | Mod: S$GLB,,, | Performed by: INTERNAL MEDICINE

## 2020-07-30 PROCEDURE — 99999 PR PBB SHADOW E&M-EST. PATIENT-LVL III: CPT | Mod: PBBFAC,,, | Performed by: UROLOGY

## 2020-07-30 PROCEDURE — 3008F PR BODY MASS INDEX (BMI) DOCUMENTED: ICD-10-PCS | Mod: CPTII,S$GLB,, | Performed by: UROLOGY

## 2020-07-30 PROCEDURE — 3008F BODY MASS INDEX DOCD: CPT | Mod: CPTII,S$GLB,, | Performed by: UROLOGY

## 2020-07-30 PROCEDURE — 99999 PR PBB SHADOW E&M-EST. PATIENT-LVL III: ICD-10-PCS | Mod: PBBFAC,,, | Performed by: UROLOGY

## 2020-07-30 PROCEDURE — 99213 OFFICE O/P EST LOW 20 MIN: CPT | Mod: S$GLB,,, | Performed by: UROLOGY

## 2020-07-30 PROCEDURE — 99244 OFF/OP CNSLTJ NEW/EST MOD 40: CPT | Mod: S$GLB,,, | Performed by: INTERNAL MEDICINE

## 2020-07-30 PROCEDURE — 99999 PR PBB SHADOW E&M-EST. PATIENT-LVL V: ICD-10-PCS | Mod: PBBFAC,,, | Performed by: INTERNAL MEDICINE

## 2020-07-30 PROCEDURE — 99999 PR PBB SHADOW E&M-EST. PATIENT-LVL V: CPT | Mod: PBBFAC,,, | Performed by: INTERNAL MEDICINE

## 2020-07-30 PROCEDURE — 99213 PR OFFICE/OUTPT VISIT, EST, LEVL III, 20-29 MIN: ICD-10-PCS | Mod: S$GLB,,, | Performed by: UROLOGY

## 2020-07-30 RX ORDER — FLAVOXATE HYDROCHLORIDE 100 MG/1
100 TABLET ORAL 3 TIMES DAILY PRN
Qty: 270 TABLET | Refills: 0 | Status: SHIPPED | OUTPATIENT
Start: 2020-07-30 | End: 2020-09-09 | Stop reason: SDUPTHER

## 2020-07-30 RX ORDER — PREGABALIN 75 MG/1
75 CAPSULE ORAL 2 TIMES DAILY
COMMUNITY
End: 2021-04-14

## 2020-07-30 RX ORDER — PHENTERMINE HYDROCHLORIDE 37.5 MG/1
37.5 TABLET ORAL
Qty: 30 TABLET | Refills: 1 | Status: SHIPPED | OUTPATIENT
Start: 2020-07-30 | End: 2020-08-29

## 2020-07-30 RX ORDER — URINARY ANTISEPTIC ANTISPASMODIC 81.6; 40.8; 10.8; .12 MG/1; MG/1; MG/1; MG/1
1 TABLET ORAL EVERY 6 HOURS PRN
Qty: 360 TABLET | Refills: 3 | Status: SHIPPED | OUTPATIENT
Start: 2020-07-30 | End: 2020-08-17

## 2020-07-30 NOTE — PATIENT INSTRUCTIONS
Patient warned of common side effects of phentermine including anxiety, insomnia, palpitations and increased blood pressure. It was also explained that it is for short-term usage along with diet and exercise, and that stopping the medication without making lifestyle changes will result in regain of weight. Patient states understanding.     Weight loss medications are controlled substances.  They require routine follow up. Prescription or pills that are lost or destroyed will not be replaced.       Start phentermine with 1/2 pill a day for at least 1-2 weeks to see if that will control your appetite.  Go up to a full pill when needed.       No soda, sweet tea, juices or lemonade. All drinks should be 5 calories or less.           Bariatric Soft Diet           Start Soft Diet 4 weeks after gastric bypass and sleeve    As your stomach heals, your doctor will progress your diet to soft foods.  This diet usually lasts for 1-2 months, but can last longer depending on each individual. Soft foods are those which can be easily mashed with a fork.    Remember these principles:   No liquids with meals. Do no drink 30 minutes before meals and wait 30 minutes to 1 hour after meals to start drinking.   Sip on water, sugar-free beverages or non-fat milk throughout the day.  You will need to continue drinking at least 1 protein drink daily to meet protein needs.   100% fruit juice (no sugar added) is allowed, but limit to 4oz a day because it is high in calories and does not contain any protein.   Chew foods slowly; one meal should take 20-30 minutes.   Eat 3-5 meals per day, without any additional snacking.   Stop eating as soon as you feel full.   Avoid using table sugar and foods made with refined sugar, which can trigger dumping syndrome.   Marinating meats with a low sugar marinade, adding low-fat salad dressing, or adding low calorie gravy (made from powder and water) can help meats to digest easier.     Adding  Vegetables and Fruits:    As long as you are consuming >80g total protein daily from combination of foods and protein drinks, you may start adding small bites of fruits and vegetables to your meals. Cooked, tender vegetables and ripe fruits without the peel are tolerated best.    Avoid fruit canned in syrup, sugary fruit juices, and vegetables cooked with oil, butter or grigsby.  Bariatric SOFT Diet    EAT THESE FOODS AVOID THESE FOODS   High in Protein: High in Fat/Sugar:   ? Canned tuna or chicken (packed in water)  ? Lean ground turkey breast or ground round  ? Turkey or chicken (no skin); cooked tender and cut in small pieces  ? Lean pork or beef (cook in crock pot until very tender; cut in small pieces  ? Scrambled, poached, or boiled eggs  ? Baked, broiled, grilled or boiled fish and seafood (not fried!)  ? Silken tofu, Edamame (soybeans)  ? Beans, hummus and lentils  ? Lean deli meats (turkey and chicken breast, ham, roast beef)  ? 1% or Skim Milk, Lactaid, or Soymilk  ? Low-fat or fat-free cottage cheese, soft cheese, mozzarella string cheese, or ricotta  ? Light yogurt, Greek yogurt, SF pudding High fat milk (whole, 2%)  Butter, margarine, oil, mayonnaise  Sour cream, cream cheese, salad dressing  Ice Cream  Cakes, cookies, pies, desserts  Candy  Luncheon meats (bologna, salami, chopped ham)  Sausage, Grigsby  Gravy  Fried Foods  ___________________________________  Tough/Crunchy--------------------------------  Tough or dry meats  Corn   Granola/cereal with nuts  Shredded Coconut    May add after 2 months:  Raw veggies  Lettuce  Plain, Unsalted Nuts and Seeds  Protein bars with 0-4 grams of sugar   As long as you are getting >80g PRO: Starchy Carbohydrates. At goal weight, some may include whole grains in small amounts.   Cooked tender vegetables without peel  Ripe fruits without peel  Frozen fruits with no added sugar  Fruit canned in its own juice or in water  Fat free, sugar free, frozen yogurt White and  wheat Bread, Rice, Pasta   Cereals (including grits, oatmeal)   Crackers, Pretzels, Chips, Granola  Corn, Popcorn, Peas  White Potatoes, Sweet potatoes  Flour and corn tortillas     Fluids: Always Avoid:   Skim/1% milk, Lactaid, Soymilk  Water and Sugar-free beverages  (decaf and non-carbonated)  Decaf coffee & decaf tea  Sugary drinks  Carbonated drinks  Alcohol  Drinking through straws           Sample Menu for Bariatric Soft Diet  For Gastric Bypass and Sleeve            3 meals + 2 protein drinks  Remember: No drinking with meals.    Time of Day Day 1 Day 2   7a:    1 egg (or ¼ cup Egg Beaters) ¼ cup low-fat cottage cheese, 1 tbsp berries   7:30a-9:30a: 1 cup water/SF beverage     10a:  Protein drink  Protein drink   10:30a-11:30a: 1 cup water/SF beverage     12p:    1-2 oz grilled shrimp, ¼ cup green beans   1-2oz canned chicken, shredded cheese, 1 tbsp salsa   12:30p-2:30p: 1 cup water/SF beverage     3p:  Protein drink   Protein drink   3:30p-5:30p: 1 cup water/SF beverage     6p:  ½ cup low fat chili, 1oz low-fat cheese, ¼ cup broccoli 2 oz grilled fish,  ¼ cup lima beans   6:30p-9p: 1 cup water/SF beverage       This sample menu provides approx. 80g protein total, including about 40g protein from foods and at least 40g protein from protein drinks.  Drinking protein drinks daily helps decrease muscle loss, increase weight loss, and prevent hair loss.    ? Sip fluids continuously in between meals.    ? For fluids: 1 cup = 8 oz   ? For food: ¼ cup = 4 tablespoons = 1oz  ? No drinking from 30 minutes before meals to 30 minutes after meals.  ? 3oz meat is approx. the size of a deck of cards.    ? A food scale will help you determine portion size (Can be purchased at [employer])        Protein shake or bar (at least 20g protein for shakes, 15g or more for bars, no more than 5 g sugar)  for breakfast and lunch and 1 snack  Sensible meal-lean protein(4-6 oz fish, chicken, turkey, lean beef) and 1-2 cups green  "vegetables for dinner.      Vegetables       Non-starchy vegetables include artichoke, asparagus, baby corn, bamboo shoots, beans: green/Italian/wax, bean sprouts, beets, broccoli, Sheldon sprouts, cabbage, carrots, cauliflower, celery, cucumber, eggplant, green onions or scallions, greens, jicama, leeks, mushrooms, okra, onions, pea pods, peppers, radishes, spinach, summer squash, tomatoes and salsa, turnips, vegetable juice cocktail, water chestnuts, zucchini          - Add Atkins advantage Cafe Caramel shake to decaf coffee. Serve hot or blend with ice for "frappaccino" like drink  - RTD Protein drinks: Atkins, Low Carb Slim Fast, EAS light, Muscle Milk Light, etc.  - Homemade Protein drinks: [employer]NC Soy95, Isopure, Nectar, UNJURY, Whey Gourmet, etc. Mix 1 scoop powder with 8oz skim/1% milk or light soymilk.  - Protein bars: Atkins, EAS, Pure Protein,  Quest, Think Thin, Detour, etc. Must have 0-4 grams sugar - Read the label.      "

## 2020-07-30 NOTE — H&P (VIEW-ONLY)
CHIEF COMPLAINT:    Mrs. Betts is a 50 y.o. female presenting for a follow up on IC    PRESENTING ILLNESS:    Nan Betts is a 50 y.o. female who has a history of IC.  Her last cystoscopy, hydrodistension was on 12/12/2018.  She states that since she was last seen, she had an adverse event from the gastric stimulator.  It was stimulating her muscle like a band around her midsection.  She went to Dr. Celeste but states he refused to revise it and she had to have it revised at [facility] in [place], [place].  She is doing much better.  Since the last time she saw me, her IC has been under relative good control.  She sometimes has to double void to completely empty, and sometimes she has to lean over to fully empty her bladder. She has had several episodes of vertigo, one when she had the gastric stimulator replaced and again when she developed fluid around the pulse generator.  She had her left ulna operated on and had no issues with vertigo, so does not associate the vertigo with anesthesia.      She continues on Flavoxate, Urogesic Blue and Elmiron.  She is worried about the information she is seeing on TV regarding Elmiron and the eyes.  She states that when she was in her early 20's she had to have her cataracts removed and they did not understand what it was that accelerated the development of cataracts in her.     Her urgency and frequency are greater.  She does not normally have nocturia and now it occurs x 2-3, urgency is also worsening such that she feels like it is time for her to undergo another cystoscopy, hydrodistension.     REVIEW OF SYSTEMS:    Review of Systems   Constitutional: Negative.    HENT: Negative.    Eyes: Negative.    Respiratory: Negative.    Cardiovascular: Negative.    Gastrointestinal: Positive for nausea.   Genitourinary: Positive for urgency.   Musculoskeletal: Negative.    Skin: Negative.    Neurological: Negative.    Endo/Heme/Allergies: Negative.    Psychiatric/Behavioral: The  patient is nervous/anxious.        PATIENT HISTORY:    Past Medical History:   Diagnosis Date    Abnormal Pap smear of vagina     Anxiety     Cataract     Chronic pain     TMJ and abdomen    Colon polyp     Epilepsy     as a child    Gastroparesis     Idiopathic    GERD (gastroesophageal reflux disease)     H/O abnormal cervical Papanicolaou smear     Hypercholesteremia 6/21/2016    Hyperlipidemia     IC (interstitial cystitis)     Internal hemorrhoids     Interstitial cystitis     Irritable bowel syndrome (IBS)     Irritable bowel syndrome with diarrhea 6/21/2016    Spastic colon     TMJ (temporomandibular joint disorder)     TMJ (temporomandibular joint syndrome) 6/21/2016       Past Surgical History:   Procedure Laterality Date    APPENDECTOMY      BREAST BIOPSY Bilateral     CATARACT EXTRACTION      CHOLECYSTECTOMY      COLONOSCOPY      COLONOSCOPY N/A 7/31/2018    Procedure: COLONOSCOPY;  Surgeon: Zack Lehman MD;  [facility]  Service: Endoscopy;  Laterality: N/A;    ELBOW SURGERY      ESOPHAGOGASTRODUODENOSCOPY N/A 7/31/2018    Procedure: EGD (ESOPHAGOGASTRODUODENOSCOPY);  Surgeon: Zack Lehman MD;  [facility]  Service: Endoscopy;  Laterality: N/A;  RUQ gastric pacemaker    Left upper arm PICC line    PONV    ESOPHAGOGASTRODUODENOSCOPY N/A 9/3/2019    Procedure: EGD (ESOPHAGOGASTRODUODENOSCOPY);  Surgeon: Zack Lehman MD;  Location: [facility]  Service: Endoscopy;  Laterality: N/A;  history of gastoparesis, per change in protocol, pt instructed to do full liquid diet x 3 days prior to procedure and clear liquids x 1 day prior to procedure-BB  pt has gastric pacemaker, monitored by Dr. Lehman-BB  hx of epilepsy, last seizure during chi    gastric stimulator placement      LASIK Bilateral 2006    MANDIBLE SURGERY Bilateral 10/17/2016    OOPHORECTOMY Bilateral     PYLOROPLASTY  03/2016    STOMACH SURGERY      gastric  pacemaker    TEMPOROMANDIBULAR JOINT SURGERY  12/2016    TONSILLECTOMY      TOTAL ABDOMINAL HYSTERECTOMY  2005    EUGENIA/BSO, Trachelectomy 7 years ago    UPPER GASTROINTESTINAL ENDOSCOPY      WRIST SURGERY         Family History   Problem Relation Age of Onset    Coronary artery disease Mother     Hodgkin's lymphoma Mother     Coronary artery disease Father     Lymphoma Sister     Coronary artery disease Paternal Grandfather     Glaucoma Maternal Grandmother     Pancreatic cancer Paternal Aunt      Socioeconomic History    Marital status:    Tobacco Use    Smoking status: Current Every Day Smoker     Packs/day: 1.00     Years: 20.00     Pack years: 20.00    Smokeless tobacco: Never Used   Substance and Sexual Activity    Alcohol use: No     Alcohol/week: 0.0 standard drinks    Drug use: No    Sexual activity: Yes     Partners: Male       Allergies:  Morphine, Ultram [tramadol], Codeine, Gabapentin, Ibuprofen, Nsaids (non-steroidal anti-inflammatory drug), Reglan [metoclopramide hcl], and Topamax [topiramate]    Medications:  Outpatient Encounter Medications as of 7/30/2020   Medication Sig Dispense Refill    (Magic mouthwash) 1:1:1 Benadryl 12.5mg/5ml liq, aluminum & magnesium hydroxide-simehticone (Maalox), lidocaine viscous 2% Swish and spit 5 mLs every 4 (four) hours as needed. for mouth sores 90 mL 3    ALPRAZolam (XANAX) 1 MG tablet       dexlansoprazole (DEXILANT) 60 mg capsule Take 1 capsule (60 mg total) by mouth once daily. 90 capsule 3    diphenhydrAMINE (BENADRYL) 50 MG tablet Take 50 mg by mouth every 4 (four) hours as needed for Itching.      estradioL (ESTRACE) 1 MG tablet Take 1 tablet (1 mg total) by mouth once daily. Take one 1mg pill daily along with the 2mg pill daily for total of 3mg daily 90 tablet 3    estradioL (ESTRACE) 2 MG tablet Take 1 tablet (2 mg total) by mouth once daily. Take one 2mg tab and one 1mg tab for total of 3 daily 90 tablet 3    flavoxATE  (URISPAS) 100 mg Tab Take 1 tablet (100 mg total) by mouth 3 (three) times daily as needed. 270 tablet 0    loperamide (IMODIUM) 2 mg capsule TAKE 2 CAPSULES BY MOUTH  TWICE DAILY AS NEEDED 360 capsule 1    methen-sod phos-meth blue-hyos (UROGESIC-BLUE) 81.6-40.8-0.12 mg Tab Take 1 tablet by mouth every 6 (six) hours as needed. 360 tablet 3    pregabalin (LYRICA) 50 MG capsule       promethazine (PHENERGAN) 25 mg/mL injection Inject 25 mg into the vein every 4 (four) hours.      simvastatin (ZOCOR) 40 MG tablet Take 40 mg by mouth every evening.       SODIUM CHLORIDE 0.45 % IV Inject 1,000 mLs into the vein as needed.       sodium chloride 0.9% 0.9 % SolP 50 mL with promethazine 25 mg/mL Soln Inject 25 mg into the vein every 4 (four) hours. Thru IV      [DISCONTINUED] methen-sod phos-meth blue-hyos (UROGESIC-BLUE) 81.6-40.8-0.12 mg Tab Take 1 tablet by mouth every 6 (six) hours as needed. 120 tablet 0    [DISCONTINUED] pentosan polysulfate (ELMIRON) 100 mg Cap Take 1 capsule (100 mg total) by mouth 3 (three) times daily. 90 capsule 7    carisoprodol (SOMA) 350 MG tablet Take 350 mg by mouth 3 (three) times daily as needed for Muscle spasms.      tiZANidine 4 mg Cap Take 4 mg by mouth every evening.       [DISCONTINUED] flavoxATE (URISPAS) 100 mg Tab flavoxate 100 mg tablet       No facility-administered encounter medications on file as of 7/30/2020.          PHYSICAL EXAMINATION:    The patient generally appears in good health, is appropriately interactive, and is in no apparent distress.    Skin: No lesions.    Mental: Cooperative with normal affect.    Neuro: Grossly intact.    HEENT: Normal. No evidence of lymphadenopathy.    Chest:  normal inspiratory effort.    Abdomen:  Soft, non-tender. No masses or organomegaly. Bladder is not palpable. No evidence of flank discomfort. No evidence of inguinal hernia.    Extremities: No clubbing, cyanosis, or edema      LABS:    Lab Results   Component Value Date     BUN 10 12/18/2019    CREATININE 0.9 12/18/2019     UA 1.020, pH 5, otherwise, negative    IMPRESSION:    Encounter Diagnoses   Name Primary?    Interstitial cystitis Yes       PLAN:    1.  For cystoscopy, hydrodistention of the bladder, possible bladder biopsy and fulguration.   Consent signed  2.  She is going to discontinue the Elmiron.  There is no other medication like it.  However, she responds well to the cysto hydrodistension, last was 1 1/2 years ago  3.  Refilled the flavoxate and the Urogesic blue

## 2020-07-30 NOTE — PROGRESS NOTES
CHIEF COMPLAINT:    Mrs. Betts is a 50 y.o. female presenting for a follow up on IC    PRESENTING ILLNESS:    Nan Betts is a 50 y.o. female who has a history of IC.  Her last cystoscopy, hydrodistension was on 12/12/2018.  She states that since she was last seen, she had an adverse event from the gastric stimulator.  It was stimulating her muscle like a band around her midsection.  She went to Dr. Celeste but states he refused to revise it and she had to have it revised at [facility] in [place], [place].  She is doing much better.  Since the last time she saw me, her IC has been under relative good control.  She sometimes has to double void to completely empty, and sometimes she has to lean over to fully empty her bladder. She has had several episodes of vertigo, one when she had the gastric stimulator replaced and again when she developed fluid around the pulse generator.  She had her left ulna operated on and had no issues with vertigo, so does not associate the vertigo with anesthesia.      She continues on Flavoxate, Urogesic Blue and Elmiron.  She is worried about the information she is seeing on TV regarding Elmiron and the eyes.  She states that when she was in her early 20's she had to have her cataracts removed and they did not understand what it was that accelerated the development of cataracts in her.     Her urgency and frequency are greater.  She does not normally have nocturia and now it occurs x 2-3, urgency is also worsening such that she feels like it is time for her to undergo another cystoscopy, hydrodistension.     REVIEW OF SYSTEMS:    Review of Systems   Constitutional: Negative.    HENT: Negative.    Eyes: Negative.    Respiratory: Negative.    Cardiovascular: Negative.    Gastrointestinal: Positive for nausea.   Genitourinary: Positive for urgency.   Musculoskeletal: Negative.    Skin: Negative.    Neurological: Negative.    Endo/Heme/Allergies: Negative.    Psychiatric/Behavioral: The  patient is nervous/anxious.        PATIENT HISTORY:    Past Medical History:   Diagnosis Date    Abnormal Pap smear of vagina     Anxiety     Cataract     Chronic pain     TMJ and abdomen    Colon polyp     Epilepsy     as a child    Gastroparesis     Idiopathic    GERD (gastroesophageal reflux disease)     H/O abnormal cervical Papanicolaou smear     Hypercholesteremia 6/21/2016    Hyperlipidemia     IC (interstitial cystitis)     Internal hemorrhoids     Interstitial cystitis     Irritable bowel syndrome (IBS)     Irritable bowel syndrome with diarrhea 6/21/2016    Spastic colon     TMJ (temporomandibular joint disorder)     TMJ (temporomandibular joint syndrome) 6/21/2016       Past Surgical History:   Procedure Laterality Date    APPENDECTOMY      BREAST BIOPSY Bilateral     CATARACT EXTRACTION      CHOLECYSTECTOMY      COLONOSCOPY      COLONOSCOPY N/A 7/31/2018    Procedure: COLONOSCOPY;  Surgeon: Zack Lehman MD;  [facility] [employer]  Service: Endoscopy;  Laterality: N/A;    ELBOW SURGERY      ESOPHAGOGASTRODUODENOSCOPY N/A 7/31/2018    Procedure: EGD (ESOPHAGOGASTRODUODENOSCOPY);  Surgeon: Zack Lehman MD;  [facility]  Service: Endoscopy;  Laterality: N/A;  RUQ gastric pacemaker    Left upper arm PICC line    PONV    ESOPHAGOGASTRODUODENOSCOPY N/A 9/3/2019    Procedure: EGD (ESOPHAGOGASTRODUODENOSCOPY);  Surgeon: Zack Lehman MD;  [facility] [employer] [facility]  Service: Endoscopy;  Laterality: N/A;  history of gastoparesis, per change in protocol, pt instructed to do full liquid diet x 3 days prior to procedure and clear liquids x 1 day prior to procedure-BB  pt has gastric pacemaker, monitored by Dr. Lehman-BB  hx of epilepsy, last seizure during chi    gastric stimulator placement      LASIK Bilateral 2006    MANDIBLE SURGERY Bilateral 10/17/2016    OOPHORECTOMY Bilateral     PYLOROPLASTY  03/2016    STOMACH SURGERY      gastric  pacemaker    TEMPOROMANDIBULAR JOINT SURGERY  12/2016    TONSILLECTOMY      TOTAL ABDOMINAL HYSTERECTOMY  2005    EUGENIA/BSO, Trachelectomy 7 years ago    UPPER GASTROINTESTINAL ENDOSCOPY      WRIST SURGERY         Family History   Problem Relation Age of Onset    Coronary artery disease Mother     Hodgkin's lymphoma Mother     Coronary artery disease Father     Lymphoma Sister     Coronary artery disease Paternal Grandfather     Glaucoma Maternal Grandmother     Pancreatic cancer Paternal Aunt      Socioeconomic History    Marital status:    Tobacco Use    Smoking status: Current Every Day Smoker     Packs/day: 1.00     Years: 20.00     Pack years: 20.00    Smokeless tobacco: Never Used   Substance and Sexual Activity    Alcohol use: No     Alcohol/week: 0.0 standard drinks    Drug use: No    Sexual activity: Yes     Partners: Male       Allergies:  Morphine, Ultram [tramadol], Codeine, Gabapentin, Ibuprofen, Nsaids (non-steroidal anti-inflammatory drug), Reglan [metoclopramide hcl], and Topamax [topiramate]    Medications:  Outpatient Encounter Medications as of 7/30/2020   Medication Sig Dispense Refill    (Magic mouthwash) 1:1:1 Benadryl 12.5mg/5ml liq, aluminum & magnesium hydroxide-simehticone (Maalox), lidocaine viscous 2% Swish and spit 5 mLs every 4 (four) hours as needed. for mouth sores 90 mL 3    ALPRAZolam (XANAX) 1 MG tablet       dexlansoprazole (DEXILANT) 60 mg capsule Take 1 capsule (60 mg total) by mouth once daily. 90 capsule 3    diphenhydrAMINE (BENADRYL) 50 MG tablet Take 50 mg by mouth every 4 (four) hours as needed for Itching.      estradioL (ESTRACE) 1 MG tablet Take 1 tablet (1 mg total) by mouth once daily. Take one 1mg pill daily along with the 2mg pill daily for total of 3mg daily 90 tablet 3    estradioL (ESTRACE) 2 MG tablet Take 1 tablet (2 mg total) by mouth once daily. Take one 2mg tab and one 1mg tab for total of 3 daily 90 tablet 3    flavoxATE  (URISPAS) 100 mg Tab Take 1 tablet (100 mg total) by mouth 3 (three) times daily as needed. 270 tablet 0    loperamide (IMODIUM) 2 mg capsule TAKE 2 CAPSULES BY MOUTH  TWICE DAILY AS NEEDED 360 capsule 1    methen-sod phos-meth blue-hyos (UROGESIC-BLUE) 81.6-40.8-0.12 mg Tab Take 1 tablet by mouth every 6 (six) hours as needed. 360 tablet 3    pregabalin (LYRICA) 50 MG capsule       promethazine (PHENERGAN) 25 mg/mL injection Inject 25 mg into the vein every 4 (four) hours.      simvastatin (ZOCOR) 40 MG tablet Take 40 mg by mouth every evening.       SODIUM CHLORIDE 0.45 % IV Inject 1,000 mLs into the vein as needed.       sodium chloride 0.9% 0.9 % SolP 50 mL with promethazine 25 mg/mL Soln Inject 25 mg into the vein every 4 (four) hours. Thru IV      [DISCONTINUED] methen-sod phos-meth blue-hyos (UROGESIC-BLUE) 81.6-40.8-0.12 mg Tab Take 1 tablet by mouth every 6 (six) hours as needed. 120 tablet 0    [DISCONTINUED] pentosan polysulfate (ELMIRON) 100 mg Cap Take 1 capsule (100 mg total) by mouth 3 (three) times daily. 90 capsule 7    carisoprodol (SOMA) 350 MG tablet Take 350 mg by mouth 3 (three) times daily as needed for Muscle spasms.      tiZANidine 4 mg Cap Take 4 mg by mouth every evening.       [DISCONTINUED] flavoxATE (URISPAS) 100 mg Tab flavoxate 100 mg tablet       No facility-administered encounter medications on file as of 7/30/2020.          PHYSICAL EXAMINATION:    The patient generally appears in good health, is appropriately interactive, and is in no apparent distress.    Skin: No lesions.    Mental: Cooperative with normal affect.    Neuro: Grossly intact.    HEENT: Normal. No evidence of lymphadenopathy.    Chest:  normal inspiratory effort.    Abdomen:  Soft, non-tender. No masses or organomegaly. Bladder is not palpable. No evidence of flank discomfort. No evidence of inguinal hernia.    Extremities: No clubbing, cyanosis, or edema      LABS:    Lab Results   Component Value Date     BUN 10 12/18/2019    CREATININE 0.9 12/18/2019     UA 1.020, pH 5, otherwise, negative    IMPRESSION:    Encounter Diagnoses   Name Primary?    Interstitial cystitis Yes       PLAN:    1.  For cystoscopy, hydrodistention of the bladder, possible bladder biopsy and fulguration.   Consent signed  2.  She is going to discontinue the Elmiron.  There is no other medication like it.  However, she responds well to the cysto hydrodistension, last was 1 1/2 years ago  3.  Refilled the flavoxate and the Urogesic blue

## 2020-07-30 NOTE — LETTER
July 30, 2020      David Celeste MD  [address]  [facility] [address]           [facility] - Bariatric Surgery  [address]  [facility] [address]  Phone: [phone]  Fax: [phone]          Patient: Nan Betts   MR Number: [id number]   YOB: 1970   Date of Visit: 7/30/2020       Dear Dr. David Celeste:    Thank you for referring Nan Betts to me for evaluation. Attached you will find relevant portions of my assessment and plan of care.    If you have questions, please do not hesitate to call me. I look forward to following Nan Betts along with you.    Sincerely,    Winter Gay MD    Enclosure  CC:  No Recipients    If you would like to receive this communication electronically, please contact [email or web address] or [phone] to request more information on [employer] Link access.    For providers and/or their staff who would like to refer a patient to [facility], please contact us through our one-stop-shop provider referral line, [facility] [place], operated [facility], at [phone].    If you feel you have received this communication in error or would no longer like to receive these types of communications, please e-mail [email or web address]

## 2020-07-31 DIAGNOSIS — Z01.818 PRE-OP TESTING: ICD-10-CM

## 2020-08-12 ENCOUNTER — OFFICE VISIT (OUTPATIENT)
Dept: GASTROENTEROLOGY | Facility: CLINIC | Age: 50
End: 2020-08-12
Payer: COMMERCIAL

## 2020-08-12 VITALS
HEART RATE: 87 BPM | DIASTOLIC BLOOD PRESSURE: 79 MMHG | WEIGHT: 259.06 LBS | SYSTOLIC BLOOD PRESSURE: 119 MMHG | HEIGHT: 68 IN | BODY MASS INDEX: 39.26 KG/M2

## 2020-08-12 DIAGNOSIS — K21.9 GASTROESOPHAGEAL REFLUX DISEASE WITHOUT ESOPHAGITIS: ICD-10-CM

## 2020-08-12 DIAGNOSIS — K31.84 GASTROPARESIS: Primary | ICD-10-CM

## 2020-08-12 DIAGNOSIS — K58.0 IRRITABLE BOWEL SYNDROME WITH DIARRHEA: ICD-10-CM

## 2020-08-12 PROCEDURE — 99215 OFFICE O/P EST HI 40 MIN: CPT | Mod: S$GLB,,, | Performed by: INTERNAL MEDICINE

## 2020-08-12 PROCEDURE — 99999 PR PBB SHADOW E&M-EST. PATIENT-LVL IV: CPT | Mod: PBBFAC,,, | Performed by: INTERNAL MEDICINE

## 2020-08-12 PROCEDURE — 99999 PR PBB SHADOW E&M-EST. PATIENT-LVL IV: ICD-10-PCS | Mod: PBBFAC,,, | Performed by: INTERNAL MEDICINE

## 2020-08-12 PROCEDURE — 99215 PR OFFICE/OUTPT VISIT, EST, LEVL V, 40-54 MIN: ICD-10-PCS | Mod: S$GLB,,, | Performed by: INTERNAL MEDICINE

## 2020-08-12 PROCEDURE — 3008F PR BODY MASS INDEX (BMI) DOCUMENTED: ICD-10-PCS | Mod: CPTII,S$GLB,, | Performed by: INTERNAL MEDICINE

## 2020-08-12 PROCEDURE — 3008F BODY MASS INDEX DOCD: CPT | Mod: CPTII,S$GLB,, | Performed by: INTERNAL MEDICINE

## 2020-08-12 NOTE — PROGRESS NOTES
Subjective:       Patient ID: Nan Betts is a 50 y.o. female.    Chief Complaint: Follow-up (Gastroparesis)    HPI  Review of Systems   Neurological: Positive for dizziness.         Objective:      Physical Exam  Abdominal:      General: Abdomen is flat. Bowel sounds are normal. There is no distension.      Palpations: Abdomen is soft. There is no mass.      Tenderness: There is no abdominal tenderness.         Assessment:       1. Gastroparesis    2. Gastroesophageal reflux disease without esophagitis    3. Irritable bowel syndrome with diarrhea        Plan:         Nan is here for a follow-up.  Since I have seen her in March she had laparoscopic placement of a new battery for her gastric stimulator.  Intraoperative EGD done at the same time period she had to have another surgery or least a drain placement in May because of the seroma at the previous site of the battery.    She had a period of time with the stimulator turned off.  This led to increased nausea vomiting.  She is back on the stimulator now.  She is having difficulties with her PICC line.  The can draw blood from it.    She is having difficulty with access to doctors.  Her general practitioner Chace andrews is no longer seeing her.  She was getting Xanax from them.  She is seeing a pain management doctor due for prescribed Lyrica    She reports increased reflux increased nausea.  Increased anxiety with stress.  Also complains of some dysphagia.    She is getting IV fluids and IV Phenergan through her PICC line right now.    I interrogated the stimulator and is working appropriately.  The current is 11.  Volume 5.5.  3 sec on 2 sec off.  Normal impedance.    Assessment  One gastroparesis.  Complicated case she has already had a stimulator and surgical pyloroplasty.  Still continues with symptoms and requires IV fluids and IV access.  I do not know of anything else I can do other than supportive care.  Certainly in the future can offer another  opinion at [facility] if she wishes.  2.  Gastroesophageal reflux this has been severe and symptomatic.  She has tried all of the other PPIs.    Dexilant twice a day gives her the best relief.  She has previously seen are tried Nexium Prevacid Prilosec to Protonix Zantac Pepcid Reglan and Carafate and Tagamet.  She mentions that she needs to be due appeared appear with her insurance to get Dexilant approved.  I discussed the difficulty in time requirements for me to get a peer to peer, how that often takes an hour of my time.  If there could be some way that can be facilitated with a direct call to a specific person them certainly happy to do that.  3.  IBS diarrhea  4.  Anxiety she asked me to refill her Xanax today I looked up the  and she had a Xanax prescription filled 9 days ago such told her I could not.  If she has a period time without a general practitioner I am happy to fill out the next prescription when it is due for her on 1 time basis.    Recommendation 1.  A referral to a Dr. VILLARREAL for port placement  2.  Consider filling the Xanax for her in the future for 1 time  3.  Refill Magic mouthwash  4.  Set up EGD    50 min appointment greater half time face-to-face counseling.

## 2020-08-14 ENCOUNTER — PATIENT MESSAGE (OUTPATIENT)
Dept: SURGERY | Facility: HOSPITAL | Age: 50
End: 2020-08-14

## 2020-08-14 DIAGNOSIS — N30.10 INTERSTITIAL CYSTITIS: Primary | ICD-10-CM

## 2020-08-17 RX ORDER — URINARY ANTISEPTIC ANTISPASMODIC 81.6; 40.8; 10.8; .12 MG/1; MG/1; MG/1; MG/1
1 TABLET ORAL EVERY 6 HOURS PRN
Qty: 30 TABLET | Refills: 11 | Status: ON HOLD | OUTPATIENT
Start: 2020-08-17 | End: 2020-08-25 | Stop reason: SDUPTHER

## 2020-08-17 NOTE — TELEPHONE ENCOUNTER
Spoke to the patient and discussed that I will send the prescription to the main [place] pharmacy as they will get prior authorization.  Messaged sent with the prescription stating that the patient requests it to be mailed to her.

## 2020-08-22 ENCOUNTER — LAB VISIT (OUTPATIENT)
Dept: URGENT CARE | Facility: CLINIC | Age: 50
End: 2020-08-22
Payer: COMMERCIAL

## 2020-08-22 VITALS — TEMPERATURE: 98 F

## 2020-08-22 DIAGNOSIS — Z01.818 PREOP EXAMINATION: ICD-10-CM

## 2020-08-22 DIAGNOSIS — N30.10 INTERSTITIAL CYSTITIS: ICD-10-CM

## 2020-08-22 PROCEDURE — U0003 INFECTIOUS AGENT DETECTION BY NUCLEIC ACID (DNA OR RNA); SEVERE ACUTE RESPIRATORY SYNDROME CORONAVIRUS 2 (SARS-COV-2) (CORONAVIRUS DISEASE [COVID-19]), AMPLIFIED PROBE TECHNIQUE, MAKING USE OF HIGH THROUGHPUT TECHNOLOGIES AS DESCRIBED BY CMS-2020-01-R: HCPCS

## 2020-08-23 LAB — SARS-COV-2 RNA RESP QL NAA+PROBE: NOT DETECTED

## 2020-08-24 ENCOUNTER — ANESTHESIA EVENT (OUTPATIENT)
Dept: SURGERY | Facility: HOSPITAL | Age: 50
End: 2020-08-24
Payer: COMMERCIAL

## 2020-08-24 ENCOUNTER — TELEPHONE (OUTPATIENT)
Dept: UROLOGY | Facility: CLINIC | Age: 50
End: 2020-08-24

## 2020-08-24 RX ORDER — MECLIZINE HYDROCHLORIDE 25 MG/1
TABLET ORAL 3 TIMES DAILY PRN
COMMUNITY

## 2020-08-24 NOTE — PRE-PROCEDURE INSTRUCTIONS
PREOP INSTRUCTIONS:No solid food ,milk or milk products for 8 hours prior to procedure.Clear liquids are allowed up to 2 hours before procedure.Clear liquids are:water,apple juice,gatorade & powerade.Patient instructed to follow the surgeon's instructions if they differ from these.Shower instructions as well as directions to the Surgery Center were given.Patient encouraged to wear loose fitting,comfortable clothing.Medication instructions for pm prior to and am of procedure reviewed.Instructed patient to avoid taking vitamins,supplements,aspirin and ibuprofen the morning of surgery. Patient stated an understanding.Patient informed of the current visitor policy and advised patient that one visitor may accompany each patient into the hospital and wait (socially distanced) until a member of the medical team provides an update at the conclusion of the procedure.When they enter the hospital both patient and visitor will have their temperature checked.All visitors are asked to arrive with a mask and to keep their mask on throughout the visit.    Covid test completed 8/22/2020-Negative    Patient denies any side effects or issues with anesthesia or sedation other than PONV

## 2020-08-24 NOTE — ANESTHESIA PREPROCEDURE EVALUATION
08/24/2020  Nan Betts is a 50 y.o., female.  Pre-operative evaluation for Procedure(s) (LRB):  CYSTOSCOPY (N/A)  HYDRODISTENTION (N/A)    Nan Betts is a 50 y.o. female     Patient Active Problem List   Diagnosis    GERD (gastroesophageal reflux disease)    Gastroparesis    Urge urinary incontinence    Interstitial cystitis    Anxiety    Vaginal atrophy    Vaginal pain    Dyspareunia    Intractable nausea and vomiting    Hypercholesteremia    Irritable bowel syndrome with diarrhea    Left lower quadrant pain    Diarrhea    Presence of gastric pacemaker    H/O pyloroplasty    Bloating    Epigastric pain    Colon polyp       Review of patient's allergies indicates:   Allergen Reactions    Morphine Anaphylaxis    Ultram [tramadol] Shortness Of Breath    Codeine Itching    Gabapentin Other (See Comments)     Causes suicidal thoughts    Ibuprofen Other (See Comments)     G L Bleeding    Nsaids (non-steroidal anti-inflammatory drug) Other (See Comments)     GI BLEEDING    Reglan [metoclopramide hcl] Other (See Comments)     Headaches and insomnia      Topamax [topiramate] Other (See Comments)     Ulcers in the mouth and dry mouth       No current facility-administered medications on file prior to encounter.      Current Outpatient Medications on File Prior to Encounter   Medication Sig Dispense Refill    ALPRAZolam (XANAX) 1 MG tablet Take 1 mg by mouth 2 (two) times daily as needed.       dexlansoprazole (DEXILANT) 60 mg capsule Take 1 capsule (60 mg total) by mouth once daily. (Patient taking differently: Take 1 capsule by mouth once daily. Patient takes twice a day) 90 capsule 3    diphenhydrAMINE (BENADRYL) 50 MG tablet Take 50 mg by mouth every 4 (four) hours as needed for Itching.      estradioL (ESTRACE) 1 MG tablet Take 1 tablet (1 mg total) by mouth once daily. Take  one 1mg pill daily along with the 2mg pill daily for total of 3mg daily (Patient taking differently: Take 1 mg by mouth every evening. Take one 1mg pill daily along with the 2mg pill daily for total of 3mg daily) 90 tablet 3    estradioL (ESTRACE) 2 MG tablet Take 1 tablet (2 mg total) by mouth once daily. Take one 2mg tab and one 1mg tab for total of 3 daily (Patient taking differently: Take 2 mg by mouth every evening. Take one 2mg tab and one 1mg tab for total of 3 daily) 90 tablet 3    flavoxATE (URISPAS) 100 mg Tab Take 1 tablet (100 mg total) by mouth 3 (three) times daily as needed. 270 tablet 0    Lactobacillus rhamnosus GG (CULTURELLE) 10 billion cell capsule Take 1 capsule by mouth once daily.      meclizine (ANTIVERT) 25 mg tablet Take 25 mg by mouth 3 (three) times daily as needed.      pregabalin (LYRICA) 50 MG capsule Take 50 mg by mouth 3 (three) times daily.       promethazine (PHENERGAN) 25 mg/mL injection Inject 25 mg into the vein every 4 (four) hours.      simvastatin (ZOCOR) 40 MG tablet Take 40 mg by mouth every evening.       carisoprodol (SOMA) 350 MG tablet Take 350 mg by mouth 3 (three) times daily as needed for Muscle spasms.      loperamide (IMODIUM) 2 mg capsule TAKE 2 CAPSULES BY MOUTH  TWICE DAILY AS NEEDED 360 capsule 1    phentermine (ADIPEX-P) 37.5 mg tablet Take 1 tablet (37.5 mg total) by mouth before breakfast. 30 tablet 1    pregabalin (LYRICA) 75 MG capsule Take 75 mg by mouth 2 (two) times daily.      SODIUM CHLORIDE 0.45 % IV Inject 1,000 mLs into the vein as needed.       sodium chloride 0.9% 0.9 % SolP 50 mL with promethazine 25 mg/mL Soln Inject 25 mg into the vein every 4 (four) hours. Thru IV      tiZANidine 4 mg Cap Take 4 mg by mouth every evening.          Past Surgical History:   Procedure Laterality Date    APPENDECTOMY      BREAST BIOPSY Bilateral     CATARACT EXTRACTION      CHOLECYSTECTOMY      COLONOSCOPY      COLONOSCOPY N/A 7/31/2018     Procedure: COLONOSCOPY;  Surgeon: Zack Lehman MD;  [facility]  Service: Endoscopy;  Laterality: N/A;    ELBOW SURGERY      ESOPHAGOGASTRODUODENOSCOPY N/A 7/31/2018    Procedure: EGD (ESOPHAGOGASTRODUODENOSCOPY);  Surgeon: Zack Lehamn MD;  [facility]  Service: Endoscopy;  Laterality: N/A;  RUQ gastric pacemaker    Left upper arm PICC line    PONV    ESOPHAGOGASTRODUODENOSCOPY N/A 9/3/2019    Procedure: EGD (ESOPHAGOGASTRODUODENOSCOPY);  Surgeon: Zack Lehman MD;  [facility]  Service: Endoscopy;  Laterality: N/A;  history of gastoparesis, per change in protocol, pt instructed to do full liquid diet x 3 days prior to procedure and clear liquids x 1 day prior to procedure-BB  pt has gastric pacemaker, monitored by Dr. Lehman-YASMANY  hx of epilepsy, last seizure during chi    gastric stimulator placement      LASIK Bilateral 2006    MANDIBLE SURGERY Bilateral 10/17/2016    OOPHORECTOMY Bilateral     PYLOROPLASTY  03/2016    STOMACH SURGERY      gastric pacemaker    TEMPOROMANDIBULAR JOINT SURGERY  12/2016    TONSILLECTOMY      TOTAL ABDOMINAL HYSTERECTOMY  2005    EUGENIA/BSO, Trachelectomy 7 years ago    UPPER GASTROINTESTINAL ENDOSCOPY      WRIST SURGERY         Social History     Socioeconomic History    Marital status:      Spouse name: Not on file    Number of children: Not on file    Years of education: Not on file    Highest education level: Not on file   Occupational History    Not on file   Social Needs    Financial resource strain: Not on file    Food insecurity     Worry: Not on file     Inability: Not on file    Transportation needs     Medical: Not on file     Non-medical: Not on file   Tobacco Use    Smoking status: Current Every Day Smoker     Packs/day: 1.00     Years: 20.00     Pack years: 20.00    Smokeless tobacco: Never Used   Substance and Sexual Activity    Alcohol use: No     Alcohol/week: 0.0 standard drinks     Drug use: No    Sexual activity: Yes     Partners: Male   Lifestyle    Physical activity     Days per week: Not on file     Minutes per session: Not on file    Stress: Not on file   Relationships    Social connections     Talks on phone: Not on file     Gets together: Not on file     Attends [employer] service: Not on file     Active member of club or organization: Not on file     Attends meetings of clubs or organizations: Not on file     Relationship status: Not on file   Other Topics Concern    Not on file   Social History Narrative    Not on file         CBC: No results for input(s): WBC, RBC, HGB, HCT, PLT, MCV, MCH, MCHC in the last 72 hours.    CMP: No results for input(s): NA, K, CL, CO2, BUN, CREATININE, GLU, MG, PHOS, CALCIUM, ALBUMIN, PROT, ALKPHOS, ALT, AST, BILITOT in the last 72 hours.    INR  No results for input(s): PT, INR, PROTIME, APTT in the last 72 hours.        Diagnostic Studies:      EKD Echo:  No results found for this or any previous visit.      Anesthesia Evaluation    I have reviewed the Patient Summary Reports.    I have reviewed the Nursing Notes. I have reviewed the NPO Status.      Review of Systems  Anesthesia Hx:  Personal Hx of Anesthesia complications, Post-Operative Nausea/Vomiting       Physical Exam  General:  Well nourished    Airway/Jaw/Neck:  Airway Findings: Mouth Opening: Normal Tongue: Normal  General Airway Assessment: Adult  Mallampati: III  TM Distance: Normal, at least 6 cm                 Anesthesia Plan  Type of Anesthesia, risks & benefits discussed:  Anesthesia Type:  general  Patient's Preference:   Intra-op Monitoring Plan: standard ASA monitors  Intra-op Monitoring Plan Comments:   Post Op Pain Control Plan: multimodal analgesia  Post Op Pain Control Plan Comments:   Induction:   IV  Beta Blocker:  Patient is not currently on a Beta-Blocker (No further documentation required).       Informed Consent: Patient understands risks and agrees with  Anesthesia plan.  Questions answered. Anesthesia consent signed with patient.  ASA Score: 2     Day of Surgery Review of History & Physical: I have interviewed and examined the patient. I have reviewed the patient's H&P dated:            Ready For Surgery From Anesthesia Perspective.

## 2020-08-24 NOTE — TELEPHONE ENCOUNTER
Called pt to confirm arrival time of 6:30 for procedure on 8/25/2020. Gave pt NPO instructions and gave pt opportunity to ask questions. Pt verbalized understanding.

## 2020-08-25 ENCOUNTER — ANESTHESIA (OUTPATIENT)
Dept: SURGERY | Facility: HOSPITAL | Age: 50
End: 2020-08-25
Payer: COMMERCIAL

## 2020-08-25 ENCOUNTER — HOSPITAL ENCOUNTER (OUTPATIENT)
Facility: HOSPITAL | Age: 50
Discharge: HOME | End: 2020-08-25
Attending: UROLOGY | Admitting: UROLOGY
Payer: COMMERCIAL

## 2020-08-25 VITALS
TEMPERATURE: 98 F | RESPIRATION RATE: 20 BRPM | WEIGHT: 259 LBS | BODY MASS INDEX: 39.25 KG/M2 | OXYGEN SATURATION: 99 % | DIASTOLIC BLOOD PRESSURE: 72 MMHG | SYSTOLIC BLOOD PRESSURE: 150 MMHG | HEIGHT: 68 IN | HEART RATE: 70 BPM

## 2020-08-25 DIAGNOSIS — N30.10 INTERSTITIAL CYSTITIS: Primary | ICD-10-CM

## 2020-08-25 PROCEDURE — D9220A PRA ANESTHESIA: Mod: ANES,,, | Performed by: STUDENT IN AN ORGANIZED HEALTH CARE EDUCATION/TRAINING PROGRAM

## 2020-08-25 PROCEDURE — 71000015 HC POSTOP RECOV 1ST HR: Performed by: UROLOGY

## 2020-08-25 PROCEDURE — 52260 PR CYSTOSCOPY,DIL BLADDER,GEN ANESTH: ICD-10-PCS | Mod: ,,, | Performed by: UROLOGY

## 2020-08-25 PROCEDURE — 52260 CYSTOSCOPY AND TREATMENT: CPT | Mod: ,,, | Performed by: UROLOGY

## 2020-08-25 PROCEDURE — D9220A PRA ANESTHESIA: ICD-10-PCS | Mod: CRNA,,, | Performed by: NURSE ANESTHETIST, CERTIFIED REGISTERED

## 2020-08-25 PROCEDURE — 71000016 HC POSTOP RECOV ADDL HR: Performed by: UROLOGY

## 2020-08-25 PROCEDURE — D9220A PRA ANESTHESIA: ICD-10-PCS | Mod: ANES,,, | Performed by: STUDENT IN AN ORGANIZED HEALTH CARE EDUCATION/TRAINING PROGRAM

## 2020-08-25 PROCEDURE — 25000003 PHARM REV CODE 250: Performed by: STUDENT IN AN ORGANIZED HEALTH CARE EDUCATION/TRAINING PROGRAM

## 2020-08-25 PROCEDURE — 37000008 HC ANESTHESIA 1ST 15 MINUTES: Performed by: UROLOGY

## 2020-08-25 PROCEDURE — 37000009 HC ANESTHESIA EA ADD 15 MINS: Performed by: UROLOGY

## 2020-08-25 PROCEDURE — 36000706: Performed by: UROLOGY

## 2020-08-25 PROCEDURE — 36000707: Performed by: UROLOGY

## 2020-08-25 PROCEDURE — 27200651 HC AIRWAY, LMA: Performed by: STUDENT IN AN ORGANIZED HEALTH CARE EDUCATION/TRAINING PROGRAM

## 2020-08-25 PROCEDURE — D9220A PRA ANESTHESIA: Mod: CRNA,,, | Performed by: NURSE ANESTHETIST, CERTIFIED REGISTERED

## 2020-08-25 PROCEDURE — 63600175 PHARM REV CODE 636 W HCPCS: Performed by: STUDENT IN AN ORGANIZED HEALTH CARE EDUCATION/TRAINING PROGRAM

## 2020-08-25 PROCEDURE — 25000003 PHARM REV CODE 250: Performed by: NURSE ANESTHETIST, CERTIFIED REGISTERED

## 2020-08-25 PROCEDURE — 63600175 PHARM REV CODE 636 W HCPCS: Performed by: NURSE ANESTHETIST, CERTIFIED REGISTERED

## 2020-08-25 PROCEDURE — 71000044 HC DOSC ROUTINE RECOVERY FIRST HOUR: Performed by: UROLOGY

## 2020-08-25 RX ORDER — URINARY ANTISEPTIC ANTISPASMODIC 81.6; 40.8; 10.8; .12 MG/1; MG/1; MG/1; MG/1
1 TABLET ORAL EVERY 6 HOURS PRN
Qty: 30 TABLET | Refills: 11 | Status: SHIPPED | OUTPATIENT
Start: 2020-08-25 | End: 2022-01-03

## 2020-08-25 RX ORDER — FLUTICASONE PROPIONATE 50 MCG
1 SPRAY, SUSPENSION (ML) NASAL DAILY
COMMUNITY

## 2020-08-25 RX ORDER — LIDOCAINE HYDROCHLORIDE 10 MG/ML
1 INJECTION, SOLUTION EPIDURAL; INFILTRATION; INTRACAUDAL; PERINEURAL ONCE
Status: DISCONTINUED | OUTPATIENT
Start: 2020-08-25 | End: 2020-08-25 | Stop reason: HOSPADM

## 2020-08-25 RX ORDER — PROPOFOL 10 MG/ML
VIAL (ML) INTRAVENOUS
Status: DISCONTINUED | OUTPATIENT
Start: 2020-08-25 | End: 2020-08-25

## 2020-08-25 RX ORDER — ONDANSETRON 2 MG/ML
4 INJECTION INTRAMUSCULAR; INTRAVENOUS DAILY PRN
Status: DISCONTINUED | OUTPATIENT
Start: 2020-08-25 | End: 2020-08-25 | Stop reason: HOSPADM

## 2020-08-25 RX ORDER — SODIUM CHLORIDE 0.9 % (FLUSH) 0.9 %
10 SYRINGE (ML) INJECTION
Status: DISCONTINUED | OUTPATIENT
Start: 2020-08-25 | End: 2020-08-25 | Stop reason: HOSPADM

## 2020-08-25 RX ORDER — FENTANYL CITRATE 50 UG/ML
25 INJECTION, SOLUTION INTRAMUSCULAR; INTRAVENOUS EVERY 5 MIN PRN
Status: DISCONTINUED | OUTPATIENT
Start: 2020-08-25 | End: 2020-08-25 | Stop reason: HOSPADM

## 2020-08-25 RX ORDER — CEFAZOLIN SODIUM 1 G/3ML
2 INJECTION, POWDER, FOR SOLUTION INTRAMUSCULAR; INTRAVENOUS
Status: COMPLETED | OUTPATIENT
Start: 2020-08-25 | End: 2020-08-25

## 2020-08-25 RX ORDER — OXYCODONE AND ACETAMINOPHEN 5; 325 MG/1; MG/1
1 TABLET ORAL ONCE AS NEEDED
Status: COMPLETED | OUTPATIENT
Start: 2020-08-25 | End: 2020-08-25

## 2020-08-25 RX ORDER — ATROPA BELLADONNA AND OPIUM 16.2; 6 MG/1; MG/1
SUPPOSITORY RECTAL
Status: DISCONTINUED
Start: 2020-08-25 | End: 2020-08-25 | Stop reason: WASHOUT

## 2020-08-25 RX ORDER — OXYCODONE AND ACETAMINOPHEN 5; 325 MG/1; MG/1
1 TABLET ORAL EVERY 4 HOURS PRN
Qty: 8 TABLET | Refills: 0 | Status: SHIPPED | OUTPATIENT
Start: 2020-08-25 | End: 2021-04-14

## 2020-08-25 RX ORDER — SODIUM CHLORIDE 9 MG/ML
INJECTION, SOLUTION INTRAVENOUS CONTINUOUS PRN
Status: DISCONTINUED | OUTPATIENT
Start: 2020-08-25 | End: 2020-08-25

## 2020-08-25 RX ORDER — MIDAZOLAM HYDROCHLORIDE 1 MG/ML
INJECTION, SOLUTION INTRAMUSCULAR; INTRAVENOUS
Status: DISCONTINUED | OUTPATIENT
Start: 2020-08-25 | End: 2020-08-25

## 2020-08-25 RX ORDER — LIDOCAINE HYDROCHLORIDE 20 MG/ML
INJECTION INTRAVENOUS
Status: DISCONTINUED | OUTPATIENT
Start: 2020-08-25 | End: 2020-08-25

## 2020-08-25 RX ORDER — ATROPA BELLADONNA AND OPIUM 16.2; 6 MG/1; MG/1
SUPPOSITORY RECTAL
Status: DISCONTINUED | OUTPATIENT
Start: 2020-08-25 | End: 2020-08-25 | Stop reason: HOSPADM

## 2020-08-25 RX ADMIN — OXYCODONE HYDROCHLORIDE AND ACETAMINOPHEN 1 TABLET: 5; 325 TABLET ORAL at 09:08

## 2020-08-25 RX ADMIN — CEFAZOLIN 2 G: 330 INJECTION, POWDER, FOR SOLUTION INTRAMUSCULAR; INTRAVENOUS at 08:08

## 2020-08-25 RX ADMIN — SODIUM CHLORIDE: 9 INJECTION, SOLUTION INTRAVENOUS at 08:08

## 2020-08-25 RX ADMIN — MIDAZOLAM HYDROCHLORIDE 2 MG: 1 INJECTION, SOLUTION INTRAMUSCULAR; INTRAVENOUS at 08:08

## 2020-08-25 RX ADMIN — LIDOCAINE HYDROCHLORIDE 100 MG: 20 INJECTION, SOLUTION INTRAVENOUS at 08:08

## 2020-08-25 RX ADMIN — PROPOFOL 200 MG: 10 INJECTION, EMULSION INTRAVENOUS at 08:08

## 2020-08-25 NOTE — ANESTHESIA PROCEDURE NOTES
Intubation  Performed by: Bernice Solomon CRNA  Authorized by: Bernice Solomon CRNA     Intubation:     Induction:  Intravenous    Intubated:  Postinduction    Mask Ventilation:  N/a    Attempts:  1    Attempted By:  Student    Method of Intubation:  Blind intubation    Difficult Airway Encountered?: No      Complications:  None    Airway Device:  Supraglottic airway/LMA    Airway Device Size:  4.0    Style/Cuff Inflation:  Cuffed (inflated to minimal occlusive pressure)    Inflation Amount (mL):  30    Placement Verified By:  Capnometry    Complicating Factors:  None    Findings Post-Intubation:  BS equal bilateral

## 2020-08-25 NOTE — TRANSFER OF CARE
"Anesthesia Transfer of Care Note    Patient: Nan Betts    Procedure(s) Performed: Procedure(s) (LRB):  CYSTOSCOPY (N/A)  HYDRODISTENTION (N/A)    Patient location: PACU    Anesthesia Type: general    Transport from OR: Transported from OR on 6-10 L/min O2 by face mask with adequate spontaneous ventilation    Post pain: adequate analgesia    Post assessment: no apparent anesthetic complications and tolerated procedure well    Post vital signs: stable    Level of consciousness: awake    Nausea/Vomiting: no nausea/vomiting    Complications: none    Transfer of care protocol was followed      Last vitals:   Visit Vitals  BP (!) 151/82 (BP Location: Right arm, Patient Position: Lying)   Pulse 79   Temp 36.4 °C (97.5 °F) (Skin)   Resp 18   Ht 5' 8" (1.727 m)   Wt 117.5 kg (259 lb)   SpO2 100%   Breastfeeding No   BMI 39.38 kg/m²     "

## 2020-08-25 NOTE — ANESTHESIA POSTPROCEDURE EVALUATION
Anesthesia Post Evaluation    Patient: Nan Betts    Procedure(s) Performed: Procedure(s) (LRB):  CYSTOSCOPY (N/A)  HYDRODISTENTION (N/A)    Final Anesthesia Type: general    Patient location during evaluation: PACU  Patient participation: Yes- Able to Participate  Level of consciousness: awake and alert, awake and oriented  Post-procedure vital signs: reviewed and stable  Pain management: adequate  Airway patency: patent    PONV status at discharge: No PONV  Anesthetic complications: no      Cardiovascular status: blood pressure returned to baseline, hemodynamically stable and stable  Respiratory status: unassisted, spontaneous ventilation and room air  Hydration status: euvolemic  Follow-up not needed.          Vitals Value Taken Time   /72 08/25/20 1018   Temp 36.4 °C (97.5 °F) 08/25/20 0900   Pulse 76 08/25/20 1020   Resp 18 08/25/20 1000   SpO2 99 % 08/25/20 1020   Vitals shown include unvalidated device data.      No case tracking events are documented in the log.      Pain/Chelsy Score: Pain Rating Prior to Med Admin: 5 (8/25/2020  9:48 AM)  Chelsy Score: 10 (8/25/2020  9:30 AM)

## 2020-08-25 NOTE — INTERVAL H&P NOTE
The patient has been examined and the H&P has been reviewed:    I concur with the findings and no changes have occurred since H&P was written.  Urine dipstick - negative for all components.    Surgery risks, benefits and alternative options discussed and understood by patient/family.          Active Hospital Problems    Diagnosis  POA    Interstitial cystitis [N30.10]  Yes      Resolved Hospital Problems   No resolved problems to display.       Patient seen in holding.  No changes in clinical condition.  Proceed with planned procedure.

## 2020-08-25 NOTE — DISCHARGE SUMMARY
[facility]  Discharge Note  Short Stay    Admit Date: 8/25/2020    Discharge Date and Time: 08/25/2020 9:14 AM      Attending Physician: Christal Khan MD     Discharge Provider: Cole Cintron MD    Diagnoses:  Active Hospital Problems    Diagnosis  POA    Interstitial cystitis [N30.10]  Yes      Resolved Hospital Problems   No resolved problems to display.       Discharged Condition: stable    Hospital Course: Patient was admitted for cystoscopy with hydrodistention and tolerated the procedure well with no complications. She was discharged home in good condition on the same day.       Final Diagnoses: Same as principal problem.    Disposition: Home or Self Care    Follow up/Patient Instructions:    Medications:  Reconciled Home Medications:   Current Discharge Medication List      START taking these medications    Details   oxyCODONE-acetaminophen (PERCOCET) 5-325 mg per tablet Take 1 tablet by mouth every 4 (four) hours as needed for Pain.  Qty: 8 tablet, Refills: 0    Comments: n/a          CONTINUE these medications which have CHANGED    Details   methen-sod phos-meth blue-hyos (UROGESIC-BLUE) 81.6-40.8-0.12 mg Tab Take 1 tablet by mouth every 6 (six) hours as needed.  Qty: 30 tablet, Refills: 11    Associated Diagnoses: Interstitial cystitis         CONTINUE these medications which have NOT CHANGED    Details   (Magic mouthwash) 1:1:1 Benadryl 12.5mg/5ml liq, aluminum & magnesium hydroxide-simehticone (Maalox), lidocaine viscous 2% Swish and spit 5 mLs every 4 (four) hours as needed. for mouth sores  Qty: 90 mL, Refills: 3      ALPRAZolam (XANAX) 1 MG tablet Take 1 mg by mouth 2 (two) times daily as needed.       carisoprodol (SOMA) 350 MG tablet Take 350 mg by mouth 3 (three) times daily as needed for Muscle spasms.      dexlansoprazole (DEXILANT) 60 mg capsule Take 1 capsule (60 mg total) by mouth once daily.  Qty: 90 capsule, Refills: 3      diphenhydrAMINE (BENADRYL) 50 MG tablet Take 50 mg  by mouth every 4 (four) hours as needed for Itching.      !! estradioL (ESTRACE) 1 MG tablet Take 1 tablet (1 mg total) by mouth once daily. Take one 1mg pill daily along with the 2mg pill daily for total of 3mg daily  Qty: 90 tablet, Refills: 3    Associated Diagnoses: Menopause      !! estradioL (ESTRACE) 2 MG tablet Take 1 tablet (2 mg total) by mouth once daily. Take one 2mg tab and one 1mg tab for total of 3 daily  Qty: 90 tablet, Refills: 3    Associated Diagnoses: Menopause      flavoxATE (URISPAS) 100 mg Tab Take 1 tablet (100 mg total) by mouth 3 (three) times daily as needed.  Qty: 270 tablet, Refills: 0    Associated Diagnoses: Interstitial cystitis; Urinary urgency      fluticasone propionate (FLONASE) 50 mcg/actuation nasal spray 1 spray by Each Nostril route once daily.      Lactobacillus rhamnosus GG (CULTURELLE) 10 billion cell capsule Take 1 capsule by mouth once daily.      loperamide (IMODIUM) 2 mg capsule TAKE 2 CAPSULES BY MOUTH  TWICE DAILY AS NEEDED  Qty: 360 capsule, Refills: 1    Associated Diagnoses: Chronic diarrhea      meclizine (ANTIVERT) 25 mg tablet Take 25 mg by mouth 3 (three) times daily as needed.      !! pregabalin (LYRICA) 50 MG capsule Take 50 mg by mouth 3 (three) times daily.       promethazine (PHENERGAN) 25 mg/mL injection Inject 25 mg into the vein every 4 (four) hours.      simvastatin (ZOCOR) 40 MG tablet Take 40 mg by mouth every evening.       SODIUM CHLORIDE 0.45 % IV Inject 1,000 mLs into the vein as needed.       sodium chloride 0.9% 0.9 % SolP 50 mL with promethazine 25 mg/mL Soln Inject 25 mg into the vein every 4 (four) hours. Thru IV      tiZANidine 4 mg Cap Take 4 mg by mouth every evening.       phentermine (ADIPEX-P) 37.5 mg tablet Take 1 tablet (37.5 mg total) by mouth before breakfast.  Qty: 30 tablet, Refills: 1    Associated Diagnoses: Obesity, Class II, BMI 35-39.9, no comorbidity      !! pregabalin (LYRICA) 75 MG capsule Take 75 mg by mouth 2 (two)  times daily.       !! - Potential duplicate medications found. Please discuss with provider.        Discharge Procedure Orders   Diet Adult Regular     Notify your health care provider if you experience any of the following:  temperature >100.4     Notify your health care provider if you experience any of the following:  persistent nausea and vomiting or diarrhea     Notify your health care provider if you experience any of the following:  severe uncontrolled pain     Notify your health care provider if you experience any of the following:  redness, tenderness, or signs of infection (pain, swelling, redness, odor or green/yellow discharge around incision site)     Notify your health care provider if you experience any of the following:  difficulty breathing or increased cough     Notify your health care provider if you experience any of the following:  severe persistent headache     Notify your health care provider if you experience any of the following:  persistent dizziness, light-headedness, or visual disturbances     Notify your health care provider if you experience any of the following:  increased confusion or weakness     Activity as tolerated       As above.

## 2020-08-25 NOTE — PROGRESS NOTES
Pt asked if IV phenergan was given durning procedure.  Nurse informed pt that it was not given.  Asked pt if she was nauseated and she stated no not really but I always take it every 4 hours.  I stated that the doctor has it ordered and I have it from pharmacy but if I give it I can not discharge her right away. Pt verbalized understanding and stated she wanted to leave and that she will just take her own medication as she has been taking

## 2020-08-25 NOTE — OP NOTE
[facility]  Operative Note    Date: 08/25/2020    Pre-Op Diagnosis:   - interstitial cystitis  Patient Active Problem List    Diagnosis Date Noted    Presence of gastric pacemaker 02/18/2019    H/O pyloroplasty 02/18/2019    Bloating 02/18/2019    Epigastric pain 02/18/2019    Colon polyp 02/18/2019    Hypercholesteremia 06/21/2016    Irritable bowel syndrome with diarrhea 06/21/2016    Left lower quadrant pain 06/21/2016    Diarrhea 06/21/2016    Intractable nausea and vomiting 06/20/2016    Urge urinary incontinence 06/11/2016    Interstitial cystitis 06/11/2016    Anxiety 06/11/2016    Vaginal atrophy 06/11/2016    Vaginal pain 06/11/2016    Dyspareunia 06/11/2016    GERD (gastroesophageal reflux disease) 03/10/2016    Gastroparesis 03/10/2016     Post-Op Diagnosis: same    Procedure(s) Performed:   1.  Cystoscopy with hydrodistension    Specimen(s): None    Staff Surgeon: Christal Khan MD    Assistant Surgeon: Cole Cintron MD    Anesthesia: General LMA anesthesia    Indications: Nan Betts is a 50 y.o. female with interstitial cystitis presenting for hydrodistension.    Findings:   - Anesthetic bladder capacity was 1000 mL.  - Mild terminal bleeding upon emptying bladder after hydrodistention.  - Cystoscopy with diffuse glomerulations in all quadrants of the bladder. No lesions or ulcers seen.    Estimated Blood Loss: min    Drains: None    Procedure in detail: After risks, benefits and possible complications of hydro distention were discussed with the patient informed consent was obtained. All questions were answered in the pre-operative area.  The patient was transferred to the cystoscopy suite and placed in the supine position.  SCDs were applied and working.  Anesthesia was administered.  After adequate anesthesia the patient was placed in the dorsal lithotomy position and prepped and draped in the usual sterile fashion. Time out was preformed and suhail-procedural  antibiotics were confirmed.    A rigid cystoscope in a 22 Fr sheath was introduced into the bladder per urethra. This passed easily. The entire urethra was visualized which showed no masses or strictures. The right and left ureteral orifices were identified in the normal anatomic position. Formal cystoscopy was performed, which revealed no masses or lesions suspicious for malignancy, no trabeculations, no bladder stones and no bladder diverticuli.     The bladder was then filled with sterile water until equilibrium was reached at 80 cm of water. The capacity of the bladder was 1000 ml. This was held for 8 minutes. There was terminal hematuria seen. There were no ulcerations seen. There were diffuse glomerulations seen.    The bladder was drained and the cystoscope removed. A urojet was given.    The patient was removed from lithotomy and transferred to recovery in stable condition.    Disposition:  The patient will follow up with Dr. Khan via virtual visit  Prescriptions were given for urogesic blue and percocet.    MD ABBY Morataya was present for the entire case and agree with the above note.

## 2020-08-25 NOTE — DISCHARGE INSTRUCTIONS
Cystoscopy       After the procedure  If you had a sedative, general anesthesia, or spinal anesthesia, you must have someone drive you home. Once youre home:  · Drink plenty of fluids.  · You may have burning or light bleeding when you urinate--this is normal.  · Medicines may be prescribed to ease any discomfort or prevent infection. Take these as directed.  · Call your doctor if you have heavy bleeding or blood clots, burning that lasts more than a day, a fever over 100°F  (38° C), or trouble urinating.  Date Last Reviewed: 1/1/2017 © 2000-2017 [employer]. [address] [place] [address]. All rights reserved. This information is not intended as a substitute for professional medical care. Always follow your healthcare professional's instructions.        Anesthesia: General Anesthesia     You are watched continuously during your procedure by your anesthesia provider.     Youre due to have surgery. During surgery, youll be given medicine called anesthesia or anesthetic. This will keep you comfortable and pain-free. Your anesthesia provider will use general anesthesia.  What is general anesthesia?  General anesthesia puts you into a state like deep sleep. It goes into the bloodstream (IV anesthetics), into the lungs (gas anesthetics), or both. You feel nothing during the procedure. You will not remember it. During the procedure, the anesthesia provider monitors you continuously. He or she checks your heart rate and rhythm, blood pressure, breathing, and blood oxygen.  · IV anesthetics. IV anesthetics are given through an IV line in your arm. Theyre often given first. This is so you are asleep before a gas anesthetic is started. Some kinds of IV anesthetics relieve pain. Others relax you. Your doctor will decide which kind is best in your case.  · Gas anesthetics. Gas anesthetics are breathed into the lungs. They are often used to keep you asleep. They can be given through a facemask or  a tube placed in your larynx or trachea (breathing tube).  ¨ If you have a facemask, your anesthesia provider will most likely place it over your nose and mouth while youre still awake. Youll breathe oxygen through the mask as your IV anesthetic is started. Gas anesthetic may be added through the mask.  ¨ If you have a tube in the larynx or trachea, it will be inserted into your throat after youre asleep.  Anesthesia tools and medicines  You will likely have:  · IV anesthetics. These are put into an IV line into your bloodstream.  · Gas anesthetics. You breathe these anesthetics into your lungs, where they pass into your bloodstream.  · Pulse oximeter. This is a small clip that is attached to the end of your finger. This measures your blood oxygen level.  · Electrocardiography leads (electrodes). These are small sticky pads that are placed on your chest. They record your heart rate and rhythm.  · Blood pressure cuff. This reads your blood pressure.  Risks and possible complications  General anesthesia has some risks. These include:  · Breathing problems  · Nausea and vomiting  · Sore throat or hoarseness (usually temporary)  · Allergic reaction to the anesthetic  · Irregular heartbeat (rare)  · Cardiac arrest (rare)   Anesthesia safety  · Follow all instructions you are given for how long not to eat or drink before your procedure.  · Be sure your doctor knows what medicines and drugs you take. This includes over-the-counter medicines, herbs, supplements, alcohol or other drugs. You will be asked when those were last taken.  · Have an adult family member or friend drive you home after the procedure.  · For the first 24 hours after your surgery:  ¨ Do not drive or use heavy equipment.  ¨ Do not make important decisions or sign legal documents. If important decisions or signing legal documents is necessary during the first 24 hours after surgery, have a trusted family member or spouse act on your behalf.  ¨ Avoid  alcohol.  ¨ Have a responsible adult stay with you. He or she can watch for problems and help keep you safe.  Date Last Reviewed: 12/1/2016  © 8178-9575 [employer]. [address] [place] [address], [place] [address]. All rights reserved. This information is not intended as a substitute for professional medical care. Always follow your healthcare professional's instructions.

## 2020-09-07 ENCOUNTER — LAB VISIT (OUTPATIENT)
Dept: URGENT CARE | Facility: CLINIC | Age: 50
End: 2020-09-07
Payer: COMMERCIAL

## 2020-09-07 VITALS — HEART RATE: 84 BPM | TEMPERATURE: 98 F | OXYGEN SATURATION: 98 %

## 2020-09-07 DIAGNOSIS — Z01.818 PRE-OP TESTING: ICD-10-CM

## 2020-09-07 PROCEDURE — U0003 INFECTIOUS AGENT DETECTION BY NUCLEIC ACID (DNA OR RNA); SEVERE ACUTE RESPIRATORY SYNDROME CORONAVIRUS 2 (SARS-COV-2) (CORONAVIRUS DISEASE [COVID-19]), AMPLIFIED PROBE TECHNIQUE, MAKING USE OF HIGH THROUGHPUT TECHNOLOGIES AS DESCRIBED BY CMS-2020-01-R: HCPCS

## 2020-09-07 PROCEDURE — 99211 PR OFFICE/OUTPT VISIT, EST, LEVL I: ICD-10-PCS | Mod: S$GLB,,, | Performed by: NURSE PRACTITIONER

## 2020-09-07 PROCEDURE — 99211 OFF/OP EST MAY X REQ PHY/QHP: CPT | Mod: S$GLB,,, | Performed by: NURSE PRACTITIONER

## 2020-09-08 LAB — SARS-COV-2 RNA RESP QL NAA+PROBE: NOT DETECTED

## 2020-09-09 ENCOUNTER — OFFICE VISIT (OUTPATIENT)
Dept: UROLOGY | Facility: CLINIC | Age: 50
End: 2020-09-09
Payer: COMMERCIAL

## 2020-09-09 DIAGNOSIS — R39.15 URINARY URGENCY: ICD-10-CM

## 2020-09-09 DIAGNOSIS — N30.10 INTERSTITIAL CYSTITIS: ICD-10-CM

## 2020-09-09 PROCEDURE — 99024 POSTOP FOLLOW-UP VISIT: CPT | Mod: 95,,, | Performed by: UROLOGY

## 2020-09-09 PROCEDURE — 99024 PR POST-OP FOLLOW-UP VISIT: ICD-10-PCS | Mod: 95,,, | Performed by: UROLOGY

## 2020-09-09 RX ORDER — FLAVOXATE HYDROCHLORIDE 100 MG/1
100 TABLET ORAL 3 TIMES DAILY PRN
Qty: 270 TABLET | Refills: 3 | Status: SHIPPED | OUTPATIENT
Start: 2020-09-09 | End: 2021-10-26

## 2020-09-09 NOTE — PROGRESS NOTES
The patient location is:  home  The chief complaint leading to consultation is:  Follow up after cystoscopy, hydrodistension of the bladder     Visit type: audiovisual    Time with patient:  16 minutes of total time spent on the encounter, which includes face to face time and non-face to face time preparing to see the patient (eg, review of tests), obtaining and/or reviewing separately obtained history, documenting clinical information in the electronic or other health record, independently interpreting results (not separately reported) and communicating results to the patient/family/caregiver, or care coordination (not separately reported).     Each patient to whom he or she provides medical services by telemedicine is:  (1) informed of the relationship between the physician and patient and the respective role of any other health care provider with respect to management of the patient; and (2) notified that he or she may decline to receive medical services by telemedicine and may withdraw from such care at any time.      CHIEF COMPLAINT:    Mrs. Betts is a 50 y.o. female presenting for a follow up after cystoscopy, hydrodistension of the bladder 8/25/2020.      PRESENTING ILLNESS:    Nan Betts is a 50 y.o. female who has this appointment for follow up.  The patient states that she has some improvement of her bladder symptoms  She had some improvement initially, then had worsening of her symptoms for several days which then improved.  She had a port placed since her hydrodistension due to the difficulty in getting blood draws.      Allergies:  Morphine, Ultram [tramadol], Codeine, Gabapentin, Ibuprofen, Nsaids (non-steroidal anti-inflammatory drug), Reglan [metoclopramide hcl], and Topamax [topiramate]    Medications:  Outpatient Encounter Medications as of 9/9/2020   Medication Sig Dispense Refill    (Magic mouthwash) 1:1:1 Benadryl 12.5mg/5ml liq, aluminum & magnesium hydroxide-simehticone (Maalox), lidocaine  viscous 2% Swish and spit 5 mLs every 4 (four) hours as needed. for mouth sores 90 mL 3    ALPRAZolam (XANAX) 1 MG tablet Take 1 mg by mouth 2 (two) times daily as needed.       carisoprodol (SOMA) 350 MG tablet Take 350 mg by mouth 3 (three) times daily as needed for Muscle spasms.      dexlansoprazole (DEXILANT) 60 mg capsule Take 1 capsule (60 mg total) by mouth once daily. (Patient taking differently: Take 1 capsule by mouth once daily. Patient takes twice a day) 90 capsule 3    diphenhydrAMINE (BENADRYL) 50 MG tablet Take 50 mg by mouth every 4 (four) hours as needed for Itching.      estradioL (ESTRACE) 1 MG tablet Take 1 tablet (1 mg total) by mouth once daily. Take one 1mg pill daily along with the 2mg pill daily for total of 3mg daily (Patient taking differently: Take 1 mg by mouth every evening. Take one 1mg pill daily along with the 2mg pill daily for total of 3mg daily) 90 tablet 3    estradioL (ESTRACE) 2 MG tablet Take 1 tablet (2 mg total) by mouth once daily. Take one 2mg tab and one 1mg tab for total of 3 daily (Patient taking differently: Take 2 mg by mouth every evening. Take one 2mg tab and one 1mg tab for total of 3 daily) 90 tablet 3    flavoxATE (URISPAS) 100 mg Tab Take 1 tablet (100 mg total) by mouth 3 (three) times daily as needed. 270 tablet 3    fluticasone propionate (FLONASE) 50 mcg/actuation nasal spray 1 spray by Each Nostril route once daily.      Lactobacillus rhamnosus GG (CULTURELLE) 10 billion cell capsule Take 1 capsule by mouth once daily.      loperamide (IMODIUM) 2 mg capsule TAKE 2 CAPSULES BY MOUTH  TWICE DAILY AS NEEDED 360 capsule 1    meclizine (ANTIVERT) 25 mg tablet Take 25 mg by mouth 3 (three) times daily as needed.      methen-sod phos-meth blue-hyos (UROGESIC-BLUE) 81.6-40.8-0.12 mg Tab Take 1 tablet by mouth every 6 (six) hours as needed. 30 tablet 11    oxyCODONE-acetaminophen (PERCOCET) 5-325 mg per tablet Take 1 tablet by mouth every 4 (four)  hours as needed for Pain. 8 tablet 0    pregabalin (LYRICA) 50 MG capsule Take 50 mg by mouth 3 (three) times daily.       pregabalin (LYRICA) 75 MG capsule Take 75 mg by mouth 2 (two) times daily.      promethazine (PHENERGAN) 25 mg/mL injection Inject 25 mg into the vein every 4 (four) hours.      simvastatin (ZOCOR) 40 MG tablet Take 40 mg by mouth every evening.       SODIUM CHLORIDE 0.45 % IV Inject 1,000 mLs into the vein as needed.       sodium chloride 0.9% 0.9 % SolP 50 mL with promethazine 25 mg/mL Soln Inject 25 mg into the vein every 4 (four) hours. Thru IV      tiZANidine 4 mg Cap Take 4 mg by mouth every evening.       [DISCONTINUED] flavoxATE (URISPAS) 100 mg Tab Take 1 tablet (100 mg total) by mouth 3 (three) times daily as needed. 270 tablet 0     No facility-administered encounter medications on file as of 9/9/2020.          PHYSICAL EXAMINATION:    The patient generally appears in good health, is appropriately interactive, and is in no apparent distress.    Mental: Cooperative with normal affect.    Chest:  normal inspiratory effort.        LABS:    Lab Results   Component Value Date    BUN 10 12/18/2019    CREATININE 0.9 12/18/2019       IMPRESSION:    Encounter Diagnoses   Name Primary?    Interstitial cystitis     Urinary urgency        PLAN:    1.  We discussed that her anesthetic bladder capacity was 1000 ml and that it looked good.  One can have a normal appearing bladder and still have symptoms but this is a good sign  2.  Zyrtec was recommended and discussed the mechanism of hydrodistension is partly due to Mast cell release of histamine.   3.  She has an ophtho appointment coming up they weill look at her macula densa.  She will let me know if she wants to resume [place].

## 2020-09-10 ENCOUNTER — ANESTHESIA (OUTPATIENT)
Dept: ENDOSCOPY | Facility: HOSPITAL | Age: 50
End: 2020-09-10
Payer: COMMERCIAL

## 2020-09-10 ENCOUNTER — ANESTHESIA EVENT (OUTPATIENT)
Dept: ENDOSCOPY | Facility: HOSPITAL | Age: 50
End: 2020-09-10
Payer: COMMERCIAL

## 2020-09-10 ENCOUNTER — HOSPITAL ENCOUNTER (OUTPATIENT)
Facility: HOSPITAL | Age: 50
Discharge: HOME | End: 2020-09-10
Attending: INTERNAL MEDICINE | Admitting: INTERNAL MEDICINE
Payer: COMMERCIAL

## 2020-09-10 VITALS
HEART RATE: 65 BPM | TEMPERATURE: 98 F | DIASTOLIC BLOOD PRESSURE: 59 MMHG | OXYGEN SATURATION: 98 % | RESPIRATION RATE: 14 BRPM | BODY MASS INDEX: 39.4 KG/M2 | HEIGHT: 68 IN | SYSTOLIC BLOOD PRESSURE: 117 MMHG | WEIGHT: 260 LBS

## 2020-09-10 DIAGNOSIS — K31.84 GASTROPARESIS: ICD-10-CM

## 2020-09-10 PROCEDURE — 37000009 HC ANESTHESIA EA ADD 15 MINS: Performed by: INTERNAL MEDICINE

## 2020-09-10 PROCEDURE — 63600175 PHARM REV CODE 636 W HCPCS: Performed by: NURSE ANESTHETIST, CERTIFIED REGISTERED

## 2020-09-10 PROCEDURE — 37000008 HC ANESTHESIA 1ST 15 MINUTES: Performed by: INTERNAL MEDICINE

## 2020-09-10 PROCEDURE — 43245 EGD DILATE STRICTURE: CPT | Performed by: INTERNAL MEDICINE

## 2020-09-10 PROCEDURE — E9220 PRA ENDO ANESTHESIA: HCPCS | Mod: ,,, | Performed by: NURSE ANESTHETIST, CERTIFIED REGISTERED

## 2020-09-10 PROCEDURE — 43450 DILATE ESOPHAGUS 1/MULT PASS: CPT | Mod: 59,,, | Performed by: INTERNAL MEDICINE

## 2020-09-10 PROCEDURE — 43245 EGD DILATE STRICTURE: CPT | Mod: ,,, | Performed by: INTERNAL MEDICINE

## 2020-09-10 PROCEDURE — 43450 PR DILATE ESOPHAGUS: ICD-10-PCS | Mod: 59,,, | Performed by: INTERNAL MEDICINE

## 2020-09-10 PROCEDURE — 43245 PR EGD, FLEX, W/DILATION, GASTR/DUOD STRICTURE: ICD-10-PCS | Mod: ,,, | Performed by: INTERNAL MEDICINE

## 2020-09-10 PROCEDURE — 43450 DILATE ESOPHAGUS 1/MULT PASS: CPT | Performed by: INTERNAL MEDICINE

## 2020-09-10 PROCEDURE — E9220 PRA ENDO ANESTHESIA: ICD-10-PCS | Mod: ,,, | Performed by: NURSE ANESTHETIST, CERTIFIED REGISTERED

## 2020-09-10 PROCEDURE — 25000003 PHARM REV CODE 250: Performed by: INTERNAL MEDICINE

## 2020-09-10 RX ORDER — PROPOFOL 10 MG/ML
VIAL (ML) INTRAVENOUS
Status: DISCONTINUED | OUTPATIENT
Start: 2020-09-10 | End: 2020-09-10

## 2020-09-10 RX ORDER — LIDOCAINE HYDROCHLORIDE 20 MG/ML
INJECTION INTRAVENOUS
Status: DISCONTINUED | OUTPATIENT
Start: 2020-09-10 | End: 2020-09-10

## 2020-09-10 RX ORDER — PROPOFOL 10 MG/ML
VIAL (ML) INTRAVENOUS CONTINUOUS PRN
Status: DISCONTINUED | OUTPATIENT
Start: 2020-09-10 | End: 2020-09-10

## 2020-09-10 RX ORDER — ONDANSETRON 2 MG/ML
INJECTION INTRAMUSCULAR; INTRAVENOUS
Status: DISCONTINUED | OUTPATIENT
Start: 2020-09-10 | End: 2020-09-10

## 2020-09-10 RX ORDER — SODIUM CHLORIDE 9 MG/ML
INJECTION, SOLUTION INTRAVENOUS CONTINUOUS
Status: CANCELLED | OUTPATIENT
Start: 2020-09-10

## 2020-09-10 RX ORDER — SODIUM CHLORIDE 9 MG/ML
INJECTION, SOLUTION INTRAVENOUS CONTINUOUS
Status: DISCONTINUED | OUTPATIENT
Start: 2020-09-10 | End: 2020-09-10 | Stop reason: HOSPADM

## 2020-09-10 RX ADMIN — SODIUM CHLORIDE: 0.9 INJECTION, SOLUTION INTRAVENOUS at 07:09

## 2020-09-10 RX ADMIN — LIDOCAINE HYDROCHLORIDE 100 MG: 20 INJECTION, SOLUTION INTRAVENOUS at 07:09

## 2020-09-10 RX ADMIN — PROPOFOL 150 MCG/KG/MIN: 10 INJECTION, EMULSION INTRAVENOUS at 07:09

## 2020-09-10 RX ADMIN — ONDANSETRON 4 MG: 2 INJECTION, SOLUTION INTRAMUSCULAR; INTRAVENOUS at 07:09

## 2020-09-10 RX ADMIN — PROPOFOL 80 MG: 10 INJECTION, EMULSION INTRAVENOUS at 07:09

## 2020-09-10 RX ADMIN — PROPOFOL 20 MG: 10 INJECTION, EMULSION INTRAVENOUS at 07:09

## 2020-09-10 NOTE — [employer] PATIENT INSTRUCTIONS
Discharge Summary/Instructions after an Endoscopic Procedure  Patient Name: Nan Betts  Patient MRN: [id number]  Patient YOB: 1970  Thursday, September 10, 2020  Zack Lehman MD  RESTRICTIONS:  During your procedure today, you received medications for sedation.  These   medications may affect your judgment, balance and coordination.  Therefore,   for 24 hours, you have the following restrictions:   - DO NOT drive a car, operate machinery, make legal/financial decisions,   sign important papers or drink alcohol.    ACTIVITY:  Today: no heavy lifting, straining or running due to procedural   sedation/anesthesia.  The following day: return to full activity including work.  DIET:  Eat and drink normally unless instructed otherwise.     TREATMENT FOR COMMON SIDE EFFECTS:  - Mild abdominal pain, nausea, belching, bloating or excessive gas:  rest,   eat lightly and use a heating pad.  - Sore Throat: treat with throat lozenges and/or gargle with warm salt   water.  - Because air was used during the procedure, expelling large amounts of air   from your rectum or belching is normal.  - If a bowel prep was taken, you may not have a bowel movement for 1-3 days.    This is normal.  SYMPTOMS TO WATCH FOR AND REPORT TO YOUR PHYSICIAN:  1. Abdominal pain or bloating, other than gas cramps.  2. Chest pain.  3. Back pain.  4. Signs of infection such as: chills or fever occurring within 24 hours   after the procedure.  5. Rectal bleeding, which would show as bright red, maroon, or black stools.   (A tablespoon of blood from the rectum is not serious, especially if   hemorrhoids are present.)  6. Vomiting.  7. Weakness or dizziness.  GO DIRECTLY TO THE NEAREST EMERGENCY ROOM IF YOU HAVE ANY OF THE FOLLOWING:      Difficulty breathing              Chills and/or fever over 101 F   Persistent vomiting and/or vomiting blood   Severe abdominal pain   Severe chest pain   Black, tarry stools   Bleeding- more than one  tablespoon   Any other symptom or condition that you feel may need urgent attention  Your doctor recommends these additional instructions:  If any biopsies were taken, your doctors clinic will contact you in 1 to 2   weeks with any results.  - Patient has a contact number available for emergencies.  The signs and   symptoms of potential delayed complications were discussed with the   patient.  Return to normal activities tomorrow.  Written discharge   instructions were provided to the patient.   - Discharge patient to home (ambulatory).   - Resume previous diet.   - Continue present medications.   - Repeat upper endoscopy PRN for retreatment.  For questions, problems or results please call your physician - Zack Lehman MD at Work:  [phone].  [facility], EMERGENCY ROOM PHONE NUMBER: [phone]  IF A COMPLICATION OR EMERGENCY SITUATION ARISES AND YOU ARE UNABLE TO REACH   YOUR PHYSICIAN - GO DIRECTLY TO THE EMERGENCY ROOM.  Zack Lehman MD  9/10/2020 7:44:21 AM  This report has been verified and signed electronically.  [employer]

## 2020-09-10 NOTE — ANESTHESIA POSTPROCEDURE EVALUATION
Anesthesia Post Evaluation    Patient: Nan Betts    Procedure(s) Performed: Procedure(s) (LRB):  ESOPHAGOGASTRODUODENOSCOPY (EGD) (N/A)    Final Anesthesia Type: general    Patient location during evaluation: PACU  Patient participation: Yes- Able to Participate  Level of consciousness: awake and alert, awake and oriented  Post-procedure vital signs: reviewed and stable  Pain management: adequate  Airway patency: patent    PONV status at discharge: No PONV  Anesthetic complications: no      Cardiovascular status: blood pressure returned to baseline, hemodynamically stable and stable  Respiratory status: unassisted, spontaneous ventilation and room air  Hydration status: euvolemic  Follow-up not needed.          Vitals Value Taken Time   /59 09/10/20 0815   Temp 36.6 °C (97.9 °F) 09/10/20 0745   Pulse 65 09/10/20 0815   Resp 14 09/10/20 0815   SpO2 98 % 09/10/20 0815         Event Time   Out of Recovery 08:16:30         Pain/Chelsy Score: Chelsy Score: 10 (9/10/2020  8:15 AM)

## 2020-09-10 NOTE — ANESTHESIA PREPROCEDURE EVALUATION
09/10/2020  Nan Betts is a 50 y.o., female.    Anesthesia Evaluation    I have reviewed the Patient Summary Reports.    I have reviewed the Nursing Notes. I have reviewed the NPO Status.   I have reviewed the Medications.     Review of Systems  Anesthesia Hx:  No problems with previous Anesthesia Denies Hx of Anesthetic complications  History of prior surgery of interest to airway management or planning: jaw. Denies Family Hx of Anesthesia complications.   Denies Personal Hx of Anesthesia complications.   Hematology/Oncology:  Hematology Normal   Oncology Normal     EENT/Dental:EENT/Dental Normal   Cardiovascular:  Cardiovascular Normal     Pulmonary:  Pulmonary Normal    Renal/:  Renal/ Normal     Hepatic/GI:   GERD, poorly controlled    Musculoskeletal:  Musculoskeletal Normal    Neurological:   Seizures, well controlled    Endocrine:  Endocrine Normal    Dermatological:  Skin Normal    Psych:  Psychiatric Normal           Physical Exam  General:  Obesity    Airway/Jaw/Neck:  Airway Findings: Mouth Opening: Normal Tongue: Normal  General Airway Assessment: Adult  Mallampati: II  TM Distance: 4 - 6 cm        Eyes/Ears/Nose:  EYES/EARS/NOSE FINDINGS: Normal   Dental:  Dental Findings: In tact   Chest/Lungs:  Chest/Lungs Clear    Heart/Vascular:  Heart Findings: Normal Heart murmur: negative Vascular Findings: Normal    Abdomen:  Abdomen Findings: Normal    Musculoskeletal:  Musculoskeletal Findings: Normal   Skin:  Skin Findings: Normal    Mental Status:  Mental Status Findings:  Cooperative, Alert and Oriented         Anesthesia Plan  Type of Anesthesia, risks & benefits discussed:  Anesthesia Type:  general  Patient's Preference: General  Intra-op Monitoring Plan: standard ASA monitors  Intra-op Monitoring Plan Comments:   Post Op Pain Control Plan:   Post Op Pain Control Plan Comments:    Induction:   IV  Beta Blocker:  Patient is not currently on a Beta-Blocker (No further documentation required).       Informed Consent: Patient understands risks and agrees with Anesthesia plan.  Questions answered. Anesthesia consent signed with patient.  ASA Score: 3     Day of Surgery Review of History & Physical: I have interviewed and examined the patient. I have reviewed the patient's H&P dated:  There are no significant changes.  H&P update referred to the surgeon.         Ready For Surgery From Anesthesia Perspective.

## 2020-09-10 NOTE — DISCHARGE INSTRUCTIONS

## 2020-09-10 NOTE — TRANSFER OF CARE
"Anesthesia Transfer of Care Note    Patient: Nan Betts    Procedure(s) Performed: Procedure(s) (LRB):  ESOPHAGOGASTRODUODENOSCOPY (EGD) (N/A)    Patient location: GI    Anesthesia Type: general    Transport from OR: Transported from OR on room air with adequate spontaneous ventilation    Post pain: adequate analgesia    Post assessment: no apparent anesthetic complications and tolerated procedure well    Post vital signs: stable    Level of consciousness: sedated and responds to stimulation    Nausea/Vomiting: no nausea/vomiting    Complications: none    Transfer of care protocol was followed      Last vitals:   Visit Vitals  /70 (BP Location: Right arm, Patient Position: Lying)   Pulse 77   Temp 36.6 °C (97.9 °F) (Temporal)   Resp 14   Ht 5' 8" (1.727 m)   Wt 117.9 kg (260 lb)   SpO2 98%   Breastfeeding No   BMI 39.53 kg/m²     "

## 2020-09-10 NOTE — H&P
[address] [facility] 4th Floor  Gastroenterology  H&P    Patient Name: Nan Betts  MRN: [id number]  Admission Date: 9/10/2020  Code Status: Prior    Attending Provider: zack saldivar  Primary Care Physician: Rosi Carias MD  Principal Problem:<principal problem not specified>    Subjective:     History of Present Illness: gastroparesis    Past Medical History:   Diagnosis Date    Abnormal Pap smear of vagina     Anxiety     Cataract     Chronic pain     TMJ and abdomen    Colon polyp     Epilepsy     as a child    Gastroparesis     Idiopathic    GERD (gastroesophageal reflux disease)     H/O abnormal cervical Papanicolaou smear     Hypercholesteremia 6/21/2016    Hyperlipidemia     IC (interstitial cystitis)     Internal hemorrhoids     Interstitial cystitis     Irritable bowel syndrome (IBS)     Irritable bowel syndrome with diarrhea 6/21/2016    Spastic colon     TMJ (temporomandibular joint disorder)     TMJ (temporomandibular joint syndrome) 6/21/2016       Past Surgical History:   Procedure Laterality Date    APPENDECTOMY      BREAST BIOPSY Bilateral     CATARACT EXTRACTION      CHOLECYSTECTOMY      COLONOSCOPY      COLONOSCOPY N/A 7/31/2018    Procedure: COLONOSCOPY;  Surgeon: Zack Saldivar MD;  Location: [address]);  Service: Endoscopy;  Laterality: N/A;    CYSTOSCOPY N/A 8/25/2020    Procedure: CYSTOSCOPY;  Surgeon: Christal Khan MD;  Location: [address];  Service: Urology;  Laterality: N/A;    ELBOW SURGERY      ESOPHAGOGASTRODUODENOSCOPY N/A 7/31/2018    Procedure: EGD (ESOPHAGOGASTRODUODENOSCOPY);  Surgeon: Zack Saldivar MD;  Location: [address]);  Service: Endoscopy;  Laterality: N/A;  RUQ gastric pacemaker    Left upper arm PICC line    PONV    ESOPHAGOGASTRODUODENOSCOPY N/A 9/3/2019    Procedure: EGD (ESOPHAGOGASTRODUODENOSCOPY);  Surgeon: Zack Saldivar MD;  Location: [facility] ([address] [place] [address]);  Service: Endoscopy;  Laterality: N/A;   history of gastoparesis, per change in protocol, pt instructed to do full liquid diet x 3 days prior to procedure and clear liquids x 1 day prior to procedure-BB  pt has gastric pacemaker, monitored by Dr. Lehman-YASMANY  hx of epilepsy, last seizure during chi    gastric stimulator placement      LASIK Bilateral 2006    MANDIBLE SURGERY Bilateral 10/17/2016    OOPHORECTOMY Bilateral     PYLOROPLASTY  03/2016    STOMACH SURGERY      gastric pacemaker    TEMPOROMANDIBULAR JOINT SURGERY  12/2016    TONSILLECTOMY      TOTAL ABDOMINAL HYSTERECTOMY  2005    EUGENIA/BSO, Trachelectomy 7 years ago    UPPER GASTROINTESTINAL ENDOSCOPY      WRIST SURGERY         Review of patient's allergies indicates:   Allergen Reactions    Morphine Anaphylaxis    Ultram [tramadol] Shortness Of Breath    Codeine Itching    Gabapentin Other (See Comments)     Causes suicidal thoughts    Ibuprofen Other (See Comments)     G L Bleeding    Nsaids (non-steroidal anti-inflammatory drug) Other (See Comments)     GI BLEEDING    Reglan [metoclopramide hcl] Other (See Comments)     Headaches and insomnia      Topamax [topiramate] Other (See Comments)     Ulcers in the mouth and dry mouth     Family History     Problem Relation (Age of Onset)    Cancer Paternal Aunt    Coronary artery disease Mother, Father, Paternal Grandfather    Glaucoma Maternal Grandmother    Hodgkin's lymphoma Mother    Lymphoma Sister    Pancreatic cancer Paternal Aunt        Tobacco Use    Smoking status: Current Every Day Smoker     Packs/day: 1.00     Years: 20.00     Pack years: 20.00    Smokeless tobacco: Never Used   Substance and Sexual Activity    Alcohol use: No     Alcohol/week: 0.0 standard drinks    Drug use: No    Sexual activity: Yes     Partners: Male     Review of Systems   Respiratory: Negative.    Cardiovascular: Negative.      Objective:     Vital Signs (Most Recent):  Temp: 97.9 °F (36.6 °C) (09/10/20 0717)  Pulse: 77 (09/10/20 0717)  Resp: 14  (09/10/20 0717)  BP: 123/70 (09/10/20 0717)  SpO2: 98 % (09/10/20 0717) Vital Signs (24h Range):  Temp:  [97.9 °F (36.6 °C)] 97.9 °F (36.6 °C)  Pulse:  [77] 77  Resp:  [14] 14  SpO2:  [98 %] 98 %  BP: (123)/(70) 123/70     Weight: 117.9 kg (260 lb) (09/10/20 0717)  Body mass index is 39.53 kg/m².    No intake or output data in the 24 hours ending 09/10/20 0724    Lines/Drains/Airways     Peripherally Inserted Central Catheter Line                 PICC Double Lumen 02/03/19 right brachial 585 days                Physical Exam  Cardiovascular:      Rate and Rhythm: Normal rate and regular rhythm.   Pulmonary:      Effort: Pulmonary effort is normal.      Breath sounds: Normal breath sounds.         Significant Labs:      Significant Imaging:      Assessment/Plan:     There are no hospital problems to display for this patient.      Indication for procedure:    ASA:II  Airway normal  Malampati class:per anes    Personal and family history negative for anesthesia problems    Plan:egd  Anesthesia plan: general      Zack Lehman MD  Gastroenterology  [facility] 4th [facility]

## 2020-09-10 NOTE — PLAN OF CARE
Discharge instructions given. Pt verbalized understanding. No c/o. No distress noted. md at bs. Assisted off unit via w/c with staff.

## 2020-09-24 ENCOUNTER — PATIENT MESSAGE (OUTPATIENT)
Dept: UROLOGY | Facility: CLINIC | Age: 50
End: 2020-09-24

## 2020-10-27 ENCOUNTER — TELEPHONE (OUTPATIENT)
Dept: GASTROENTEROLOGY | Facility: CLINIC | Age: 50
End: 2020-10-27

## 2020-10-27 NOTE — TELEPHONE ENCOUNTER
----- Message from Zenia Mathew sent at 10/22/2020  1:21 PM CDT -----  Regarding: Prior authorization  Contact: Torsten rivera/[facility] [phone]  Calling for prior authorization for Rx dexlansoprazole (DEXILANT) 60 mg capsule for quantity limit. Please call

## 2020-10-27 NOTE — TELEPHONE ENCOUNTER
Spoke with patient.  Aware PA request has been submitted to [facility] for Dexilant 60 mg for twice a day dosing.  Patient aware it will be sent for clinical review.  [facility] will contact the office by fax and the patient by mail when the decision has been made.    Angela

## 2020-11-04 ENCOUNTER — PATIENT MESSAGE (OUTPATIENT)
Dept: GASTROENTEROLOGY | Facility: CLINIC | Age: 50
End: 2020-11-04

## 2020-11-09 ENCOUNTER — PATIENT MESSAGE (OUTPATIENT)
Dept: GASTROENTEROLOGY | Facility: CLINIC | Age: 50
End: 2020-11-09

## 2020-12-10 ENCOUNTER — TELEPHONE (OUTPATIENT)
Dept: GASTROENTEROLOGY | Facility: CLINIC | Age: 50
End: 2020-12-10

## 2020-12-10 NOTE — TELEPHONE ENCOUNTER
----- Message from Vladimir Allen sent at 12/10/2020  2:27 PM CST -----  Regarding: [facility] calling        The Pharmacist would like a call back about the Rx-dexlansoprazole (DEXILANT) 60 mg capsule.  The caller would like to speak with you about helping the Pt with the appeals process.  The Pt claims that the Pt has been on the Rx for 7 years and any pertinent information.    If Miss Steven could look into this for the Pt.    [facility]/Caller's Phone # [phone] (any agent can help)(appeals dept)

## 2020-12-10 NOTE — TELEPHONE ENCOUNTER
Message left for PATIENT to return my call.  Our office has attempted to appeal many, many times without success.  Her insurance company WILL NOT cover Dexilant.   Angela

## 2020-12-10 NOTE — TELEPHONE ENCOUNTER
----- Message from Gail Ash sent at 12/10/2020  2:45 PM CST -----  Regarding: Missed call  Contact: pt  Reason:Returning call to staff    Communication:[phone]

## 2020-12-11 ENCOUNTER — TELEPHONE (OUTPATIENT)
Dept: ENDOSCOPY | Facility: HOSPITAL | Age: 50
End: 2020-12-11

## 2020-12-11 NOTE — TELEPHONE ENCOUNTER
----- Message from Mary Stout sent at 12/11/2020 10:22 AM CST -----  Contact: self @ [phone]  Pt says she is returning Bernice's call.  Pls call.

## 2020-12-14 ENCOUNTER — TELEPHONE (OUTPATIENT)
Dept: GASTROENTEROLOGY | Facility: CLINIC | Age: 50
End: 2020-12-14

## 2020-12-14 NOTE — TELEPHONE ENCOUNTER
----- Message from Vladimir Allen sent at 12/10/2020  2:27 PM CST -----  Regarding: [facility] calling        The Pharmacist would like a call back about the Rx-dexlansoprazole (DEXILANT) 60 mg capsule.  The caller would like to speak with you about helping the Pt with the appeals process.  The Pt claims that the Pt has been on the Rx for 7 years and any pertinent information.    If Miss Steven could look into this for the Pt.    [facility]/Caller's Phone # [phone] (any agent can help)(appeals dept)

## 2020-12-14 NOTE — TELEPHONE ENCOUNTER
Spoke with patient.  Aware we have attempted to do PA and appeal for her medication 5 times each time it was denied.  Aware I spoke with  and we are not going to attempt again.  Our office was told the medication IS NOT on her insurance formulary and WILL NOT be covered.

## 2021-01-04 ENCOUNTER — OFFICE VISIT (OUTPATIENT)
Dept: GASTROENTEROLOGY | Facility: CLINIC | Age: 51
End: 2021-01-04
Payer: COMMERCIAL

## 2021-01-04 VITALS
HEART RATE: 91 BPM | DIASTOLIC BLOOD PRESSURE: 94 MMHG | WEIGHT: 257.5 LBS | SYSTOLIC BLOOD PRESSURE: 158 MMHG | HEIGHT: 68 IN | BODY MASS INDEX: 39.03 KG/M2

## 2021-01-04 DIAGNOSIS — K31.84 GASTROPARESIS: ICD-10-CM

## 2021-01-04 DIAGNOSIS — K58.0 IRRITABLE BOWEL SYNDROME WITH DIARRHEA: ICD-10-CM

## 2021-01-04 DIAGNOSIS — Z45.2 PICC (PERIPHERALLY INSERTED CENTRAL CATHETER) IN PLACE: ICD-10-CM

## 2021-01-04 DIAGNOSIS — K21.9 GASTROESOPHAGEAL REFLUX DISEASE WITHOUT ESOPHAGITIS: Primary | ICD-10-CM

## 2021-01-04 DIAGNOSIS — R10.84 ABDOMINAL PAIN, GENERALIZED: ICD-10-CM

## 2021-01-04 PROCEDURE — 99999 PR PBB SHADOW E&M-EST. PATIENT-LVL IV: ICD-10-PCS | Mod: PBBFAC,,, | Performed by: INTERNAL MEDICINE

## 2021-01-04 PROCEDURE — 99999 PR PBB SHADOW E&M-EST. PATIENT-LVL IV: CPT | Mod: PBBFAC,,, | Performed by: INTERNAL MEDICINE

## 2021-01-04 PROCEDURE — 3008F PR BODY MASS INDEX (BMI) DOCUMENTED: ICD-10-PCS | Mod: CPTII,S$GLB,, | Performed by: INTERNAL MEDICINE

## 2021-01-04 PROCEDURE — 3008F BODY MASS INDEX DOCD: CPT | Mod: CPTII,S$GLB,, | Performed by: INTERNAL MEDICINE

## 2021-01-04 PROCEDURE — 99215 PR OFFICE/OUTPT VISIT, EST, LEVL V, 40-54 MIN: ICD-10-PCS | Mod: S$GLB,,, | Performed by: INTERNAL MEDICINE

## 2021-01-04 PROCEDURE — 99215 OFFICE O/P EST HI 40 MIN: CPT | Mod: S$GLB,,, | Performed by: INTERNAL MEDICINE

## 2021-01-04 PROCEDURE — 1126F PR PAIN SEVERITY QUANTIFIED, NO PAIN PRESENT: ICD-10-PCS | Mod: S$GLB,,, | Performed by: INTERNAL MEDICINE

## 2021-01-04 PROCEDURE — 1126F AMNT PAIN NOTED NONE PRSNT: CPT | Mod: S$GLB,,, | Performed by: INTERNAL MEDICINE

## 2021-01-04 RX ORDER — DEXLANSOPRAZOLE 60 MG/1
1 CAPSULE, DELAYED RELEASE ORAL DAILY
Qty: 90 CAPSULE | Refills: 3 | Status: SHIPPED | OUTPATIENT
Start: 2021-01-04 | End: 2021-09-28 | Stop reason: SDUPTHER

## 2021-01-12 ENCOUNTER — TELEPHONE (OUTPATIENT)
Dept: OBSTETRICS AND GYNECOLOGY | Facility: CLINIC | Age: 51
End: 2021-01-12

## 2021-01-12 ENCOUNTER — TELEPHONE (OUTPATIENT)
Dept: GASTROENTEROLOGY | Facility: CLINIC | Age: 51
End: 2021-01-12

## 2021-01-12 DIAGNOSIS — Z12.31 SCREENING MAMMOGRAM, ENCOUNTER FOR: Primary | ICD-10-CM

## 2021-01-12 DIAGNOSIS — R10.11 RIGHT UPPER QUADRANT ABDOMINAL PAIN: ICD-10-CM

## 2021-01-13 ENCOUNTER — TELEPHONE (OUTPATIENT)
Dept: GASTROENTEROLOGY | Facility: CLINIC | Age: 51
End: 2021-01-13

## 2021-01-22 ENCOUNTER — PATIENT MESSAGE (OUTPATIENT)
Dept: GASTROENTEROLOGY | Facility: CLINIC | Age: 51
End: 2021-01-22

## 2021-01-26 ENCOUNTER — TELEPHONE (OUTPATIENT)
Dept: GASTROENTEROLOGY | Facility: CLINIC | Age: 51
End: 2021-01-26

## 2021-01-27 ENCOUNTER — TELEPHONE (OUTPATIENT)
Dept: GASTROENTEROLOGY | Facility: CLINIC | Age: 51
End: 2021-01-27

## 2021-02-02 ENCOUNTER — PATIENT MESSAGE (OUTPATIENT)
Dept: GASTROENTEROLOGY | Facility: CLINIC | Age: 51
End: 2021-02-02

## 2021-02-03 ENCOUNTER — PATIENT MESSAGE (OUTPATIENT)
Dept: GASTROENTEROLOGY | Facility: CLINIC | Age: 51
End: 2021-02-03

## 2021-04-10 ENCOUNTER — TELEPHONE (OUTPATIENT)
Dept: GASTROENTEROLOGY | Facility: HOSPITAL | Age: 51
End: 2021-04-10

## 2021-04-10 ENCOUNTER — NURSE TRIAGE (OUTPATIENT)
Dept: ADMINISTRATIVE | Facility: CLINIC | Age: 51
End: 2021-04-10

## 2021-04-10 ENCOUNTER — PATIENT MESSAGE (OUTPATIENT)
Dept: GASTROENTEROLOGY | Facility: CLINIC | Age: 51
End: 2021-04-10

## 2021-04-12 ENCOUNTER — PATIENT MESSAGE (OUTPATIENT)
Dept: GASTROENTEROLOGY | Facility: CLINIC | Age: 51
End: 2021-04-12

## 2021-04-12 ENCOUNTER — TELEPHONE (OUTPATIENT)
Dept: GASTROENTEROLOGY | Facility: CLINIC | Age: 51
End: 2021-04-12

## 2021-04-12 ENCOUNTER — TELEPHONE (OUTPATIENT)
Dept: OBSTETRICS AND GYNECOLOGY | Facility: CLINIC | Age: 51
End: 2021-04-12

## 2021-04-13 ENCOUNTER — PATIENT MESSAGE (OUTPATIENT)
Dept: OBSTETRICS AND GYNECOLOGY | Facility: CLINIC | Age: 51
End: 2021-04-13

## 2021-04-13 ENCOUNTER — TELEPHONE (OUTPATIENT)
Dept: GASTROENTEROLOGY | Facility: CLINIC | Age: 51
End: 2021-04-13

## 2021-04-14 ENCOUNTER — TELEPHONE (OUTPATIENT)
Dept: GASTROENTEROLOGY | Facility: CLINIC | Age: 51
End: 2021-04-14

## 2021-04-14 ENCOUNTER — PATIENT MESSAGE (OUTPATIENT)
Dept: GASTROENTEROLOGY | Facility: CLINIC | Age: 51
End: 2021-04-14

## 2021-04-14 ENCOUNTER — OFFICE VISIT (OUTPATIENT)
Dept: OBSTETRICS AND GYNECOLOGY | Facility: CLINIC | Age: 51
End: 2021-04-14
Payer: COMMERCIAL

## 2021-04-14 VITALS
BODY MASS INDEX: 38.29 KG/M2 | SYSTOLIC BLOOD PRESSURE: 124 MMHG | WEIGHT: 252.63 LBS | DIASTOLIC BLOOD PRESSURE: 86 MMHG | HEIGHT: 68 IN

## 2021-04-14 DIAGNOSIS — N95.1 MENOPAUSAL SYNDROME: ICD-10-CM

## 2021-04-14 DIAGNOSIS — Z78.0 MENOPAUSE: ICD-10-CM

## 2021-04-14 DIAGNOSIS — Z01.419 ENCOUNTER FOR GYNECOLOGICAL EXAMINATION WITHOUT ABNORMAL FINDING: Primary | ICD-10-CM

## 2021-04-14 DIAGNOSIS — K62.5 RECTAL BLEEDING: ICD-10-CM

## 2021-04-14 DIAGNOSIS — Z12.31 SCREENING MAMMOGRAM, ENCOUNTER FOR: ICD-10-CM

## 2021-04-14 DIAGNOSIS — E89.40 POSTSURGICAL MENOPAUSE: ICD-10-CM

## 2021-04-14 PROCEDURE — 3008F PR BODY MASS INDEX (BMI) DOCUMENTED: ICD-10-PCS | Mod: CPTII,S$GLB,, | Performed by: OBSTETRICS & GYNECOLOGY

## 2021-04-14 PROCEDURE — 99396 PREV VISIT EST AGE 40-64: CPT | Mod: S$GLB,,, | Performed by: OBSTETRICS & GYNECOLOGY

## 2021-04-14 PROCEDURE — 3008F BODY MASS INDEX DOCD: CPT | Mod: CPTII,S$GLB,, | Performed by: OBSTETRICS & GYNECOLOGY

## 2021-04-14 PROCEDURE — 1126F AMNT PAIN NOTED NONE PRSNT: CPT | Mod: S$GLB,,, | Performed by: OBSTETRICS & GYNECOLOGY

## 2021-04-14 PROCEDURE — 99999 PR PBB SHADOW E&M-EST. PATIENT-LVL V: CPT | Mod: PBBFAC,,, | Performed by: OBSTETRICS & GYNECOLOGY

## 2021-04-14 PROCEDURE — 1126F PR PAIN SEVERITY QUANTIFIED, NO PAIN PRESENT: ICD-10-PCS | Mod: S$GLB,,, | Performed by: OBSTETRICS & GYNECOLOGY

## 2021-04-14 PROCEDURE — 99396 PR PREVENTIVE VISIT,EST,40-64: ICD-10-PCS | Mod: S$GLB,,, | Performed by: OBSTETRICS & GYNECOLOGY

## 2021-04-14 PROCEDURE — 99999 PR PBB SHADOW E&M-EST. PATIENT-LVL V: ICD-10-PCS | Mod: PBBFAC,,, | Performed by: OBSTETRICS & GYNECOLOGY

## 2021-04-14 RX ORDER — TIZANIDINE 4 MG/1
4 TABLET ORAL 3 TIMES DAILY
COMMUNITY
Start: 2021-01-10

## 2021-04-14 RX ORDER — CLOTRIMAZOLE AND BETAMETHASONE DIPROPIONATE 10; .64 MG/G; MG/G
CREAM TOPICAL
COMMUNITY
Start: 2021-04-06 | End: 2022-08-02

## 2021-04-14 RX ORDER — SIMETHICONE 125 MG
250 CAPSULE ORAL
COMMUNITY
Start: 2020-03-26 | End: 2022-01-03

## 2021-04-14 RX ORDER — CARISOPRODOL 350 MG/1
TABLET ORAL
COMMUNITY
Start: 2020-12-28 | End: 2022-01-03

## 2021-04-15 RX ORDER — ESTRADIOL 2 MG/1
3 TABLET ORAL DAILY
Qty: 135 TABLET | Refills: 0 | Status: SHIPPED | OUTPATIENT
Start: 2021-04-15 | End: 2021-05-26 | Stop reason: SDUPTHER

## 2021-04-16 ENCOUNTER — PATIENT MESSAGE (OUTPATIENT)
Dept: GASTROENTEROLOGY | Facility: CLINIC | Age: 51
End: 2021-04-16

## 2021-04-19 ENCOUNTER — TELEPHONE (OUTPATIENT)
Dept: SURGERY | Facility: CLINIC | Age: 51
End: 2021-04-19

## 2021-04-20 ENCOUNTER — OFFICE VISIT (OUTPATIENT)
Dept: GASTROENTEROLOGY | Facility: CLINIC | Age: 51
End: 2021-04-20
Payer: COMMERCIAL

## 2021-04-20 ENCOUNTER — PATIENT MESSAGE (OUTPATIENT)
Dept: GASTROENTEROLOGY | Facility: CLINIC | Age: 51
End: 2021-04-20

## 2021-04-20 VITALS
HEIGHT: 68 IN | DIASTOLIC BLOOD PRESSURE: 85 MMHG | HEART RATE: 97 BPM | SYSTOLIC BLOOD PRESSURE: 133 MMHG | WEIGHT: 252 LBS | BODY MASS INDEX: 38.19 KG/M2

## 2021-04-20 DIAGNOSIS — K21.9 GASTROESOPHAGEAL REFLUX DISEASE WITHOUT ESOPHAGITIS: ICD-10-CM

## 2021-04-20 DIAGNOSIS — K31.84 GASTROPARESIS: Primary | ICD-10-CM

## 2021-04-20 DIAGNOSIS — R10.13 EPIGASTRIC PAIN: ICD-10-CM

## 2021-04-20 PROCEDURE — 1126F AMNT PAIN NOTED NONE PRSNT: CPT | Mod: S$GLB,,, | Performed by: INTERNAL MEDICINE

## 2021-04-20 PROCEDURE — 1126F PR PAIN SEVERITY QUANTIFIED, NO PAIN PRESENT: ICD-10-PCS | Mod: S$GLB,,, | Performed by: INTERNAL MEDICINE

## 2021-04-20 PROCEDURE — 99215 PR OFFICE/OUTPT VISIT, EST, LEVL V, 40-54 MIN: ICD-10-PCS | Mod: S$GLB,,, | Performed by: INTERNAL MEDICINE

## 2021-04-20 PROCEDURE — 99999 PR PBB SHADOW E&M-EST. PATIENT-LVL IV: ICD-10-PCS | Mod: PBBFAC,,, | Performed by: INTERNAL MEDICINE

## 2021-04-20 PROCEDURE — 3008F BODY MASS INDEX DOCD: CPT | Mod: CPTII,S$GLB,, | Performed by: INTERNAL MEDICINE

## 2021-04-20 PROCEDURE — 3008F PR BODY MASS INDEX (BMI) DOCUMENTED: ICD-10-PCS | Mod: CPTII,S$GLB,, | Performed by: INTERNAL MEDICINE

## 2021-04-20 PROCEDURE — 99999 PR PBB SHADOW E&M-EST. PATIENT-LVL IV: CPT | Mod: PBBFAC,,, | Performed by: INTERNAL MEDICINE

## 2021-04-20 PROCEDURE — 99215 OFFICE O/P EST HI 40 MIN: CPT | Mod: S$GLB,,, | Performed by: INTERNAL MEDICINE

## 2021-04-20 RX ORDER — SUCRALFATE 1 G/10ML
1 SUSPENSION ORAL
Qty: 414 ML | Refills: 6 | Status: SHIPPED | OUTPATIENT
Start: 2021-04-20 | End: 2021-09-28 | Stop reason: ALTCHOICE

## 2021-04-27 ENCOUNTER — PATIENT MESSAGE (OUTPATIENT)
Dept: SURGERY | Facility: CLINIC | Age: 51
End: 2021-04-27

## 2021-04-28 ENCOUNTER — TELEPHONE (OUTPATIENT)
Dept: SURGERY | Facility: CLINIC | Age: 51
End: 2021-04-28

## 2021-04-28 ENCOUNTER — OFFICE VISIT (OUTPATIENT)
Dept: SURGERY | Facility: CLINIC | Age: 51
End: 2021-04-28
Attending: COLON & RECTAL SURGERY
Payer: COMMERCIAL

## 2021-04-28 VITALS
BODY MASS INDEX: 38.32 KG/M2 | SYSTOLIC BLOOD PRESSURE: 117 MMHG | HEART RATE: 86 BPM | DIASTOLIC BLOOD PRESSURE: 87 MMHG | WEIGHT: 252.88 LBS | HEIGHT: 68 IN

## 2021-04-28 DIAGNOSIS — K64.9 HEMORRHOIDS, UNSPECIFIED HEMORRHOID TYPE: Primary | ICD-10-CM

## 2021-04-28 PROCEDURE — 46600 PR DIAG2STIC A2SCOPY: ICD-10-PCS | Mod: S$GLB,,, | Performed by: COLON & RECTAL SURGERY

## 2021-04-28 PROCEDURE — 46600 DIAGNOSTIC ANOSCOPY SPX: CPT | Mod: S$GLB,,, | Performed by: COLON & RECTAL SURGERY

## 2021-04-28 PROCEDURE — 99999 PR PBB SHADOW E&M-EST. PATIENT-LVL IV: CPT | Mod: PBBFAC,,, | Performed by: COLON & RECTAL SURGERY

## 2021-04-28 PROCEDURE — 99203 PR OFFICE/OUTPT VISIT, NEW, LEVL III, 30-44 MIN: ICD-10-PCS | Mod: 25,S$GLB,, | Performed by: COLON & RECTAL SURGERY

## 2021-04-28 PROCEDURE — 1126F AMNT PAIN NOTED NONE PRSNT: CPT | Mod: S$GLB,,, | Performed by: COLON & RECTAL SURGERY

## 2021-04-28 PROCEDURE — 99203 OFFICE O/P NEW LOW 30 MIN: CPT | Mod: 25,S$GLB,, | Performed by: COLON & RECTAL SURGERY

## 2021-04-28 PROCEDURE — 99999 PR PBB SHADOW E&M-EST. PATIENT-LVL IV: ICD-10-PCS | Mod: PBBFAC,,, | Performed by: COLON & RECTAL SURGERY

## 2021-04-28 PROCEDURE — 1126F PR PAIN SEVERITY QUANTIFIED, NO PAIN PRESENT: ICD-10-PCS | Mod: S$GLB,,, | Performed by: COLON & RECTAL SURGERY

## 2021-04-28 PROCEDURE — 3008F BODY MASS INDEX DOCD: CPT | Mod: CPTII,S$GLB,, | Performed by: COLON & RECTAL SURGERY

## 2021-04-28 PROCEDURE — 3008F PR BODY MASS INDEX (BMI) DOCUMENTED: ICD-10-PCS | Mod: CPTII,S$GLB,, | Performed by: COLON & RECTAL SURGERY

## 2021-05-12 ENCOUNTER — PATIENT MESSAGE (OUTPATIENT)
Dept: RESEARCH | Facility: HOSPITAL | Age: 51
End: 2021-05-12

## 2021-05-13 ENCOUNTER — PATIENT MESSAGE (OUTPATIENT)
Dept: OBSTETRICS AND GYNECOLOGY | Facility: CLINIC | Age: 51
End: 2021-05-13

## 2021-05-14 ENCOUNTER — TELEPHONE (OUTPATIENT)
Dept: OBSTETRICS AND GYNECOLOGY | Facility: CLINIC | Age: 51
End: 2021-05-14

## 2021-05-14 DIAGNOSIS — N95.1 MENOPAUSAL SYNDROME: Primary | ICD-10-CM

## 2021-05-21 ENCOUNTER — OFFICE VISIT (OUTPATIENT)
Dept: SURGERY | Facility: CLINIC | Age: 51
End: 2021-05-21
Attending: COLON & RECTAL SURGERY
Payer: COMMERCIAL

## 2021-05-21 VITALS
WEIGHT: 256 LBS | DIASTOLIC BLOOD PRESSURE: 83 MMHG | HEIGHT: 68 IN | SYSTOLIC BLOOD PRESSURE: 153 MMHG | HEART RATE: 77 BPM | BODY MASS INDEX: 38.8 KG/M2

## 2021-05-21 DIAGNOSIS — K64.9 HEMORRHOIDS, UNSPECIFIED HEMORRHOID TYPE: Primary | ICD-10-CM

## 2021-05-21 PROCEDURE — 3008F BODY MASS INDEX DOCD: CPT | Mod: CPTII,S$GLB,, | Performed by: COLON & RECTAL SURGERY

## 2021-05-21 PROCEDURE — 99999 PR PBB SHADOW E&M-EST. PATIENT-LVL IV: CPT | Mod: PBBFAC,,, | Performed by: COLON & RECTAL SURGERY

## 2021-05-21 PROCEDURE — 1126F AMNT PAIN NOTED NONE PRSNT: CPT | Mod: S$GLB,,, | Performed by: COLON & RECTAL SURGERY

## 2021-05-21 PROCEDURE — 46221 PR HEMORRHOIDECTOMY INTERNAL RUBBER BAND LIGATIONS: ICD-10-PCS | Mod: S$GLB,,, | Performed by: COLON & RECTAL SURGERY

## 2021-05-21 PROCEDURE — 46221 LIGATION OF HEMORRHOID(S): CPT | Mod: S$GLB,,, | Performed by: COLON & RECTAL SURGERY

## 2021-05-21 PROCEDURE — 1126F PR PAIN SEVERITY QUANTIFIED, NO PAIN PRESENT: ICD-10-PCS | Mod: S$GLB,,, | Performed by: COLON & RECTAL SURGERY

## 2021-05-21 PROCEDURE — 99213 OFFICE O/P EST LOW 20 MIN: CPT | Mod: 25,S$GLB,, | Performed by: COLON & RECTAL SURGERY

## 2021-05-21 PROCEDURE — 99213 PR OFFICE/OUTPT VISIT, EST, LEVL III, 20-29 MIN: ICD-10-PCS | Mod: 25,S$GLB,, | Performed by: COLON & RECTAL SURGERY

## 2021-05-21 PROCEDURE — 3008F PR BODY MASS INDEX (BMI) DOCUMENTED: ICD-10-PCS | Mod: CPTII,S$GLB,, | Performed by: COLON & RECTAL SURGERY

## 2021-05-21 PROCEDURE — 99999 PR PBB SHADOW E&M-EST. PATIENT-LVL IV: ICD-10-PCS | Mod: PBBFAC,,, | Performed by: COLON & RECTAL SURGERY

## 2021-05-24 ENCOUNTER — PATIENT MESSAGE (OUTPATIENT)
Dept: SURGERY | Facility: CLINIC | Age: 51
End: 2021-05-24

## 2021-05-26 ENCOUNTER — PATIENT MESSAGE (OUTPATIENT)
Dept: OBSTETRICS AND GYNECOLOGY | Facility: CLINIC | Age: 51
End: 2021-05-26

## 2021-05-26 DIAGNOSIS — Z78.0 MENOPAUSE: ICD-10-CM

## 2021-05-26 RX ORDER — ESTRADIOL 2 MG/1
3 TABLET ORAL DAILY
Qty: 135 TABLET | Refills: 3 | Status: SHIPPED | OUTPATIENT
Start: 2021-05-26 | End: 2021-05-26 | Stop reason: SDUPTHER

## 2021-05-26 RX ORDER — ESTRADIOL 2 MG/1
3 TABLET ORAL DAILY
Qty: 135 TABLET | Refills: 3 | Status: SHIPPED | OUTPATIENT
Start: 2021-05-26 | End: 2022-05-20

## 2021-06-10 ENCOUNTER — TELEPHONE (OUTPATIENT)
Dept: GASTROENTEROLOGY | Facility: CLINIC | Age: 51
End: 2021-06-10

## 2021-06-14 ENCOUNTER — TELEPHONE (OUTPATIENT)
Dept: GASTROENTEROLOGY | Facility: CLINIC | Age: 51
End: 2021-06-14

## 2021-06-14 PROCEDURE — G0179 MD RECERTIFICATION HHA PT: HCPCS | Mod: ,,, | Performed by: INTERNAL MEDICINE

## 2021-06-14 PROCEDURE — G0179 PR HOME HEALTH MD RECERTIFICATION: ICD-10-PCS | Mod: ,,, | Performed by: INTERNAL MEDICINE

## 2021-06-28 ENCOUNTER — EXTERNAL HOME HEALTH (OUTPATIENT)
Dept: HOME HEALTH SERVICES | Facility: HOSPITAL | Age: 51
End: 2021-06-28

## 2021-07-02 ENCOUNTER — OFFICE VISIT (OUTPATIENT)
Dept: SURGERY | Facility: CLINIC | Age: 51
End: 2021-07-02
Attending: COLON & RECTAL SURGERY
Payer: COMMERCIAL

## 2021-07-02 VITALS
DIASTOLIC BLOOD PRESSURE: 69 MMHG | WEIGHT: 247.81 LBS | BODY MASS INDEX: 37.68 KG/M2 | HEART RATE: 78 BPM | RESPIRATION RATE: 18 BRPM | OXYGEN SATURATION: 98 % | TEMPERATURE: 98 F | SYSTOLIC BLOOD PRESSURE: 126 MMHG

## 2021-07-02 DIAGNOSIS — K64.9 HEMORRHOIDS, UNSPECIFIED HEMORRHOID TYPE: Primary | ICD-10-CM

## 2021-07-02 PROCEDURE — 99999 PR PBB SHADOW E&M-EST. PATIENT-LVL V: CPT | Mod: PBBFAC,,, | Performed by: COLON & RECTAL SURGERY

## 2021-07-02 PROCEDURE — 99213 PR OFFICE/OUTPT VISIT, EST, LEVL III, 20-29 MIN: ICD-10-PCS | Mod: S$GLB,,, | Performed by: COLON & RECTAL SURGERY

## 2021-07-02 PROCEDURE — 99999 PR PBB SHADOW E&M-EST. PATIENT-LVL V: ICD-10-PCS | Mod: PBBFAC,,, | Performed by: COLON & RECTAL SURGERY

## 2021-07-02 PROCEDURE — 3008F PR BODY MASS INDEX (BMI) DOCUMENTED: ICD-10-PCS | Mod: CPTII,S$GLB,, | Performed by: COLON & RECTAL SURGERY

## 2021-07-02 PROCEDURE — 99213 OFFICE O/P EST LOW 20 MIN: CPT | Mod: S$GLB,,, | Performed by: COLON & RECTAL SURGERY

## 2021-07-02 PROCEDURE — 1126F AMNT PAIN NOTED NONE PRSNT: CPT | Mod: S$GLB,,, | Performed by: COLON & RECTAL SURGERY

## 2021-07-02 PROCEDURE — 3008F BODY MASS INDEX DOCD: CPT | Mod: CPTII,S$GLB,, | Performed by: COLON & RECTAL SURGERY

## 2021-07-02 PROCEDURE — 1126F PR PAIN SEVERITY QUANTIFIED, NO PAIN PRESENT: ICD-10-PCS | Mod: S$GLB,,, | Performed by: COLON & RECTAL SURGERY

## 2021-07-02 RX ORDER — HYDROCORTISONE ACETATE 25 MG/1
25 SUPPOSITORY RECTAL 2 TIMES DAILY
Qty: 20 SUPPOSITORY | Refills: 3 | Status: SHIPPED | OUTPATIENT
Start: 2021-07-02 | End: 2021-07-12

## 2021-07-02 RX ORDER — HYDROGEN PEROXIDE 3 %
20 SOLUTION, NON-ORAL MISCELLANEOUS
COMMUNITY
End: 2022-01-03

## 2021-07-19 ENCOUNTER — PATIENT MESSAGE (OUTPATIENT)
Dept: GASTROENTEROLOGY | Facility: CLINIC | Age: 51
End: 2021-07-19

## 2021-07-22 ENCOUNTER — EXTERNAL HOME HEALTH (OUTPATIENT)
Dept: HOME HEALTH SERVICES | Facility: HOSPITAL | Age: 51
End: 2021-07-22
Payer: COMMERCIAL

## 2021-07-23 ENCOUNTER — PATIENT MESSAGE (OUTPATIENT)
Dept: GASTROENTEROLOGY | Facility: CLINIC | Age: 51
End: 2021-07-23

## 2021-08-02 ENCOUNTER — PATIENT MESSAGE (OUTPATIENT)
Dept: GASTROENTEROLOGY | Facility: CLINIC | Age: 51
End: 2021-08-02

## 2021-08-17 ENCOUNTER — PATIENT MESSAGE (OUTPATIENT)
Dept: GASTROENTEROLOGY | Facility: CLINIC | Age: 51
End: 2021-08-17

## 2021-08-23 ENCOUNTER — TELEPHONE (OUTPATIENT)
Dept: GASTROENTEROLOGY | Facility: CLINIC | Age: 51
End: 2021-08-23

## 2021-09-28 ENCOUNTER — PATIENT MESSAGE (OUTPATIENT)
Dept: GASTROENTEROLOGY | Facility: CLINIC | Age: 51
End: 2021-09-28

## 2021-09-28 RX ORDER — SUCRALFATE 1 G/1
1 TABLET ORAL
Qty: 360 TABLET | Refills: 2 | Status: SHIPPED | OUTPATIENT
Start: 2021-09-28 | End: 2022-05-28

## 2021-10-14 ENCOUNTER — OFFICE VISIT (OUTPATIENT)
Dept: GASTROENTEROLOGY | Facility: CLINIC | Age: 51
End: 2021-10-14
Payer: COMMERCIAL

## 2021-10-14 ENCOUNTER — PATIENT MESSAGE (OUTPATIENT)
Dept: GASTROENTEROLOGY | Facility: CLINIC | Age: 51
End: 2021-10-14

## 2021-10-14 VITALS
WEIGHT: 244.5 LBS | DIASTOLIC BLOOD PRESSURE: 85 MMHG | SYSTOLIC BLOOD PRESSURE: 130 MMHG | HEIGHT: 68 IN | HEART RATE: 81 BPM | BODY MASS INDEX: 37.05 KG/M2

## 2021-10-14 DIAGNOSIS — K21.9 GASTROESOPHAGEAL REFLUX DISEASE WITHOUT ESOPHAGITIS: ICD-10-CM

## 2021-10-14 DIAGNOSIS — Z98.890 H/O PYLOROPLASTY: Primary | ICD-10-CM

## 2021-10-14 DIAGNOSIS — K31.84 GASTROPARESIS: ICD-10-CM

## 2021-10-14 PROCEDURE — 1159F PR MEDICATION LIST DOCUMENTED IN MEDICAL RECORD: ICD-10-PCS | Mod: CPTII,S$GLB,, | Performed by: INTERNAL MEDICINE

## 2021-10-14 PROCEDURE — 3008F BODY MASS INDEX DOCD: CPT | Mod: CPTII,S$GLB,, | Performed by: INTERNAL MEDICINE

## 2021-10-14 PROCEDURE — 3075F SYST BP GE 130 - 139MM HG: CPT | Mod: CPTII,S$GLB,, | Performed by: INTERNAL MEDICINE

## 2021-10-14 PROCEDURE — 1160F PR REVIEW ALL MEDS BY PRESCRIBER/CLIN PHARMACIST DOCUMENTED: ICD-10-PCS | Mod: CPTII,S$GLB,, | Performed by: INTERNAL MEDICINE

## 2021-10-14 PROCEDURE — 99215 PR OFFICE/OUTPT VISIT, EST, LEVL V, 40-54 MIN: ICD-10-PCS | Mod: S$GLB,,, | Performed by: INTERNAL MEDICINE

## 2021-10-14 PROCEDURE — 99999 PR PBB SHADOW E&M-EST. PATIENT-LVL V: CPT | Mod: PBBFAC,,, | Performed by: INTERNAL MEDICINE

## 2021-10-14 PROCEDURE — 3075F PR MOST RECENT SYSTOLIC BLOOD PRESS GE 130-139MM HG: ICD-10-PCS | Mod: CPTII,S$GLB,, | Performed by: INTERNAL MEDICINE

## 2021-10-14 PROCEDURE — 3079F DIAST BP 80-89 MM HG: CPT | Mod: CPTII,S$GLB,, | Performed by: INTERNAL MEDICINE

## 2021-10-14 PROCEDURE — 99215 OFFICE O/P EST HI 40 MIN: CPT | Mod: S$GLB,,, | Performed by: INTERNAL MEDICINE

## 2021-10-14 PROCEDURE — 3008F PR BODY MASS INDEX (BMI) DOCUMENTED: ICD-10-PCS | Mod: CPTII,S$GLB,, | Performed by: INTERNAL MEDICINE

## 2021-10-14 PROCEDURE — 99999 PR PBB SHADOW E&M-EST. PATIENT-LVL V: ICD-10-PCS | Mod: PBBFAC,,, | Performed by: INTERNAL MEDICINE

## 2021-10-14 PROCEDURE — 3079F PR MOST RECENT DIASTOLIC BLOOD PRESSURE 80-89 MM HG: ICD-10-PCS | Mod: CPTII,S$GLB,, | Performed by: INTERNAL MEDICINE

## 2021-10-14 PROCEDURE — 1159F MED LIST DOCD IN RCRD: CPT | Mod: CPTII,S$GLB,, | Performed by: INTERNAL MEDICINE

## 2021-10-14 PROCEDURE — 1160F RVW MEDS BY RX/DR IN RCRD: CPT | Mod: CPTII,S$GLB,, | Performed by: INTERNAL MEDICINE

## 2021-10-15 ENCOUNTER — IMMUNIZATION (OUTPATIENT)
Dept: PHARMACY | Facility: CLINIC | Age: 51
End: 2021-10-15
Payer: COMMERCIAL

## 2021-10-15 ENCOUNTER — PATIENT MESSAGE (OUTPATIENT)
Dept: ENDOSCOPY | Facility: HOSPITAL | Age: 51
End: 2021-10-15

## 2021-10-15 DIAGNOSIS — Z01.818 PRE-OP TESTING: ICD-10-CM

## 2021-10-18 ENCOUNTER — PATIENT MESSAGE (OUTPATIENT)
Dept: ENDOSCOPY | Facility: HOSPITAL | Age: 51
End: 2021-10-18
Payer: COMMERCIAL

## 2021-10-21 ENCOUNTER — TELEPHONE (OUTPATIENT)
Dept: GASTROENTEROLOGY | Facility: CLINIC | Age: 51
End: 2021-10-21

## 2021-10-21 ENCOUNTER — PATIENT MESSAGE (OUTPATIENT)
Dept: ENDOSCOPY | Facility: HOSPITAL | Age: 51
End: 2021-10-21
Payer: COMMERCIAL

## 2021-10-25 ENCOUNTER — TELEPHONE (OUTPATIENT)
Dept: GASTROENTEROLOGY | Facility: CLINIC | Age: 51
End: 2021-10-25
Payer: COMMERCIAL

## 2021-11-03 ENCOUNTER — HOSPITAL ENCOUNTER (OUTPATIENT)
Facility: HOSPITAL | Age: 51
Discharge: HOME | End: 2021-11-03
Attending: INTERNAL MEDICINE | Admitting: INTERNAL MEDICINE
Payer: COMMERCIAL

## 2021-11-03 ENCOUNTER — ANESTHESIA (OUTPATIENT)
Dept: ENDOSCOPY | Facility: HOSPITAL | Age: 51
End: 2021-11-03
Payer: COMMERCIAL

## 2021-11-03 ENCOUNTER — ANESTHESIA EVENT (OUTPATIENT)
Dept: ENDOSCOPY | Facility: HOSPITAL | Age: 51
End: 2021-11-03
Payer: COMMERCIAL

## 2021-11-03 VITALS
HEART RATE: 70 BPM | OXYGEN SATURATION: 100 % | SYSTOLIC BLOOD PRESSURE: 128 MMHG | RESPIRATION RATE: 14 BRPM | WEIGHT: 250 LBS | HEIGHT: 68 IN | DIASTOLIC BLOOD PRESSURE: 65 MMHG | TEMPERATURE: 98 F | BODY MASS INDEX: 37.89 KG/M2

## 2021-11-03 DIAGNOSIS — K31.84 GASTROPARESIS: Primary | ICD-10-CM

## 2021-11-03 DIAGNOSIS — K21.00 REFLUX ESOPHAGITIS: ICD-10-CM

## 2021-11-03 PROCEDURE — 37000008 HC ANESTHESIA 1ST 15 MINUTES: Performed by: INTERNAL MEDICINE

## 2021-11-03 PROCEDURE — 43249 ESOPH EGD DILATION <30 MM: CPT | Mod: ,,, | Performed by: INTERNAL MEDICINE

## 2021-11-03 PROCEDURE — C1726 CATH, BAL DIL, NON-VASCULAR: HCPCS | Performed by: INTERNAL MEDICINE

## 2021-11-03 PROCEDURE — E9220 PRA ENDO ANESTHESIA: ICD-10-PCS | Mod: ,,, | Performed by: NURSE ANESTHETIST, CERTIFIED REGISTERED

## 2021-11-03 PROCEDURE — 25000003 PHARM REV CODE 250: Performed by: NURSE ANESTHETIST, CERTIFIED REGISTERED

## 2021-11-03 PROCEDURE — 43249 PR EGD, FLEX, W/BALL DILATION, < 30MM: ICD-10-PCS | Mod: ,,, | Performed by: INTERNAL MEDICINE

## 2021-11-03 PROCEDURE — E9220 PRA ENDO ANESTHESIA: HCPCS | Mod: ,,, | Performed by: NURSE ANESTHETIST, CERTIFIED REGISTERED

## 2021-11-03 PROCEDURE — 63600175 PHARM REV CODE 636 W HCPCS: Performed by: NURSE ANESTHETIST, CERTIFIED REGISTERED

## 2021-11-03 PROCEDURE — 37000009 HC ANESTHESIA EA ADD 15 MINS: Performed by: INTERNAL MEDICINE

## 2021-11-03 PROCEDURE — 43249 ESOPH EGD DILATION <30 MM: CPT | Performed by: INTERNAL MEDICINE

## 2021-11-03 RX ORDER — PROPOFOL 10 MG/ML
VIAL (ML) INTRAVENOUS
Status: DISCONTINUED | OUTPATIENT
Start: 2021-11-03 | End: 2021-11-03

## 2021-11-03 RX ORDER — SODIUM CHLORIDE 9 MG/ML
INJECTION, SOLUTION INTRAVENOUS CONTINUOUS
Status: DISCONTINUED | OUTPATIENT
Start: 2021-11-03 | End: 2021-11-03 | Stop reason: HOSPADM

## 2021-11-03 RX ORDER — LIDOCAINE HYDROCHLORIDE 20 MG/ML
INJECTION INTRAVENOUS
Status: DISCONTINUED | OUTPATIENT
Start: 2021-11-03 | End: 2021-11-03

## 2021-11-03 RX ADMIN — SODIUM CHLORIDE: 0.9 INJECTION, SOLUTION INTRAVENOUS at 10:11

## 2021-11-03 RX ADMIN — GLYCOPYRROLATE 0.2 MG: 0.2 INJECTION, SOLUTION INTRAMUSCULAR; INTRAVITREAL at 10:11

## 2021-11-03 RX ADMIN — PROPOFOL 30 MG: 10 INJECTION, EMULSION INTRAVENOUS at 11:11

## 2021-11-03 RX ADMIN — PROPOFOL 20 MG: 10 INJECTION, EMULSION INTRAVENOUS at 11:11

## 2021-11-03 RX ADMIN — PROPOFOL 30 MG: 10 INJECTION, EMULSION INTRAVENOUS at 10:11

## 2021-11-03 RX ADMIN — LIDOCAINE HYDROCHLORIDE 75 MG: 20 INJECTION, SOLUTION INTRAVENOUS at 10:11

## 2021-11-03 RX ADMIN — PROPOFOL 100 MG: 10 INJECTION, EMULSION INTRAVENOUS at 10:11

## 2021-11-04 ENCOUNTER — PATIENT MESSAGE (OUTPATIENT)
Dept: GASTROENTEROLOGY | Facility: CLINIC | Age: 51
End: 2021-11-04
Payer: COMMERCIAL

## 2021-11-05 ENCOUNTER — TELEPHONE (OUTPATIENT)
Dept: GASTROENTEROLOGY | Facility: HOSPITAL | Age: 51
End: 2021-11-05
Payer: COMMERCIAL

## 2021-11-05 ENCOUNTER — TELEPHONE (OUTPATIENT)
Dept: ENDOSCOPY | Facility: HOSPITAL | Age: 51
End: 2021-11-05
Payer: COMMERCIAL

## 2021-11-06 ENCOUNTER — PATIENT MESSAGE (OUTPATIENT)
Dept: GASTROENTEROLOGY | Facility: CLINIC | Age: 51
End: 2021-11-06
Payer: COMMERCIAL

## 2021-11-16 ENCOUNTER — PATIENT MESSAGE (OUTPATIENT)
Dept: UROLOGY | Facility: CLINIC | Age: 51
End: 2021-11-16
Payer: COMMERCIAL

## 2021-11-16 ENCOUNTER — PATIENT MESSAGE (OUTPATIENT)
Dept: GASTROENTEROLOGY | Facility: CLINIC | Age: 51
End: 2021-11-16
Payer: COMMERCIAL

## 2021-11-17 ENCOUNTER — PATIENT MESSAGE (OUTPATIENT)
Dept: GASTROENTEROLOGY | Facility: CLINIC | Age: 51
End: 2021-11-17
Payer: COMMERCIAL

## 2021-12-16 ENCOUNTER — TELEPHONE (OUTPATIENT)
Dept: GASTROENTEROLOGY | Facility: CLINIC | Age: 51
End: 2021-12-16
Payer: COMMERCIAL

## 2021-12-20 ENCOUNTER — PATIENT MESSAGE (OUTPATIENT)
Dept: GASTROENTEROLOGY | Facility: CLINIC | Age: 51
End: 2021-12-20
Payer: COMMERCIAL

## 2022-01-03 ENCOUNTER — OFFICE VISIT (OUTPATIENT)
Dept: SURGERY | Facility: CLINIC | Age: 52
End: 2022-01-03
Payer: COMMERCIAL

## 2022-01-03 VITALS
BODY MASS INDEX: 37.59 KG/M2 | HEART RATE: 84 BPM | HEIGHT: 68 IN | DIASTOLIC BLOOD PRESSURE: 90 MMHG | SYSTOLIC BLOOD PRESSURE: 140 MMHG | WEIGHT: 248 LBS

## 2022-01-03 DIAGNOSIS — K21.9 GASTROESOPHAGEAL REFLUX DISEASE WITHOUT ESOPHAGITIS: ICD-10-CM

## 2022-01-03 DIAGNOSIS — K31.84 GASTROPARESIS: ICD-10-CM

## 2022-01-03 DIAGNOSIS — Z95.828 PORT-A-CATH IN PLACE: Primary | ICD-10-CM

## 2022-01-03 PROCEDURE — 99999 PR PBB SHADOW E&M-EST. PATIENT-LVL IV: ICD-10-PCS | Mod: PBBFAC,,, | Performed by: SURGERY

## 2022-01-03 PROCEDURE — 1159F PR MEDICATION LIST DOCUMENTED IN MEDICAL RECORD: ICD-10-PCS | Mod: CPTII,S$GLB,, | Performed by: SURGERY

## 2022-01-03 PROCEDURE — 1160F RVW MEDS BY RX/DR IN RCRD: CPT | Mod: CPTII,S$GLB,, | Performed by: SURGERY

## 2022-01-03 PROCEDURE — 3077F SYST BP >= 140 MM HG: CPT | Mod: CPTII,S$GLB,, | Performed by: SURGERY

## 2022-01-03 PROCEDURE — 1159F MED LIST DOCD IN RCRD: CPT | Mod: CPTII,S$GLB,, | Performed by: SURGERY

## 2022-01-03 PROCEDURE — 3080F DIAST BP >= 90 MM HG: CPT | Mod: CPTII,S$GLB,, | Performed by: SURGERY

## 2022-01-03 PROCEDURE — 3080F PR MOST RECENT DIASTOLIC BLOOD PRESSURE >= 90 MM HG: ICD-10-PCS | Mod: CPTII,S$GLB,, | Performed by: SURGERY

## 2022-01-03 PROCEDURE — 99213 OFFICE O/P EST LOW 20 MIN: CPT | Mod: S$GLB,,, | Performed by: SURGERY

## 2022-01-03 PROCEDURE — 3077F PR MOST RECENT SYSTOLIC BLOOD PRESSURE >= 140 MM HG: ICD-10-PCS | Mod: CPTII,S$GLB,, | Performed by: SURGERY

## 2022-01-03 PROCEDURE — 99999 PR PBB SHADOW E&M-EST. PATIENT-LVL IV: CPT | Mod: PBBFAC,,, | Performed by: SURGERY

## 2022-01-03 PROCEDURE — 3008F BODY MASS INDEX DOCD: CPT | Mod: CPTII,S$GLB,, | Performed by: SURGERY

## 2022-01-03 PROCEDURE — 99213 PR OFFICE/OUTPT VISIT, EST, LEVL III, 20-29 MIN: ICD-10-PCS | Mod: S$GLB,,, | Performed by: SURGERY

## 2022-01-03 PROCEDURE — 3008F PR BODY MASS INDEX (BMI) DOCUMENTED: ICD-10-PCS | Mod: CPTII,S$GLB,, | Performed by: SURGERY

## 2022-01-03 PROCEDURE — 1160F PR REVIEW ALL MEDS BY PRESCRIBER/CLIN PHARMACIST DOCUMENTED: ICD-10-PCS | Mod: CPTII,S$GLB,, | Performed by: SURGERY

## 2022-01-03 NOTE — PROGRESS NOTES
Surgery Clinic Note - H and P    Subjective:     Nan Betts is a 51 y.o. female with h/o HLD, GERD, and gastroparesis with gastric stimulator last replaced 3/2020 who presents to clinic for evaluation of port placement. Pt states she had a right sided power port placed 9/4/20 by Dr. Lopez in [place], [place] due to difficulty with peripheral access and previous failed PICC lines. She reports since the placement of her port, she has experienced issues with the port moving out of position and clotting frequently. Pt says her site works otherwise but frequently need heparin flushes and hep locks due to clotting issues. She states the surgeon who placed the port does not want to revise it and she is now searching for a surgeon who will revise her current port or place a new one to remedy the issues she is having with her current port. Pt denies any other issues.    PMH:   Past Medical History:   Diagnosis Date    Abnormal Pap smear of vagina     Anxiety     Cataract     Chronic pain     TMJ and abdomen    Colon polyp     Epilepsy     as a child    Gastroparesis     Idiopathic    GERD (gastroesophageal reflux disease)     H/O abnormal cervical Papanicolaou smear     Hypercholesteremia 6/21/2016    Hyperlipidemia     IC (interstitial cystitis)     Internal hemorrhoids     Interstitial cystitis     Irritable bowel syndrome (IBS)     Irritable bowel syndrome with diarrhea 6/21/2016    Spastic colon     TMJ (temporomandibular joint disorder)     TMJ (temporomandibular joint syndrome) 6/21/2016       Past Surgical History:   Past Surgical History:   Procedure Laterality Date    APPENDECTOMY      BREAST BIOPSY Bilateral     CATARACT EXTRACTION      CHOLECYSTECTOMY      COLONOSCOPY      COLONOSCOPY N/A 7/31/2018    Procedure: COLONOSCOPY;  Surgeon: Zack Lehman MD;  Location: [address];  Service: Endoscopy;  Laterality: N/A;    CYSTOSCOPY N/A 8/25/2020    Procedure: CYSTOSCOPY;   Surgeon: Christal Khan MD;  [facility];  Service: Urology;  Laterality: N/A;    ELBOW SURGERY      ESOPHAGOGASTRODUODENOSCOPY N/A 7/31/2018    Procedure: EGD (ESOPHAGOGASTRODUODENOSCOPY);  Surgeon: Zack Lehman MD;  Location: [facility] (4TH FLR);  Service: Endoscopy;  Laterality: N/A;  RUQ gastric pacemaker    Left upper arm PICC line    PONV    ESOPHAGOGASTRODUODENOSCOPY N/A 9/3/2019    Procedure: EGD (ESOPHAGOGASTRODUODENOSCOPY);  Surgeon: Zack Lehman MD;  Location: [facility] (4TH FLR);  Service: Endoscopy;  Laterality: N/A;  history of gastoparesis, per change in protocol, pt instructed to do full liquid diet x 3 days prior to procedure and clear liquids x 1 day prior to procedure-BB  pt has gastric pacemaker, monitored by Dr. Lehman-YASMANY  hx of epilepsy, last seizure during chi    ESOPHAGOGASTRODUODENOSCOPY N/A 9/10/2020    Procedure: ESOPHAGOGASTRODUODENOSCOPY (EGD);  Surgeon: Zack Lehman MD;  Location: [facility] (4TH FLR);  Service: Endoscopy;  Laterality: N/A;  3 days Full liquid diet/ 1 day Clear liquids  waiting for Pt to cb with date - [facility]  Left upper arm -  PICC  COVID screening 9/7/20 [facility]    ESOPHAGOGASTRODUODENOSCOPY N/A 11/3/2021    Procedure: EGD (ESOPHAGOGASTRODUODENOSCOPY);  Surgeon: Zack Lehman MD;  Location: [facility] (4TH FLR);  Service: Endoscopy;  Laterality: N/A;  10/15 fully vaccinated as of 7/15/21; gastric pacemaker-pt does not have remote;  pt has a port does not want IV started; 3 days full liquid diet; instr. to portal-st    GASTRIC STIMULATOR IMPLANT SURGERY      left side on 03/30/2020    gastric stimulator placement      LASIK Bilateral 2006    MANDIBLE SURGERY Bilateral 10/17/2016    OOPHORECTOMY Bilateral     PYLOROPLASTY  03/2016    STOMACH SURGERY      gastric pacemaker    TEMPOROMANDIBULAR JOINT SURGERY  12/2016    TONSILLECTOMY      TOTAL ABDOMINAL HYSTERECTOMY  2005    EUGENIA/BSO, Trachelectomy  2012    UPPER  "GASTROINTESTINAL ENDOSCOPY      WRIST SURGERY         Social History:  Social History     Socioeconomic History    Marital status:    Tobacco Use    Smoking status: Current Every Day Smoker     Packs/day: 1.00     Years: 20.00     Pack years: 20.00     Start date: 4/14/1986    Smokeless tobacco: Never Used   Substance and Sexual Activity    Alcohol use: No     Alcohol/week: 0.0 standard drinks    Drug use: No    Sexual activity: Yes     Partners: Male     Comment:  since 2012       Allergies:   Review of patient's allergies indicates:   Allergen Reactions    Corticosteroids (glucocorticoids) Nausea And Vomiting and Other (See Comments)     Acid reflux  Acid reflux  Increases acid reflux    Ketorolac Anaphylaxis and Shortness Of Breath     Other reaction(s): Throat swelling, Unknown    Morphine Anaphylaxis    Prednisone Other (See Comments)     Increases acid reflux    Tramadol Shortness Of Breath     Other reaction(s): Throat swelling, Unknown    Codeine Itching    Gabapentin Other (See Comments)     Causes suicidal thoughts  Other reaction(s): suicidal thoughts  Causes suicidal thoughts  Causes suicidal thoughts      Ibuprofen Other (See Comments)     G L Bleeding  G L Bleeding  G L Bleeding    Nsaids (non-steroidal anti-inflammatory drug) Other (See Comments)     GI BLEEDING  Other reaction(s): Other: See Comments  GI BLEEDING  GI BLEEDING    Hydrocodone      Other reaction(s): Unknown    Hydrocodone-acetaminophen     Metoclopramide Other (See Comments)     Other reaction(s): Headache, Other (See Comments)  Headaches and insomnia  Headaches and insomnia  Headaches and insomnia      Reglan [metoclopramide hcl] Other (See Comments)     Headaches and insomnia      Topiramate Other (See Comments)     Ulcers in the mouth and dry mouth  Other reaction(s): Other: See Comments, Unknown  per pt, gets "fog brain"  per pt, gets "fog brain"  Ulcers in the mouth and dry mouth  Ulcers in the " mouth and dry mouth         Medications:  Current Outpatient Medications:     (Magic mouthwash) 1:1:1 Benadryl 12.5mg/5ml liq, aluminum & magnesium hydroxide-simehticone (Maalox), LIDOcaine viscous 2%, Swish and spit 5 mLs every 4 (four) hours as needed (mouth sores). for mouth sores, Disp: 240 mL, Rfl: 3    ALPRAZolam (XANAX) 1 MG tablet, Take 1 mg by mouth 2 (two) times daily as needed. , Disp: , Rfl:     carisoprodoL (SOMA) 350 MG tablet, carisoprodol 350 mg tablet, Disp: , Rfl:     clotrimazole-betamethasone 1-0.05% (LOTRISONE) cream, APPLY TO AFFECTED AREA 2-3 TIMES DAILY AS NEEDED, Disp: , Rfl:     dexlansoprazole (DEXILANT) 60 mg capsule, Take 1 capsule (60 mg total) by mouth once daily., Disp: 90 capsule, Rfl: 3    diphenhydrAMINE (BENADRYL) 50 MG tablet, Take 50 mg by mouth every 4 (four) hours as needed for Itching., Disp: , Rfl:     esomeprazole (NEXIUM) 20 MG capsule, Take 20 mg by mouth before breakfast. THREE TABLETS DAILY, Disp: , Rfl:     estradioL (ESTRACE) 2 MG tablet, Take 1.5 tablets (3 mg total) by mouth once daily., Disp: 135 tablet, Rfl: 3    flavoxATE (URISPAS) 100 mg Tab, TAKE 1 TABLET BY MOUTH 3  TIMES DAILY AS NEEDED, Disp: 90 tablet, Rfl: 0    fluticasone propionate (FLONASE) 50 mcg/actuation nasal spray, 1 spray by Each Nostril route once daily., Disp: , Rfl:     Lactobacillus acidophilus (PROBIOTIC ORAL), , Disp: , Rfl:     loperamide (IMODIUM) 2 mg capsule, Take 2 capsules (4 mg total) by mouth 2 (two) times daily as needed for Diarrhea., Disp: 360 capsule, Rfl: 1    meclizine (ANTIVERT) 25 mg tablet, Take 25 mg by mouth 3 (three) times daily as needed., Disp: , Rfl:     methen-sod phos-meth blue-hyos (UROGESIC-BLUE) 81.6-40.8-0.12 mg Tab, Take 1 tablet by mouth every 6 (six) hours as needed., Disp: 30 tablet, Rfl: 11    methocarbamol (ROBAXIN-750 ORAL), Take by mouth., Disp: , Rfl:     pregabalin (LYRICA) 50 MG capsule, Take 50 mg by mouth 3 (three) times daily. ,  Disp: , Rfl:     promethazine (PHENERGAN) 25 mg/mL injection, Inject 25 mg into the vein every 4 (four) hours., Disp: , Rfl:     simethicone (MYLICON) 125 mg Cap capsule, Take 250 mg by mouth., Disp: , Rfl:     simvastatin (ZOCOR) 40 MG tablet, Take 40 mg by mouth every evening. , Disp: , Rfl:     SODIUM CHLORIDE 0.45 % IV, Inject 1,000 mLs into the vein as needed. , Disp: , Rfl:     sodium chloride 0.9% 0.9 % SolP 50 mL with promethazine 25 mg/mL Soln, Inject 25 mg into the vein every 4 (four) hours. Thru IV, Disp: , Rfl:     sucralfate (CARAFATE) 1 gram tablet, Take 1 tablet (1 g total) by mouth 4 (four) times daily before meals and nightly., Disp: 360 tablet, Rfl: 2    tiZANidine (ZANAFLEX) 4 MG tablet, Take 4 mg by mouth 3 (three) times daily., Disp: , Rfl:     Current Outpatient Medications on File Prior to Visit   Medication Sig Dispense Refill    (Magic mouthwash) 1:1:1 Benadryl 12.5mg/5ml liq, aluminum & magnesium hydroxide-simehticone (Maalox), LIDOcaine viscous 2% Swish and spit 5 mLs every 4 (four) hours as needed (mouth sores). for mouth sores 240 mL 3    ALPRAZolam (XANAX) 1 MG tablet Take 1 mg by mouth 2 (two) times daily as needed.       carisoprodoL (SOMA) 350 MG tablet carisoprodol 350 mg tablet      clotrimazole-betamethasone 1-0.05% (LOTRISONE) cream APPLY TO AFFECTED AREA 2-3 TIMES DAILY AS NEEDED      dexlansoprazole (DEXILANT) 60 mg capsule Take 1 capsule (60 mg total) by mouth once daily. 90 capsule 3    diphenhydrAMINE (BENADRYL) 50 MG tablet Take 50 mg by mouth every 4 (four) hours as needed for Itching.      esomeprazole (NEXIUM) 20 MG capsule Take 20 mg by mouth before breakfast. THREE TABLETS DAILY      estradioL (ESTRACE) 2 MG tablet Take 1.5 tablets (3 mg total) by mouth once daily. 135 tablet 3    flavoxATE (URISPAS) 100 mg Tab TAKE 1 TABLET BY MOUTH 3  TIMES DAILY AS NEEDED 90 tablet 0    fluticasone propionate (FLONASE) 50 mcg/actuation nasal spray 1 spray by Each  Nostril route once daily.      Lactobacillus acidophilus (PROBIOTIC ORAL)       loperamide (IMODIUM) 2 mg capsule Take 2 capsules (4 mg total) by mouth 2 (two) times daily as needed for Diarrhea. 360 capsule 1    meclizine (ANTIVERT) 25 mg tablet Take 25 mg by mouth 3 (three) times daily as needed.      methen-sod phos-meth blue-hyos (UROGESIC-BLUE) 81.6-40.8-0.12 mg Tab Take 1 tablet by mouth every 6 (six) hours as needed. 30 tablet 11    methocarbamol (ROBAXIN-750 ORAL) Take by mouth.      pregabalin (LYRICA) 50 MG capsule Take 50 mg by mouth 3 (three) times daily.       promethazine (PHENERGAN) 25 mg/mL injection Inject 25 mg into the vein every 4 (four) hours.      simethicone (MYLICON) 125 mg Cap capsule Take 250 mg by mouth.      simvastatin (ZOCOR) 40 MG tablet Take 40 mg by mouth every evening.       SODIUM CHLORIDE 0.45 % IV Inject 1,000 mLs into the vein as needed.       sodium chloride 0.9% 0.9 % SolP 50 mL with promethazine 25 mg/mL Soln Inject 25 mg into the vein every 4 (four) hours. Thru IV      sucralfate (CARAFATE) 1 gram tablet Take 1 tablet (1 g total) by mouth 4 (four) times daily before meals and nightly. 360 tablet 2    tiZANidine (ZANAFLEX) 4 MG tablet Take 4 mg by mouth 3 (three) times daily.       No current facility-administered medications on file prior to visit.         Objective:     PHYSICAL EXAM:  Vital Signs (Most Recent)  Pulse: 84 (01/03/22 1410)  BP: (!) 140/90 (stress) (01/03/22 1410)    Review of Systems   All other systems reviewed and are negative.   A 10+ review of systems was performed with pertinent positives and negatives noted above in the history of present illness.  Other systems were negative unless otherwise specified.    Physical Exam  Vitals reviewed.   Constitutional:       General: She is not in acute distress.     Appearance: Normal appearance. She is obese.   Cardiovascular:      Rate and Rhythm: Normal rate.      Comments: Right portacath in place.  No erythema, induration or TTP around site.  Pulmonary:      Effort: Pulmonary effort is normal. No respiratory distress.   Skin:     General: Skin is warm and dry.   Neurological:      Mental Status: She is alert.            Assessment:     51 y.o. female with functional right chest portacath in place. Pt desires revision or placement of new portacath. Port is functional with minimal issues and there is risk of stenosis or injury with placing new port or revising current port. Pt will need a port study to evaluate her current port.    Plan:     - Will order port study to evaluate port  - Discussed with patient the concerns of revision or replacement of a functional portacath.  - Will f/u with pt after port study    Zack Verma MD  General Surgery  [facility]     I have personally taken the history and examined this patient and agree with the resident's note as stated above.         Zack King MD

## 2022-01-05 ENCOUNTER — PATIENT MESSAGE (OUTPATIENT)
Dept: GASTROENTEROLOGY | Facility: CLINIC | Age: 52
End: 2022-01-05
Payer: COMMERCIAL

## 2022-01-06 DIAGNOSIS — K21.9 GASTROESOPHAGEAL REFLUX DISEASE WITHOUT ESOPHAGITIS: Primary | ICD-10-CM

## 2022-01-06 RX ORDER — PANTOPRAZOLE SODIUM 40 MG/10ML
40 INJECTION, POWDER, LYOPHILIZED, FOR SOLUTION INTRAVENOUS 2 TIMES DAILY
Qty: 60 EACH | Refills: 11 | Status: SHIPPED | OUTPATIENT
Start: 2022-01-06 | End: 2023-03-01 | Stop reason: ALTCHOICE

## 2022-01-07 ENCOUNTER — PATIENT MESSAGE (OUTPATIENT)
Dept: SURGERY | Facility: CLINIC | Age: 52
End: 2022-01-07
Payer: COMMERCIAL

## 2022-01-11 ENCOUNTER — HOSPITAL ENCOUNTER (OUTPATIENT)
Dept: INTERVENTIONAL RADIOLOGY/VASCULAR | Facility: HOSPITAL | Age: 52
Discharge: HOME | End: 2022-01-11
Attending: SURGERY
Payer: COMMERCIAL

## 2022-01-11 VITALS
DIASTOLIC BLOOD PRESSURE: 102 MMHG | TEMPERATURE: 98 F | BODY MASS INDEX: 37.89 KG/M2 | RESPIRATION RATE: 18 BRPM | HEIGHT: 68 IN | WEIGHT: 250 LBS | HEART RATE: 92 BPM | OXYGEN SATURATION: 98 % | SYSTOLIC BLOOD PRESSURE: 155 MMHG

## 2022-01-11 DIAGNOSIS — Z95.828 PORT-A-CATH IN PLACE: ICD-10-CM

## 2022-01-11 PROCEDURE — 36598 IR PORT CHECK, EXISTING CENTRAL VENOUS LINE W/CONTRAST INJ: ICD-10-PCS | Mod: ,,, | Performed by: RADIOLOGY

## 2022-01-11 PROCEDURE — A4550 SURGICAL TRAYS: HCPCS

## 2022-01-11 PROCEDURE — 36598 INJ W/FLUOR EVAL CV DEVICE: CPT | Performed by: RADIOLOGY

## 2022-01-11 PROCEDURE — 25500020 PHARM REV CODE 255: Performed by: SURGERY

## 2022-01-11 RX ADMIN — IOHEXOL 10 ML: 350 INJECTION, SOLUTION INTRAVENOUS at 01:01

## 2022-01-11 NOTE — H&P
Interventional Radiology Pre-Procedure History & Physical      Chief Complaint/Reason for Referral: Port check    History of Present Illness:  Nan Betts is a 51 y.o. female who presents with an indwelling port. Pt reports difficulty accessing the port. Referred for port check.    Past Medical History:   Diagnosis Date    Abnormal Pap smear of vagina     Anxiety     Cataract     Chronic pain     TMJ and abdomen    Colon polyp     Epilepsy     as a child    Gastroparesis     Idiopathic    GERD (gastroesophageal reflux disease)     H/O abnormal cervical Papanicolaou smear     Hypercholesteremia 6/21/2016    Hyperlipidemia     IC (interstitial cystitis)     Internal hemorrhoids     Interstitial cystitis     Irritable bowel syndrome (IBS)     Irritable bowel syndrome with diarrhea 6/21/2016    Spastic colon     TMJ (temporomandibular joint disorder)     TMJ (temporomandibular joint syndrome) 6/21/2016     Past Surgical History:   Procedure Laterality Date    APPENDECTOMY      BREAST BIOPSY Bilateral     CATARACT EXTRACTION      CHOLECYSTECTOMY      COLONOSCOPY      COLONOSCOPY N/A 7/31/2018    Procedure: COLONOSCOPY;  Surgeon: Zack Lehman MD;  [facility]  Service: Endoscopy;  Laterality: N/A;    CYSTOSCOPY N/A 8/25/2020    Procedure: CYSTOSCOPY;  Surgeon: Christal Khan MD;  [address]  Service: Urology;  Laterality: N/A;    ELBOW SURGERY      ESOPHAGOGASTRODUODENOSCOPY N/A 7/31/2018    Procedure: EGD (ESOPHAGOGASTRODUODENOSCOPY);  Surgeon: Zack Lehman MD;  [address]  Service: Endoscopy;  Laterality: N/A;  RUQ gastric pacemaker    Left upper arm PICC line    PONV    ESOPHAGOGASTRODUODENOSCOPY N/A 9/3/2019    Procedure: EGD (ESOPHAGOGASTRODUODENOSCOPY);  Surgeon: Zack Lehman MD;  [facility]  Service: Endoscopy;  Laterality: N/A;  history of gastoparesis, per change in protocol, pt instructed to do  full liquid diet x 3 days prior to procedure and clear liquids x 1 day prior to procedure-BB  pt has gastric pacemaker, monitored by Dr. Lehman-YASMANY  hx of epilepsy, last seizure during chi    ESOPHAGOGASTRODUODENOSCOPY N/A 9/10/2020    Procedure: ESOPHAGOGASTRODUODENOSCOPY (EGD);  Surgeon: Zack Lehman MD;  Location: [facility] (4TH FLR);  Service: Endoscopy;  Laterality: N/A;  3 days Full liquid diet/ 1 day Clear liquids  waiting for Pt to cb with date - ERW  Left upper arm -  PICC  COVID screening 9/7/20 [place] [facility]- [facility]    ESOPHAGOGASTRODUODENOSCOPY N/A 11/3/2021    Procedure: EGD (ESOPHAGOGASTRODUODENOSCOPY);  Surgeon: Zack Lehman MD;  [facility]  Service: Endoscopy;  Laterality: N/A;  10/15 fully vaccinated as of 7/15/21; gastric pacemaker-pt does not have remote;  pt has a port does not want IV started; 3 days full liquid diet; instr. to portal-st    GASTRIC STIMULATOR IMPLANT SURGERY      left side on 03/30/2020    gastric stimulator placement      LASIK Bilateral 2006    MANDIBLE SURGERY Bilateral 10/17/2016    OOPHORECTOMY Bilateral     PYLOROPLASTY  03/2016    STOMACH SURGERY      gastric pacemaker    TEMPOROMANDIBULAR JOINT SURGERY  12/2016    TONSILLECTOMY      TOTAL ABDOMINAL HYSTERECTOMY  2005    EUGENIA/BSO, Trachelectomy  2012    UPPER GASTROINTESTINAL ENDOSCOPY      WRIST SURGERY         Allergies:   Review of patient's allergies indicates:   Allergen Reactions    Corticosteroids (glucocorticoids) Nausea And Vomiting and Other (See Comments)     Acid reflux  Acid reflux  Increases acid reflux    Ketorolac Anaphylaxis and Shortness Of Breath     Other reaction(s): Throat swelling, Unknown    Morphine Anaphylaxis    Prednisone Other (See Comments)     Increases acid reflux    Tramadol Shortness Of Breath     Other reaction(s): Throat swelling, Unknown    Codeine Itching    Gabapentin Other (See Comments)     Causes suicidal thoughts  Other reaction(s):  "suicidal thoughts  Causes suicidal thoughts  Causes suicidal thoughts      Ibuprofen Other (See Comments)     G L Bleeding  G L Bleeding  G L Bleeding    Nsaids (non-steroidal anti-inflammatory drug) Other (See Comments)     GI BLEEDING  Other reaction(s): Other: See Comments  GI BLEEDING  GI BLEEDING    Hydrocodone      Other reaction(s): Unknown    Hydrocodone-acetaminophen     Metoclopramide Other (See Comments)     Other reaction(s): Headache, Other (See Comments)  Headaches and insomnia  Headaches and insomnia  Headaches and insomnia      Reglan [metoclopramide hcl] Other (See Comments)     Headaches and insomnia      Topiramate Other (See Comments)     Ulcers in the mouth and dry mouth  Other reaction(s): Other: See Comments, Unknown  per pt, gets "fog brain"  per pt, gets "fog brain"  Ulcers in the mouth and dry mouth  Ulcers in the mouth and dry mouth          Home Meds:   Prior to Admission medications    Medication Sig Start Date End Date Taking? Authorizing Provider   (Magic mouthwash) 1:1:1 Benadryl 12.5mg/5ml liq, aluminum & magnesium hydroxide-simehticone (Maalox), LIDOcaine viscous 2% Swish and spit 5 mLs every 4 (four) hours as needed (mouth sores). for mouth sores 12/12/21  Yes Zack Lehman MD   ALPRAZolam (XANAX) 1 MG tablet Take 1 mg by mouth 2 (two) times daily as needed.  2/26/20  Yes Historical Provider   diphenhydrAMINE (BENADRYL) 50 MG tablet Take 50 mg by mouth every 4 (four) hours as needed for Itching.   Yes Historical Provider   estradioL (ESTRACE) 2 MG tablet Take 1.5 tablets (3 mg total) by mouth once daily. 5/26/21  Yes Niesha Dunn MD   flavoxATE (URISPAS) 100 mg Tab TAKE 1 TABLET BY MOUTH 3  TIMES DAILY AS NEEDED 12/10/21  Yes Christal Khan MD   fluticasone propionate (FLONASE) 50 mcg/actuation nasal spray 1 spray by Each Nostril route once daily.   Yes Historical Provider   Lactobacillus acidophilus (PROBIOTIC ORAL)    Yes Historical Provider   loperamide " (IMODIUM) 2 mg capsule Take 2 capsules (4 mg total) by mouth 2 (two) times daily as needed for Diarrhea. 9/28/21  Yes Zack Lehman MD   meclizine (ANTIVERT) 25 mg tablet Take 25 mg by mouth 3 (three) times daily as needed.   Yes Historical Provider   methocarbamol (ROBAXIN-750 ORAL) Take by mouth.   Yes Historical Provider   pantoprazole (PROTONIX) 40 mg injection Inject 40 mg into the vein 2 (two) times a day. 1/6/22 1/6/23 Yes Zack Lehman MD   promethazine (PHENERGAN) 25 mg/mL injection Inject 25 mg into the vein every 4 (four) hours.   Yes Historical Provider   simvastatin (ZOCOR) 40 MG tablet Take 40 mg by mouth every evening.  9/8/16  Yes Historical Provider   SODIUM CHLORIDE 0.45 % IV Inject 1,000 mLs into the vein as needed.    Yes Historical Provider   sodium chloride 0.9% 0.9 % SolP 50 mL with promethazine 25 mg/mL Soln Inject 25 mg into the vein every 4 (four) hours. Thru IV   Yes Historical Provider   sodium chloride 0.9% SolP 100 mL with pantoprazole 40 mg SolR 40 mg Inject 40 mg into the vein every 12 (twelve) hours. 1/6/22  Yes Zack Lehman MD   sucralfate (CARAFATE) 1 gram tablet Take 1 tablet (1 g total) by mouth 4 (four) times daily before meals and nightly. 9/28/21  Yes Zack Lehman MD   tiZANidine (ZANAFLEX) 4 MG tablet Take 4 mg by mouth 3 (three) times daily. 1/10/21  Yes Historical Provider   clotrimazole-betamethasone 1-0.05% (LOTRISONE) cream APPLY TO AFFECTED AREA 2-3 TIMES DAILY AS NEEDED 4/6/21   Historical Provider       Anticoagulation/Antiplatelet Meds: no anticoagulation    Review of Systems:   Hematological: no known coagulopathies  Respiratory: no shortness of breath  Cardiovascular: no chest pain  Gastrointestinal: no abdominal pain  Genitourinary: no dysuria  Musculoskeletal: negative  Neurological: no TIA or stroke symptoms     Physical Exam:  Temp: 98 °F (36.7 °C) (01/11/22 1256)  Pulse: 92 (01/11/22 1256)  Resp: 18 (01/11/22 1256)  BP: (!) 155/102 (01/11/22 1256)  SpO2:  98 % (01/11/22 1256)    General: NAD  HEENT: Normocephalic, sclera anicteric, oropharynx clear  Neck/chest: Supple, no palpable lymphadenopathy, RIGHT IJ vein chest port in place, surrounding tissues are relatively mobile 2/2 pendulous breast  Heart: RRR  Lungs: Symmetric excursions, breathing unlabored  Abd: NTND, soft  Extremities: MTZ  Neuro: Gross nonfocal    Laboratory:  No results found for: INR    Lab Results   Component Value Date    WBC 8.47 12/18/2019    HGB 14.0 12/18/2019    HCT 41.7 12/18/2019    MCV 87 12/18/2019     12/18/2019      Lab Results   Component Value Date    GLU 87 12/18/2019     12/18/2019    K 4.0 12/18/2019     12/18/2019    CO2 25 12/18/2019    BUN 10 12/18/2019    CREATININE 0.9 12/18/2019    CALCIUM 9.3 12/18/2019    MG 1.8 12/18/2019    ALT 17 12/18/2019    AST 23 12/18/2019    ALBUMIN 4.0 12/18/2019    BILITOT 0.2 12/18/2019       Assessment/Plan:  51 y.o. female with an indwelling port. Will undergo port check today.    Sedation: None      Andrew Marsala MD [facility]  Pager [phone]

## 2022-01-11 NOTE — DISCHARGE SUMMARY
Interventional Radiology Short Stay Discharge Summary      Admit Date: 1/11/2022  Discharge Date: 01/11/2022     Hospital Course: Uneventful    Discharge Diagnosis: Gastroparesis s/p port check today    Discharge Condition: Stable    Discharge Disposition: Home    Diet: Resume prior diet    Activity: Activity as tolerated    Follow-up: With referring provider      Ford Valdes MD  [facility]  Pager [phone]

## 2022-01-11 NOTE — DISCHARGE INSTRUCTIONS
Patient Education       How to Care for a Portacath   About this topic   A central line catheter is a very long intravenous (IV) line. A portacath or port is a type of central line. A port has 2 main parts, a round disc and a long tube called a catheter. The port and catheter are completely inside your body. The port looks like a small bump under your skin. It is most often in the chest area, but may be in your upper arm or lower abdomen. The long catheter goes under the skin and into a large vein that leads just above your heart.  Some ports can be used to inject dye when you have special tests. This is a power-injectable port. Ask your doctor about your port before you have any tests. Also, your doctor will give you a card that tells information about your port. Keep this with you and share it with your other doctors.  Once this port is in place, the doctor or nurse will use a special needle to get into the disc part. This is called accessing the port. The needle can stay in for a week at a time and will be covered with a clear bandage and taped in place. When the needle is not in the port, you will not have any holes in your skin because the port is under the skin. It can stay in place for months or years until your treatment is no longer needed. This kind of IV is used to draw blood for tests whenever possible to save your other veins from needlesticks.  General   Always wash your hands carefully before you touch your port, needle, tubing, or dressing. Everyone who touches your port or access needle should wash their hands first and wear gloves.       When you are at home, there may be times when your port is still accessed. This means it still has a needle in place.  · Do not get your port wet while the needle is in place. Take a sponge bath or cover the area with plastic wrap before you shower.  · Make sure your dressing stays clean and dry. This will help keep the needle secure and in place.  If your port  is not accessed, it will just look like a small raised area under the skin.  · You can shower, take a bath, or swim in a pool.  · You do not need to have a dressing over your port.  · Wear clothes that do not rub on your port.  · Talk to your doctor about what activities are safe for you.  · Flush your port before and after drugs and blood draws. Flush your port at least one time every 4 weeks. Talk to your doctor about what you should use to flush your port with.  Changing the Port Dressing: Keeping the skin clean and changing the dressing as ordered may help to prevent infection. You will always change the dressing if it is wet, loose, or dirty. You or someone in your family can learn how to do this. Others have a nurse come to your home to help with this care.  · Gather supplies and place them on a clean work space. Your supplies may come in a kit. You will need clean gloves, sterile gloves, masks, chlorhexidine swab sticks, skin preparation wipes, tape, and sterile dressings.  · Always wash your hands well with soap and water before touching the needle and tubing. This will help to avoid spreading germs. Wear a mask when changing the dressing. Also, have the patient wear a mask or keep their head turned away while the dressing is being changed. Wear clean gloves when taking off the old dressing.  · Take off the old dressing. Avoid using scissors or sharp tools. They could cut the extension tubing that is connected to the needle. Do not pull on the tubing when taking off the dressing.  · Check the site for swelling, drainage, or redness.  · Wash your hands again. Put on sterile gloves.  · Wash the site with a chlorhexidine swab using a back-and-forth scrubbing motion for 30 seconds. Start at the exit site, wash the tubing if it is staying in, and wash away from the site. Clean the entire area where the new dressing will go, about 4 inches (10 cm). Let the cleaned area air dry for at least 30 seconds or until  completely dry. Do not fan, wave, or blow on the site to help it dry faster.  · Your doctor may order a small sponge to go around the needle where it goes into the port. Put this sponge on the skin so it circles the needle. Make sure it is right-side up as marked on the sponge.  · Once the cleaned area is dry, use a skin preparation wipe around the port where the new dressing will go. Allow this skin prep to dry completely before putting the dressing on. This protects the skin, helping to prevent irritation from changing the dressing and helps the dressing stick.  · Lay the tubing on the skin in a loose [place] if it is long or in a straight line if it is short. It should not lay on top of where the port is in the skin or lay on itself. Put the dressing on all sides of the tubing. Be sure to cover where the needle goes in to the skin. The dressing should be snug but not stretched or it will pull on the skin. Most dressings are clear and will stick to the skin. If it is not sticking, put medical tape on the edges only to hold it in place. If you often have trouble with it sticking, talk to your doctor or nurse about the dressing you use or the way you are changing your dressing. They will help you get the dressing to stick better.  · Throw away the old dressing, mask, used swabs, and used wipes.  · Remove gloves.  · Wash your hands with soap and water.  Accessing and Flushing the Port: The port should be flushed each month with a sterile fluid. It should also be flushed before and after each use. This will help keep the port and catheter from getting blocked.  · Wash your hands with soap and water.  · Gather supplies and place them on a clean work space. You will need:  ? Clean gloves  ? Masks for the person who is accessing the port and one for the patient  ? Chlorhexidine swab sticks  ? Alcohol wipes  ? Special needle to access the port connected to tubing. This needle is bent in the middle and connected to a short  piece of clear tubing.  ? 10 mL syringes filled with the fluid to flush the catheter. Your doctor may order heparin, normal saline, or a special flush for you to use. Your doctor may have you use a smaller amount of fluid to flush the line if the catheter is in a child.  ? Injection cap  ? Numbing cream is sometimes used to numb the skin before accessing the port.  · Wear a mask when changing the dressing. Also, have the patient wear a mask or keep their head turned away while the dressing is being changed.  · Put on clean gloves and take off the old dressing. Throw it away.  · Wash your hands with soap and water.  · Apply numbing cream on skin over port and wait required time for cream to work.  · Wipe off cream with clean gauze.  · Now put on sterile gloves. Connect the injection cap to the tubing. Fill injection cap, tubing, and needle with flush solution. Clamp tubing and leave the syringe attached to the tubing.  · Clean skin over port with chlorhexidine swabs and allow to dry. Do not fan, wave, or blow on the site to help it dry faster.  · Hold the edges of the port in place with your thumb and index finger of the hand that you do not write with. Do not touch the place where you will insert the needle because it is clean.  · Insert the needle at a 90-degree angle from the skin in the center of the port. Push the needle in firmly until it touches the back of the port. Do not push more once you reach the back of the port.  · Unclamp the tubing and pull back on the syringe to check for a blood return. You do not need to fill the syringe with blood, just enough into the tubing to check that the blood is there. If there is a blood return, flush slowly and steadily until the syringe is almost empty. Clamp the tubing before you get to the very last amount of flush solution, less than 1 mL in the syringe.  · If there is not a blood return, reposition the needle and try again. If there is still no blood return, start  over with a new needle. If you are not able to get a blood return with a new needle, contact your doctor.  · Take off syringe.  · Cover needle and tubing with a clear dressing.  · Throw out any used swabs, wipes, or materials.  · Wash your hands with soap and water.  Flushing the Port and Taking Out the Needle (Deaccessing)   · Wash your hands with soap and water.  · Gather supplies and place them on a clean work space. You will need:  ? Masks for the person who is accessing the port and one for the patient  Clean gloves  ? Alcohol wipe  ? 10 mL syringes filled with the fluid to flush the catheter. Your doctor may order heparin, normal saline, or a special flush solution for you to use. Your doctor may have you use a smaller amount of fluid to flush the line if the catheter is in a child.  · Wash your hands with soap and water.  · Wearing clean gloves, scrub the injection cap with an alcohol wipe for 15 seconds and let dry. Do not fan, wave, or blow on the site to help it dry faster.  · Attach the flush syringe to the injection cap. Unclamp the tubing and pull back on the syringe to check for a blood return. If there is a blood return, flush slowly and steadily until the syringe is almost empty. Clamp the tubing before you get to the very last bit of flush solution, less than 1 mL, and remove the syringe.  · If there is not a blood return, reposition the needle and try again. If there is still no blood return, start over with a new needle. If you are not able to get a blood return with a new needle, contact your doctor.  · After flushing the port, put on the mask and remove the clear dressing. Hold the port steady with your thumb and index fingers of one hand. With your other hand, remove the port needle. Take care that you do not stick your fingers or skin with the needle. Most needles have a safety catch or cover. Your nurse will show you how to cover the needle when it is out.  · Dispose of the needle in the  proper container.  · If there is bleeding, put gentle pressure on the site with a sterile gauze until it stops.  · Throw out your mask, used swabs, wipes, or materials.  · Wash your hands with soap and water.  What problems could happen?   · Infection  · Bruising  · Blood clot  · Scar inside the large blood vessel if the port is in for a long time  · Catheter moves to wrong position inside the vein  · Fluid leaks into the surrounding skin and tissue  When do I need to call the doctor?   · Fever of 100.4°F (38°C) or higher  · Problems with your port site like:  ? Site starts bleeding and does not stop with gentle pressure  ? Site becomes more red, or the skin over or around your port breaks open  ? Rash, bumps, itching, or irritation where the dressing goes onto the skin  ? Drainage or pus from the port site  · Problems with the port like:  ? Your port seems to have moved under your skin  ? Not able to get the needle into the port  ? Pain, irritation, or swelling when you flush the port  ? Not able to get the drugs or flush solution through the port  ? Not able to get a blood return from your port  ? You have any concerns about your port  · Sudden shortness of breath or chest pain  · Pain in your arm or chest when you flush the port  · Swishing sound in one of your ears when you flush the port  · Swelling in your neck, jaw, face, or upper chest  · Your veins in your face, upper arms, neck, or chest are swelling up or sticking out  · Change in skin color on your upper chest, neck, jaw, or face  Teach Back: Helping You Understand   The Teach Back Method helps you understand the information we are giving you. After you talk with the staff, tell them in your own words what you learned. This helps to make sure the staff has described each thing clearly. It also helps to explain things that may have been confusing. Before going home, make sure you can do these:  · I can tell you about my condition.  · I can tell you how I  will take care of my port.  · I can tell you what I will do if I have fever, redness, or drainage from the port site.  Where can I learn more?   American Society of Clinical Oncology  https://www.cancer.net/navigating-cancer-care/how-cancer-treated/chemotherapy/catheters-and-ports-cancer-treatment   [facility]  https://www.[facility].org.uk/information-and-support/treating/chemotherapy/being-treated-with-chemotherapy/implantable-ports.html#56312   Last Reviewed Date   2021-09-27  Consumer Information Use and Disclaimer   This information is not specific medical advice and does not replace information you receive from your health care provider. This is only a brief summary of general information. It does NOT include all information about conditions, illnesses, injuries, tests, procedures, treatments, therapies, discharge instructions or life-style choices that may apply to you. You must talk with your health care provider for complete information about your health and treatment options. This information should not be used to decide whether or not to accept your health care providers advice, instructions or recommendations. Only your health care provider has the knowledge and training to provide advice that is right for you.  Copyright   Copyright © 2021 [employer] and its affiliates and/or licensors. All rights reserved.

## 2022-01-11 NOTE — PROCEDURES
Interventional Radiology Immediate Post-Procedure Note    Pre-Op Diagnosis: Gastroparesis  Post-Op Diagnosis: Same    Procedure: Fluoro guided port check    Procedure performed by: Ford Valdes MD  Assistants: None    Estimated Blood Loss: Nil  Specimen Removed: None    Findings/description of procedure:  With the patient upright, the port drops down along the chest wall. The surrounding tissue are mobile, and the port rotates from side to side easily. With the patient supine, the port is in the more typical location below the right clavicle. Contrast injection with the patient supine confirms the port is patent and that the tip is near the superior cavoatrial jxn.    No immediate complications. Patient tolerated procedure well. Please see full dictated procedure report for additional details and recommendations.    Recs:  Ample soft tissue around the port likely makes it more mobile and could be contributing to difficult access. Doubtful that the port is actually flipping inside the pocket. Consider revision to move the port higher on the chest wall.      Ford Valdes MD  [facility]  Pager [phone]

## 2022-01-11 NOTE — NURSING
Reviewed discharge instructions with pt, pt left with R PAC still accessed from hospital arrival, ambulated with pt to front of hospital into care of family, IR care complete

## 2022-01-12 ENCOUNTER — PATIENT MESSAGE (OUTPATIENT)
Dept: SURGERY | Facility: CLINIC | Age: 52
End: 2022-01-12
Payer: COMMERCIAL

## 2022-01-12 ENCOUNTER — PATIENT MESSAGE (OUTPATIENT)
Dept: GASTROENTEROLOGY | Facility: CLINIC | Age: 52
End: 2022-01-12
Payer: COMMERCIAL

## 2022-01-13 DIAGNOSIS — K31.84 GASTROPARESIS: Primary | ICD-10-CM

## 2022-01-27 ENCOUNTER — HOSPITAL ENCOUNTER (OUTPATIENT)
Dept: INTERVENTIONAL RADIOLOGY/VASCULAR | Facility: HOSPITAL | Age: 52
Discharge: HOME | End: 2022-01-27
Attending: RADIOLOGY
Payer: COMMERCIAL

## 2022-01-27 ENCOUNTER — ANESTHESIA EVENT (OUTPATIENT)
Dept: INTERVENTIONAL RADIOLOGY/VASCULAR | Facility: HOSPITAL | Age: 52
End: 2022-01-27
Payer: COMMERCIAL

## 2022-01-27 ENCOUNTER — PATIENT MESSAGE (OUTPATIENT)
Dept: GASTROENTEROLOGY | Facility: CLINIC | Age: 52
End: 2022-01-27
Payer: COMMERCIAL

## 2022-01-27 ENCOUNTER — HOSPITAL ENCOUNTER (OUTPATIENT)
Dept: INTERVENTIONAL RADIOLOGY/VASCULAR | Facility: HOSPITAL | Age: 52
Discharge: HOME | End: 2022-01-27
Attending: INTERNAL MEDICINE
Payer: COMMERCIAL

## 2022-01-27 VITALS
DIASTOLIC BLOOD PRESSURE: 74 MMHG | SYSTOLIC BLOOD PRESSURE: 132 MMHG | HEIGHT: 68 IN | OXYGEN SATURATION: 97 % | RESPIRATION RATE: 16 BRPM | TEMPERATURE: 98 F | WEIGHT: 255 LBS | HEART RATE: 84 BPM | BODY MASS INDEX: 38.65 KG/M2

## 2022-01-27 DIAGNOSIS — K31.84 GASTROPARESIS: Primary | ICD-10-CM

## 2022-01-27 DIAGNOSIS — K31.84 GASTROPARESIS: ICD-10-CM

## 2022-01-27 LAB
ALBUMIN SERPL BCP-MCNC: 3.9 G/DL (ref 3.5–5.2)
ALP SERPL-CCNC: 100 U/L (ref 55–135)
ALT SERPL W/O P-5'-P-CCNC: 16 U/L (ref 10–44)
ANION GAP SERPL CALC-SCNC: 13 MMOL/L (ref 8–16)
AST SERPL-CCNC: 28 U/L (ref 10–40)
BILIRUB SERPL-MCNC: 0.3 MG/DL (ref 0.1–1)
BUN SERPL-MCNC: 17 MG/DL (ref 6–20)
CALCIUM SERPL-MCNC: 9.5 MG/DL (ref 8.7–10.5)
CHLORIDE SERPL-SCNC: 102 MMOL/L (ref 95–110)
CO2 SERPL-SCNC: 23 MMOL/L (ref 23–29)
CREAT SERPL-MCNC: 0.8 MG/DL (ref 0.5–1.4)
ERYTHROCYTE [DISTWIDTH] IN BLOOD BY AUTOMATED COUNT: 12.2 % (ref 11.5–14.5)
EST. GFR  (AFRICAN AMERICAN): >60 ML/MIN/1.73 M^2
EST. GFR  (NON AFRICAN AMERICAN): >60 ML/MIN/1.73 M^2
GLUCOSE SERPL-MCNC: 87 MG/DL (ref 70–110)
HCT VFR BLD AUTO: 38.5 % (ref 37–48.5)
HGB BLD-MCNC: 13.2 G/DL (ref 12–16)
INR PPP: 1.1 (ref 0.8–1.2)
MCH RBC QN AUTO: 30.2 PG (ref 27–31)
MCHC RBC AUTO-ENTMCNC: 34.3 G/DL (ref 32–36)
MCV RBC AUTO: 88 FL (ref 82–98)
PLATELET # BLD AUTO: 284 K/UL (ref 150–450)
PMV BLD AUTO: 9.7 FL (ref 9.2–12.9)
POTASSIUM SERPL-SCNC: 4.2 MMOL/L (ref 3.5–5.1)
PROT SERPL-MCNC: 6.9 G/DL (ref 6–8.4)
PROTHROMBIN TIME: 11.1 SEC (ref 9–12.5)
RBC # BLD AUTO: 4.37 M/UL (ref 4–5.4)
SARS-COV-2 RDRP RESP QL NAA+PROBE: NEGATIVE
SODIUM SERPL-SCNC: 138 MMOL/L (ref 136–145)
WBC # BLD AUTO: 11.33 K/UL (ref 3.9–12.7)

## 2022-01-27 PROCEDURE — 76937 PR  US GUIDE, VASCULAR ACCESS: ICD-10-PCS | Mod: 26,,, | Performed by: RADIOLOGY

## 2022-01-27 PROCEDURE — 36561 IR TUNNELED CATH PLACEMENT WITH PORT: ICD-10-PCS | Mod: RT,,, | Performed by: RADIOLOGY

## 2022-01-27 PROCEDURE — 77001 CHG FLUOROGUIDE CNTRL VEN ACCESS,PLACE,REPLACE,REMOVE: ICD-10-PCS | Mod: 26,,, | Performed by: RADIOLOGY

## 2022-01-27 PROCEDURE — 76937 US GUIDE VASCULAR ACCESS: CPT | Mod: 26,,, | Performed by: RADIOLOGY

## 2022-01-27 PROCEDURE — 37000009 HC ANESTHESIA EA ADD 15 MINS

## 2022-01-27 PROCEDURE — 77001 FLUOROGUIDE FOR VEIN DEVICE: CPT | Mod: TC | Performed by: RADIOLOGY

## 2022-01-27 PROCEDURE — 63600175 PHARM REV CODE 636 W HCPCS: Performed by: ANESTHESIOLOGY

## 2022-01-27 PROCEDURE — A4550 SURGICAL TRAYS: HCPCS

## 2022-01-27 PROCEDURE — 85610 PROTHROMBIN TIME: CPT | Performed by: RADIOLOGY

## 2022-01-27 PROCEDURE — 36590 REMOVAL TUNNELED CV CATH: CPT | Performed by: RADIOLOGY

## 2022-01-27 PROCEDURE — 36590 PR REMOVAL TUNNELED CV CATH W SUBQ PORT OR PUMP: ICD-10-PCS | Mod: 51,RT,, | Performed by: RADIOLOGY

## 2022-01-27 PROCEDURE — 85027 COMPLETE CBC AUTOMATED: CPT | Performed by: RADIOLOGY

## 2022-01-27 PROCEDURE — 63600175 PHARM REV CODE 636 W HCPCS: Performed by: RADIOLOGY

## 2022-01-27 PROCEDURE — 37000008 HC ANESTHESIA 1ST 15 MINUTES

## 2022-01-27 PROCEDURE — 36561 INSERT TUNNELED CV CATH: CPT | Performed by: RADIOLOGY

## 2022-01-27 PROCEDURE — C1788 PORT, INDWELLING, IMP: HCPCS

## 2022-01-27 PROCEDURE — 25000003 PHARM REV CODE 250: Performed by: RADIOLOGY

## 2022-01-27 PROCEDURE — U0002 COVID-19 LAB TEST NON-CDC: HCPCS | Performed by: INTERNAL MEDICINE

## 2022-01-27 PROCEDURE — 25000003 PHARM REV CODE 250: Performed by: NURSE ANESTHETIST, CERTIFIED REGISTERED

## 2022-01-27 PROCEDURE — 36590 REMOVAL TUNNELED CV CATH: CPT | Mod: 51,RT,, | Performed by: RADIOLOGY

## 2022-01-27 PROCEDURE — 77001 FLUOROGUIDE FOR VEIN DEVICE: CPT | Mod: 26,,, | Performed by: RADIOLOGY

## 2022-01-27 PROCEDURE — 80053 COMPREHEN METABOLIC PANEL: CPT | Performed by: RADIOLOGY

## 2022-01-27 PROCEDURE — 63600175 PHARM REV CODE 636 W HCPCS: Performed by: NURSE ANESTHETIST, CERTIFIED REGISTERED

## 2022-01-27 PROCEDURE — 76937 US GUIDE VASCULAR ACCESS: CPT | Mod: TC | Performed by: RADIOLOGY

## 2022-01-27 RX ORDER — SIMETHICONE 125 MG
250 TABLET,CHEWABLE ORAL 2 TIMES DAILY
COMMUNITY
End: 2022-02-21

## 2022-01-27 RX ORDER — ONDANSETRON 2 MG/ML
INJECTION INTRAMUSCULAR; INTRAVENOUS
Status: DISCONTINUED | OUTPATIENT
Start: 2022-01-27 | End: 2022-01-27

## 2022-01-27 RX ORDER — FENTANYL CITRATE 50 UG/ML
25 INJECTION, SOLUTION INTRAMUSCULAR; INTRAVENOUS EVERY 5 MIN PRN
Status: DISCONTINUED | OUTPATIENT
Start: 2022-01-27 | End: 2022-01-28 | Stop reason: HOSPADM

## 2022-01-27 RX ORDER — HYDROCODONE BITARTRATE AND ACETAMINOPHEN 5; 325 MG/1; MG/1
1 TABLET ORAL EVERY 6 HOURS PRN
Qty: 12 TABLET | Refills: 0 | Status: SHIPPED | OUTPATIENT
Start: 2022-01-27 | End: 2022-02-21

## 2022-01-27 RX ORDER — SODIUM CHLORIDE 0.9 % (FLUSH) 0.9 %
10 SYRINGE (ML) INJECTION
Status: DISCONTINUED | OUTPATIENT
Start: 2022-01-27 | End: 2022-01-28 | Stop reason: HOSPADM

## 2022-01-27 RX ORDER — PROCHLORPERAZINE EDISYLATE 5 MG/ML
5 INJECTION INTRAMUSCULAR; INTRAVENOUS EVERY 30 MIN PRN
Status: DISCONTINUED | OUTPATIENT
Start: 2022-01-27 | End: 2022-01-28 | Stop reason: HOSPADM

## 2022-01-27 RX ORDER — FENTANYL CITRATE 50 UG/ML
INJECTION, SOLUTION INTRAMUSCULAR; INTRAVENOUS
Status: DISCONTINUED | OUTPATIENT
Start: 2022-01-27 | End: 2022-01-27

## 2022-01-27 RX ORDER — SUCCINYLCHOLINE CHLORIDE 20 MG/ML
INJECTION INTRAMUSCULAR; INTRAVENOUS
Status: DISCONTINUED | OUTPATIENT
Start: 2022-01-27 | End: 2022-01-27

## 2022-01-27 RX ORDER — PROPOFOL 10 MG/ML
VIAL (ML) INTRAVENOUS
Status: DISCONTINUED | OUTPATIENT
Start: 2022-01-27 | End: 2022-01-27

## 2022-01-27 RX ORDER — CEFAZOLIN SODIUM 2 G/50ML
2 SOLUTION INTRAVENOUS
Status: COMPLETED | OUTPATIENT
Start: 2022-01-27 | End: 2022-01-27

## 2022-01-27 RX ORDER — LIDOCAINE HYDROCHLORIDE 20 MG/ML
INJECTION INTRAVENOUS
Status: DISCONTINUED | OUTPATIENT
Start: 2022-01-27 | End: 2022-01-27

## 2022-01-27 RX ORDER — PROMETHAZINE HYDROCHLORIDE 25 MG/ML
INJECTION, SOLUTION INTRAMUSCULAR; INTRAVENOUS
Status: DISCONTINUED | OUTPATIENT
Start: 2022-01-27 | End: 2022-01-27

## 2022-01-27 RX ORDER — LIDOCAINE HYDROCHLORIDE AND EPINEPHRINE 10; 10 MG/ML; UG/ML
INJECTION, SOLUTION INFILTRATION; PERINEURAL CODE/TRAUMA/SEDATION MEDICATION
Status: COMPLETED | OUTPATIENT
Start: 2022-01-27 | End: 2022-01-27

## 2022-01-27 RX ADMIN — PROMETHAZINE HYDROCHLORIDE 6.25 MG: 25 INJECTION INTRAMUSCULAR; INTRAVENOUS at 01:01

## 2022-01-27 RX ADMIN — LIDOCAINE HYDROCHLORIDE AND EPINEPHRINE 10 ML: 10; 10 INJECTION, SOLUTION INFILTRATION; PERINEURAL at 12:01

## 2022-01-27 RX ADMIN — FENTANYL CITRATE 25 MCG: 50 INJECTION INTRAMUSCULAR; INTRAVENOUS at 01:01

## 2022-01-27 RX ADMIN — ONDANSETRON 8 MG: 2 INJECTION, SOLUTION INTRAMUSCULAR; INTRAVENOUS at 12:01

## 2022-01-27 RX ADMIN — CEFAZOLIN SODIUM 2 G: 2 SOLUTION INTRAVENOUS at 10:01

## 2022-01-27 RX ADMIN — GLYCOPYRROLATE 0.2 MG: 0.2 INJECTION, SOLUTION INTRAMUSCULAR; INTRAVITREAL at 11:01

## 2022-01-27 RX ADMIN — FENTANYL CITRATE 50 MCG: 50 INJECTION, SOLUTION INTRAMUSCULAR; INTRAVENOUS at 11:01

## 2022-01-27 RX ADMIN — PROCHLORPERAZINE EDISYLATE 5 MG: 5 INJECTION INTRAMUSCULAR; INTRAVENOUS at 01:01

## 2022-01-27 RX ADMIN — SUCCINYLCHOLINE CHLORIDE 160 MG: 20 INJECTION, SOLUTION INTRAMUSCULAR; INTRAVENOUS at 11:01

## 2022-01-27 RX ADMIN — LIDOCAINE HYDROCHLORIDE 50 MG: 20 INJECTION, SOLUTION INTRAVENOUS at 11:01

## 2022-01-27 RX ADMIN — PROPOFOL 200 MG: 10 INJECTION, EMULSION INTRAVENOUS at 11:01

## 2022-01-27 RX ADMIN — FENTANYL CITRATE 25 MCG: 50 INJECTION, SOLUTION INTRAMUSCULAR; INTRAVENOUS at 12:01

## 2022-01-27 RX ADMIN — SODIUM CHLORIDE, SODIUM LACTATE, POTASSIUM CHLORIDE, AND CALCIUM CHLORIDE: .6; .31; .03; .02 INJECTION, SOLUTION INTRAVENOUS at 11:01

## 2022-01-27 RX ADMIN — FENTANYL CITRATE 25 MCG: 50 INJECTION, SOLUTION INTRAMUSCULAR; INTRAVENOUS at 01:01

## 2022-01-27 RX ADMIN — PROMETHAZINE HYDROCHLORIDE 6.25 MG: 25 INJECTION INTRAMUSCULAR; INTRAVENOUS at 12:01

## 2022-01-27 NOTE — Clinical Note
A pre-sedation assessment was completed by the physician immediately prior to sedation start.   Reviewed labs

## 2022-01-27 NOTE — DISCHARGE INSTRUCTIONS
Port Removal Instructions:    Remove tegaderm dressing and gauze on next day after procedure.    Do not remove steri strips that is below dressing, resembles paper tape, allow these to fall off on own.    Try to keep dressing dry and intact.    No submerging under water in pool or bathtub until incision is completely healed.    No heavy lifting, greater than 25 pounds, especially overhead for approximately 1 week.

## 2022-01-27 NOTE — NURSING
Patient's IR care is complete. VSS. See flowsheets. AVS reviewed with patient, and AVS paperwork provided to patient. All questions answered. PIV removed, catheter intact. Dressing c/d/i, no hematoma. Patient in pre post awaiting Rx from outpatient pharmacy, then will be accompanied to family's vehicle via wheelchair at discharge.

## 2022-01-27 NOTE — TRANSFER OF CARE
"Anesthesia Transfer of Care Note    Patient: Nan Betts    Procedure(s) Performed: * No procedures listed *    Patient location: PACU    Anesthesia Type: general    Transport from OR: Transported from OR on 2-3 L/min O2 by NC with adequate spontaneous ventilation. Continuous SpO2 monitoring in transport    Post pain: adequate analgesia    Post assessment: no apparent anesthetic complications and tolerated procedure well    Post vital signs: stable    Level of consciousness: awake and alert    Nausea/Vomiting: no nausea/vomiting    Complications: none    Transfer of care protocol was followed      Last vitals:   Visit Vitals  BP (!) 140/87   Pulse 93   Temp 36.4 °C (97.6 °F) (Skin)   Resp 17   Ht 5' 8" (1.727 m)   Wt 115.7 kg (255 lb)   SpO2 97%   Breastfeeding No   BMI 38.77 kg/m²     "

## 2022-01-27 NOTE — ANESTHESIA PROCEDURE NOTES
Intubation    Date/Time: 1/27/2022 11:55 AM  Performed by: Milton Ventura CRNA  Authorized by: Corinne C. Weinstein, MD     Intubation:     Induction:  Intravenous    Intubated:  Postinduction    Mask Ventilation:  Easy mask    Attempts:  1    Attempted By:  CRNA    Method of Intubation:  Video laryngoscopy    Blade:  Tate 3    Laryngeal View Grade: Grade I - full view of cords      Difficult Airway Encountered?: No      Complications:  None    Airway Device:  Oral endotracheal tube    Airway Device Size:  7.0    Style/Cuff Inflation:  Cuffed (inflated to minimal occlusive pressure)    Inflation Amount (mL):  4    Tube secured:  22    Secured at:  The lips    Placement Verified By:  Capnometry    Complicating Factors:  Obesity and oropharyngeal edema or fat    Findings Post-Intubation:  BS equal bilateral and atraumatic/condition of teeth unchanged

## 2022-01-27 NOTE — DISCHARGE SUMMARY
Interventional Radiology Short Stay Discharge Summary      Admit Date: 1/27/2022  Discharge Date: 01/27/2022     Hospital Course: Uneventful    Discharge Diagnosis: Gastroparesis s/p port removal and replacement today    Discharge Condition: Stable    Discharge Disposition: Home    Diet: Resume prior diet    Activity: Activity as tolerated and no driving for today    Follow-up: With referring provider      Ford HerrmannBanner  Pager [phone]

## 2022-01-27 NOTE — ANESTHESIA PREPROCEDURE EVALUATION
01/27/2022  Nan Betts is a 51 y.o., female with achalasia here for port replacement.    Pre-operative evaluation for Procedure(s) (LRB):  Port replacement IR    +TMJ, difficult IV access    Patient Active Problem List   Diagnosis    GERD (gastroesophageal reflux disease)    Gastroparesis    Urge urinary incontinence    Interstitial cystitis    Anxiety    Vaginal atrophy    Vaginal pain    Dyspareunia    Intractable nausea and vomiting    Hypercholesteremia    Irritable bowel syndrome with diarrhea    Left lower quadrant pain    Diarrhea    Presence of gastric pacemaker    H/O pyloroplasty    Bloating    Epigastric pain    Colon polyp     Past Surgical History:   Procedure Laterality Date    APPENDECTOMY      BREAST BIOPSY Bilateral     CATARACT EXTRACTION      CHOLECYSTECTOMY      COLONOSCOPY      COLONOSCOPY N/A 7/31/2018    Procedure: COLONOSCOPY;  Surgeon: Zack Lehman MD;  [facility]  Service: Endoscopy;  Laterality: N/A;    CYSTOSCOPY N/A 8/25/2020    Procedure: CYSTOSCOPY;  Surgeon: Christal Khan MD;  [facility];  Service: Urology;  Laterality: N/A;    ELBOW SURGERY      ESOPHAGOGASTRODUODENOSCOPY N/A 7/31/2018    Procedure: EGD (ESOPHAGOGASTRODUODENOSCOPY);  Surgeon: Zack Lehman MD;  [facility]  Service: Endoscopy;  Laterality: N/A;  RUQ gastric pacemaker    Left upper arm PICC line    PONV    ESOPHAGOGASTRODUODENOSCOPY N/A 9/3/2019    Procedure: EGD (ESOPHAGOGASTRODUODENOSCOPY);  Surgeon: Zack Lehman MD;  [facility]  Service: Endoscopy;  Laterality: N/A;  history of gastoparesis, per change in protocol, pt instructed to do full liquid diet x 3 days prior to procedure and clear liquids x 1 day prior to procedure-BB  pt has gastric pacemaker, monitored by Dr. Lehman-BB  hx of epilepsy,  last seizure during chi    ESOPHAGOGASTRODUODENOSCOPY N/A 9/10/2020    Procedure: ESOPHAGOGASTRODUODENOSCOPY (EGD);  Surgeon: Zack Lehman MD;  Location: [facility] (4TH FLR);  Service: Endoscopy;  Laterality: N/A;  3 days Full liquid diet/ 1 day Clear liquids  waiting for Pt to cb with date - ERW  Left upper arm -  PICC  COVID screening 9/7/20 [place] [facility]- [facility]    ESOPHAGOGASTRODUODENOSCOPY N/A 11/3/2021    Procedure: EGD (ESOPHAGOGASTRODUODENOSCOPY);  Surgeon: Zack Lehman MD;  Location: [facility] (4TH FLR);  Service: Endoscopy;  Laterality: N/A;  10/15 fully vaccinated as of 7/15/21; gastric pacemaker-pt does not have remote;  pt has a port does not want IV started; 3 days full liquid diet; instr. to portal-st    GASTRIC STIMULATOR IMPLANT SURGERY      left side on 03/30/2020    gastric stimulator placement      LASIK Bilateral 2006    MANDIBLE SURGERY Bilateral 10/17/2016    OOPHORECTOMY Bilateral     PYLOROPLASTY  03/2016    STOMACH SURGERY      gastric pacemaker    TEMPOROMANDIBULAR JOINT SURGERY  12/2016    TONSILLECTOMY      TOTAL ABDOMINAL HYSTERECTOMY  2005    EUGENIA/BSO, Trachelectomy  2012    UPPER GASTROINTESTINAL ENDOSCOPY      WRIST SURGERY           Review of patient's allergies indicates:   Allergen Reactions    Corticosteroids (glucocorticoids) Nausea And Vomiting and Other (See Comments)     Acid reflux  Acid reflux  Increases acid reflux    Ketorolac Anaphylaxis and Shortness Of Breath     Other reaction(s): Throat swelling, Unknown    Morphine Anaphylaxis    Prednisone Other (See Comments)     Increases acid reflux    Tramadol Shortness Of Breath     Other reaction(s): Throat swelling, Unknown    Codeine Itching    Gabapentin Other (See Comments)     Causes suicidal thoughts  Other reaction(s): suicidal thoughts  Causes suicidal thoughts  Causes suicidal thoughts      Ibuprofen Other (See Comments)     G L Bleeding  G L Bleeding  G L Bleeding    Nsaids  "(non-steroidal anti-inflammatory drug) Other (See Comments)     GI BLEEDING  Other reaction(s): Other: See Comments  GI BLEEDING  GI BLEEDING    Hydrocodone      Other reaction(s): Unknown    Hydrocodone-acetaminophen     Metoclopramide Other (See Comments)     Other reaction(s): Headache, Other (See Comments)  Headaches and insomnia  Headaches and insomnia  Headaches and insomnia      Reglan [metoclopramide hcl] Other (See Comments)     Headaches and insomnia      Topiramate Other (See Comments)     Ulcers in the mouth and dry mouth  Other reaction(s): Other: See Comments, Unknown  per pt, gets "fog brain"  per pt, gets "fog brain"  Ulcers in the mouth and dry mouth  Ulcers in the mouth and dry mouth         Current Outpatient Medications on File Prior to Visit   Medication Sig Dispense Refill    (Magic mouthwash) 1:1:1 Benadryl 12.5mg/5ml liq, aluminum & magnesium hydroxide-simehticone (Maalox), LIDOcaine viscous 2% Swish and spit 5 mLs every 4 (four) hours as needed (mouth sores). for mouth sores 240 mL 3    ALPRAZolam (XANAX) 1 MG tablet Take 1 mg by mouth 2 (two) times daily as needed.       clotrimazole-betamethasone 1-0.05% (LOTRISONE) cream APPLY TO AFFECTED AREA 2-3 TIMES DAILY AS NEEDED      diphenhydrAMINE (BENADRYL) 50 MG tablet Take 50 mg by mouth every 4 (four) hours as needed for Itching.      estradioL (ESTRACE) 2 MG tablet Take 1.5 tablets (3 mg total) by mouth once daily. 135 tablet 3    flavoxATE (URISPAS) 100 mg Tab TAKE 1 TABLET BY MOUTH 3  TIMES DAILY AS NEEDED 90 tablet 0    fluticasone propionate (FLONASE) 50 mcg/actuation nasal spray 1 spray by Each Nostril route once daily.      Lactobacillus acidophilus (PROBIOTIC ORAL)       loperamide (IMODIUM) 2 mg capsule Take 2 capsules (4 mg total) by mouth 2 (two) times daily as needed for Diarrhea. 360 capsule 1    meclizine (ANTIVERT) 25 mg tablet Take 25 mg by mouth 3 (three) times daily as needed.      methocarbamol (ROBAXIN-750 " ORAL) Take by mouth.      pantoprazole (PROTONIX) 40 mg injection Inject 40 mg into the vein 2 (two) times a day. 60 each 11    promethazine (PHENERGAN) 25 mg/mL injection Inject 25 mg into the vein every 4 (four) hours.      simethicone (MYLICON) 125 MG chewable tablet Take 250 mg by mouth 2 (two) times a day.      simvastatin (ZOCOR) 40 MG tablet Take 40 mg by mouth every evening.       SODIUM CHLORIDE 0.45 % IV Inject 1,000 mLs into the vein as needed.       sodium chloride 0.9% 0.9 % SolP 50 mL with promethazine 25 mg/mL Soln Inject 25 mg into the vein every 4 (four) hours. Thru IV      sodium chloride 0.9% SolP 100 mL with pantoprazole 40 mg SolR 40 mg Inject 40 mg into the vein every 12 (twelve) hours. 60 vial 0    sucralfate (CARAFATE) 1 gram tablet Take 1 tablet (1 g total) by mouth 4 (four) times daily before meals and nightly. 360 tablet 2    tiZANidine (ZANAFLEX) 4 MG tablet Take 4 mg by mouth 3 (three) times daily.       No current facility-administered medications on file prior to visit.       Past Surgical History:   Procedure Laterality Date    APPENDECTOMY      BREAST BIOPSY Bilateral     CATARACT EXTRACTION      CHOLECYSTECTOMY      COLONOSCOPY      COLONOSCOPY N/A 7/31/2018    Procedure: COLONOSCOPY;  Surgeon: Zack Lehman MD;  Location: [address]);  Service: Endoscopy;  Laterality: N/A;    CYSTOSCOPY N/A 8/25/2020    Procedure: CYSTOSCOPY;  Surgeon: Christal Khan MD;  Location: [address];  Service: Urology;  Laterality: N/A;    ELBOW SURGERY      ESOPHAGOGASTRODUODENOSCOPY N/A 7/31/2018    Procedure: EGD (ESOPHAGOGASTRODUODENOSCOPY);  Surgeon: Zack Lehman MD;  [address]  Service: Endoscopy;  Laterality: N/A;  RUQ gastric pacemaker    Left upper arm PICC line    PONV    ESOPHAGOGASTRODUODENOSCOPY N/A 9/3/2019    Procedure: EGD (ESOPHAGOGASTRODUODENOSCOPY);  Surgeon: Zack Lehman MD;  [facility]  Service:  Endoscopy;  Laterality: N/A;  history of gastoparesis, per change in protocol, pt instructed to do full liquid diet x 3 days prior to procedure and clear liquids x 1 day prior to procedure-BB  pt has gastric pacemaker, monitored by Dr. Lehman-[place]  hx of epilepsy, last seizure during chi    ESOPHAGOGASTRODUODENOSCOPY N/A 9/10/2020    Procedure: ESOPHAGOGASTRODUODENOSCOPY (EGD);  Surgeon: Zack Lehman MD;  [facility]  Service: Endoscopy;  Laterality: N/A;  3 days Full liquid diet/ 1 day Clear liquids  waiting for Pt to cb with date - ERW  Left upper arm -  PICC  COVID screening 9/7/20 [place] [facility]- [facility]    ESOPHAGOGASTRODUODENOSCOPY N/A 11/3/2021    Procedure: EGD (ESOPHAGOGASTRODUODENOSCOPY);  Surgeon: Zack Lehman MD;  [facility]  Service: Endoscopy;  Laterality: N/A;  10/15 fully vaccinated as of 7/15/21; gastric pacemaker-pt does not have remote;  pt has a port does not want IV started; 3 days full liquid diet; instr. to portal-st    GASTRIC STIMULATOR IMPLANT SURGERY      left side on 03/30/2020    gastric stimulator placement      LASIK Bilateral 2006    MANDIBLE SURGERY Bilateral 10/17/2016    OOPHORECTOMY Bilateral     PYLOROPLASTY  03/2016    STOMACH SURGERY      gastric pacemaker    TEMPOROMANDIBULAR JOINT SURGERY  12/2016    TONSILLECTOMY      TOTAL ABDOMINAL HYSTERECTOMY  2005    EUGENIA/BSO, Trachelectomy  2012    UPPER GASTROINTESTINAL ENDOSCOPY      WRIST SURGERY         Social History     Socioeconomic History    Marital status:    Tobacco Use    Smoking status: Current Every Day Smoker     Packs/day: 1.00     Years: 20.00     Pack years: 20.00     Start date: 4/14/1986    Smokeless tobacco: Never Used   Substance and Sexual Activity    Alcohol use: No     Alcohol/week: 0.0 standard drinks    Drug use: No    Sexual activity: Yes     Partners: Male     Comment:  since 2012         CBC:   Recent Labs     01/27/22  1011   WBC  11.33   RBC 4.37   HGB 13.2   HCT 38.5      MCV 88   MCH 30.2   MCHC 34.3       CMP:   Recent Labs     01/27/22  1011      K 4.2      CO2 23   BUN 17   CREATININE 0.8   GLU 87   CALCIUM 9.5   ALBUMIN 3.9   PROT 6.9   ALKPHOS 100   ALT 16   AST 28   BILITOT 0.3       INR  Recent Labs     01/27/22  1011   INR 1.1               2D Echo:  No results found for this or any previous visit.      Pre-op Assessment    I have reviewed the Patient Summary Reports.     I have reviewed the Nursing Notes.    I have reviewed the Medications.     Review of Systems  Anesthesia Hx:  No problems with previous Anesthesia    Hematology/Oncology:  Hematology Normal   Oncology Normal     EENT/Dental:EENT/Dental Normal   Cardiovascular:  Cardiovascular Normal     Pulmonary:  Pulmonary Normal    Renal/:  Renal/ Normal     Hepatic/GI:   GERD    Musculoskeletal:  Musculoskeletal Normal    Neurological:   Seizures    Endocrine:  Endocrine Normal    Dermatological:  Skin Normal    Psych:  Psychiatric Normal           Physical Exam  General:  Well nourished, Obesity    Airway/Jaw/Neck:  Airway Findings: Mouth Opening: Normal Tongue: Normal  General Airway Assessment: Adult  Mallampati: II  Jaw/Neck Findings:  Neck ROM: Normal ROM  Neck Findings:  Normal mouth opening     Eyes/Ears/Nose:  Eyes/Ears/Nose Findings:    Dental:  Dental Findings: In tact   Chest/Lungs:  Chest/Lungs Findings: Clear to auscultation, Normal Respiratory Rate     Heart/Vascular:  Heart Findings: Rate: Normal  Rhythm: Regular Rhythm  Sounds: Normal  Heart Murmur  Vascular Findings:        Mental Status:  Mental Status Findings:  Cooperative, Alert and Oriented         Anesthesia Plan  Type of Anesthesia, risks & benefits discussed:  Anesthesia Type:  general    Patient's Preference:   Plan Factors:          Intra-op Monitoring Plan: standard ASA monitors  Intra-op Monitoring Plan Comments:   Post Op Pain Control Plan: multimodal analgesia  Post Op Pain  Control Plan Comments: I    Induction:   IV  Beta Blocker:  Patient is not currently on a Beta-Blocker (No further documentation required).       Informed Consent: Patient understands risks and agrees with Anesthesia plan.  Questions answered. Anesthesia consent signed with patient.  ASA Score: 3     Day of Surgery Review of History & Physical:        Anesthesia Plan Notes: Discussed anesthetic options, pt understands and agrees with plan        Ready For Surgery From Anesthesia Perspective.

## 2022-01-27 NOTE — PROCEDURES
Interventional Radiology Immediate Post-Procedure Note    Pre-Op Diagnosis: Gastroparesis  Post-Op Diagnosis: Same    Procedure: Port removal, port placement    Procedure performed by: Ford Valdes MD  Assistants: None    Estimated Blood Loss: Minimal  Specimen Removed: No: NA    Findings/description of procedure:  Indwelling RIGHT IJ vein chest port removed completely.     New 8-Fr BARD CLEARVUE chest port placed via patent RIGHT IJ vein higher on the chest wall to facilitate access. This was left accesses as per GI.    No immediate complications. Patient tolerated procedure well. Please see full dictated procedure report for additional details and recommendations.      Ford Valdes MD  [facility]  Pager [phone]

## 2022-01-27 NOTE — H&P
Interventional Radiology Pre-Procedure History & Physical      Chief Complaint/Reason for Referral: Difficulty using indwelling port    History of Present Illness:  Nan Betts is a 51 y.o. female who presents with an indwelling port used for port for IVF and frequent blood draws. Pt reports difficulty accessing the port. Port catheter tip in satisfactory position but port itself drops down along the chest wall with the pt upright. Ample soft tissues may be contributing to port mobility and difficult access. She is very distressed regarding the difficulty they've had using it.    Past Medical History:   Diagnosis Date    Abnormal Pap smear of vagina     Anxiety     Cataract     Chronic pain     TMJ and abdomen    Colon polyp     Epilepsy     as a child    Gastroparesis     Idiopathic    GERD (gastroesophageal reflux disease)     H/O abnormal cervical Papanicolaou smear     Hypercholesteremia 6/21/2016    Hyperlipidemia     IC (interstitial cystitis)     Internal hemorrhoids     Interstitial cystitis     Irritable bowel syndrome (IBS)     Irritable bowel syndrome with diarrhea 6/21/2016    Spastic colon     TMJ (temporomandibular joint disorder)     TMJ (temporomandibular joint syndrome) 6/21/2016     Past Surgical History:   Procedure Laterality Date    APPENDECTOMY      BREAST BIOPSY Bilateral     CATARACT EXTRACTION      CHOLECYSTECTOMY      COLONOSCOPY      COLONOSCOPY N/A 7/31/2018    Procedure: COLONOSCOPY;  Surgeon: Zack Lehman MD;  Location: [address]);  Service: Endoscopy;  Laterality: N/A;    CYSTOSCOPY N/A 8/25/2020    Procedure: CYSTOSCOPY;  Surgeon: Christal Khan MD;  Location: [address];  Service: Urology;  Laterality: N/A;    ELBOW SURGERY      ESOPHAGOGASTRODUODENOSCOPY N/A 7/31/2018    Procedure: EGD (ESOPHAGOGASTRODUODENOSCOPY);  Surgeon: Zack Lehman MD;  Location: [address]);  Service: Endoscopy;  Laterality: N/A;  RUQ gastric  pacemaker    Left upper arm PICC line    PONV    ESOPHAGOGASTRODUODENOSCOPY N/A 9/3/2019    Procedure: EGD (ESOPHAGOGASTRODUODENOSCOPY);  Surgeon: Zack Lehman MD;  Location: [facility] (4TH FLR);  Service: Endoscopy;  Laterality: N/A;  history of gastoparesis, per change in protocol, pt instructed to do full liquid diet x 3 days prior to procedure and clear liquids x 1 day prior to procedure-BB  pt has gastric pacemaker, monitored by Dr. Lehman-YASMANY  hx of epilepsy, last seizure during chi    ESOPHAGOGASTRODUODENOSCOPY N/A 9/10/2020    Procedure: ESOPHAGOGASTRODUODENOSCOPY (EGD);  Surgeon: Zack Lehman MD;  Location: [facility] (4TH FLR);  Service: Endoscopy;  Laterality: N/A;  3 days Full liquid diet/ 1 day Clear liquids  waiting for Pt to cb with date - ERW  Left upper arm -  PICC  COVID screening 9/7/20 [place] [facility]- ERW    ESOPHAGOGASTRODUODENOSCOPY N/A 11/3/2021    Procedure: EGD (ESOPHAGOGASTRODUODENOSCOPY);  Surgeon: Zack Lehman MD;  Location: [facility] (4TH FLR);  Service: Endoscopy;  Laterality: N/A;  10/15 fully vaccinated as of 7/15/21; gastric pacemaker-pt does not have remote;  pt has a port does not want IV started; 3 days full liquid diet; instr. to portal-st    GASTRIC STIMULATOR IMPLANT SURGERY      left side on 03/30/2020    gastric stimulator placement      LASIK Bilateral 2006    MANDIBLE SURGERY Bilateral 10/17/2016    OOPHORECTOMY Bilateral     PYLOROPLASTY  03/2016    STOMACH SURGERY      gastric pacemaker    TEMPOROMANDIBULAR JOINT SURGERY  12/2016    TONSILLECTOMY      TOTAL ABDOMINAL HYSTERECTOMY  2005    EUGENIA/BSO, Trachelectomy  2012    UPPER GASTROINTESTINAL ENDOSCOPY      WRIST SURGERY         Allergies:   Review of patient's allergies indicates:   Allergen Reactions    Corticosteroids (glucocorticoids) Nausea And Vomiting and Other (See Comments)     Acid reflux  Acid reflux  Increases acid reflux    Ketorolac Anaphylaxis and Shortness Of Breath     Other  "reaction(s): Throat swelling, Unknown    Morphine Anaphylaxis    Prednisone Other (See Comments)     Increases acid reflux    Tramadol Shortness Of Breath     Other reaction(s): Throat swelling, Unknown    Codeine Itching    Gabapentin Other (See Comments)     Causes suicidal thoughts  Other reaction(s): suicidal thoughts  Causes suicidal thoughts  Causes suicidal thoughts      Ibuprofen Other (See Comments)     G L Bleeding  G L Bleeding  G L Bleeding    Nsaids (non-steroidal anti-inflammatory drug) Other (See Comments)     GI BLEEDING  Other reaction(s): Other: See Comments  GI BLEEDING  GI BLEEDING    Hydrocodone      Other reaction(s): Unknown    Hydrocodone-acetaminophen     Metoclopramide Other (See Comments)     Other reaction(s): Headache, Other (See Comments)  Headaches and insomnia  Headaches and insomnia  Headaches and insomnia      Reglan [metoclopramide hcl] Other (See Comments)     Headaches and insomnia      Topiramate Other (See Comments)     Ulcers in the mouth and dry mouth  Other reaction(s): Other: See Comments, Unknown  per pt, gets "fog brain"  per pt, gets "fog brain"  Ulcers in the mouth and dry mouth  Ulcers in the mouth and dry mouth          Home Meds:   Prior to Admission medications    Medication Sig Start Date End Date Taking? Authorizing Provider   (Magic mouthwash) 1:1:1 Benadryl 12.5mg/5ml liq, aluminum & magnesium hydroxide-simehticone (Maalox), LIDOcaine viscous 2% Swish and spit 5 mLs every 4 (four) hours as needed (mouth sores). for mouth sores 12/12/21  Yes Zack Lehman MD   ALPRAZolam (XANAX) 1 MG tablet Take 1 mg by mouth 2 (two) times daily as needed.  2/26/20  Yes Historical Provider   clotrimazole-betamethasone 1-0.05% (LOTRISONE) cream APPLY TO AFFECTED AREA 2-3 TIMES DAILY AS NEEDED 4/6/21  Yes Historical Provider   diphenhydrAMINE (BENADRYL) 50 MG tablet Take 50 mg by mouth every 4 (four) hours as needed for Itching.   Yes Historical Provider   estradioL " (ESTRACE) 2 MG tablet Take 1.5 tablets (3 mg total) by mouth once daily. 5/26/21  Yes Niesha Dunn MD   flavoxATE (URISPAS) 100 mg Tab TAKE 1 TABLET BY MOUTH 3  TIMES DAILY AS NEEDED 12/10/21  Yes Christal Khan MD   Lactobacillus acidophilus (PROBIOTIC ORAL)    Yes Historical Provider   loperamide (IMODIUM) 2 mg capsule Take 2 capsules (4 mg total) by mouth 2 (two) times daily as needed for Diarrhea. 9/28/21  Yes Zack Lehman MD   meclizine (ANTIVERT) 25 mg tablet Take 25 mg by mouth 3 (three) times daily as needed.   Yes Historical Provider   methocarbamol (ROBAXIN-750 ORAL) Take by mouth.   Yes Historical Provider   pantoprazole (PROTONIX) 40 mg injection Inject 40 mg into the vein 2 (two) times a day. 1/6/22 1/6/23 Yes Zack Lehman MD   promethazine (PHENERGAN) 25 mg/mL injection Inject 25 mg into the vein every 4 (four) hours.   Yes Historical Provider   simethicone (MYLICON) 125 MG chewable tablet Take 250 mg by mouth 2 (two) times a day.   Yes Historical Provider   simvastatin (ZOCOR) 40 MG tablet Take 40 mg by mouth every evening.  9/8/16  Yes Historical Provider   sucralfate (CARAFATE) 1 gram tablet Take 1 tablet (1 g total) by mouth 4 (four) times daily before meals and nightly. 9/28/21  Yes Zack Lehman MD   tiZANidine (ZANAFLEX) 4 MG tablet Take 4 mg by mouth 3 (three) times daily. 1/10/21  Yes Historical Provider   fluticasone propionate (FLONASE) 50 mcg/actuation nasal spray 1 spray by Each Nostril route once daily.    Historical Provider   SODIUM CHLORIDE 0.45 % IV Inject 1,000 mLs into the vein as needed.     Historical Provider   sodium chloride 0.9% 0.9 % SolP 50 mL with promethazine 25 mg/mL Soln Inject 25 mg into the vein every 4 (four) hours. Thru IV    Historical Provider   sodium chloride 0.9% SolP 100 mL with pantoprazole 40 mg SolR 40 mg Inject 40 mg into the vein every 12 (twelve) hours. 1/6/22   Zack Lehman MD       Anticoagulation/Antiplatelet Meds: no  anticoagulation     Review of Systems:   Hematological: no known coagulopathies  Respiratory: no shortness of breath  Cardiovascular: no chest pain  Gastrointestinal: no abdominal pain  Genitourinary: no dysuria  Musculoskeletal: negative  Neurological: no TIA or stroke symptoms     Physical Exam:  Temp: 97.6 °F (36.4 °C) (01/27/22 0928)  Pulse: 93 (01/27/22 0928)  Resp: 18 (01/27/22 0928)  BP: (!) 140/87 (01/27/22 0928)  SpO2: 97 % (01/27/22 0928)    General: NAD  HEENT: Normocephalic, sclera anicteric, oropharynx clear  Neck/chest: Supple, no palpable lymphadenopathy, RIGHT IJ vein chest port in place, surrounding tissues are relatively mobile 2/2 pendulous breast  Heart: RRR  Lungs: Symmetric excursions, breathing unlabored  Abd: NTND, soft  Extremities: MTZ  Neuro: Gross nonfocal    Laboratory:  No results found for: INR    Lab Results   Component Value Date    WBC 8.47 12/18/2019    HGB 14.0 12/18/2019    HCT 41.7 12/18/2019    MCV 87 12/18/2019     12/18/2019      Lab Results   Component Value Date    GLU 87 12/18/2019     12/18/2019    K 4.0 12/18/2019     12/18/2019    CO2 25 12/18/2019    BUN 10 12/18/2019    CREATININE 0.9 12/18/2019    CALCIUM 9.3 12/18/2019    MG 1.8 12/18/2019    ALT 17 12/18/2019    AST 23 12/18/2019    ALBUMIN 4.0 12/18/2019    BILITOT 0.2 12/18/2019       Imaging:  Port check 1/11/22 reviewed.    Assessment/Plan:  51 y.o. female with an indwelling port. Will undergo revision today. Pt aware this may or may not involve completely removing indwelling port, placing a new port in a new location, and new/additional scars.    Sedation:  Sedation history: have not been any systemic reactions  ASA: 3 / Mallampati: 3  Sedation plan: Up to moderate (Versed, fentanyl)     Risks (including, but not limited to, pain, bleeding, infection, damage to nearby structures, treatment failure/recurrence, the need for additional procedures), potential benefits, and alternatives were  discussed with the patient. All questions were answered to the best of my abilities. The patient wishes to proceed. Written informed consent was obtained.      Ford Valdes MD  [facility]  Pager [phone]

## 2022-01-28 NOTE — ANESTHESIA POSTPROCEDURE EVALUATION
Anesthesia Post Evaluation    Patient: Nan Betts    Procedure(s) Performed: * No procedures listed *    Final Anesthesia Type: general      Patient location during evaluation: PACU  Patient participation: Yes- Able to Participate  Level of consciousness: awake and alert  Post-procedure vital signs: reviewed and stable  Pain management: adequate  Airway patency: patent  SPRING mitigation strategies: Multimodal analgesia  PONV status at discharge: No PONV  Anesthetic complications: no      Cardiovascular status: hemodynamically stable and blood pressure returned to baseline  Respiratory status: room air, unassisted and spontaneous ventilation  Hydration status: euvolemic  Follow-up not needed.          Vitals Value Taken Time   /74 01/27/22 1418   Temp 36.6 °C (97.9 °F) 01/27/22 1350   Pulse 84 01/27/22 1418   Resp 16 01/27/22 1418   SpO2 97 % 01/27/22 1418         Event Time   Out of Recovery 13:53:51         Pain/Chelsy Score: Pain Rating Prior to Med Admin: 7 (1/27/2022  1:35 PM)  Chelsy Score: 10 (1/27/2022  2:18 PM)

## 2022-02-14 ENCOUNTER — PATIENT MESSAGE (OUTPATIENT)
Dept: GASTROENTEROLOGY | Facility: CLINIC | Age: 52
End: 2022-02-14
Payer: COMMERCIAL

## 2022-02-21 ENCOUNTER — HOSPITAL ENCOUNTER (OUTPATIENT)
Dept: RADIOLOGY | Facility: HOSPITAL | Age: 52
Discharge: HOME | End: 2022-02-21
Attending: ORTHOPAEDIC SURGERY
Payer: COMMERCIAL

## 2022-02-21 ENCOUNTER — PATIENT MESSAGE (OUTPATIENT)
Dept: ORTHOPEDICS | Facility: CLINIC | Age: 52
End: 2022-02-21

## 2022-02-21 ENCOUNTER — OFFICE VISIT (OUTPATIENT)
Dept: ORTHOPEDICS | Facility: CLINIC | Age: 52
End: 2022-02-21
Payer: COMMERCIAL

## 2022-02-21 VITALS — WEIGHT: 243.63 LBS | HEIGHT: 68 IN | BODY MASS INDEX: 36.92 KG/M2

## 2022-02-21 DIAGNOSIS — M50.30 DDD (DEGENERATIVE DISC DISEASE), CERVICAL: ICD-10-CM

## 2022-02-21 DIAGNOSIS — G62.9 NEUROPATHY: ICD-10-CM

## 2022-02-21 DIAGNOSIS — M51.36 DDD (DEGENERATIVE DISC DISEASE), LUMBAR: ICD-10-CM

## 2022-02-21 DIAGNOSIS — M54.9 MID BACK PAIN: Primary | ICD-10-CM

## 2022-02-21 PROCEDURE — 1160F PR REVIEW ALL MEDS BY PRESCRIBER/CLIN PHARMACIST DOCUMENTED: ICD-10-PCS | Mod: CPTII,S$GLB,, | Performed by: ORTHOPAEDIC SURGERY

## 2022-02-21 PROCEDURE — 72110 XR LUMBAR SPINE AP AND LAT WITH FLEX/EXT: ICD-10-PCS | Mod: 26,,, | Performed by: RADIOLOGY

## 2022-02-21 PROCEDURE — 72110 X-RAY EXAM L-2 SPINE 4/>VWS: CPT | Mod: 26,,, | Performed by: RADIOLOGY

## 2022-02-21 PROCEDURE — 3008F PR BODY MASS INDEX (BMI) DOCUMENTED: ICD-10-PCS | Mod: CPTII,S$GLB,, | Performed by: ORTHOPAEDIC SURGERY

## 2022-02-21 PROCEDURE — 1159F MED LIST DOCD IN RCRD: CPT | Mod: CPTII,S$GLB,, | Performed by: ORTHOPAEDIC SURGERY

## 2022-02-21 PROCEDURE — 72050 X-RAY EXAM NECK SPINE 4/5VWS: CPT | Mod: TC

## 2022-02-21 PROCEDURE — 72050 X-RAY EXAM NECK SPINE 4/5VWS: CPT | Mod: 26,,, | Performed by: RADIOLOGY

## 2022-02-21 PROCEDURE — 3008F BODY MASS INDEX DOCD: CPT | Mod: CPTII,S$GLB,, | Performed by: ORTHOPAEDIC SURGERY

## 2022-02-21 PROCEDURE — 99204 PR OFFICE/OUTPT VISIT, NEW, LEVL IV, 45-59 MIN: ICD-10-PCS | Mod: S$GLB,,, | Performed by: ORTHOPAEDIC SURGERY

## 2022-02-21 PROCEDURE — 1159F PR MEDICATION LIST DOCUMENTED IN MEDICAL RECORD: ICD-10-PCS | Mod: CPTII,S$GLB,, | Performed by: ORTHOPAEDIC SURGERY

## 2022-02-21 PROCEDURE — 72110 X-RAY EXAM L-2 SPINE 4/>VWS: CPT | Mod: TC

## 2022-02-21 PROCEDURE — 99999 PR PBB SHADOW E&M-EST. PATIENT-LVL III: ICD-10-PCS | Mod: PBBFAC,,, | Performed by: ORTHOPAEDIC SURGERY

## 2022-02-21 PROCEDURE — 72050 XR CERVICAL SPINE AP LAT WITH FLEX EXTEN: ICD-10-PCS | Mod: 26,,, | Performed by: RADIOLOGY

## 2022-02-21 PROCEDURE — 99999 PR PBB SHADOW E&M-EST. PATIENT-LVL III: CPT | Mod: PBBFAC,,, | Performed by: ORTHOPAEDIC SURGERY

## 2022-02-21 PROCEDURE — 99204 OFFICE O/P NEW MOD 45 MIN: CPT | Mod: S$GLB,,, | Performed by: ORTHOPAEDIC SURGERY

## 2022-02-21 PROCEDURE — 1160F RVW MEDS BY RX/DR IN RCRD: CPT | Mod: CPTII,S$GLB,, | Performed by: ORTHOPAEDIC SURGERY

## 2022-02-21 NOTE — PROGRESS NOTES
DATE: 2/21/2022  PATIENT: Nan Betts    Supervising Physician: Vladimir Dan M.D.    CHIEF COMPLAINT: back pain, leg numbness and tingling    HISTORY:  Nan Betts is a 51 y.o. female with a pmhx of interstitial cystitis, gastroparesis (gastric pacemaker in place), GERD here for initial evaluation of upper, mid, and low back pain (Back - 4, Leg - 0). The pain has been present for years, worsening over time. The patient describes the pain as aching.  The pain is worse with activity and improved by rest. There is positive profound associated numbness and tingling in both legs. There is negative subjective weakness. Prior treatments have included PT and ESIs years ago with no relief, but no surgery. Pt says she feels like her back pain is related to her large breasts.     The patient denies myelopathic symptoms such as handwriting changes or difficulty with buttons/coins/keys. Denies perineal paresthesias, bowel/bladder dysfunction.    PAST MEDICAL/SURGICAL HISTORY:  Past Medical History:   Diagnosis Date    Abnormal Pap smear of vagina     Anxiety     Cataract     Chronic pain     TMJ and abdomen    Colon polyp     Epilepsy     as a child    Gastroparesis     Idiopathic    GERD (gastroesophageal reflux disease)     H/O abnormal cervical Papanicolaou smear     Hypercholesteremia 6/21/2016    Hyperlipidemia     IC (interstitial cystitis)     Internal hemorrhoids     Interstitial cystitis     Irritable bowel syndrome (IBS)     Irritable bowel syndrome with diarrhea 6/21/2016    Spastic colon     TMJ (temporomandibular joint disorder)     TMJ (temporomandibular joint syndrome) 6/21/2016     Past Surgical History:   Procedure Laterality Date    APPENDECTOMY      BREAST BIOPSY Bilateral     CATARACT EXTRACTION      CHOLECYSTECTOMY      COLONOSCOPY      COLONOSCOPY N/A 7/31/2018    Procedure: COLONOSCOPY;  Surgeon: Zack Lehman MD;  Location: [address]);  Service: Endoscopy;   Laterality: N/A;    CYSTOSCOPY N/A 8/25/2020    Procedure: CYSTOSCOPY;  Surgeon: Christal Khan MD;  [facility];  Service: Urology;  Laterality: N/A;    ELBOW SURGERY      ESOPHAGOGASTRODUODENOSCOPY N/A 7/31/2018    Procedure: EGD (ESOPHAGOGASTRODUODENOSCOPY);  Surgeon: Zack Lehman MD;  Location: [facility] (4TH FLR);  Service: Endoscopy;  Laterality: N/A;  RUQ gastric pacemaker    Left upper arm PICC line    PONV    ESOPHAGOGASTRODUODENOSCOPY N/A 9/3/2019    Procedure: EGD (ESOPHAGOGASTRODUODENOSCOPY);  Surgeon: Zack Lehman MD;  Location: [facility] (4TH FLR);  Service: Endoscopy;  Laterality: N/A;  history of gastoparesis, per change in protocol, pt instructed to do full liquid diet x 3 days prior to procedure and clear liquids x 1 day prior to procedure-BB  pt has gastric pacemaker, monitored by Dr. Lehman-BB  hx of epilepsy, last seizure during chi    ESOPHAGOGASTRODUODENOSCOPY N/A 9/10/2020    Procedure: ESOPHAGOGASTRODUODENOSCOPY (EGD);  Surgeon: Zack Lehman MD;  Location: [facility] (4TH FLR);  Service: Endoscopy;  Laterality: N/A;  3 days Full liquid diet/ 1 day Clear liquids  waiting for Pt to cb with date - ERW  Left upper arm -  PICC  COVID screening 9/7/20 [place] [facility]- ERW    ESOPHAGOGASTRODUODENOSCOPY N/A 11/3/2021    Procedure: EGD (ESOPHAGOGASTRODUODENOSCOPY);  Surgeon: Zack Lehman MD;  Location: [facility] (4TH FLR);  Service: Endoscopy;  Laterality: N/A;  10/15 fully vaccinated as of 7/15/21; gastric pacemaker-pt does not have remote;  pt has a port does not want IV started; 3 days full liquid diet; instr. to portal-st    GASTRIC STIMULATOR IMPLANT SURGERY      left side on 03/30/2020    gastric stimulator placement      LASIK Bilateral 2006    MANDIBLE SURGERY Bilateral 10/17/2016    OOPHORECTOMY Bilateral     PYLOROPLASTY  03/2016    STOMACH SURGERY      gastric pacemaker    TEMPOROMANDIBULAR JOINT SURGERY  12/2016    TONSILLECTOMY      TOTAL  ABDOMINAL HYSTERECTOMY  2005    EUGENIA/BSO, Trachelectomy  2012    UPPER GASTROINTESTINAL ENDOSCOPY      WRIST SURGERY         Medications:   Current Outpatient Medications on File Prior to Visit   Medication Sig Dispense Refill    (Magic mouthwash) 1:1:1 Benadryl 12.5mg/5ml liq, aluminum & magnesium hydroxide-simehticone (Maalox), LIDOcaine viscous 2% Swish and spit 5 mLs every 4 (four) hours as needed (mouth sores). for mouth sores 240 mL 3    ALPRAZolam (XANAX) 1 MG tablet Take 1 mg by mouth 2 (two) times daily as needed.       clotrimazole-betamethasone 1-0.05% (LOTRISONE) cream APPLY TO AFFECTED AREA 2-3 TIMES DAILY AS NEEDED      diphenhydrAMINE (BENADRYL) 50 MG tablet Take 50 mg by mouth every 4 (four) hours as needed for Itching.      estradioL (ESTRACE) 2 MG tablet Take 1.5 tablets (3 mg total) by mouth once daily. 135 tablet 3    flavoxATE (URISPAS) 100 mg Tab TAKE 1 TABLET BY MOUTH 3  TIMES DAILY AS NEEDED 90 tablet 0    fluticasone propionate (FLONASE) 50 mcg/actuation nasal spray 1 spray by Each Nostril route once daily.      HYDROcodone-acetaminophen (NORCO) 5-325 mg per tablet Take 1 tablet by mouth every 6 (six) hours as needed for Pain. 12 tablet 0    Lactobacillus acidophilus (PROBIOTIC ORAL)       loperamide (IMODIUM) 2 mg capsule Take 2 capsules (4 mg total) by mouth 2 (two) times daily as needed for Diarrhea. 360 capsule 1    meclizine (ANTIVERT) 25 mg tablet Take 25 mg by mouth 3 (three) times daily as needed.      methocarbamol (ROBAXIN-750 ORAL) Take by mouth.      pantoprazole (PROTONIX) 40 mg injection Inject 40 mg into the vein 2 (two) times a day. 60 each 11    promethazine (PHENERGAN) 25 mg/mL injection Inject 25 mg into the vein every 4 (four) hours.      simethicone (MYLICON) 125 MG chewable tablet Take 250 mg by mouth 2 (two) times a day.      simvastatin (ZOCOR) 40 MG tablet Take 40 mg by mouth every evening.       SODIUM CHLORIDE 0.45 % IV Inject 1,000 mLs into the  vein as needed.       sodium chloride 0.9% 0.9 % SolP 50 mL with promethazine 25 mg/mL Soln Inject 25 mg into the vein every 4 (four) hours. Thru IV      sodium chloride 0.9% SolP 100 mL with pantoprazole 40 mg SolR 40 mg Inject 40 mg into the vein every 12 (twelve) hours. 60 vial 0    sucralfate (CARAFATE) 1 gram tablet Take 1 tablet (1 g total) by mouth 4 (four) times daily before meals and nightly. 360 tablet 2    tiZANidine (ZANAFLEX) 4 MG tablet Take 4 mg by mouth 3 (three) times daily.       No current facility-administered medications on file prior to visit.       Social History:   Social History     Socioeconomic History    Marital status:    Tobacco Use    Smoking status: Current Every Day Smoker     Packs/day: 1.00     Years: 20.00     Pack years: 20.00     Start date: 4/14/1986    Smokeless tobacco: Never Used   Substance and Sexual Activity    Alcohol use: No     Alcohol/week: 0.0 standard drinks    Drug use: No    Sexual activity: Yes     Partners: Male     Comment:  since 2012       REVIEW OF SYSTEMS:  Constitution: Negative. Negative for chills, fever and night sweats.   Cardiovascular: Negative for chest pain and syncope.   Respiratory: Negative for cough and shortness of breath.   Gastrointestinal: See HPI. Negative for nausea/vomiting. Negative for abdominal pain.  Genitourinary: See HPI. Negative for discoloration or dysuria.  Skin: Negative for dry skin, itching and rash.   Hematologic/Lymphatic: Negative for bleeding problem. Does not bruise/bleed easily.   Musculoskeletal: Negative for falls and muscle weakness.   Neurological: See HPI. No seizures.   Endocrine: Negative for polydipsia, polyphagia and polyuria.   Allergic/Immunologic: Negative for hives and persistent infections.     EXAM:  There were no vitals taken for this visit.    PHYSICAL EXAMINATION:    General: The patient is a very pleasant 51 y.o. female in no apparent distress, the patient is oriented to  person, place and time.  Psych: Normal mood and affect  HEENT: Vision grossly intact, hearing intact to the spoken word.  Lungs: Respirations unlabored.  Gait: Normal station and gait, no difficulty with toe or heel walk.   Skin: Cervical skin and dorsal lumbar skin negative for rashes, lesions, hairy patches and surgical scars.    Range of motion: Cervical and lumbar range of motion is acceptable. There is mild tenderness to palpation of the paracervical muscles.  There is mild lumbar tenderness to palpation.  Spinal Balance: Global saggital and coronal spinal balance acceptable, no significant for scoliosis and kyphosis.  Musculoskeletal: No pain with the range of motion of the bilateral shoulders and elbows. Normal bulk and contour of the bilateral hands.  No pain with the range of motion of the bilateral hips. Mild bilateral trochanteric tenderness to palpation.  Vascular: Bilateral upper and lower extremities warm and well perfused, radial pulses 2+ bilaterally, dorsalis pedis pulses 2+ bilaterally.  Neurological: Normal strength and tone in all major motor groups in the bilateral upper and lower extremities. Normal sensation to light touch in the C5-T1 and L2-S1 dermatomes bilaterally.  Deep tendon reflexes symmetric 2+ in the bilateral upper and lower extremities.  Negative Inverted Radial Reflex and Abbott's bilaterally. Negative Babinski bilaterally. Negative straight leg raise bilaterally.     IMAGING:   Today I personally reviewed AP, Lat and Flex/Ex  upright C-spine films that demonstrate minimal degenerative changes.    AP, Lat and Flex/Ex upright lumbar spine films demonstrate disc space narrowing at L5-S1 without instability.     There is no height or weight on file to calculate BMI.    No results found for: HGBA1C      ASSESSMENT/PLAN:    There are no diagnoses linked to this encounter.    Today we discussed at length all of the different treatment options including anti-inflammatories,  acetaminophen, rest, ice, heat, physical therapy including strengthening and stretching exercises, home exercises, ROM, aerobic conditioning, aqua therapy, other modalities including ultrasound, massage, and dry needling, epidural steroid injections and finally surgical intervention.      Pt presents with chronic back pain and lower extremity neuropathy. Will refer to plastic surgery for possible breast reduction and schedule bilateral EMG to evaluate neuropathy. Will call pt with results.

## 2022-02-23 ENCOUNTER — TELEPHONE (OUTPATIENT)
Dept: GASTROENTEROLOGY | Facility: CLINIC | Age: 52
End: 2022-02-23
Payer: COMMERCIAL

## 2022-02-23 NOTE — TELEPHONE ENCOUNTER
Spoke with patient.  Stated she spoke with [facility] today regarding her infusion.  Waiting to hear back from the intake nurse to make arrangements for a start date.  Nan will call me back once she hears when that date is scheduled.   Angela

## 2022-02-23 NOTE — TELEPHONE ENCOUNTER
Spoke to Keiry,pharmacist, with [facility] in [place] regarding patient self administering her own infusions.  Keiry is aware patient has been trained by a local home health agency in her town and goes to a local hospital periodically to have port flushed and dressing changed.      She will also make arrangements for patient to purchase her own pump.   Angela

## 2022-02-23 NOTE — TELEPHONE ENCOUNTER
----- Message from Johanne Brewster sent at 2/23/2022 12:09 PM CST -----  Contact: Rx  Keiry calling in regards to pt Rx try to clarify oders that was faxed over     Confirmed patient's contact info below:  Contact Name: Keiry  Phone Number: [phone]

## 2022-02-23 NOTE — TELEPHONE ENCOUNTER
----- Message from Zenia Mathew sent at 2/23/2022 10:27 AM CST -----  Regarding: Infusion  Contact: [phone]  Calling to speak with nurse regarding update from [facility]. Please call

## 2022-02-23 NOTE — TELEPHONE ENCOUNTER
----- Message from Ariadna Lancaster sent at 2/23/2022  9:39 AM CST -----  Nan Betts calling regarding a miss call from Agnela. [phone]

## 2022-02-24 ENCOUNTER — PATIENT MESSAGE (OUTPATIENT)
Dept: ORTHOPEDICS | Facility: CLINIC | Age: 52
End: 2022-02-24
Payer: COMMERCIAL

## 2022-02-24 ENCOUNTER — TELEPHONE (OUTPATIENT)
Dept: GASTROENTEROLOGY | Facility: CLINIC | Age: 52
End: 2022-02-24
Payer: COMMERCIAL

## 2022-02-24 ENCOUNTER — PATIENT MESSAGE (OUTPATIENT)
Dept: GASTROENTEROLOGY | Facility: CLINIC | Age: 52
End: 2022-02-24
Payer: COMMERCIAL

## 2022-02-24 NOTE — TELEPHONE ENCOUNTER
Spoke with Keiry the pharmacist.  Aware it is one bag of saline daily to infuse over 1.5 hours.   Angela

## 2022-02-24 NOTE — TELEPHONE ENCOUNTER
----- Message from Lois Ruth sent at 2/24/2022  2:17 PM CST -----  Regarding: Refill  Contact: Keiry FLEMING [phone]  SODIUM CHLORIDE 0.45 % IV    Keiry FLEMING from [facility] need to talk to office, she have questions about medication. There is no quantity on prescription. Keiry need call back from office

## 2022-03-03 ENCOUNTER — PATIENT MESSAGE (OUTPATIENT)
Dept: ORTHOPEDICS | Facility: CLINIC | Age: 52
End: 2022-03-03
Payer: COMMERCIAL

## 2022-03-04 ENCOUNTER — PATIENT MESSAGE (OUTPATIENT)
Dept: ORTHOPEDICS | Facility: CLINIC | Age: 52
End: 2022-03-04
Payer: COMMERCIAL

## 2022-03-04 ENCOUNTER — PATIENT MESSAGE (OUTPATIENT)
Dept: GASTROENTEROLOGY | Facility: CLINIC | Age: 52
End: 2022-03-04
Payer: COMMERCIAL

## 2022-03-04 DIAGNOSIS — M51.36 DDD (DEGENERATIVE DISC DISEASE), LUMBAR: Primary | ICD-10-CM

## 2022-03-08 ENCOUNTER — TELEPHONE (OUTPATIENT)
Dept: GASTROENTEROLOGY | Facility: CLINIC | Age: 52
End: 2022-03-08
Payer: COMMERCIAL

## 2022-03-08 NOTE — TELEPHONE ENCOUNTER
----- Message from Andreia Izaguirre MA sent at 3/4/2022  1:54 PM CST -----  She said to either mail to her or she can  next Friday  ----- Message -----  From: Jan Gonzalez  Sent: 3/4/2022  11:53 AM CST  To: Dominik BASS Staff    Patient called to speak w/ someone in regards to f/u on her message that was sent via patient portal. Requesting to obtain an order from  for an IV pole. Callback [phone]

## 2022-03-11 ENCOUNTER — PATIENT MESSAGE (OUTPATIENT)
Dept: PLASTIC SURGERY | Facility: CLINIC | Age: 52
End: 2022-03-11
Payer: COMMERCIAL

## 2022-03-11 ENCOUNTER — PATIENT MESSAGE (OUTPATIENT)
Dept: ORTHOPEDICS | Facility: CLINIC | Age: 52
End: 2022-03-11
Payer: COMMERCIAL

## 2022-03-11 ENCOUNTER — HOSPITAL ENCOUNTER (OUTPATIENT)
Dept: INTERVENTIONAL RADIOLOGY/VASCULAR | Facility: HOSPITAL | Age: 52
Discharge: HOME | End: 2022-03-11
Attending: RADIOLOGY
Payer: COMMERCIAL

## 2022-03-11 NOTE — NURSING
Pt in IR pre post area for IR walk appointment, port check. Dr. Valdes at bedside. Dr. Valdes evaluated port; patient discharged, ambulatory, accompanied to main hallway at discharge.

## 2022-03-11 NOTE — PROGRESS NOTES
"Patient presents for port evaluation. Says she feels a lump and possibly a suture at the incision site.    Examined by me. Port is already accessed. The incision appears healed. No redness, swelling or discharge. There is a suture knot just above the skin surface. The area was prepped and this was removed with sterile scissors. The "lump" feels like a subtle linear firmness running beneath the incision itself, likely part of the healing process. Nothing to do for this. The dressing was replaced. The port may be used immediately.     Return PRN.      [facility]  Pager [phone]      "

## 2022-03-19 ENCOUNTER — PATIENT MESSAGE (OUTPATIENT)
Dept: SURGERY | Facility: CLINIC | Age: 52
End: 2022-03-19
Payer: COMMERCIAL

## 2022-03-24 ENCOUNTER — PATIENT MESSAGE (OUTPATIENT)
Dept: GASTROENTEROLOGY | Facility: CLINIC | Age: 52
End: 2022-03-24
Payer: COMMERCIAL

## 2022-03-30 NOTE — TELEPHONE ENCOUNTER
Spoke with pharmacist with [facility] in [place], [place].   Stated due to the shortage of heparin they cannot supply the amount patient is requesting.  They will supply enough heparin for the patient to flush her port at the end of the day.  Patient is aware they will send Cathslo to her if she were to clot.  The medication is injected, sits for 30 minutes and then drawn back out.  Patient will need to go to local infusion site if this is in fact needed because she does not have home health presently.                                                                              Patient stated if the Cathslo is needed she will need an order to be sent to her local infusion site.  She will let me know if this occurs.                                                               Dr.James Lehman is aware and is ok with plan.   Angela

## 2022-04-08 ENCOUNTER — PATIENT MESSAGE (OUTPATIENT)
Dept: GASTROENTEROLOGY | Facility: CLINIC | Age: 52
End: 2022-04-08
Payer: COMMERCIAL

## 2022-04-13 NOTE — TELEPHONE ENCOUNTER
Spoke with patient.  Scheduled to see Dr.James Lehman at the [facility] location on 6/8 for 2:30.  Confirmation mailed.   Angela

## 2022-04-19 ENCOUNTER — PATIENT MESSAGE (OUTPATIENT)
Dept: GASTROENTEROLOGY | Facility: CLINIC | Age: 52
End: 2022-04-19
Payer: COMMERCIAL

## 2022-04-26 ENCOUNTER — OFFICE VISIT (OUTPATIENT)
Dept: DERMATOLOGY | Facility: CLINIC | Age: 52
End: 2022-04-26
Payer: COMMERCIAL

## 2022-04-26 ENCOUNTER — HOSPITAL ENCOUNTER (EMERGENCY)
Facility: HOSPITAL | Age: 52
Discharge: HOME | End: 2022-04-26
Attending: EMERGENCY MEDICINE
Payer: COMMERCIAL

## 2022-04-26 ENCOUNTER — TELEPHONE (OUTPATIENT)
Dept: NEUROLOGY | Facility: CLINIC | Age: 52
End: 2022-04-26
Payer: COMMERCIAL

## 2022-04-26 VITALS
DIASTOLIC BLOOD PRESSURE: 84 MMHG | HEART RATE: 71 BPM | WEIGHT: 250 LBS | SYSTOLIC BLOOD PRESSURE: 148 MMHG | BODY MASS INDEX: 37.89 KG/M2 | HEIGHT: 68 IN | RESPIRATION RATE: 18 BRPM | TEMPERATURE: 98 F | OXYGEN SATURATION: 98 %

## 2022-04-26 DIAGNOSIS — L82.1 STUCCO KERATOSES: ICD-10-CM

## 2022-04-26 DIAGNOSIS — R51.9 ACUTE NONINTRACTABLE HEADACHE, UNSPECIFIED HEADACHE TYPE: Primary | ICD-10-CM

## 2022-04-26 DIAGNOSIS — L24.9 IRRITANT CONTACT DERMATITIS, UNSPECIFIED TRIGGER: Primary | ICD-10-CM

## 2022-04-26 LAB
CTP QC/QA: YES
SARS-COV-2 RDRP RESP QL NAA+PROBE: NEGATIVE

## 2022-04-26 PROCEDURE — 1159F PR MEDICATION LIST DOCUMENTED IN MEDICAL RECORD: ICD-10-PCS | Mod: CPTII,S$GLB,, | Performed by: DERMATOLOGY

## 2022-04-26 PROCEDURE — 99999 PR PBB SHADOW E&M-EST. PATIENT-LVL IV: CPT | Mod: PBBFAC,,, | Performed by: DERMATOLOGY

## 2022-04-26 PROCEDURE — 99999 PR PBB SHADOW E&M-EST. PATIENT-LVL IV: ICD-10-PCS | Mod: PBBFAC,,, | Performed by: DERMATOLOGY

## 2022-04-26 PROCEDURE — 80047 BASIC METABLC PNL IONIZED CA: CPT

## 2022-04-26 PROCEDURE — 63600175 PHARM REV CODE 636 W HCPCS: Performed by: EMERGENCY MEDICINE

## 2022-04-26 PROCEDURE — 1160F RVW MEDS BY RX/DR IN RCRD: CPT | Mod: CPTII,S$GLB,, | Performed by: DERMATOLOGY

## 2022-04-26 PROCEDURE — 99285 EMERGENCY DEPT VISIT HI MDM: CPT | Mod: 25

## 2022-04-26 PROCEDURE — 63600175 PHARM REV CODE 636 W HCPCS: Performed by: PHYSICIAN ASSISTANT

## 2022-04-26 PROCEDURE — 96375 TX/PRO/DX INJ NEW DRUG ADDON: CPT | Mod: 59

## 2022-04-26 PROCEDURE — 96365 THER/PROPH/DIAG IV INF INIT: CPT | Mod: 59

## 2022-04-26 PROCEDURE — 99285 EMERGENCY DEPT VISIT HI MDM: CPT | Mod: CS,,, | Performed by: PHYSICIAN ASSISTANT

## 2022-04-26 PROCEDURE — 25000003 PHARM REV CODE 250: Performed by: PHYSICIAN ASSISTANT

## 2022-04-26 PROCEDURE — 25500020 PHARM REV CODE 255: Performed by: EMERGENCY MEDICINE

## 2022-04-26 PROCEDURE — U0002 COVID-19 LAB TEST NON-CDC: HCPCS | Performed by: PHYSICIAN ASSISTANT

## 2022-04-26 PROCEDURE — 99204 OFFICE O/P NEW MOD 45 MIN: CPT | Mod: S$GLB,,, | Performed by: DERMATOLOGY

## 2022-04-26 PROCEDURE — 96366 THER/PROPH/DIAG IV INF ADDON: CPT

## 2022-04-26 PROCEDURE — 99204 PR OFFICE/OUTPT VISIT, NEW, LEVL IV, 45-59 MIN: ICD-10-PCS | Mod: S$GLB,,, | Performed by: DERMATOLOGY

## 2022-04-26 PROCEDURE — 99285 PR EMERGENCY DEPT VISIT,LEVEL V: ICD-10-PCS | Mod: CS,,, | Performed by: PHYSICIAN ASSISTANT

## 2022-04-26 PROCEDURE — 1159F MED LIST DOCD IN RCRD: CPT | Mod: CPTII,S$GLB,, | Performed by: DERMATOLOGY

## 2022-04-26 PROCEDURE — 1160F PR REVIEW ALL MEDS BY PRESCRIBER/CLIN PHARMACIST DOCUMENTED: ICD-10-PCS | Mod: CPTII,S$GLB,, | Performed by: DERMATOLOGY

## 2022-04-26 RX ORDER — HEPARIN 100 UNIT/ML
5 SYRINGE INTRAVENOUS
Status: COMPLETED | OUTPATIENT
Start: 2022-04-26 | End: 2022-04-26

## 2022-04-26 RX ORDER — ACETAMINOPHEN 500 MG
1000 TABLET ORAL
Status: COMPLETED | OUTPATIENT
Start: 2022-04-26 | End: 2022-04-26

## 2022-04-26 RX ORDER — PROPARACAINE HYDROCHLORIDE 5 MG/ML
2 SOLUTION/ DROPS OPHTHALMIC
Status: COMPLETED | OUTPATIENT
Start: 2022-04-26 | End: 2022-04-26

## 2022-04-26 RX ORDER — TRIAMCINOLONE ACETONIDE 1 MG/G
OINTMENT TOPICAL
Qty: 454 G | Refills: 3 | Status: SHIPPED | OUTPATIENT
Start: 2022-04-26

## 2022-04-26 RX ORDER — BUTALBITAL, ACETAMINOPHEN AND CAFFEINE 50; 325; 40 MG/1; MG/1; MG/1
1 TABLET ORAL EVERY 6 HOURS PRN
Qty: 12 TABLET | Refills: 0 | Status: SHIPPED | OUTPATIENT
Start: 2022-04-26 | End: 2022-04-29

## 2022-04-26 RX ORDER — ONDANSETRON 2 MG/ML
4 INJECTION INTRAMUSCULAR; INTRAVENOUS
Status: COMPLETED | OUTPATIENT
Start: 2022-04-26 | End: 2022-04-26

## 2022-04-26 RX ORDER — HEPARIN 100 UNIT/ML
10 SYRINGE INTRAVENOUS
Status: DISCONTINUED | OUTPATIENT
Start: 2022-04-26 | End: 2022-04-26

## 2022-04-26 RX ORDER — MAGNESIUM SULFATE HEPTAHYDRATE 40 MG/ML
2 INJECTION, SOLUTION INTRAVENOUS ONCE
Status: COMPLETED | OUTPATIENT
Start: 2022-04-26 | End: 2022-04-26

## 2022-04-26 RX ADMIN — IOHEXOL 100 ML: 350 INJECTION, SOLUTION INTRAVENOUS at 04:04

## 2022-04-26 RX ADMIN — PROPARACAINE HYDROCHLORIDE 2 DROP: 5 SOLUTION/ DROPS OPHTHALMIC at 02:04

## 2022-04-26 RX ADMIN — HEPARIN 500 UNITS: 100 SYRINGE at 05:04

## 2022-04-26 RX ADMIN — ONDANSETRON 4 MG: 2 INJECTION INTRAMUSCULAR; INTRAVENOUS at 03:04

## 2022-04-26 RX ADMIN — ACETAMINOPHEN 1000 MG: 500 TABLET ORAL at 01:04

## 2022-04-26 RX ADMIN — MAGNESIUM SULFATE 2 G: 2 INJECTION INTRAVENOUS at 03:04

## 2022-04-26 RX ADMIN — SODIUM CHLORIDE 1000 ML: 0.9 INJECTION, SOLUTION INTRAVENOUS at 03:04

## 2022-04-26 NOTE — ED PROVIDER NOTES
"Encounter Date: 4/26/2022       History     Chief Complaint   Patient presents with    Headache     Ha on and off daily     51 yo F PMHx of epilepsy, gastroparesis, GERD, HLD who presents to the ED with chief complaint of headache.  She reports headache started the base of the skull and radiates to the forehead.  She also complains of an area of scalp tenderness" to the right parietal region that will occasionally radiate to the back of the head and 2 the back of the right eye.  She does endorse pressure to the right eye as well as intermittent right-sided blurred vision that began today.  She does have a long history of headaches but has never had headaches like this in the past.  She reports that the headaches have been intermittent for the last 3 weeks.  She has been taking Tylenol at home with some relief.  She is not having headaches every day.  She reports that the pain will occasionally radiate to the neck into the bilateral shoulders.  She denies fever, chills, dysarthria, extremity weakness.  She has chronic numbness and tingling to the lower extremities but this is unchanged from baseline.  She denies any other worsening or alleviating factors.        Review of patient's allergies indicates:   Allergen Reactions    Corticosteroids (glucocorticoids) Nausea And Vomiting and Other (See Comments)     Acid reflux  Acid reflux  Increases acid reflux    Ketorolac Anaphylaxis and Shortness Of Breath     Other reaction(s): Throat swelling, Unknown    Morphine Anaphylaxis    Prednisone Other (See Comments)     Increases acid reflux    Tramadol Shortness Of Breath     Other reaction(s): Throat swelling, Unknown    Codeine Itching    Gabapentin Other (See Comments)     Causes suicidal thoughts  Other reaction(s): suicidal thoughts  Causes suicidal thoughts  Causes suicidal thoughts      Ibuprofen Other (See Comments)     G L Bleeding  G L Bleeding  G L Bleeding    Nsaids (non-steroidal anti-inflammatory " "drug) Other (See Comments)     GI BLEEDING  Other reaction(s): Other: See Comments  GI BLEEDING  GI BLEEDING    Hydrocodone      Other reaction(s): Unknown    Hydrocodone-acetaminophen     Metoclopramide Other (See Comments)     Other reaction(s): Headache, Other (See Comments)  Headaches and insomnia  Headaches and insomnia  Headaches and insomnia      Reglan [metoclopramide hcl] Other (See Comments)     Headaches and insomnia      Topiramate Other (See Comments)     Ulcers in the mouth and dry mouth  Other reaction(s): Other: See Comments, Unknown  per pt, gets "fog brain"  per pt, gets "fog brain"  Ulcers in the mouth and dry mouth  Ulcers in the mouth and dry mouth       Past Medical History:   Diagnosis Date    Abnormal Pap smear of vagina     Anxiety     Cataract     Chronic pain     TMJ and abdomen    Colon polyp     Epilepsy     as a child    Gastroparesis     Idiopathic    GERD (gastroesophageal reflux disease)     H/O abnormal cervical Papanicolaou smear     Hypercholesteremia 6/21/2016    Hyperlipidemia     IC (interstitial cystitis)     Internal hemorrhoids     Interstitial cystitis     Irritable bowel syndrome (IBS)     Irritable bowel syndrome with diarrhea 6/21/2016    Spastic colon     TMJ (temporomandibular joint disorder)     TMJ (temporomandibular joint syndrome) 6/21/2016     Past Surgical History:   Procedure Laterality Date    APPENDECTOMY      BREAST BIOPSY Bilateral     CATARACT EXTRACTION      CHOLECYSTECTOMY      COLONOSCOPY      COLONOSCOPY N/A 7/31/2018    Procedure: COLONOSCOPY;  Surgeon: Zack Lehman MD;  Location: [address]);  Service: Endoscopy;  Laterality: N/A;    CYSTOSCOPY N/A 8/25/2020    Procedure: CYSTOSCOPY;  Surgeon: Christal Khan MD;  Location: [address];  Service: Urology;  Laterality: N/A;    ELBOW SURGERY      ESOPHAGOGASTRODUODENOSCOPY N/A 7/31/2018    Procedure: EGD (ESOPHAGOGASTRODUODENOSCOPY);  Surgeon: Zack BASS" MD Dominik;  Location: [facility] (4TH FLR);  Service: Endoscopy;  Laterality: N/A;  RUQ gastric pacemaker    Left upper arm PICC line    PONV    ESOPHAGOGASTRODUODENOSCOPY N/A 9/3/2019    Procedure: EGD (ESOPHAGOGASTRODUODENOSCOPY);  Surgeon: Zack Lehman MD;  Location: [facility] (4TH FLR);  Service: Endoscopy;  Laterality: N/A;  history of gastoparesis, per change in protocol, pt instructed to do full liquid diet x 3 days prior to procedure and clear liquids x 1 day prior to procedure-BB  pt has gastric pacemaker, monitored by Dr. Lehman-YASMANY  hx of epilepsy, last seizure during chi    ESOPHAGOGASTRODUODENOSCOPY N/A 9/10/2020    Procedure: ESOPHAGOGASTRODUODENOSCOPY (EGD);  Surgeon: Zack Lehman MD;  Location: [facility] (4TH FLR);  Service: Endoscopy;  Laterality: N/A;  3 days Full liquid diet/ 1 day Clear liquids  waiting for Pt to cb with date - ER[facility]  Left upper arm -  PICC  COVID screening 9/7/20 [facility]- [facility]    ESOPHAGOGASTRODUODENOSCOPY N/A 11/3/2021    Procedure: EGD (ESOPHAGOGASTRODUODENOSCOPY);  Surgeon: Zack Lehman MD;  Location: [facility] (4TH FLR);  Service: Endoscopy;  Laterality: N/A;  10/15 fully vaccinated as of 7/15/21; gastric pacemaker-pt does not have remote;  pt has a port does not want IV started; 3 days full liquid diet; instr. to portal-st    GASTRIC STIMULATOR IMPLANT SURGERY      left side on 03/30/2020    gastric stimulator placement      LASIK Bilateral 2006    MANDIBLE SURGERY Bilateral 10/17/2016    OOPHORECTOMY Bilateral     PYLOROPLASTY  03/2016    STOMACH SURGERY      gastric pacemaker    TEMPOROMANDIBULAR JOINT SURGERY  12/2016    TONSILLECTOMY      TOTAL ABDOMINAL HYSTERECTOMY  2005    EUGENIA/BSO, Trachelectomy  2012    UPPER GASTROINTESTINAL ENDOSCOPY      WRIST SURGERY       Family History   Problem Relation Age of Onset    Coronary artery disease Mother     Hodgkin's lymphoma Mother     Hypertension Mother     Coronary artery disease Father 43     Hypertension Father     Heart attacks under age 50 Father     Lymphoma Sister     Coronary artery disease Paternal Grandfather     Stroke Paternal Grandfather     Glaucoma Maternal Grandmother     Pancreatic cancer Paternal Aunt     Cancer Paternal Aunt         pancreatic    Stroke Maternal Grandfather     Colon cancer Neg Hx     Breast cancer Neg Hx     Ovarian cancer Neg Hx     Cervical cancer Neg Hx     Endometrial cancer Neg Hx     Vaginal cancer Neg Hx     Diabetes Neg Hx      Social History     Tobacco Use    Smoking status: Current Every Day Smoker     Packs/day: 1.00     Years: 20.00     Pack years: 20.00     Start date: 4/14/1986    Smokeless tobacco: Never Used   Substance Use Topics    Alcohol use: No     Alcohol/week: 0.0 standard drinks    Drug use: No     Review of Systems   Constitutional: Negative for fever.   HENT: Negative for sore throat.    Eyes: Positive for visual disturbance.   Respiratory: Negative for shortness of breath.    Cardiovascular: Negative for chest pain.   Gastrointestinal: Negative for nausea.   Genitourinary: Negative for dysuria.   Musculoskeletal: Positive for neck pain. Negative for back pain and neck stiffness.   Skin: Negative for rash.   Neurological: Positive for headaches. Negative for weakness.   Hematological: Does not bruise/bleed easily.       Physical Exam     Initial Vitals [04/26/22 1141]   BP Pulse Resp Temp SpO2   (!) 148/84 71 18 98.3 °F (36.8 °C) 98 %      MAP       --         Physical Exam    Nursing note and vitals reviewed.  Constitutional: She appears well-developed and well-nourished. She is not diaphoretic. No distress.   HENT:   Head: Normocephalic and atraumatic.   Mouth/Throat: Oropharynx is clear and moist.   Eyes: Conjunctivae and EOM are normal. Pupils are equal, round, and reactive to light.   callie temporal TTP   Neck: Neck supple.   Normal range of motion.  Cardiovascular: Normal rate, regular rhythm, normal heart sounds and  intact distal pulses. Exam reveals no gallop and no friction rub.    No murmur heard.  Pulmonary/Chest: Breath sounds normal. She has no wheezes. She has no rhonchi. She has no rales.   Abdominal: Abdomen is soft. Bowel sounds are normal. There is no abdominal tenderness.   Musculoskeletal:         General: Normal range of motion.      Cervical back: Normal range of motion and neck supple.     Neurological: She is alert and oriented to person, place, and time. She has normal strength. No cranial nerve deficit or sensory deficit. GCS score is 15. GCS eye subscore is 4. GCS verbal subscore is 5. GCS motor subscore is 6.   No focal neurological deficits.  Ambulates with normal gait. No meningismus.    Skin: Skin is warm and dry. Capillary refill takes less than 2 seconds.   Psychiatric: She has a normal mood and affect. Her behavior is normal. Judgment and thought content normal.         ED Course   Procedures  Labs Reviewed   SARS-COV-2 RDRP GENE   ISTAT CHEM8          Imaging Results           CTA Head and Neck (xpd) (Final result)  Result time 04/26/22 16:34:19    Final result by Neil Ramos DO (04/26/22 16:34:19)                 Impression:      CTA head: Unremarkable CTA of the head specifically without evidence for proximal significant stenosis or occlusion.    There is developmental variant with absent or hypoplastic right P1 segment of the PCA and essentially persistent fetal circulation of the right PCA via right posterior communicating artery.    CTA neck: Less than 50% proximal ICA stenosis by NASCET criteria.  No evidence for significant carotid stenosis, dissection or occlusion.    Prominent left hilar lymph node partially visualized cannot exclude adenopathy.  Clinical correlation and further evaluation with dedicated CT thorax advised.    CT head: No evidence for acute intracranial hemorrhage or definite abnormal parenchymal enhancement.    Question partially empty sella.    Moderate patchy  paranasal sinus disease.  Please note there is aerated secretions in the maxillary antra component of acute sinusitis to be considered    Please see above for additional details..      Electronically signed by: Neil Ramos DO  Date:    04/26/2022  Time:    16:34             Narrative:    EXAMINATION:  CTA HEAD AND NECK (XPD)    CLINICAL HISTORY:  Carotid artery dissection suspected;    TECHNIQUE:  5 mm axial images of the head pre and post contrast with 0.625 mm axial CTA images of the head neck post-contrast.  Coronal and sagittal MPR and MIP imaging was performed 100 ml of Omnipaque 350 contrast was injected intravenously    COMPARISON:  None    FINDINGS:  CT head with and  without contrast: There is no evidence for acute intracranial hemorrhage or sulcal effacement.  The ventricles are normal in size and configuration without evidence for hydrocephalus.  There is no midline shift or mass effect.  Allowing for CT technique there is no abnormal parenchymal enhancement.  There is mild moderate opacification inferior frontal sinuses with patchy moderate opacification the ethmoid air cells as well as moderate peripheral opacification the maxillary antra with superimposed aerated secretions cannot exclude component of acute sinusitis.    CTA head:    Anterior circulation: The bilateral distal cervical, petrous, cavernous, and supraclinoid segments of the ICAs are patent without significant focal stenosis or aneurysm.    The anterior and middle cerebral arteries are patent without focal stenosis or aneurysm.  The right A1 segment of the VU is slightly hypoplastic compatible with developmental variant.    Posterior circulation: The distal vertebral arteries, basilar artery and posterior cerebral arteries are patent without focal stenosis or aneurysm.  Please note the posterior circulation is slightly small in caliber with absent or severely hypoplastic right P1 segment of the PCA with essentially persistent fetal  circulation of the right PCA via right posterior communicating artery..    CTA neck: Common origin right brachiocephalic left common carotid arteries as well as origin left subclavian artery from the arch are within normal limits.  Origin of the vertebral arteries from the respective subclavian arteries are within normal limits.  Left vertebral artery is slightly dominant vertebral arteries are patent throughout their course without focal stenosis.    There is slight prominent partially visualized lymph node in left hilum measuring approximately 1 cm clinical correlation and further evaluation dedicated CT thorax advised.    .    Right carotid: The right common carotid artery, carotid bifurcation and extracranial portions of the internal carotid artery are patent without significant focal stenosis.    Left carotid: The left common carotid artery, carotid bifurcation and extracranial portions of the internal carotid arteries are patent without significant focal stenosis.  There is probable noncalcified plaquing left carotid bifurcation and proximal ICA.    There is less than 50% stenosis of the proximal ICAs by NASCET criteria.  No evidence for significant focal narrowing or defect within the carotid arteries to suggest carotid dissection    Pharynx/larynx: Nasopharynx, oropharynx hypopharynx larynx and proximal trachea are unremarkable.    Glands: Bilateral parotid and submandibular glands are within normal limits. Thyroid gland is unremarkable.    No evidence for adenopathy throughout the neck by size criteria.    No evidence for acute fracture or subluxation cervical spine.  No consolidation visualized lungs.    Question partially empty sella.  Right-sided Port-A-Cath    This report was flagged in Epic as abnormal.                                 Medications   acetaminophen tablet 1,000 mg (1,000 mg Oral Given 4/26/22 1311)   proparacaine 0.5 % ophthalmic solution 2 drop (2 drops Left Eye Given 4/26/22 1407)    magnesium sulfate 2g in water 50mL IVPB (premix) (0 g Intravenous Stopped 4/26/22 1733)   sodium chloride 0.9% bolus 1,000 mL (0 mLs Intravenous Stopped 4/26/22 1633)   ondansetron injection 4 mg (4 mg Intravenous Given 4/26/22 1542)   iohexoL (OMNIPAQUE 350) injection 100 mL (100 mLs Intravenous Given 4/26/22 1614)   heparin, porcine (PF) 100 unit/mL injection flush 500 Units (500 Units Intravenous Given 4/26/22 1732)     Medical Decision Making:   History:   Old Medical Records: I decided to obtain old medical records.  Initial Assessment:   Emergent evaluation of a 52-year-old female who presents to the emergency department with chief complaint of intermittent headache x3 weeks.  She endorses right-sided blurred vision that began today.  Patient is afebrile, hemodynamically stable, and nontoxic appearing.  Will order visual acuity, i-STAT, CTA head and neck, analgesia, and continue to monitor.  She states that she has a 2 hour drive home and cannot receive any sedating medications.  Differential Diagnosis:   Differential diagnosis includes but is not limited to complex migraine, tension headache, cluster headache, dissection, giant cell arteritis.  Clinical Tests:   Lab Tests: Ordered and Reviewed  Radiological Study: Ordered and Reviewed  ED Management:  COVID swab negative.  Visual acuity 20/50 right eye, 20/25 left eye.   ISTAT unremarkable.   CTA head and neck:   CTA head: Unremarkable CTA of the head specifically without evidence for proximal significant stenosis or occlusion.   There is developmental variant with absent or hypoplastic right P1 segment of the PCA and essentially persistent fetal circulation of the right PCA via right posterior communicating artery.   CTA neck: Less than 50% proximal ICA stenosis by NASCET criteria.  No evidence for significant carotid stenosis, dissection or occlusion.   Prominent left hilar lymph node partially visualized cannot exclude adenopathy.  Clinical correlation  and further evaluation with dedicated CT thorax advised.   CT head: No evidence for acute intracranial hemorrhage or definite abnormal parenchymal enhancement.   Question partially empty sella.   Moderate patchy paranasal sinus disease.  Please note there is aerated secretions in the maxillary antra component of acute sinusitis to be considered     Discussed findings with the patient. Recommend outpatient neurology follow up. Will place referral. Will discharge with Fioricet as needed for pain. Also recommend outpatient follow up with PCP for incidental findings mentioned above. Return precautions given. All questions answered.   The patient was instructed to follow up with a primary care provider and neurology or to return to the emergency department for worsening symptoms. The treatment plan was discussed with the patient who demonstrated understanding and comfort with plan. The patient's history, physical exam, and plan of care was discussed with and agreed upon with my supervising physician.                         Clinical Impression:   Final diagnoses:  [R51.9] Acute nonintractable headache, unspecified headache type (Primary)          ED Disposition Condition    Discharge Stable        ED Prescriptions     Medication Sig Dispense Start Date End Date Auth. Provider    butalbital-acetaminophen-caffeine -40 mg (FIORICET, ESGIC) -40 mg per tablet Take 1 tablet by mouth every 6 (six) hours as needed for Pain. 12 tablet 4/26/2022 4/29/2022 Ivette Kinney PA-C        Follow-up Information     Follow up With Specialties Details Why Contact Info Additional Information    [facility] - Emergency Dept Emergency Medicine Go to  If symptoms worsen [id number]  [facility] [address] [phone]     your PCP  Schedule an appointment as soon as possible for a visit in 1 week       [facility] - Neurology [facility] Neurology Schedule an appointment as soon as possible for a visit in 1 week  [id number]  [facility] [id number] [phone] Neuroscience [place] [facility], 7th Floor Please park in [facility] and take Clinic elevator           Ivette Kinney PA-C  04/26/22 [id number]

## 2022-04-26 NOTE — ED NOTES
I-STAT Chem-8+ Results:   Value Reference Range   Sodium 139 136-145 mmol/L   Potassium  3.8 3.5-5.1 mmol/L   Chloride 103  mmol/L   Ionized Calcium 1.15 1.06-1.42 mmol/L   CO2 (measured) 24 23-29 mmol/L   Glucose 91  mg/dL   BUN 14 6-30 mg/dL   Creatinine 0.7 0.5-1.4 mg/dL   Hematocrit 36 36-54%

## 2022-04-26 NOTE — DISCHARGE INSTRUCTIONS
Your CTA results are below. Take Fioricet as needed for pain.   Please follow up with neurology (I have placed a referral) and with your primary doctor for incidental findings below or return to the ER for any new or worsening symptoms.   Impression:     CTA head: Unremarkable CTA of the head specifically without evidence for proximal significant stenosis or occlusion.     There is developmental variant with absent or hypoplastic right P1 segment of the PCA and essentially persistent fetal circulation of the right PCA via right posterior communicating artery.     CTA neck: Less than 50% proximal ICA stenosis by NASCET criteria.  No evidence for significant carotid stenosis, dissection or occlusion.     Prominent left hilar lymph node partially visualized cannot exclude adenopathy.  Clinical correlation and further evaluation with dedicated CT thorax advised.     CT head: No evidence for acute intracranial hemorrhage or definite abnormal parenchymal enhancement.     Question partially empty sella.     Moderate patchy paranasal sinus disease.  Please note there is aerated secretions in the maxillary antra component of acute sinusitis to be considered

## 2022-04-26 NOTE — ED NOTES
Patient identifiers verified and correct for   C/C: Severe headache right parietal-occipital area radiating to the neck   APPEARANCE: awake and alert in NAD.  SKIN: warm, dry and intact. No breakdown or bruising.  MUSCULOSKELETAL: Patient moving all extremities spontaneously, no obvious swelling or deformities noted. Ambulates independently.  RESPIRATORY: Denies shortness of breath.Respirations unlabored.   CARDIAC: Denies CP, 2+ distal pulses; no peripheral edema  ABDOMEN: S/ND/NT, Denies nausea  : voids spontaneously, denies difficulty  Neurologic: AAO x 4; follows commands equal strength in all extremities; denies numbness/tingling. Denies dizziness    Right Port of cath

## 2022-04-26 NOTE — PROGRESS NOTES
Subjective:       Patient ID:  Nan Betts is a 52 y.o. female who presents for   Chief Complaint   Patient presents with    Rash     hands     History of Present Illness: The patient presents to establish care and for evaluation of rash.    The patient has never seen a dermatologist before and was prescribed lotrisone cream by PCP which worked in past (helped with redness)  Pt reports that the rash (redness) has been present for 5 years and comes and goes. The bumps came Sept 2020 and have been constantly there.      Rash - Initial  Affected locations: right hand, left hand, left arm and right arm  Duration: 5 years  Signs / symptoms: itching, burning, redness and pain  Severity: moderate  Timing: intermittent  Exacerbated by: washing hands/.  Treatments tried: prescribed cream.  Improvement on treatment: mild        Review of Systems   Skin: Positive for itching, rash and dry skin.        Objective:    Physical Exam   Constitutional: She appears well-developed and well-nourished. She is obese.  No distress.   Neurological: She is alert and oriented to person, place, and time. She is not disoriented.   Psychiatric: She has a normal mood and affect.   Skin:   Areas Examined (abnormalities noted in diagram):   Chest / Axilla Inspection Performed  RUE Inspected  LUE Inspection Performed  Nails and Digits Inspection Performed              Diagram Legend     Erythematous scaling macule/papule c/w actinic keratosis       Vascular papule c/w angioma      Pigmented verrucoid papule/plaque c/w seborrheic keratosis      Yellow umbilicated papule c/w sebaceous hyperplasia      Irregularly shaped tan macule c/w lentigo     1-2 mm smooth white papules consistent with Milia      Movable subcutaneous cyst with punctum c/w epidermal inclusion cyst      Subcutaneous movable cyst c/w pilar cyst      Firm pink to brown papule c/w dermatofibroma      Pedunculated fleshy papule(s) c/w skin tag(s)      Evenly pigmented  "macule c/w junctional nevus     Mildly variegated pigmented, slightly irregular-bordered macule c/w mildly atypical nevus      Flesh colored to evenly pigmented papule c/w intradermal nevus       Pink pearly papule/plaque c/w basal cell carcinoma      Erythematous hyperkeratotic cursted plaque c/w SCC      Surgical scar with no sign of skin cancer recurrence      Open and closed comedones      Inflammatory papules and pustules      Verrucoid papule consistent consistent with wart     Erythematous eczematous patches and plaques     Dystrophic onycholytic nail with subungual debris c/w onychomycosis     Umbilicated papule    Erythematous-base heme-crusted tan verrucoid plaque consistent with inflamed seborrheic keratosis     Erythematous Silvery Scaling Plaque c/w Psoriasis     See annotation      Assessment / Plan:        Irritant contact dermatitis, unspecified trigger  Pt washes hands frequently  -     triamcinolone acetonide 0.1% (KENALOG) 0.1 % ointment; AAA hands bid - may occlude qhs  Dispense: 454 g; Refill: 3  Recommend [facility] [employer] CREME or [employer] working hands after every hand washing and every hour while awake (when flared).    Discontinue smoking.    Hand eczema brochure provided. Minimize water exposure    If condition recurs or persists, schedule for patch testing. Pt states she has a h/o being "sensitive" to chemicals      Stucco keratoses  These are benign inherited growths without a malignant potential. Reassurance given to patient.   Apply Am Lactin lotion or cream to thighs nightly. Available over-the counter. May cause skin irritation.               Follow up in about 6 weeks (around 6/7/2022).  "

## 2022-04-26 NOTE — PATIENT INSTRUCTIONS
Recommend [facility] [employer] CREME or [employer] working hands after every hand washing and every hour while awake (when flared).      Discontinue smoking.      Apply Am Lactin lotion or cream to inner thighs nightly. Available over-the counter. May cause skin irritation.

## 2022-04-26 NOTE — TELEPHONE ENCOUNTER
Called and spoke with pt. Explained we do not have any available appts today. Offered Thursday. Pt declined. She lives 2 hrs away and is in town today and Friday. No available appts on Friday either.

## 2022-04-26 NOTE — FIRST PROVIDER EVALUATION
" Emergency Department TeleTriage Encounter Note      CHIEF COMPLAINT    Chief Complaint   Patient presents with    Headache     Ha on and off daily       VITAL SIGNS   Initial Vitals [04/26/22 1141]   BP Pulse Resp Temp SpO2   (!) 148/84 71 18 98.3 °F (36.8 °C) 98 %      MAP       --            ALLERGIES    Review of patient's allergies indicates:   Allergen Reactions    Corticosteroids (glucocorticoids) Nausea And Vomiting and Other (See Comments)     Acid reflux  Acid reflux  Increases acid reflux    Ketorolac Anaphylaxis and Shortness Of Breath     Other reaction(s): Throat swelling, Unknown    Morphine Anaphylaxis    Prednisone Other (See Comments)     Increases acid reflux    Tramadol Shortness Of Breath     Other reaction(s): Throat swelling, Unknown    Codeine Itching    Gabapentin Other (See Comments)     Causes suicidal thoughts  Other reaction(s): suicidal thoughts  Causes suicidal thoughts  Causes suicidal thoughts      Ibuprofen Other (See Comments)     G L Bleeding  G L Bleeding  G L Bleeding    Nsaids (non-steroidal anti-inflammatory drug) Other (See Comments)     GI BLEEDING  Other reaction(s): Other: See Comments  GI BLEEDING  GI BLEEDING    Hydrocodone      Other reaction(s): Unknown    Hydrocodone-acetaminophen     Metoclopramide Other (See Comments)     Other reaction(s): Headache, Other (See Comments)  Headaches and insomnia  Headaches and insomnia  Headaches and insomnia      Reglan [metoclopramide hcl] Other (See Comments)     Headaches and insomnia      Topiramate Other (See Comments)     Ulcers in the mouth and dry mouth  Other reaction(s): Other: See Comments, Unknown  per pt, gets "fog brain"  per pt, gets "fog brain"  Ulcers in the mouth and dry mouth  Ulcers in the mouth and dry mouth         PROVIDER TRIAGE NOTE  HPI: Nanhalie Nair Roxane, a 52 y.o. female presents to the ED evaluation of HA to top of head into neck x 3 weeks.  Intermittent.  History of HA.  Attests to blurry " vision.  Last treatment tried was tylenol taken yesterday.  Pt stating that she no longer wants to wait d/t long drive home.  She was encouraged to stay to be evaluated by a provider on site.     Constitutional: Vital signs show mild HTN, well nourished, well developed, appearing stated age, NAD   HEENT: NCAT, symmetrical lids, No obvious facial deformity.  Normal phonation. Normal Conjunctiva, Gross EOMs intact   Neck: NAROM   Respiratory: Normal effort.  No obvious use of accessory muscles   Abdomen: appearance (flat, rounded)   Musculoskeletal: Moved upper extremities well   Neuro: Alert, answers questions appropriately    Psych: appropriate mood and affect          ORDERS  Labs Reviewed   HIV 1 / 2 ANTIBODY   HEPATITIS C ANTIBODY       ED Orders (720h ago, onward)    Start Ordered     Status Ordering Provider    04/26/22 1143 04/26/22 1143  HIV 1/2 Ag/Ab (4th Gen)  STAT         Ordered SOLE TAM    04/26/22 1143 04/26/22 1143  Hepatitis C Antibody  STAT         Ordered SOLE TAM            Virtual Visit Note: The provider triage portion of this emergency department evaluation and documentation was performed via [employer], a HIPAA-compliant telemedicine application, in concert with a tele-presenter in the room. A face to face patient evaluation with one of my colleagues will occur once the patient is placed in an emergency department room.      DISCLAIMER: This note was prepared with [employer] voice recognition transcription software. Garbled syntax, mangled pronouns, and other bizarre constructions may be attributed to that software system.

## 2022-04-26 NOTE — TELEPHONE ENCOUNTER
----- Message from Crescencio Vargas sent at 4/26/2022  8:12 AM CDT -----  Contact: @[phone]  NP consult: Patient feels she needs to be seen today she's having headaches and shooting nerve pain at the top of her head and scalp, pls advise

## 2022-04-27 ENCOUNTER — TELEPHONE (OUTPATIENT)
Dept: EMERGENCY MEDICINE | Facility: HOSPITAL | Age: 52
End: 2022-04-27
Payer: COMMERCIAL

## 2022-04-27 ENCOUNTER — NURSE TRIAGE (OUTPATIENT)
Dept: ADMINISTRATIVE | Facility: CLINIC | Age: 52
End: 2022-04-27
Payer: COMMERCIAL

## 2022-04-27 LAB
BUN SERPL-MCNC: 14 MG/DL (ref 6–30)
CHLORIDE SERPL-SCNC: 103 MMOL/L (ref 95–110)
CREAT SERPL-MCNC: 0.7 MG/DL (ref 0.5–1.4)
GLUCOSE SERPL-MCNC: 91 MG/DL (ref 70–110)
HCT VFR BLD CALC: 36 %PCV (ref 36–54)
POC IONIZED CALCIUM: 1.15 MMOL/L (ref 1.06–1.42)
POC TCO2 (MEASURED): 24 MMOL/L (ref 23–29)
POTASSIUM BLD-SCNC: 3.8 MMOL/L (ref 3.5–5.1)
SAMPLE: NORMAL
SODIUM BLD-SCNC: 139 MMOL/L (ref 136–145)

## 2022-04-27 NOTE — TELEPHONE ENCOUNTER
Pt calling back stating that the ER hung up on her again. Will attempt to speak with a provider.  ED states that they will have the PA call pt back right now. Informed pt. Verbalized understanding.

## 2022-04-27 NOTE — TELEPHONE ENCOUNTER
Pt stated she was on the phone with the nurse and lost connection. Pt transferred to YAIMA Rebolledo RN.   Reason for Disposition   [1] Follow-up call to recent contact AND [2] information only call, no triage required    Protocols used: INFORMATION ONLY CALL-A-

## 2022-04-27 NOTE — TELEPHONE ENCOUNTER
Pt calling with questions regarding a medication she was prescribed and would like to speak to provider in ED. Informed pt that she should discuss compatability with her pharmacist or her PCP. Pt then got frustrated and stated she didn't care which physician she spoke with she just needed to speak with someone to see if she could take the medication. Stated she had been hung up on by the ER numerous times and called last and night said they would call her back. Pt warm transferred to ED. Will route message.        butalbital-acetaminophen-caffeine -40 mg (FIORICET, ESGIC) -40 mg per tablet 1 tablet, Every 6 hours PRN      tiZANidine (ZANAFLEX) 4 MG tablet 4 mg, 3 times daily         Reason for Disposition   Caller has medication question about med not prescribed by PCP and triager unable to answer question (e.g., compatibility with other med, storage)    Protocols used: MEDICATION QUESTION CALL-A-[place]

## 2022-04-27 NOTE — TELEPHONE ENCOUNTER
Patient seen in the ED yesterday for headache.  She went to go fill her Fioricet at the pharmacy and they told her there may be an interaction with her tizanidine.  She called the ED multiple times last night and this morning, returned her call today.  I reviewed this interaction and informed the patient there may be decreased efficacy of tizanidine due to CYP interactions.  I advised her to take tizanidine at night for her muscle spasms and drop pain and take Fioricet during the day for headache.    Patient expressed significant frustration that no one had answered her call and that she has been so for unable to schedule a neurology appointment.  I explained to her that ED  is not able to give medical advice as this is not their training and unfortunately there is not always a provider available to return phone calls.  She was given a referral to neurology but had difficulty scheduling appointment online.  I advised her to try calling the clinic, phone number provided.  I also advised her to follow up with her primary care physician in [place].    I advised her that if her headache is intractable, becomes more severe or she develops any other symptoms she should return to the ED for evaluation.  Patient voiced understanding.  All questions answered.

## 2022-04-28 ENCOUNTER — TELEPHONE (OUTPATIENT)
Dept: NEUROLOGY | Facility: CLINIC | Age: 52
End: 2022-04-28
Payer: COMMERCIAL

## 2022-04-29 ENCOUNTER — PROCEDURE VISIT (OUTPATIENT)
Dept: NEUROLOGY | Facility: CLINIC | Age: 52
End: 2022-04-29
Payer: COMMERCIAL

## 2022-04-29 ENCOUNTER — OFFICE VISIT (OUTPATIENT)
Dept: NEUROLOGY | Facility: CLINIC | Age: 52
End: 2022-04-29
Payer: COMMERCIAL

## 2022-04-29 VITALS
RESPIRATION RATE: 18 BRPM | HEART RATE: 75 BPM | SYSTOLIC BLOOD PRESSURE: 176 MMHG | TEMPERATURE: 98 F | BODY MASS INDEX: 38.63 KG/M2 | DIASTOLIC BLOOD PRESSURE: 92 MMHG | HEIGHT: 68 IN | WEIGHT: 254.88 LBS

## 2022-04-29 DIAGNOSIS — G43.709 CHRONIC MIGRAINE WITHOUT AURA WITHOUT STATUS MIGRAINOSUS, NOT INTRACTABLE: Primary | ICD-10-CM

## 2022-04-29 DIAGNOSIS — G62.9 NEUROPATHY: ICD-10-CM

## 2022-04-29 DIAGNOSIS — R93.89 ABNORMAL FINDING ON CT SCAN: ICD-10-CM

## 2022-04-29 DIAGNOSIS — M79.18 MYOFASCIAL MUSCLE PAIN: ICD-10-CM

## 2022-04-29 PROCEDURE — 1160F PR REVIEW ALL MEDS BY PRESCRIBER/CLIN PHARMACIST DOCUMENTED: ICD-10-PCS | Mod: CPTII,S$GLB,, | Performed by: PHYSICIAN ASSISTANT

## 2022-04-29 PROCEDURE — 3008F BODY MASS INDEX DOCD: CPT | Mod: CPTII,S$GLB,, | Performed by: PHYSICIAN ASSISTANT

## 2022-04-29 PROCEDURE — 3008F PR BODY MASS INDEX (BMI) DOCUMENTED: ICD-10-PCS | Mod: CPTII,S$GLB,, | Performed by: PHYSICIAN ASSISTANT

## 2022-04-29 PROCEDURE — 3077F SYST BP >= 140 MM HG: CPT | Mod: CPTII,S$GLB,, | Performed by: PHYSICIAN ASSISTANT

## 2022-04-29 PROCEDURE — 3080F PR MOST RECENT DIASTOLIC BLOOD PRESSURE >= 90 MM HG: ICD-10-PCS | Mod: CPTII,S$GLB,, | Performed by: PHYSICIAN ASSISTANT

## 2022-04-29 PROCEDURE — 95886 PR EMG COMPLETE, W/ NERVE CONDUCTION STUDIES, 5+ MUSCLES: ICD-10-PCS | Mod: S$GLB,,, | Performed by: PSYCHIATRY & NEUROLOGY

## 2022-04-29 PROCEDURE — 3080F DIAST BP >= 90 MM HG: CPT | Mod: CPTII,S$GLB,, | Performed by: PHYSICIAN ASSISTANT

## 2022-04-29 PROCEDURE — 99215 OFFICE O/P EST HI 40 MIN: CPT | Mod: FS,S$GLB,, | Performed by: PHYSICIAN ASSISTANT

## 2022-04-29 PROCEDURE — 99215 PR OFFICE/OUTPT VISIT, EST, LEVL V, 40-54 MIN: ICD-10-PCS | Mod: FS,S$GLB,, | Performed by: PHYSICIAN ASSISTANT

## 2022-04-29 PROCEDURE — 95910 NRV CNDJ TEST 7-8 STUDIES: CPT | Mod: S$GLB,,, | Performed by: PSYCHIATRY & NEUROLOGY

## 2022-04-29 PROCEDURE — 99999 PR PBB SHADOW E&M-EST. PATIENT-LVL V: ICD-10-PCS | Mod: PBBFAC,,, | Performed by: PHYSICIAN ASSISTANT

## 2022-04-29 PROCEDURE — 1159F MED LIST DOCD IN RCRD: CPT | Mod: CPTII,S$GLB,, | Performed by: PHYSICIAN ASSISTANT

## 2022-04-29 PROCEDURE — 3077F PR MOST RECENT SYSTOLIC BLOOD PRESSURE >= 140 MM HG: ICD-10-PCS | Mod: CPTII,S$GLB,, | Performed by: PHYSICIAN ASSISTANT

## 2022-04-29 PROCEDURE — 99999 PR PBB SHADOW E&M-EST. PATIENT-LVL V: CPT | Mod: PBBFAC,,, | Performed by: PHYSICIAN ASSISTANT

## 2022-04-29 PROCEDURE — 1160F RVW MEDS BY RX/DR IN RCRD: CPT | Mod: CPTII,S$GLB,, | Performed by: PHYSICIAN ASSISTANT

## 2022-04-29 PROCEDURE — 95910 PR NERVE CONDUCTION STUDY; 7-8 STUDIES: ICD-10-PCS | Mod: S$GLB,,, | Performed by: PSYCHIATRY & NEUROLOGY

## 2022-04-29 PROCEDURE — 95886 MUSC TEST DONE W/N TEST COMP: CPT | Mod: S$GLB,,, | Performed by: PSYCHIATRY & NEUROLOGY

## 2022-04-29 PROCEDURE — 1159F PR MEDICATION LIST DOCUMENTED IN MEDICAL RECORD: ICD-10-PCS | Mod: CPTII,S$GLB,, | Performed by: PHYSICIAN ASSISTANT

## 2022-04-29 RX ORDER — GALCANEZUMAB 120 MG/ML
INJECTION, SOLUTION SUBCUTANEOUS
Qty: 2 ML | Refills: 0 | Status: SHIPPED | OUTPATIENT
Start: 2022-04-29 | End: 2022-07-19

## 2022-04-29 RX ORDER — FLAVOXATE HYDROCHLORIDE 100 MG/1
TABLET ORAL
COMMUNITY

## 2022-04-29 RX ORDER — SIMETHICONE 125 MG
125 TABLET,CHEWABLE ORAL 2 TIMES DAILY
COMMUNITY
End: 2022-08-02

## 2022-04-29 RX ORDER — GALCANEZUMAB 120 MG/ML
120 INJECTION, SOLUTION SUBCUTANEOUS
Qty: 1 ML | Refills: 11 | Status: SHIPPED | OUTPATIENT
Start: 2022-04-29 | End: 2022-07-19
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
## 2022-05-01 ENCOUNTER — PATIENT MESSAGE (OUTPATIENT)
Dept: ORTHOPEDICS | Facility: CLINIC | Age: 52
End: 2022-05-01
Payer: COMMERCIAL

## 2022-05-02 ENCOUNTER — TELEPHONE (OUTPATIENT)
Dept: PHARMACY | Facility: CLINIC | Age: 52
End: 2022-05-02
Payer: COMMERCIAL

## 2022-05-02 ENCOUNTER — PATIENT MESSAGE (OUTPATIENT)
Dept: GASTROENTEROLOGY | Facility: CLINIC | Age: 52
End: 2022-05-02
Payer: COMMERCIAL

## 2022-05-05 ENCOUNTER — PATIENT MESSAGE (OUTPATIENT)
Dept: GASTROENTEROLOGY | Facility: CLINIC | Age: 52
End: 2022-05-05
Payer: COMMERCIAL

## 2022-05-12 ENCOUNTER — PATIENT MESSAGE (OUTPATIENT)
Dept: NEUROLOGY | Facility: CLINIC | Age: 52
End: 2022-05-12
Payer: COMMERCIAL

## 2022-05-17 ENCOUNTER — PATIENT MESSAGE (OUTPATIENT)
Dept: GASTROENTEROLOGY | Facility: CLINIC | Age: 52
End: 2022-05-17
Payer: COMMERCIAL

## 2022-05-19 DIAGNOSIS — K31.84 GASTROPARESIS: Primary | ICD-10-CM

## 2022-05-27 ENCOUNTER — PATIENT MESSAGE (OUTPATIENT)
Dept: GASTROENTEROLOGY | Facility: CLINIC | Age: 52
End: 2022-05-27
Payer: COMMERCIAL

## 2022-05-27 ENCOUNTER — PATIENT MESSAGE (OUTPATIENT)
Dept: NEUROLOGY | Facility: CLINIC | Age: 52
End: 2022-05-27
Payer: COMMERCIAL

## 2022-05-31 ENCOUNTER — PATIENT MESSAGE (OUTPATIENT)
Dept: DERMATOLOGY | Facility: CLINIC | Age: 52
End: 2022-05-31
Payer: COMMERCIAL

## 2022-06-02 ENCOUNTER — TELEPHONE (OUTPATIENT)
Dept: GASTROENTEROLOGY | Facility: CLINIC | Age: 52
End: 2022-06-02
Payer: COMMERCIAL

## 2022-06-02 ENCOUNTER — TELEPHONE (OUTPATIENT)
Dept: NEUROLOGY | Facility: CLINIC | Age: 52
End: 2022-06-02
Payer: COMMERCIAL

## 2022-06-02 NOTE — TELEPHONE ENCOUNTER
----- Message from Zack Lehman MD sent at 5/28/2022  7:37 AM CDT -----  Regarding: RE: Call Back  I don't have an alternative, so was can discontinue for now  ----- Message -----  From: Bernice Blackburn MA  Sent: 5/16/2022   3:29 PM CDT  To: Zack Lehman MD  Subject: FW: Call Back                                    Spoke with pharmacist.  Aware you are out of the office for 2 weeks.  He stated he would make a note and we can call back on your return.   Angela  ----- Message -----  From: Seymour Lee  Sent: 5/16/2022   3:23 PM CDT  To: Dominik BASS Staff  Subject: Call Back                                        Who Called: Lj-          What is the reason for the call: promethazine on back order. Would like to know if would like alternative or discontinue. Please contact          Can patient be contacted on [facility]:  n/a         Call back number: [phone] fax [phone]

## 2022-06-02 NOTE — TELEPHONE ENCOUNTER
----- Message from Cruz Jones sent at 6/2/2022  3:25 PM CDT -----  Regarding: fax received  Type: Needs Medical Advice    Who Called:  Nan Jaeger Call Back Number: [phone]     Additional Information: checking to see if fax received. Other provider tried to fax 2x today to [phone] got errors both times. Sent to general neuro fax at [phone] and pt in bold on cover sheet for CARLOZ Dillon please pass to his office. Please call pt to let know if fax received b/c sent today.

## 2022-06-07 ENCOUNTER — PATIENT MESSAGE (OUTPATIENT)
Dept: NEUROLOGY | Facility: CLINIC | Age: 52
End: 2022-06-07
Payer: COMMERCIAL

## 2022-06-08 NOTE — PATIENT INSTRUCTIONS
Emgality ordered    Please get your eye provider to send me copies of your dilated exam      Large Joint Aspiration/Injection: R knee    Date/Time: 6/8/2022 1:40 PM  Performed by: Gallo Lara MD  Authorized by: Gallo Lara MD     Consent Done?:  Yes (Verbal)  Indications:  Pain  Site marked: the procedure site was marked    Timeout: prior to procedure the correct patient, procedure, and site was verified      Details:  Needle Size:  25 G  Approach:  Anterolateral  Location:  Knee  Site:  R knee  Medications:  40 mg triamcinolone acetonide 40 mg/mL  Patient tolerance:  Patient tolerated the procedure well with no immediate complications

## 2022-06-14 ENCOUNTER — TELEPHONE (OUTPATIENT)
Dept: INTERVENTIONAL RADIOLOGY/VASCULAR | Facility: HOSPITAL | Age: 52
End: 2022-06-14
Payer: COMMERCIAL

## 2022-06-14 NOTE — NURSING
Patient verified arrival of 1000 to [facility], take am meds, have someone drive her, bring list of medications, verified ALL, on heparin flushes. May eat and drink as normal.

## 2022-06-15 ENCOUNTER — OFFICE VISIT (OUTPATIENT)
Dept: ORTHOPEDICS | Facility: CLINIC | Age: 52
End: 2022-06-15
Payer: COMMERCIAL

## 2022-06-15 ENCOUNTER — HOSPITAL ENCOUNTER (OUTPATIENT)
Dept: INTERVENTIONAL RADIOLOGY/VASCULAR | Facility: HOSPITAL | Age: 52
Discharge: HOME | End: 2022-06-15
Attending: INTERNAL MEDICINE
Payer: COMMERCIAL

## 2022-06-15 VITALS
DIASTOLIC BLOOD PRESSURE: 70 MMHG | RESPIRATION RATE: 18 BRPM | HEART RATE: 86 BPM | BODY MASS INDEX: 37.89 KG/M2 | WEIGHT: 250 LBS | OXYGEN SATURATION: 97 % | TEMPERATURE: 97 F | SYSTOLIC BLOOD PRESSURE: 137 MMHG | HEIGHT: 68 IN

## 2022-06-15 VITALS — WEIGHT: 250.56 LBS | BODY MASS INDEX: 37.97 KG/M2 | HEIGHT: 68 IN

## 2022-06-15 DIAGNOSIS — K31.84 GASTROPARESIS: ICD-10-CM

## 2022-06-15 DIAGNOSIS — M51.36 DDD (DEGENERATIVE DISC DISEASE), LUMBAR: Primary | ICD-10-CM

## 2022-06-15 PROCEDURE — 63600175 PHARM REV CODE 636 W HCPCS: Performed by: RADIOLOGY

## 2022-06-15 PROCEDURE — 1160F PR REVIEW ALL MEDS BY PRESCRIBER/CLIN PHARMACIST DOCUMENTED: ICD-10-PCS | Mod: CPTII,S$GLB,, | Performed by: ORTHOPAEDIC SURGERY

## 2022-06-15 PROCEDURE — 1159F MED LIST DOCD IN RCRD: CPT | Mod: CPTII,S$GLB,, | Performed by: ORTHOPAEDIC SURGERY

## 2022-06-15 PROCEDURE — 36598 IR PORT CHECK, EXISTING CENTRAL VENOUS LINE W/CONTRAST INJ: ICD-10-PCS | Mod: RT,,, | Performed by: RADIOLOGY

## 2022-06-15 PROCEDURE — 3008F PR BODY MASS INDEX (BMI) DOCUMENTED: ICD-10-PCS | Mod: CPTII,S$GLB,, | Performed by: ORTHOPAEDIC SURGERY

## 2022-06-15 PROCEDURE — 1159F PR MEDICATION LIST DOCUMENTED IN MEDICAL RECORD: ICD-10-PCS | Mod: CPTII,S$GLB,, | Performed by: ORTHOPAEDIC SURGERY

## 2022-06-15 PROCEDURE — 1160F RVW MEDS BY RX/DR IN RCRD: CPT | Mod: CPTII,S$GLB,, | Performed by: ORTHOPAEDIC SURGERY

## 2022-06-15 PROCEDURE — 99999 PR PBB SHADOW E&M-EST. PATIENT-LVL II: CPT | Mod: PBBFAC,,, | Performed by: ORTHOPAEDIC SURGERY

## 2022-06-15 PROCEDURE — 99213 PR OFFICE/OUTPT VISIT, EST, LEVL III, 20-29 MIN: ICD-10-PCS | Mod: S$GLB,,, | Performed by: ORTHOPAEDIC SURGERY

## 2022-06-15 PROCEDURE — 99213 OFFICE O/P EST LOW 20 MIN: CPT | Mod: S$GLB,,, | Performed by: ORTHOPAEDIC SURGERY

## 2022-06-15 PROCEDURE — 3008F BODY MASS INDEX DOCD: CPT | Mod: CPTII,S$GLB,, | Performed by: ORTHOPAEDIC SURGERY

## 2022-06-15 PROCEDURE — A4550 SURGICAL TRAYS: HCPCS

## 2022-06-15 PROCEDURE — 36598 INJ W/FLUOR EVAL CV DEVICE: CPT | Performed by: RADIOLOGY

## 2022-06-15 PROCEDURE — 99999 PR PBB SHADOW E&M-EST. PATIENT-LVL II: ICD-10-PCS | Mod: PBBFAC,,, | Performed by: ORTHOPAEDIC SURGERY

## 2022-06-15 RX ORDER — HEPARIN 100 UNIT/ML
SYRINGE INTRAVENOUS CODE/TRAUMA/SEDATION MEDICATION
Status: COMPLETED | OUTPATIENT
Start: 2022-06-15 | End: 2022-06-15

## 2022-06-15 RX ADMIN — HEPARIN 5 ML: 100 SYRINGE at 11:06

## 2022-06-15 NOTE — DISCHARGE SUMMARY
Interventional Radiology Short Stay Discharge Summary      Admit Date: 6/15/2022  Discharge Date: 06/15/2022     Hospital Course: Uneventful    Discharge Diagnosis: Gastroparesis s/p port check today    Discharge Condition: Stable    Discharge Disposition: Home    Diet: Resume prior diet    Activity: Activity as tolerated    Follow-up: With referring provider    See Procedure Note.      Ford Valdes MD  [facility]  Pager [phone]

## 2022-06-15 NOTE — SEDATION DOCUMENTATION
IR procedure - Port Check - is completed. Heparin injected to confirm the patency of patient's port. Fluoro utilized to visualize and confirm the location and position of the implanted catheter.

## 2022-06-15 NOTE — H&P
Interventional Radiology Pre-Procedure History & Physical      Chief Complaint/Reason for Referral: Port check    History of Present Illness:  Nan Betts is a 52 y.o. female who presents with an indwelling right IJ vein chest port. Currently working, but has clotted in the past. Referred for port check. Patient also asks for clarification on when to pack (hep lock) port with heparin.    Past Medical History:   Diagnosis Date    Abnormal Pap smear of vagina     Anxiety     Cataract     Chronic pain     TMJ and abdomen    Colon polyp     Epilepsy     as a child    Gastroparesis     Idiopathic    GERD (gastroesophageal reflux disease)     H/O abnormal cervical Papanicolaou smear     Hypercholesteremia 6/21/2016    Hyperlipidemia     IC (interstitial cystitis)     Internal hemorrhoids     Interstitial cystitis     Irritable bowel syndrome (IBS)     Irritable bowel syndrome with diarrhea 6/21/2016    Spastic colon     TMJ (temporomandibular joint disorder)     TMJ (temporomandibular joint syndrome) 6/21/2016     Past Surgical History:   Procedure Laterality Date    APPENDECTOMY      BREAST BIOPSY Bilateral     CATARACT EXTRACTION      CHOLECYSTECTOMY      COLONOSCOPY      COLONOSCOPY N/A 7/31/2018    Procedure: COLONOSCOPY;  Surgeon: Zack Lehman MD;  Location: [facility] (4TH FLR);  Service: Endoscopy;  Laterality: N/A;    CYSTOSCOPY N/A 8/25/2020    Procedure: CYSTOSCOPY;  Surgeon: Christal Khan MD;  [facility];  Service: Urology;  Laterality: N/A;    ELBOW SURGERY      ESOPHAGOGASTRODUODENOSCOPY N/A 7/31/2018    Procedure: EGD (ESOPHAGOGASTRODUODENOSCOPY);  Surgeon: Zack Lehman MD;  Location: [facility] (4TH FLR);  Service: Endoscopy;  Laterality: N/A;  RUQ gastric pacemaker    Left upper arm PICC line    PONV    ESOPHAGOGASTRODUODENOSCOPY N/A 9/3/2019    Procedure: EGD (ESOPHAGOGASTRODUODENOSCOPY);  Surgeon: Zack Lehman MD;  Location: [facility] (4TH FLR);   Service: Endoscopy;  Laterality: N/A;  history of gastoparesis, per change in protocol, pt instructed to do full liquid diet x 3 days prior to procedure and clear liquids x 1 day prior to procedure-BB  pt has gastric pacemaker, monitored by Dr. Lehman-YASMANY  hx of epilepsy, last seizure during chi    ESOPHAGOGASTRODUODENOSCOPY N/A 9/10/2020    Procedure: ESOPHAGOGASTRODUODENOSCOPY (EGD);  Surgeon: Zack Lehman MD;  Location: [facility] (4TH FLR);  Service: Endoscopy;  Laterality: N/A;  3 days Full liquid diet/ 1 day Clear liquids  waiting for Pt to cb with date - [facility]  Left upper arm -  PICC  COVID screening 9/7/20 [place] [facility]- [facility]    ESOPHAGOGASTRODUODENOSCOPY N/A 11/3/2021    Procedure: EGD (ESOPHAGOGASTRODUODENOSCOPY);  Surgeon: Zack Lehman MD;  [facility]  Service: Endoscopy;  Laterality: N/A;  10/15 fully vaccinated as of 7/15/21; gastric pacemaker-pt does not have remote;  pt has a port does not want IV started; 3 days full liquid diet; instr. to portal-st    GASTRIC STIMULATOR IMPLANT SURGERY      left side on 03/30/2020    gastric stimulator placement      LASIK Bilateral 2006    MANDIBLE SURGERY Bilateral 10/17/2016    OOPHORECTOMY Bilateral     PYLOROPLASTY  03/2016    STOMACH SURGERY      gastric pacemaker    TEMPOROMANDIBULAR JOINT SURGERY  12/2016    TONSILLECTOMY      TOTAL ABDOMINAL HYSTERECTOMY  2005    EUGENIA/BSO, Trachelectomy  2012    UPPER GASTROINTESTINAL ENDOSCOPY      WRIST SURGERY         Allergies:   Review of patient's allergies indicates:   Allergen Reactions    Corticosteroids (glucocorticoids) Nausea And Vomiting and Other (See Comments)     Acid reflux  Acid reflux  Increases acid reflux    Ketorolac Anaphylaxis and Shortness Of Breath     Other reaction(s): Throat swelling, Unknown    Morphine Anaphylaxis    Prednisone Other (See Comments)     Increases acid reflux    Tramadol Shortness Of Breath     Other reaction(s): Throat swelling,  "Unknown    Codeine Itching    Gabapentin Other (See Comments)     Causes suicidal thoughts  Other reaction(s): suicidal thoughts  Causes suicidal thoughts  Causes suicidal thoughts      Ibuprofen Other (See Comments)     G L Bleeding  G L Bleeding  G L Bleeding    Nsaids (non-steroidal anti-inflammatory drug) Other (See Comments)     GI BLEEDING  Other reaction(s): Other: See Comments  GI BLEEDING  GI BLEEDING    Hydrocodone      Other reaction(s): Unknown    Hydrocodone-acetaminophen     Metoclopramide Other (See Comments)     Other reaction(s): Headache, Other (See Comments)  Headaches and insomnia  Headaches and insomnia  Headaches and insomnia      Morphine sulfate      Other reaction(s): Unknown    Reglan [metoclopramide hcl] Other (See Comments)     Headaches and insomnia      Topiramate Other (See Comments)     Ulcers in the mouth and dry mouth  Other reaction(s): Other: See Comments, Unknown  per pt, gets "fog brain"  per pt, gets "fog brain"  Ulcers in the mouth and dry mouth  Ulcers in the mouth and dry mouth          Home Meds:   Prior to Admission medications    Medication Sig Start Date End Date Taking? Authorizing Provider   (Magic mouthwash) 1:1:1 diphenhydramine(Benadryl) 12.5mg/5ml liq, aluminum & magnesium hydroxide-simethicone (Maalox), LIDOcaine viscous 2% Swish and spit 5 mLs every 4 (four) hours as needed (mouth sores). for mouth sores 6/6/22  Yes Zack Lehman MD   ALPRAZolam (XANAX) 1 MG tablet Take 1 mg by mouth 2 (two) times daily as needed.  2/26/20  Yes Historical Provider   diphenhydrAMINE (BENADRYL) 50 MG tablet Take 50 mg by mouth every 4 (four) hours as needed for Itching.   Yes Historical Provider   estradioL (ESTRACE) 2 MG tablet TAKE 1 AND 1/2 TABLETS BY  MOUTH ONCE DAILY 5/20/22  Yes Niesha Dunn MD   flavoxATE (URISPAS) 100 mg Tab 1 tablet   Yes Historical Provider   galcanezumab-gnlm (EMGALITY PEN) 120 mg/mL PnIj Loading dose, inject both syringes (240 mg " total) as shown by neuro nurse 4/29/22  Yes Ananth Dillon PA-C   galcanezumab-gnlm (EMGALITY PEN) 120 mg/mL PnIj Inject 120 mg into the skin every 28 days. 4/29/22  Yes Ananth Dillon PA-C   Lactobacillus rhamnosus GG (CULTURELLE) 10 billion cell capsule Take 1 capsule by mouth once daily.   Yes Historical Provider   loperamide (IMODIUM) 2 mg capsule TAKE 2 CAPSULES BY MOUTH  TWICE DAILY AS NEEDED 5/28/22  Yes Zack Lehman MD   meclizine (ANTIVERT) 25 mg tablet Take 25 mg by mouth 3 (three) times daily as needed.   Yes Historical Provider   methocarbamol (ROBAXIN-750 ORAL) Take by mouth.   Yes Historical Provider   pantoprazole (PROTONIX) 40 mg injection Inject 40 mg into the vein 2 (two) times a day. 1/6/22 1/6/23 Yes Zack Lehman MD   promethazine (PHENERGAN) 25 mg/mL injection Inject 25 mg into the vein every 4 (four) hours.   Yes Historical Provider   simethicone (MYLICON) 125 MG chewable tablet Take 125 mg by mouth 2 (two) times a day.   Yes Historical Provider   simvastatin (ZOCOR) 40 MG tablet Take 40 mg by mouth every evening.  9/8/16  Yes Historical Provider   SODIUM CHLORIDE 0.45 % IV Inject 1,000 mLs into the vein as needed.    Yes Historical Provider   sodium chloride 0.9% 0.9 % SolP 50 mL with promethazine 25 mg/mL Soln Inject 25 mg into the vein every 4 (four) hours. Thru IV   Yes Historical Provider   sodium chloride 0.9% SolP 100 mL with pantoprazole 40 mg SolR 40 mg Inject 40 mg into the vein every 12 (twelve) hours. 1/6/22  Yes Zack Lehman MD   sucralfate (CARAFATE) 1 gram tablet TAKE 1 TABLET BY MOUTH 4  TIMES DAILY BEFORE MEALS  AND AT NIGHT 5/28/22  Yes Zack Lehman MD   tiZANidine (ZANAFLEX) 4 MG tablet Take 4 mg by mouth 3 (three) times daily. 1/10/21  Yes Historical Provider   triamcinolone acetonide 0.1% (KENALOG) 0.1 % ointment AAA hands bid - may occlude qhs 4/26/22  Yes Jen Thomas MD   clotrimazole-betamethasone 1-0.05% (LOTRISONE) cream APPLY TO AFFECTED AREA 2-3  TIMES DAILY AS NEEDED 4/6/21   Historical Provider   COVID-19 antigen test Kit  2/26/22   Historical Provider   fluticasone propionate (FLONASE) 50 mcg/actuation nasal spray 1 spray by Each Nostril route once daily.    Historical Provider       Anticoagulation/Antiplatelet Meds: no anticoagulation    Review of Systems:   Hematological: no known coagulopathies  Respiratory: no shortness of breath  Cardiovascular: no chest pain  Gastrointestinal: no abdominal pain  Genitourinary: no dysuria  Musculoskeletal: negative  Neurological: no TIA or stroke symptoms     Physical Exam:  Temp: 97.3 °F (36.3 °C) (06/15/22 0943)  Pulse: 86 (06/15/22 0943)  Resp: 18 (06/15/22 0943)  BP: 137/70 (06/15/22 0943)  SpO2: 97 % (06/15/22 0943)    General: NAD  HEENT: Normocephalic, sclera anicteric, oropharynx clear  Neck/chest: Supple, no palpable lymphadenopathy, right IJ chest port in place, already accessed  Heart: RRR  Lungs: Symmetric excursions, breathing unlabored  Abd: NTND, soft  Extremities: MTZ  Neuro: Gross nonfocal    Laboratory:  Lab Results   Component Value Date    INR 1.1 01/27/2022       Lab Results   Component Value Date    WBC 11.33 01/27/2022    HGB 13.2 01/27/2022    HCT 36 04/26/2022    MCV 88 01/27/2022     01/27/2022      Lab Results   Component Value Date    GLU 87 01/27/2022     01/27/2022    K 4.2 01/27/2022     01/27/2022    CO2 23 01/27/2022    BUN 17 01/27/2022    CREATININE 0.8 01/27/2022    CALCIUM 9.5 01/27/2022    MG 1.8 12/18/2019    ALT 16 01/27/2022    AST 28 01/27/2022    ALBUMIN 3.9 01/27/2022    BILITOT 0.3 01/27/2022       Imaging:  Images for port placement 1/27/22 reviewed.    Assessment/Plan:  52 y.o. female with an indwelling right IJ vein chest port. Will undergo check today.    Sedation: None      Ford Valdes MD  [facility]  Pager [phone]

## 2022-06-15 NOTE — PROCEDURES
Interventional Radiology Immediate Post-Procedure Note    Pre-Op Diagnosis: Gastroparesis  Post-Op Diagnosis: Same    Procedure: Port check    Procedure performed by: Ford Valdes MD  Assistants: None    Estimated Blood Loss: None  Specimen Removed: None    Findings/description of procedure:  Contrast injected via the indwelling right IJ vein chest port confirms the port is in good position and patent. There is an associated fibrin sheath but this is not obstructing.    No immediate complications. Patient tolerated procedure well. Please see full dictated procedure report for additional details and recommendations.    Recs:  1. Port may be used immediately.  2. Port should be flushed with NS and packed with heparin (hep lock) after every use.      Ford Valdes MD  [facility]  Pager [phone]

## 2022-06-15 NOTE — PROGRESS NOTES
DATE: 6/15/2022  PATIENT: Nan Betts    Attending Physician: Vladimir Dan M.D.    CHIEF COMPLAINT: LBP    HISTORY:  Nan Betts is a 52 y.o. female w/ a hx of interstitial cystitis, gastroparesis (gastric pacemaker in place), GERD, GI bleeds X2 (cannot take NSAIDs), b/l cubital tunnel syndrome (s/p releases), and back injury (in her 20s) presents for initial evaluation of low back pain (Back - 5). The pain has been present for many years. The patient describes the pain as dull but it does not go down her legs.  The pain is worse with laying down and improved by sitting upright. There is associated numbness and tingling in BLE. There is subjective weakness BLE (R>L). Prior treatments have included meds (narcotis, NSAIDs, robaxin, tizanidine), PT and depo shots, but no ADELINA or surgery.    Patient saw Shamika Contreras, who ordered EMG to assess her BLE numbness. She cannot get MRI due to gastric pacemaker.    The Patient denies myelopathic symptoms such as handwriting changes or difficulty with buttons/coins/keys. Denies perineal paresthesias, bowel/bladder dysfunction.    The patient smokes 1/2 ppd for 37 years; she does not have DM or endorse IVDU. The patient is not on any blood thinners and does not take chronic narcotics. She does not work (stay at home).    PAST MEDICAL/SURGICAL HISTORY:  Past Medical History:   Diagnosis Date    Abnormal Pap smear of vagina     Anxiety     Cataract     Chronic pain     TMJ and abdomen    Colon polyp     Epilepsy     as a child    Gastroparesis     Idiopathic    GERD (gastroesophageal reflux disease)     H/O abnormal cervical Papanicolaou smear     Hypercholesteremia 6/21/2016    Hyperlipidemia     IC (interstitial cystitis)     Internal hemorrhoids     Interstitial cystitis     Irritable bowel syndrome (IBS)     Irritable bowel syndrome with diarrhea 6/21/2016    Spastic colon     TMJ (temporomandibular joint disorder)     TMJ (temporomandibular  joint syndrome) 6/21/2016     Past Surgical History:   Procedure Laterality Date    APPENDECTOMY      BREAST BIOPSY Bilateral     CATARACT EXTRACTION      CHOLECYSTECTOMY      COLONOSCOPY      COLONOSCOPY N/A 7/31/2018    Procedure: COLONOSCOPY;  Surgeon: Zack Lehman MD;  Location: [facility] (4TH FLR);  Service: Endoscopy;  Laterality: N/A;    CYSTOSCOPY N/A 8/25/2020    Procedure: CYSTOSCOPY;  Surgeon: Christal Khan MD;  [facility];  Service: Urology;  Laterality: N/A;    ELBOW SURGERY      ESOPHAGOGASTRODUODENOSCOPY N/A 7/31/2018    Procedure: EGD (ESOPHAGOGASTRODUODENOSCOPY);  Surgeon: Zack Lehman MD;  Location: [facility] (4TH FLR);  Service: Endoscopy;  Laterality: N/A;  RUQ gastric pacemaker    Left upper arm PICC line    PONV    ESOPHAGOGASTRODUODENOSCOPY N/A 9/3/2019    Procedure: EGD (ESOPHAGOGASTRODUODENOSCOPY);  Surgeon: Zack Lehman MD;  Location: [facility] (4TH FLR);  Service: Endoscopy;  Laterality: N/A;  history of gastoparesis, per change in protocol, pt instructed to do full liquid diet x 3 days prior to procedure and clear liquids x 1 day prior to procedure-BB  pt has gastric pacemaker, monitored by Dr. Lehman-BB  hx of epilepsy, last seizure during chi    ESOPHAGOGASTRODUODENOSCOPY N/A 9/10/2020    Procedure: ESOPHAGOGASTRODUODENOSCOPY (EGD);  Surgeon: Zack Lehman MD;  Location: [facility] (4TH FLR);  Service: Endoscopy;  Laterality: N/A;  3 days Full liquid diet/ 1 day Clear liquids  waiting for Pt to cb with date - ERW  Left upper arm -  PICC  COVID screening 9/7/20 [facility]- [facility]    ESOPHAGOGASTRODUODENOSCOPY N/A 11/3/2021    Procedure: EGD (ESOPHAGOGASTRODUODENOSCOPY);  Surgeon: Zack Lehman MD;  Location: [facility] (4TH FLR);  Service: Endoscopy;  Laterality: N/A;  10/15 fully vaccinated as of 7/15/21; gastric pacemaker-pt does not have remote;  pt has a port does not want IV started; 3 days full liquid diet; instr. to portal-st    GASTRIC  STIMULATOR IMPLANT SURGERY      left side on 03/30/2020    gastric stimulator placement      LASIK Bilateral 2006    MANDIBLE SURGERY Bilateral 10/17/2016    OOPHORECTOMY Bilateral     PYLOROPLASTY  03/2016    STOMACH SURGERY      gastric pacemaker    TEMPOROMANDIBULAR JOINT SURGERY  12/2016    TONSILLECTOMY      TOTAL ABDOMINAL HYSTERECTOMY  2005    EUGENIA/BSO, Trachelectomy  2012    UPPER GASTROINTESTINAL ENDOSCOPY      WRIST SURGERY         Current Medications:   Current Outpatient Medications:     (Magic mouthwash) 1:1:1 diphenhydramine(Benadryl) 12.5mg/5ml liq, aluminum & magnesium hydroxide-simethicone (Maalox), LIDOcaine viscous 2%, Swish and spit 5 mLs every 4 (four) hours as needed (mouth sores). for mouth sores, Disp: 240 mL, Rfl: 3    ALPRAZolam (XANAX) 1 MG tablet, Take 1 mg by mouth 2 (two) times daily as needed. , Disp: , Rfl:     clotrimazole-betamethasone 1-0.05% (LOTRISONE) cream, APPLY TO AFFECTED AREA 2-3 TIMES DAILY AS NEEDED, Disp: , Rfl:     COVID-19 antigen test Kit, , Disp: , Rfl:     diphenhydrAMINE (BENADRYL) 50 MG tablet, Take 50 mg by mouth every 4 (four) hours as needed for Itching., Disp: , Rfl:     estradioL (ESTRACE) 2 MG tablet, TAKE 1 AND 1/2 TABLETS BY  MOUTH ONCE DAILY, Disp: 135 tablet, Rfl: 0    flavoxATE (URISPAS) 100 mg Tab, 1 tablet, Disp: , Rfl:     fluticasone propionate (FLONASE) 50 mcg/actuation nasal spray, 1 spray by Each Nostril route once daily., Disp: , Rfl:     galcanezumab-gnlm (EMGALITY PEN) 120 mg/mL PnIj, Loading dose, inject both syringes (240 mg total) as shown by neuro nurse, Disp: 2 mL, Rfl: 0    galcanezumab-gnlm (EMGALITY PEN) 120 mg/mL PnIj, Inject 120 mg into the skin every 28 days., Disp: 1 mL, Rfl: 11    iron fum,ps/folic acid/vitC/B3 (INTEGRA F ORAL), Take 1 mg by mouth once daily. No miligrams on bottle., Disp: , Rfl:     Lactobacillus rhamnosus GG (CULTURELLE) 10 billion cell capsule, Take 1 capsule by mouth once daily., Disp: ,  Rfl:     loperamide (IMODIUM) 2 mg capsule, TAKE 2 CAPSULES BY MOUTH  TWICE DAILY AS NEEDED, Disp: 360 capsule, Rfl: 1    meclizine (ANTIVERT) 25 mg tablet, Take 25 mg by mouth 3 (three) times daily as needed., Disp: , Rfl:     methocarbamol (ROBAXIN-750 ORAL), Take by mouth., Disp: , Rfl:     pantoprazole (PROTONIX) 40 mg injection, Inject 40 mg into the vein 2 (two) times a day., Disp: 60 each, Rfl: 11    promethazine (PHENERGAN) 25 mg/mL injection, Inject 25 mg into the vein every 4 (four) hours., Disp: , Rfl:     simethicone (MYLICON) 125 MG chewable tablet, Take 125 mg by mouth 2 (two) times a day., Disp: , Rfl:     simvastatin (ZOCOR) 40 MG tablet, Take 40 mg by mouth every evening. , Disp: , Rfl:     SODIUM CHLORIDE 0.45 % IV, Inject 1,000 mLs into the vein as needed. , Disp: , Rfl:     sodium chloride 0.9% 0.9 % SolP 50 mL with promethazine 25 mg/mL Soln, Inject 25 mg into the vein every 4 (four) hours. Thru IV, Disp: , Rfl:     sodium chloride 0.9% SolP 100 mL with pantoprazole 40 mg SolR 40 mg, Inject 40 mg into the vein every 12 (twelve) hours., Disp: 60 vial, Rfl: 0    sucralfate (CARAFATE) 1 gram tablet, TAKE 1 TABLET BY MOUTH 4  TIMES DAILY BEFORE MEALS  AND AT NIGHT, Disp: 360 tablet, Rfl: 3    tiZANidine (ZANAFLEX) 4 MG tablet, Take 4 mg by mouth 3 (three) times daily., Disp: , Rfl:     triamcinolone acetonide 0.1% (KENALOG) 0.1 % ointment, AAA hands bid - may occlude qhs, Disp: 454 g, Rfl: 3  No current facility-administered medications for this visit.    Social History:   Social History     Socioeconomic History    Marital status:    Tobacco Use    Smoking status: Current Every Day Smoker     Packs/day: 1.00     Years: 20.00     Pack years: 20.00     Start date: 4/14/1986    Smokeless tobacco: Never Used   Substance and Sexual Activity    Alcohol use: No     Alcohol/week: 0.0 standard drinks    Drug use: No    Sexual activity: Yes     Partners: Male     Comment:   "since 2012       REVIEW OF SYSTEMS:  Constitution: Negative. Negative for chills, fever and night sweats.   Cardiovascular: Negative for chest pain and syncope.   Respiratory: Negative for cough and shortness of breath.   Gastrointestinal: See HPI. Negative for nausea/vomiting. Negative for abdominal pain.  Genitourinary: See HPI. Negative for discoloration or dysuria.  Skin: Negative for dry skin, itching and rash.   Hematologic/Lymphatic: negative for bleeding/clotting disorders.   Musculoskeletal: Negative for falls and muscle weakness.   Neurological: See HPI. no history of seizures. no history of cranial surgery or shunts.  Endocrine: Negative for polydipsia, polyphagia and polyuria.   Allergic/Immunologic: Negative for hives and persistent infections.    PHYSICAL EXAMINATION:    Ht 5' 8" (1.727 m)   Wt 113.6 kg (250 lb 8.8 oz)   BMI 38.10 kg/m²     General: The patient is a 52 y.o. female in no apparent distress, the patient is orientatied to person, place and time.   Psych: Normal mood and affect  HEENT: Vision grossly intact, hearing intact to the spoken word.  Lungs: Respirations unlabored.  Gait: Normal station and gait, no difficulty with toe or heel walk.   Skin: Dorsal lumbar skin negative for rashes, lesions, hairy patches and surgical scars.  Range of motion: Lumbar range of motion is acceptable. There is lower lumbar tenderness to palpation.  Spinal Balance: Global saggital and coronal spinal balance acceptable, no significant for scoliosis and kyphosis.  Musculoskeletal: No pain with the range of motion of the bilateral hips. No trochanteric tenderness to palpation.  Vascular: Bilateral lower extremities warm and well perfused, Dorsalis pedis pulses 2+ bilaterally.  Neurological: Normal strength and tone in all major motor groups in the bilateral lower extremities. Normal sensation to light touch in the L2-S1 dermatomes bilaterally.  Deep tendon reflexes symmetric 2+ in the bilateral lower " extremities.  Negative Babinski bilaterally.    IMAGING:   Today I independently reviewed the following images and my interpretations are as follows:    AP, Lat and Flex/Ex upright L-spine films demonstrate lumbar spondylosis and L5-S1 DDD.    EMG/NCV BLE was normal    Body mass index is 38.1 kg/m².  No results found for: HGBA1C      ASSESSMENT/PLAN:    Diagnoses and all orders for this visit:    DDD (degenerative disc disease), lumbar      Follow up in about 3 months (around 9/15/2022).    Patient has L5-S1 DDD. I discussed the natural history of their diagnoses as well as surgical and nonsurgical treatment options. I educated the patient on the importance of core/back strengthening, correct posture, bending/lifting ergonomics, and low-impact aerobic exercises (walking, elliptical, and aquatherapy). Continue medications. We will schedule CT myelogram, but there will be delay due to contrast shortage. She wanted to be referred to Hand Surgery for her b/l cubital tunnel syndrome. Patient will follow up in 3 months for CT myelogram review.    Vladimir Dan MD  Orthopaedic Spine Surgeon  Department of Orthopaedic Surgery  [phone]

## 2022-07-01 ENCOUNTER — PATIENT MESSAGE (OUTPATIENT)
Dept: ORTHOPEDICS | Facility: CLINIC | Age: 52
End: 2022-07-01
Payer: COMMERCIAL

## 2022-07-01 DIAGNOSIS — M79.641 BILATERAL HAND PAIN: Primary | ICD-10-CM

## 2022-07-01 DIAGNOSIS — M79.642 BILATERAL HAND PAIN: Primary | ICD-10-CM

## 2022-07-05 ENCOUNTER — OFFICE VISIT (OUTPATIENT)
Dept: ORTHOPEDICS | Facility: CLINIC | Age: 52
End: 2022-07-05
Payer: COMMERCIAL

## 2022-07-05 ENCOUNTER — PATIENT MESSAGE (OUTPATIENT)
Dept: GASTROENTEROLOGY | Facility: CLINIC | Age: 52
End: 2022-07-05
Payer: COMMERCIAL

## 2022-07-05 ENCOUNTER — PATIENT MESSAGE (OUTPATIENT)
Dept: PLASTIC SURGERY | Facility: CLINIC | Age: 52
End: 2022-07-05
Payer: COMMERCIAL

## 2022-07-05 ENCOUNTER — HOSPITAL ENCOUNTER (OUTPATIENT)
Dept: RADIOLOGY | Facility: HOSPITAL | Age: 52
Discharge: HOME | End: 2022-07-05
Attending: ORTHOPAEDIC SURGERY
Payer: COMMERCIAL

## 2022-07-05 VITALS — HEIGHT: 68 IN | WEIGHT: 250 LBS | BODY MASS INDEX: 37.89 KG/M2

## 2022-07-05 DIAGNOSIS — M79.641 BILATERAL HAND PAIN: ICD-10-CM

## 2022-07-05 DIAGNOSIS — M79.642 BILATERAL HAND PAIN: ICD-10-CM

## 2022-07-05 DIAGNOSIS — G56.03 BILATERAL CARPAL TUNNEL SYNDROME: Primary | ICD-10-CM

## 2022-07-05 PROCEDURE — 99214 PR OFFICE/OUTPT VISIT, EST, LEVL IV, 30-39 MIN: ICD-10-PCS | Mod: S$GLB,,, | Performed by: ORTHOPAEDIC SURGERY

## 2022-07-05 PROCEDURE — 73130 X-RAY EXAM OF HAND: CPT | Mod: 26,50,, | Performed by: INTERNAL MEDICINE

## 2022-07-05 PROCEDURE — 3008F BODY MASS INDEX DOCD: CPT | Mod: CPTII,S$GLB,, | Performed by: ORTHOPAEDIC SURGERY

## 2022-07-05 PROCEDURE — 73130 XR HAND COMPLETE 3 VIEWS BILATERAL: ICD-10-PCS | Mod: 26,50,, | Performed by: INTERNAL MEDICINE

## 2022-07-05 PROCEDURE — 99999 PR PBB SHADOW E&M-EST. PATIENT-LVL IV: ICD-10-PCS | Mod: PBBFAC,,, | Performed by: ORTHOPAEDIC SURGERY

## 2022-07-05 PROCEDURE — 99214 OFFICE O/P EST MOD 30 MIN: CPT | Mod: S$GLB,,, | Performed by: ORTHOPAEDIC SURGERY

## 2022-07-05 PROCEDURE — 1159F PR MEDICATION LIST DOCUMENTED IN MEDICAL RECORD: ICD-10-PCS | Mod: CPTII,S$GLB,, | Performed by: ORTHOPAEDIC SURGERY

## 2022-07-05 PROCEDURE — 3008F PR BODY MASS INDEX (BMI) DOCUMENTED: ICD-10-PCS | Mod: CPTII,S$GLB,, | Performed by: ORTHOPAEDIC SURGERY

## 2022-07-05 PROCEDURE — 73130 X-RAY EXAM OF HAND: CPT | Mod: TC,50

## 2022-07-05 PROCEDURE — 99999 PR PBB SHADOW E&M-EST. PATIENT-LVL IV: CPT | Mod: PBBFAC,,, | Performed by: ORTHOPAEDIC SURGERY

## 2022-07-05 PROCEDURE — 1159F MED LIST DOCD IN RCRD: CPT | Mod: CPTII,S$GLB,, | Performed by: ORTHOPAEDIC SURGERY

## 2022-07-05 NOTE — PROGRESS NOTES
[facility]  History & Physical  Orthopedics    SUBJECTIVE:      COVID-19 attestation:  This patient was treated during the COVID-19 pandemic.  This was discussed with the patient, they are aware of our current policies and procedures, were given the option of delaying their visit and or switching to a virtual visit, delaying their surgery when applicable, and they elect to proceed.    Chief Complaint: Bilateral hand numbness and tingling    Referring Provider: Vladimir Dan MD     History of Present Illness:  Patient is a 52 y.o. right hand dominant female who presents today with complaints of  Bilateral hand numbness and tingling.  The patient notes that she is status post bilateral ulnar nerve decompressions by Dr. Castaneda in [facility].  The right side was done March 30, 2020 in the left side was done May 7, 2020 20 per her report.  She is also status post a left de Quervain release on January 7, 2020.  These initially all have done well although she notes that she started having some recurrence in her numbness in bilateral hands in the right greater than left hands with involvement of basically the entire right hand and in the left hand is worst to the thumb and index fingers greater than the ring and small fingers.  She notes that her symptoms began about 5-6 months ago.  She has no other complaints today presents for initial evaluation.     The patient is a/an  caretaker.    Onset of symptoms/DOI was  5-6 months.    Symptoms are aggravated by activity and movement.    Symptoms are alleviated by rest.    Symptoms consist of pain and numbness/tingling.    The patient rates their pain as  mild    Attempted treatment(s) and/or interventions include activity modifications, rest, surgical intervention.     The patient denies any fevers, chills, N/V, D/C and presents for evaluation.       Past Medical History:   Diagnosis Date    Abnormal Pap smear of vagina     Anxiety     Cataract      Chronic pain     TMJ and abdomen    Colon polyp     Epilepsy     as a child    Gastroparesis     Idiopathic    GERD (gastroesophageal reflux disease)     H/O abnormal cervical Papanicolaou smear     Hypercholesteremia 6/21/2016    Hyperlipidemia     IC (interstitial cystitis)     Internal hemorrhoids     Interstitial cystitis     Irritable bowel syndrome (IBS)     Irritable bowel syndrome with diarrhea 6/21/2016    Spastic colon     TMJ (temporomandibular joint disorder)     TMJ (temporomandibular joint syndrome) 6/21/2016     Past Surgical History:   Procedure Laterality Date    APPENDECTOMY      BREAST BIOPSY Bilateral     CATARACT EXTRACTION      CHOLECYSTECTOMY      COLONOSCOPY      COLONOSCOPY N/A 7/31/2018    Procedure: COLONOSCOPY;  Surgeon: Zack Lehman MD;  Location: [facility] (4TH FLR);  Service: Endoscopy;  Laterality: N/A;    CYSTOSCOPY N/A 8/25/2020    Procedure: CYSTOSCOPY;  Surgeon: Christal Khan MD;  [facility];  Service: Urology;  Laterality: N/A;    ELBOW SURGERY      ESOPHAGOGASTRODUODENOSCOPY N/A 7/31/2018    Procedure: EGD (ESOPHAGOGASTRODUODENOSCOPY);  Surgeon: Zack Lehman MD;  Location: [facility] (4TH FLR);  Service: Endoscopy;  Laterality: N/A;  RUQ gastric pacemaker    Left upper arm PICC line    PONV    ESOPHAGOGASTRODUODENOSCOPY N/A 9/3/2019    Procedure: EGD (ESOPHAGOGASTRODUODENOSCOPY);  Surgeon: Zack Lehman MD;  Location: [facility] (4TH FLR);  Service: Endoscopy;  Laterality: N/A;  history of gastoparesis, per change in protocol, pt instructed to do full liquid diet x 3 days prior to procedure and clear liquids x 1 day prior to procedure-BB  pt has gastric pacemaker, monitored by Dr. Lehman-BB  hx of epilepsy, last seizure during chi    ESOPHAGOGASTRODUODENOSCOPY N/A 9/10/2020    Procedure: ESOPHAGOGASTRODUODENOSCOPY (EGD);  Surgeon: Zack Lehman MD;  Location: [facility] (4TH FLR);  Service: Endoscopy;  Laterality: N/A;  3 days  Full liquid diet/ 1 day Clear liquids  waiting for Pt to cb with date - ERW  Left upper arm -  PICC  COVID screening 9/7/20 [place] [facility]- [facility]    ESOPHAGOGASTRODUODENOSCOPY N/A 11/3/2021    Procedure: EGD (ESOPHAGOGASTRODUODENOSCOPY);  Surgeon: Zack Lehman MD;  Location: [facility] ([address] [place] [address]);  Service: Endoscopy;  Laterality: N/A;  10/15 fully vaccinated as of 7/15/21; gastric pacemaker-pt does not have remote;  pt has a port does not want IV started; 3 days full liquid diet; instr. to portal-st    GASTRIC STIMULATOR IMPLANT SURGERY      left side on 03/30/2020    gastric stimulator placement      LASIK Bilateral 2006    MANDIBLE SURGERY Bilateral 10/17/2016    OOPHORECTOMY Bilateral     PYLOROPLASTY  03/2016    STOMACH SURGERY      gastric pacemaker    TEMPOROMANDIBULAR JOINT SURGERY  12/2016    TONSILLECTOMY      TOTAL ABDOMINAL HYSTERECTOMY  2005    EUGENIA/BSO, Trachelectomy  2012    UPPER GASTROINTESTINAL ENDOSCOPY      WRIST SURGERY       Review of patient's allergies indicates:   Allergen Reactions    Corticosteroids (glucocorticoids) Nausea And Vomiting and Other (See Comments)     Acid reflux  Acid reflux  Increases acid reflux    Ketorolac Anaphylaxis and Shortness Of Breath     Other reaction(s): Throat swelling, Unknown    Morphine Anaphylaxis    Prednisone Other (See Comments)     Increases acid reflux    Tramadol Shortness Of Breath     Other reaction(s): Throat swelling, Unknown    Codeine Itching    Gabapentin Other (See Comments)     Causes suicidal thoughts  Other reaction(s): suicidal thoughts  Causes suicidal thoughts  Causes suicidal thoughts      Ibuprofen Other (See Comments)     G L Bleeding  G L Bleeding  G L Bleeding    Nsaids (non-steroidal anti-inflammatory drug) Other (See Comments)     GI BLEEDING  Other reaction(s): Other: See Comments  GI BLEEDING  GI BLEEDING    Hydrocodone      Other reaction(s): Unknown    Hydrocodone-acetaminophen     Metoclopramide  "Other (See Comments)     Other reaction(s): Headache, Other (See Comments)  Headaches and insomnia  Headaches and insomnia  Headaches and insomnia      Morphine sulfate      Other reaction(s): Unknown    Reglan [metoclopramide hcl] Other (See Comments)     Headaches and insomnia      Topiramate Other (See Comments)     Ulcers in the mouth and dry mouth  Other reaction(s): Other: See Comments, Unknown  per pt, gets "fog brain"  per pt, gets "fog brain"  Ulcers in the mouth and dry mouth  Ulcers in the mouth and dry mouth       Social History     Social History Narrative    Not on file     Family History   Problem Relation Age of Onset    Coronary artery disease Mother     Hodgkin's lymphoma Mother     Hypertension Mother     Coronary artery disease Father 43    Hypertension Father     Heart attacks under age 50 Father     Lymphoma Sister     Coronary artery disease Paternal Grandfather     Stroke Paternal Grandfather     Glaucoma Maternal Grandmother     Pancreatic cancer Paternal Aunt     Cancer Paternal Aunt         pancreatic    Stroke Maternal Grandfather     Colon cancer Neg Hx     Breast cancer Neg Hx     Ovarian cancer Neg Hx     Cervical cancer Neg Hx     Endometrial cancer Neg Hx     Vaginal cancer Neg Hx     Diabetes Neg Hx          Current Outpatient Medications:     (Magic mouthwash) 1:1:1 diphenhydramine(Benadryl) 12.5mg/5ml liq, aluminum & magnesium hydroxide-simethicone (Maalox), LIDOcaine viscous 2%, Swish and spit 5 mLs every 4 (four) hours as needed (mouth sores). for mouth sores, Disp: 240 mL, Rfl: 3    ALPRAZolam (XANAX) 1 MG tablet, Take 1 mg by mouth 2 (two) times daily as needed. , Disp: , Rfl:     clotrimazole-betamethasone 1-0.05% (LOTRISONE) cream, APPLY TO AFFECTED AREA 2-3 TIMES DAILY AS NEEDED, Disp: , Rfl:     COVID-19 antigen test Kit, , Disp: , Rfl:     diphenhydrAMINE (BENADRYL) 50 MG tablet, Take 50 mg by mouth every 4 (four) hours as needed for Itching., " Disp: , Rfl:     estradioL (ESTRACE) 2 MG tablet, TAKE 1 AND 1/2 TABLETS BY  MOUTH ONCE DAILY, Disp: 135 tablet, Rfl: 0    flavoxATE (URISPAS) 100 mg Tab, 1 tablet, Disp: , Rfl:     fluticasone propionate (FLONASE) 50 mcg/actuation nasal spray, 1 spray by Each Nostril route once daily., Disp: , Rfl:     galcanezumab-gnlm (EMGALITY PEN) 120 mg/mL PnIj, Loading dose, inject both syringes (240 mg total) as shown by neuro nurse, Disp: 2 mL, Rfl: 0    galcanezumab-gnlm (EMGALITY PEN) 120 mg/mL PnIj, Inject 120 mg into the skin every 28 days., Disp: 1 mL, Rfl: 11    iron fum,ps/folic acid/vitC/B3 (INTEGRA F ORAL), Take 1 mg by mouth once daily. No miligrams on bottle., Disp: , Rfl:     Lactobacillus rhamnosus GG (CULTURELLE) 10 billion cell capsule, Take 1 capsule by mouth once daily., Disp: , Rfl:     loperamide (IMODIUM) 2 mg capsule, TAKE 2 CAPSULES BY MOUTH  TWICE DAILY AS NEEDED, Disp: 360 capsule, Rfl: 1    meclizine (ANTIVERT) 25 mg tablet, Take 25 mg by mouth 3 (three) times daily as needed., Disp: , Rfl:     methocarbamol (ROBAXIN-750 ORAL), Take by mouth., Disp: , Rfl:     pantoprazole (PROTONIX) 40 mg injection, Inject 40 mg into the vein 2 (two) times a day., Disp: 60 each, Rfl: 11    promethazine (PHENERGAN) 25 mg/mL injection, Inject 25 mg into the vein every 4 (four) hours., Disp: , Rfl:     simethicone (MYLICON) 125 MG chewable tablet, Take 125 mg by mouth 2 (two) times a day., Disp: , Rfl:     simvastatin (ZOCOR) 40 MG tablet, Take 40 mg by mouth every evening. , Disp: , Rfl:     SODIUM CHLORIDE 0.45 % IV, Inject 1,000 mLs into the vein as needed. , Disp: , Rfl:     sodium chloride 0.9% 0.9 % SolP 50 mL with promethazine 25 mg/mL Soln, Inject 25 mg into the vein every 4 (four) hours. Thru IV, Disp: , Rfl:     sodium chloride 0.9% SolP 100 mL with pantoprazole 40 mg SolR 40 mg, Inject 40 mg into the vein every 12 (twelve) hours., Disp: 60 vial, Rfl: 0    sucralfate (CARAFATE) 1 gram  "tablet, TAKE 1 TABLET BY MOUTH 4  TIMES DAILY BEFORE MEALS  AND AT NIGHT, Disp: 360 tablet, Rfl: 3    tiZANidine (ZANAFLEX) 4 MG tablet, Take 4 mg by mouth 3 (three) times daily., Disp: , Rfl:     triamcinolone acetonide 0.1% (KENALOG) 0.1 % ointment, AAA hands bid - may occlude qhs, Disp: 454 g, Rfl: 3      Review of Systems:  As per HPI otherwise noncontributory    OBJECTIVE:      Vital Signs (Most Recent):  Vitals:    07/05/22 0802   Weight: 113.4 kg (250 lb)   Height: 5' 8" (1.727 m)     Body mass index is 38.01 kg/m².      Physical Exam:  Constitutional: The patient appears well-developed and well-nourished. No distress.   Skin: No lesions appreciated  Head: Normocephalic and atraumatic.   Nose: Nose normal.   Ears: No deformities seen  Eyes: Conjunctivae and EOM are normal.   Neck: No tracheal deviation present.   Cardiovascular: Normal rate and intact distal pulses.    Pulmonary/Chest: Effort normal. No respiratory distress.   Abdominal: There is no guarding.   Neurological: The patient is alert.   Psychiatric: The patient has a normal mood and affect.     Bilateral Hand/Wrist Examination:    Observation/Inspection:  Swelling  none    Deformity  none  Discoloration  none     Scars    bilateral cubital tunnel and left de Quervain, all well-healed    Atrophy  none    HAND/WRIST EXAMINATION:  Finkelstein's Test   Neg  WHAT Test    Neg  Snuff box tenderness   Neg  Reed's Test    Neg  Hook of Hamate Tenderness  Neg  CMC grind    Neg  Circumduction test   Neg    Neurovascular Exam:  Digits WWP, brisk CR < 3s throughout  NVI motor/LTS to M/R/U nerves, radial pulse 2+  Tinel's Test - Carpal Tunnel  Neg  Tinel's Test - Cubital Tunnel  Neg  Phalen's Test    Neg  Median Nerve Compression Test Neg    ROM hand full, painless    ROM wrist full, painless    ROM elbow full, painless    Abdomen not guarded  Respirations nonlabored  Perfusion intact    Diagnostic Results:     Imaging - I independently viewed the patient's " imaging as well as the radiology report.  Xrays of the patient's  Bilateral hands  demonstrate no evidence of any acute fractures or dislocations or significant degenerative changes.    EMG -  none    ASSESSMENT/PLAN:      52 y.o. yo female with  Bilateral hand numbness status post prior cubital tunnel release  Plan: The patient and I had a thorough discussion today.  We discussed the working diagnosis as well as several other potential alternative diagnoses.  Treatment options were discussed, both conservative and surgical.  Conservative treatment options would include things such as activity modifications, workplace modifications, a period of rest, oral vs topical OTC and prescription anti-inflammatory medications, occupational therapy, splinting/bracing, immobilization, corticosteroid injections, and others.  Surgical options were discussed as well.     At this time, the patient  Seems to be suffering most from carpal tunnel syndrome in her bilateral right greater than left hands.  However she does not have any provocative tests today.  I would like to prescribe her nocturnal carpal tunnel braces and obtain an EMG of bilateral upper extremities to evaluate for carpal tunnel syndrome and or recurrent cubital tunnel syndrome.  She may follow-up after for additional treatment going forward.    Should the patient's symptoms worsen, persist, or fail to improve they should return for reevaluation and I would be happy to see them back anytime.        William Laboy M.D.     Please be aware that this note has been generated with the assistance of [email or web address] voice-to-text.  Please excuse any spelling or grammatical errors.    Thank you for choosing Dr. William Laboy for your orthopedic hand and upper extremity care. It is our goal to provide you with exceptional care that will help keep you healthy, active, and get you back in the game.     If you felt that you received exemplary care today, please consider leaving feedback  for Dr. Laboy on [employer] at [email or web address].    Please do not hesitate to reach out to us via email, phone, or [employer] with any questions, concerns, or feedback.

## 2022-07-06 ENCOUNTER — PATIENT MESSAGE (OUTPATIENT)
Dept: PLASTIC SURGERY | Facility: CLINIC | Age: 52
End: 2022-07-06

## 2022-07-07 ENCOUNTER — PATIENT MESSAGE (OUTPATIENT)
Dept: PLASTIC SURGERY | Facility: CLINIC | Age: 52
End: 2022-07-07
Payer: COMMERCIAL

## 2022-07-11 NOTE — DISCHARGE INSTRUCTIONS
Caller: Yaritza Hernandez    Relationship: Self    Best call back number: [phone]    What form or medical record are you requesting: MEDICAL RECORDS     Who is requesting this form or medical record from you: [facility] DR DANIELLE YE    How would you like to receive the form or medical records (pick-up, mail, fax): [phone]    Timeframe paperwork needed: 7/18/22    Additional notes: PATIENT MOVED TO ANOTHER PRACTICE UNDER A NEW PCP WOULD LIKE RECORDS SENT THERE.              Upper GI Endoscopy     During endoscopy, a long, flexible tube is used to view the inside of your upper GI tract.      Upper GI endoscopy allows your healthcare provider to look directly into the beginning of your gastrointestinal (GI) tract. The esophagus, stomach, and duodenum (the first part of the small intestine) make up the upper GI tract.   Before the exam  Follow these and any other instructions you are given before your endoscopy. If you dont follow the healthcare providers instructions carefully, the test may need to be canceled or done over:  · Don't eat or drink anything after midnight the night before your exam. If your exam is in the afternoon, drink only clear liquids in the morning. Don't eat or drink anything for 8 hours before the exam. In some cases, you may be able to take medicines with sips of water until 2 hours before the procedure. Speak with your healthcare provider about this.   · Bring your X-rays and any other test results you have.  · Because you will be sedated, arrange for an adult to drive you home after the exam.  · Tell your healthcare provider before the exam if you are taking any medicines or have any medical problems.  The procedure  Here is what to expect:  · You will lie on the endoscopy table. Usually patients lie on the left side.  · You will be monitored and given oxygen.  · Your throat may be numbed with a spray or gargle. You are given medicine through an intravenous (IV) line that will help you relax and remain comfortable. You may be awake or asleep during the procedure.  · The healthcare provider will put the endoscope in your mouth and down your esophagus. It is thinner than most pieces of food that you swallow. It will not affect your breathing. The medicine helps keep you from gagging.  · Air is put into your GI tract to expand it. It can make you burp.  · During the procedure, the healthcare provider can take biopsies (tissue samples), remove abnormalities,  such as polyps, or treat abnormalities through a variety of devices placed through the endoscope. You will not feel this.   · The endoscope carries images of your upper GI tract to a video screen. If you are awake, you may be able to look at the images.  · After the procedure is done, you will rest for a time. An adult must drive you home.  When to call your healthcare provider  Contact your healthcare provider if you have:  · Black or tarry stools, or blood in your stool  · Fever  · Pain in your belly that does not go away  · Nausea and vomiting, or vomiting blood   Date Last Reviewed: 7/1/2016  © 3364-6123 [employer]. [address] [place] [address], [place] [address]. All rights reserved. This information is not intended as a substitute for professional medical care. Always follow your healthcare professional's instructions.

## 2022-07-13 ENCOUNTER — TELEPHONE (OUTPATIENT)
Dept: GASTROENTEROLOGY | Facility: CLINIC | Age: 52
End: 2022-07-13
Payer: COMMERCIAL

## 2022-07-13 NOTE — TELEPHONE ENCOUNTER
----- Message from Lucía Sy sent at 7/13/2022 10:06 AM CDT -----  Type: Call Back      Who called: Esperanza with [facility]      What is the request in detail: Patient is requesting a call back from Angela. She would like to get the patients last office notes faxed to [phone]. Please advise.       Can the clinic reply by [facility]?       Would the patient rather a call back or a response via [facility]?       Best call back number: [phone]      Additional Information:

## 2022-07-14 DIAGNOSIS — N64.89 GALACTOCELE: ICD-10-CM

## 2022-07-14 DIAGNOSIS — Z82.49 FAMILY HX OF ISCHEM HEART DIS AND OTH DIS OF THE CIRC SYS: ICD-10-CM

## 2022-07-14 DIAGNOSIS — F17.210 NICOTINE DEPENDENCE, CIGARETTES, UNCOMPLICATED: ICD-10-CM

## 2022-07-14 DIAGNOSIS — I10 ESSENTIAL (PRIMARY) HYPERTENSION: ICD-10-CM

## 2022-07-14 DIAGNOSIS — F41.9 ANXIETY DISORDER, UNSPECIFIED: ICD-10-CM

## 2022-07-14 DIAGNOSIS — R00.2 PALPITATIONS: ICD-10-CM

## 2022-07-14 DIAGNOSIS — N64.89 BILATERAL PENDULOUS BREASTS: Primary | ICD-10-CM

## 2022-07-14 DIAGNOSIS — E78.2 MIXED HYPERLIPIDEMIA: ICD-10-CM

## 2022-07-14 DIAGNOSIS — R07.89 OTHER CHEST PAIN: Primary | ICD-10-CM

## 2022-07-18 ENCOUNTER — TELEPHONE (OUTPATIENT)
Dept: PHARMACY | Facility: CLINIC | Age: 52
End: 2022-07-18
Payer: COMMERCIAL

## 2022-07-18 ENCOUNTER — PATIENT MESSAGE (OUTPATIENT)
Dept: NEUROLOGY | Facility: CLINIC | Age: 52
End: 2022-07-18
Payer: COMMERCIAL

## 2022-07-18 ENCOUNTER — PATIENT MESSAGE (OUTPATIENT)
Dept: PLASTIC SURGERY | Facility: CLINIC | Age: 52
End: 2022-07-18
Payer: COMMERCIAL

## 2022-07-18 DIAGNOSIS — G43.709 CHRONIC MIGRAINE WITHOUT AURA WITHOUT STATUS MIGRAINOSUS, NOT INTRACTABLE: Primary | ICD-10-CM

## 2022-07-19 ENCOUNTER — PATIENT MESSAGE (OUTPATIENT)
Dept: NEUROLOGY | Facility: CLINIC | Age: 52
End: 2022-07-19
Payer: COMMERCIAL

## 2022-07-19 ENCOUNTER — TELEPHONE (OUTPATIENT)
Dept: PHARMACY | Facility: CLINIC | Age: 52
End: 2022-07-19
Payer: COMMERCIAL

## 2022-07-19 ENCOUNTER — PATIENT MESSAGE (OUTPATIENT)
Dept: ORTHOPEDICS | Facility: CLINIC | Age: 52
End: 2022-07-19
Payer: COMMERCIAL

## 2022-07-19 ENCOUNTER — TELEPHONE (OUTPATIENT)
Dept: ORTHOPEDICS | Facility: CLINIC | Age: 52
End: 2022-07-19
Payer: COMMERCIAL

## 2022-07-19 ENCOUNTER — PATIENT MESSAGE (OUTPATIENT)
Dept: NEUROSURGERY | Facility: CLINIC | Age: 52
End: 2022-07-19
Payer: COMMERCIAL

## 2022-07-19 RX ORDER — FREMANEZUMAB-VFRM 225 MG/1.5ML
225 INJECTION SUBCUTANEOUS
Qty: 1 EACH | Refills: 10 | Status: SHIPPED | OUTPATIENT
Start: 2022-07-19 | End: 2023-02-15

## 2022-07-19 NOTE — TELEPHONE ENCOUNTER
Ajovy prescription has been APPROVED FROM 7/19/2022 TO 1/19/2023 with copayment of $0.       [facility] [place] @[phone] will reach out to patient for further correspondence.

## 2022-07-19 NOTE — TELEPHONE ENCOUNTER
Spoke to patient, informed her she was on the list for scheduling Myelograms and she has about 10 people ahead of her. They will be call her in a week or so, per Jan in myelogram dept.

## 2022-07-20 ENCOUNTER — TELEPHONE (OUTPATIENT)
Dept: NEUROSURGERY | Facility: CLINIC | Age: 52
End: 2022-07-20
Payer: COMMERCIAL

## 2022-07-20 NOTE — TELEPHONE ENCOUNTER
----- Message from Ariadna Lancaster sent at 7/20/2022 12:52 PM CDT -----  Nan Betts calling regarding Appointment Access (message) for a sooner appt.

## 2022-07-20 NOTE — TELEPHONE ENCOUNTER
Called and spoke with pt. She was looking for an earlier appt on same day. Explained again no earlier appt same day. Pt verbalized understanding.

## 2022-07-22 ENCOUNTER — PATIENT MESSAGE (OUTPATIENT)
Dept: PLASTIC SURGERY | Facility: CLINIC | Age: 52
End: 2022-07-22
Payer: COMMERCIAL

## 2022-07-25 ENCOUNTER — PATIENT MESSAGE (OUTPATIENT)
Dept: PLASTIC SURGERY | Facility: CLINIC | Age: 52
End: 2022-07-25
Payer: COMMERCIAL

## 2022-07-25 ENCOUNTER — TELEPHONE (OUTPATIENT)
Dept: PLASTIC SURGERY | Facility: CLINIC | Age: 52
End: 2022-07-25
Payer: COMMERCIAL

## 2022-07-25 NOTE — TELEPHONE ENCOUNTER
Spoke to the pt today and she is aware that we are waiting for her insurance to approve her procedure.  Her consultation was 7/6 and it can take up to 6 weeks for approval  Pt also needs to be nicotine free, have PCP clearance and cardiology clearance prior to scheduling a surgical date with the nurse.

## 2022-07-28 ENCOUNTER — PATIENT MESSAGE (OUTPATIENT)
Dept: NEUROSURGERY | Facility: CLINIC | Age: 52
End: 2022-07-28
Payer: COMMERCIAL

## 2022-07-29 ENCOUNTER — PATIENT MESSAGE (OUTPATIENT)
Dept: ORTHOPEDICS | Facility: CLINIC | Age: 52
End: 2022-07-29
Payer: COMMERCIAL

## 2022-07-29 DIAGNOSIS — M50.30 DDD (DEGENERATIVE DISC DISEASE), CERVICAL: Primary | ICD-10-CM

## 2022-07-29 DIAGNOSIS — M51.36 DDD (DEGENERATIVE DISC DISEASE), LUMBAR: ICD-10-CM

## 2022-08-02 ENCOUNTER — OFFICE VISIT (OUTPATIENT)
Dept: OBSTETRICS AND GYNECOLOGY | Facility: CLINIC | Age: 52
End: 2022-08-02
Attending: OBSTETRICS & GYNECOLOGY
Payer: COMMERCIAL

## 2022-08-02 ENCOUNTER — PATIENT MESSAGE (OUTPATIENT)
Dept: GASTROENTEROLOGY | Facility: CLINIC | Age: 52
End: 2022-08-02

## 2022-08-02 ENCOUNTER — PATIENT MESSAGE (OUTPATIENT)
Dept: ORTHOPEDICS | Facility: CLINIC | Age: 52
End: 2022-08-02
Payer: COMMERCIAL

## 2022-08-02 ENCOUNTER — PATIENT MESSAGE (OUTPATIENT)
Dept: NEUROSURGERY | Facility: CLINIC | Age: 52
End: 2022-08-02

## 2022-08-02 ENCOUNTER — OFFICE VISIT (OUTPATIENT)
Dept: NEUROSURGERY | Facility: CLINIC | Age: 52
End: 2022-08-02
Payer: COMMERCIAL

## 2022-08-02 ENCOUNTER — HOSPITAL ENCOUNTER (OUTPATIENT)
Dept: RADIOLOGY | Facility: HOSPITAL | Age: 52
Discharge: HOME | End: 2022-08-02
Attending: INTERNAL MEDICINE
Payer: COMMERCIAL

## 2022-08-02 ENCOUNTER — OFFICE VISIT (OUTPATIENT)
Dept: GASTROENTEROLOGY | Facility: CLINIC | Age: 52
End: 2022-08-02
Payer: COMMERCIAL

## 2022-08-02 ENCOUNTER — TELEPHONE (OUTPATIENT)
Dept: NEUROSURGERY | Facility: CLINIC | Age: 52
End: 2022-08-02

## 2022-08-02 ENCOUNTER — PROCEDURE VISIT (OUTPATIENT)
Dept: NEUROLOGY | Facility: CLINIC | Age: 52
End: 2022-08-02
Payer: COMMERCIAL

## 2022-08-02 VITALS
SYSTOLIC BLOOD PRESSURE: 149 MMHG | BODY MASS INDEX: 38.06 KG/M2 | DIASTOLIC BLOOD PRESSURE: 79 MMHG | WEIGHT: 251.13 LBS | HEIGHT: 68 IN

## 2022-08-02 VITALS
DIASTOLIC BLOOD PRESSURE: 80 MMHG | WEIGHT: 251 LBS | BODY MASS INDEX: 38.04 KG/M2 | HEIGHT: 68 IN | SYSTOLIC BLOOD PRESSURE: 121 MMHG | HEART RATE: 76 BPM

## 2022-08-02 VITALS
SYSTOLIC BLOOD PRESSURE: 125 MMHG | DIASTOLIC BLOOD PRESSURE: 75 MMHG | HEART RATE: 64 BPM | BODY MASS INDEX: 38.12 KG/M2 | WEIGHT: 251.5 LBS | HEIGHT: 68 IN

## 2022-08-02 DIAGNOSIS — R00.2 PALPITATIONS: ICD-10-CM

## 2022-08-02 DIAGNOSIS — F17.210 NICOTINE DEPENDENCE, CIGARETTES, UNCOMPLICATED: ICD-10-CM

## 2022-08-02 DIAGNOSIS — R51.9 NONINTRACTABLE HEADACHE, UNSPECIFIED CHRONICITY PATTERN, UNSPECIFIED HEADACHE TYPE: Primary | ICD-10-CM

## 2022-08-02 DIAGNOSIS — D50.0 IRON DEFICIENCY ANEMIA DUE TO CHRONIC BLOOD LOSS: Primary | ICD-10-CM

## 2022-08-02 DIAGNOSIS — I10 ESSENTIAL (PRIMARY) HYPERTENSION: ICD-10-CM

## 2022-08-02 DIAGNOSIS — K21.9 GASTROESOPHAGEAL REFLUX DISEASE WITHOUT ESOPHAGITIS: ICD-10-CM

## 2022-08-02 DIAGNOSIS — R07.89 OTHER CHEST PAIN: ICD-10-CM

## 2022-08-02 DIAGNOSIS — M79.621 AXILLARY PAIN, RIGHT: ICD-10-CM

## 2022-08-02 DIAGNOSIS — K31.84 GASTROPARESIS: ICD-10-CM

## 2022-08-02 DIAGNOSIS — Z82.49 FAMILY HX OF ISCHEM HEART DIS AND OTH DIS OF THE CIRC SYS: ICD-10-CM

## 2022-08-02 DIAGNOSIS — Z86.010 HISTORY OF COLON POLYPS: ICD-10-CM

## 2022-08-02 DIAGNOSIS — G56.03 BILATERAL CARPAL TUNNEL SYNDROME: ICD-10-CM

## 2022-08-02 DIAGNOSIS — K58.0 IRRITABLE BOWEL SYNDROME WITH DIARRHEA: ICD-10-CM

## 2022-08-02 DIAGNOSIS — Z12.31 SCREENING MAMMOGRAM, ENCOUNTER FOR: ICD-10-CM

## 2022-08-02 DIAGNOSIS — E78.2 MIXED HYPERLIPIDEMIA: ICD-10-CM

## 2022-08-02 DIAGNOSIS — Z01.419 ENCOUNTER FOR GYNECOLOGICAL EXAMINATION WITHOUT ABNORMAL FINDING: Primary | ICD-10-CM

## 2022-08-02 DIAGNOSIS — F41.9 ANXIETY DISORDER, UNSPECIFIED: ICD-10-CM

## 2022-08-02 DIAGNOSIS — Z78.0 MENOPAUSE: ICD-10-CM

## 2022-08-02 PROCEDURE — 1159F PR MEDICATION LIST DOCUMENTED IN MEDICAL RECORD: ICD-10-PCS | Mod: CPTII,S$GLB,, | Performed by: INTERNAL MEDICINE

## 2022-08-02 PROCEDURE — 3008F BODY MASS INDEX DOCD: CPT | Mod: CPTII,S$GLB,, | Performed by: NEUROLOGICAL SURGERY

## 2022-08-02 PROCEDURE — 1160F RVW MEDS BY RX/DR IN RCRD: CPT | Mod: CPTII,S$GLB,, | Performed by: OBSTETRICS & GYNECOLOGY

## 2022-08-02 PROCEDURE — 3008F PR BODY MASS INDEX (BMI) DOCUMENTED: ICD-10-PCS | Mod: CPTII,S$GLB,, | Performed by: INTERNAL MEDICINE

## 2022-08-02 PROCEDURE — 99999 PR PBB SHADOW E&M-EST. PATIENT-LVL V: CPT | Mod: PBBFAC,,, | Performed by: OBSTETRICS & GYNECOLOGY

## 2022-08-02 PROCEDURE — 99205 OFFICE O/P NEW HI 60 MIN: CPT | Mod: S$GLB,,, | Performed by: NEUROLOGICAL SURGERY

## 2022-08-02 PROCEDURE — 3078F DIAST BP <80 MM HG: CPT | Mod: CPTII,S$GLB,, | Performed by: INTERNAL MEDICINE

## 2022-08-02 PROCEDURE — 3079F PR MOST RECENT DIASTOLIC BLOOD PRESSURE 80-89 MM HG: ICD-10-PCS | Mod: CPTII,S$GLB,, | Performed by: NEUROLOGICAL SURGERY

## 2022-08-02 PROCEDURE — 1159F PR MEDICATION LIST DOCUMENTED IN MEDICAL RECORD: ICD-10-PCS | Mod: CPTII,S$GLB,, | Performed by: OBSTETRICS & GYNECOLOGY

## 2022-08-02 PROCEDURE — 3077F PR MOST RECENT SYSTOLIC BLOOD PRESSURE >= 140 MM HG: ICD-10-PCS | Mod: CPTII,S$GLB,, | Performed by: OBSTETRICS & GYNECOLOGY

## 2022-08-02 PROCEDURE — 3008F PR BODY MASS INDEX (BMI) DOCUMENTED: ICD-10-PCS | Mod: CPTII,S$GLB,, | Performed by: OBSTETRICS & GYNECOLOGY

## 2022-08-02 PROCEDURE — 95886 PR EMG COMPLETE, W/ NERVE CONDUCTION STUDIES, 5+ MUSCLES: ICD-10-PCS | Mod: S$GLB,,, | Performed by: PHYSICAL MEDICINE & REHABILITATION

## 2022-08-02 PROCEDURE — 1160F PR REVIEW ALL MEDS BY PRESCRIBER/CLIN PHARMACIST DOCUMENTED: ICD-10-PCS | Mod: CPTII,S$GLB,, | Performed by: INTERNAL MEDICINE

## 2022-08-02 PROCEDURE — 1159F PR MEDICATION LIST DOCUMENTED IN MEDICAL RECORD: ICD-10-PCS | Mod: CPTII,S$GLB,, | Performed by: NEUROLOGICAL SURGERY

## 2022-08-02 PROCEDURE — 3078F PR MOST RECENT DIASTOLIC BLOOD PRESSURE < 80 MM HG: ICD-10-PCS | Mod: CPTII,S$GLB,, | Performed by: INTERNAL MEDICINE

## 2022-08-02 PROCEDURE — 1160F PR REVIEW ALL MEDS BY PRESCRIBER/CLIN PHARMACIST DOCUMENTED: ICD-10-PCS | Mod: CPTII,S$GLB,, | Performed by: OBSTETRICS & GYNECOLOGY

## 2022-08-02 PROCEDURE — 1160F RVW MEDS BY RX/DR IN RCRD: CPT | Mod: CPTII,S$GLB,, | Performed by: NEUROLOGICAL SURGERY

## 2022-08-02 PROCEDURE — 1160F RVW MEDS BY RX/DR IN RCRD: CPT | Mod: CPTII,S$GLB,, | Performed by: INTERNAL MEDICINE

## 2022-08-02 PROCEDURE — 99215 OFFICE O/P EST HI 40 MIN: CPT | Mod: S$GLB,,, | Performed by: INTERNAL MEDICINE

## 2022-08-02 PROCEDURE — 3074F PR MOST RECENT SYSTOLIC BLOOD PRESSURE < 130 MM HG: ICD-10-PCS | Mod: CPTII,S$GLB,, | Performed by: INTERNAL MEDICINE

## 2022-08-02 PROCEDURE — 3008F BODY MASS INDEX DOCD: CPT | Mod: CPTII,S$GLB,, | Performed by: OBSTETRICS & GYNECOLOGY

## 2022-08-02 PROCEDURE — 1159F MED LIST DOCD IN RCRD: CPT | Mod: CPTII,S$GLB,, | Performed by: OBSTETRICS & GYNECOLOGY

## 2022-08-02 PROCEDURE — 3074F SYST BP LT 130 MM HG: CPT | Mod: CPTII,S$GLB,, | Performed by: NEUROLOGICAL SURGERY

## 2022-08-02 PROCEDURE — 1159F MED LIST DOCD IN RCRD: CPT | Mod: CPTII,S$GLB,, | Performed by: INTERNAL MEDICINE

## 2022-08-02 PROCEDURE — 75571 CT HRT W/O DYE W/CA TEST: CPT | Mod: TC

## 2022-08-02 PROCEDURE — 1159F MED LIST DOCD IN RCRD: CPT | Mod: CPTII,S$GLB,, | Performed by: NEUROLOGICAL SURGERY

## 2022-08-02 PROCEDURE — 3074F SYST BP LT 130 MM HG: CPT | Mod: CPTII,S$GLB,, | Performed by: INTERNAL MEDICINE

## 2022-08-02 PROCEDURE — 75571 CT HRT W/O DYE W/CA TEST: CPT | Mod: 26,,, | Performed by: RADIOLOGY

## 2022-08-02 PROCEDURE — 99999 PR PBB SHADOW E&M-EST. PATIENT-LVL IV: CPT | Mod: PBBFAC,,, | Performed by: INTERNAL MEDICINE

## 2022-08-02 PROCEDURE — 3008F BODY MASS INDEX DOCD: CPT | Mod: CPTII,S$GLB,, | Performed by: INTERNAL MEDICINE

## 2022-08-02 PROCEDURE — 3079F DIAST BP 80-89 MM HG: CPT | Mod: CPTII,S$GLB,, | Performed by: NEUROLOGICAL SURGERY

## 2022-08-02 PROCEDURE — 3008F PR BODY MASS INDEX (BMI) DOCUMENTED: ICD-10-PCS | Mod: CPTII,S$GLB,, | Performed by: NEUROLOGICAL SURGERY

## 2022-08-02 PROCEDURE — 99396 PREV VISIT EST AGE 40-64: CPT | Mod: S$GLB,,, | Performed by: OBSTETRICS & GYNECOLOGY

## 2022-08-02 PROCEDURE — 99396 PR PREVENTIVE VISIT,EST,40-64: ICD-10-PCS | Mod: S$GLB,,, | Performed by: OBSTETRICS & GYNECOLOGY

## 2022-08-02 PROCEDURE — 95886 MUSC TEST DONE W/N TEST COMP: CPT | Mod: S$GLB,,, | Performed by: PHYSICAL MEDICINE & REHABILITATION

## 2022-08-02 PROCEDURE — 99205 PR OFFICE/OUTPT VISIT, NEW, LEVL V, 60-74 MIN: ICD-10-PCS | Mod: S$GLB,,, | Performed by: NEUROLOGICAL SURGERY

## 2022-08-02 PROCEDURE — 99999 PR PBB SHADOW E&M-EST. PATIENT-LVL IV: ICD-10-PCS | Mod: PBBFAC,,, | Performed by: NEUROLOGICAL SURGERY

## 2022-08-02 PROCEDURE — 99999 PR PBB SHADOW E&M-EST. PATIENT-LVL IV: CPT | Mod: PBBFAC,,, | Performed by: NEUROLOGICAL SURGERY

## 2022-08-02 PROCEDURE — 3078F PR MOST RECENT DIASTOLIC BLOOD PRESSURE < 80 MM HG: ICD-10-PCS | Mod: CPTII,S$GLB,, | Performed by: OBSTETRICS & GYNECOLOGY

## 2022-08-02 PROCEDURE — 1160F PR REVIEW ALL MEDS BY PRESCRIBER/CLIN PHARMACIST DOCUMENTED: ICD-10-PCS | Mod: CPTII,S$GLB,, | Performed by: NEUROLOGICAL SURGERY

## 2022-08-02 PROCEDURE — 3077F SYST BP >= 140 MM HG: CPT | Mod: CPTII,S$GLB,, | Performed by: OBSTETRICS & GYNECOLOGY

## 2022-08-02 PROCEDURE — 99999 PR PBB SHADOW E&M-EST. PATIENT-LVL V: ICD-10-PCS | Mod: PBBFAC,,, | Performed by: OBSTETRICS & GYNECOLOGY

## 2022-08-02 PROCEDURE — 99215 PR OFFICE/OUTPT VISIT, EST, LEVL V, 40-54 MIN: ICD-10-PCS | Mod: S$GLB,,, | Performed by: INTERNAL MEDICINE

## 2022-08-02 PROCEDURE — 3078F DIAST BP <80 MM HG: CPT | Mod: CPTII,S$GLB,, | Performed by: OBSTETRICS & GYNECOLOGY

## 2022-08-02 PROCEDURE — 99999 PR PBB SHADOW E&M-EST. PATIENT-LVL IV: ICD-10-PCS | Mod: PBBFAC,,, | Performed by: INTERNAL MEDICINE

## 2022-08-02 PROCEDURE — 95911 NRV CNDJ TEST 9-10 STUDIES: CPT | Mod: S$GLB,,, | Performed by: PHYSICAL MEDICINE & REHABILITATION

## 2022-08-02 PROCEDURE — 75571 CT CALCIUM SCORING CARDIAC: ICD-10-PCS | Mod: 26,,, | Performed by: RADIOLOGY

## 2022-08-02 PROCEDURE — 95911 PR NERVE CONDUCTION STUDY; 9-10 STUDIES: ICD-10-PCS | Mod: S$GLB,,, | Performed by: PHYSICAL MEDICINE & REHABILITATION

## 2022-08-02 PROCEDURE — 3074F PR MOST RECENT SYSTOLIC BLOOD PRESSURE < 130 MM HG: ICD-10-PCS | Mod: CPTII,S$GLB,, | Performed by: NEUROLOGICAL SURGERY

## 2022-08-02 RX ORDER — ESTRADIOL 2 MG/1
3 TABLET ORAL DAILY
Qty: 135 TABLET | Refills: 3 | Status: SHIPPED | OUTPATIENT
Start: 2022-08-02

## 2022-08-02 RX ORDER — PANCRELIPASE 24000; 76000; 120000 [USP'U]/1; [USP'U]/1; [USP'U]/1
1 CAPSULE, DELAYED RELEASE PELLETS ORAL
Qty: 270 CAPSULE | Refills: 1 | Status: SHIPPED | OUTPATIENT
Start: 2022-08-02 | End: 2022-09-12

## 2022-08-02 RX ORDER — LIDOCAINE HYDROCHLORIDE 20 MG/ML
5 SOLUTION ORAL; TOPICAL 4 TIMES DAILY PRN
COMMUNITY
Start: 2022-07-25 | End: 2022-11-15

## 2022-08-02 RX ORDER — BUSPIRONE HYDROCHLORIDE 5 MG/1
5 TABLET ORAL 2 TIMES DAILY
COMMUNITY
Start: 2022-08-01

## 2022-08-02 RX ORDER — METOPROLOL TARTRATE 25 MG/1
25 TABLET, FILM COATED ORAL 2 TIMES DAILY
COMMUNITY
End: 2022-10-18

## 2022-08-02 RX ORDER — METOPROLOL SUCCINATE 25 MG/1
TABLET, EXTENDED RELEASE ORAL
COMMUNITY
Start: 2022-08-02 | End: 2023-03-01 | Stop reason: SDUPTHER

## 2022-08-02 RX ORDER — DEXLANSOPRAZOLE 60 MG/1
1 CAPSULE, DELAYED RELEASE ORAL DAILY
COMMUNITY
Start: 2022-07-29 | End: 2022-08-14

## 2022-08-02 NOTE — PROGRESS NOTES
Subjective:       Patient ID: Nan Betts is a 52 y.o. female.    Chief Complaint:  Annual Exam and Gynecologic Exam      History of Present Illness  HPI  Nan Betts is a 52 y.o. female  here for her annual GYN exam.   She is doing well on her HRT and wants to continue it. Was tried and failed on patches and gels, doing well on oral Estradiol 3mg, had symptoms which recurred when she tried to drop her dose. . She is trying to stop smoking, and is down to 10 cigarettes/day. States had a Low Calcium score on Cardiac evaluation earlier this week.   denies vaginal itching or irritation.  Denies vaginal discharge.  She is sexually active. She has had1 partner for the past 10 years .   History of abnormal pap: Yes -   Last Pap: approximate date  and was normal  Last MMG: normal-2020-routine follow-up in 12 months  Last Colonoscopy:  colonoscopy 1 years ago without abnormalities.  denies domestic violence. She does feel safe at home.     Past Medical History:   Diagnosis Date    Abnormal Pap smear of vagina     Anxiety     Cataract     Chronic pain     TMJ and abdomen    Colon polyp     Epilepsy     as a child    Gastroparesis     Idiopathic    GERD (gastroesophageal reflux disease)     H/O abnormal cervical Papanicolaou smear     Hypercholesteremia 2016    Hyperlipidemia     IC (interstitial cystitis)     Internal hemorrhoids     Interstitial cystitis     Irritable bowel syndrome (IBS)     Irritable bowel syndrome with diarrhea 2016    Spastic colon     TMJ (temporomandibular joint disorder)     TMJ (temporomandibular joint syndrome) 2016     Past Surgical History:   Procedure Laterality Date    APPENDECTOMY      BREAST BIOPSY Bilateral     CATARACT EXTRACTION      CHOLECYSTECTOMY      COLONOSCOPY      COLONOSCOPY N/A 2018    Procedure: COLONOSCOPY;  Surgeon: Zack Lehman MD;  Location: [address]);  Service: Endoscopy;  Laterality: N/A;     CYSTOSCOPY N/A 8/25/2020    Procedure: CYSTOSCOPY;  Surgeon: Christal Khan MD;  [facility];  Service: Urology;  Laterality: N/A;    ELBOW SURGERY      ESOPHAGOGASTRODUODENOSCOPY N/A 7/31/2018    Procedure: EGD (ESOPHAGOGASTRODUODENOSCOPY);  Surgeon: Zack Lehman MD;  Location: [facility] (4TH FLR);  Service: Endoscopy;  Laterality: N/A;  RUQ gastric pacemaker    Left upper arm PICC line    PONV    ESOPHAGOGASTRODUODENOSCOPY N/A 9/3/2019    Procedure: EGD (ESOPHAGOGASTRODUODENOSCOPY);  Surgeon: Zack Lehman MD;  Location: [facility] (4TH FLR);  Service: Endoscopy;  Laterality: N/A;  history of gastoparesis, per change in protocol, pt instructed to do full liquid diet x 3 days prior to procedure and clear liquids x 1 day prior to procedure-BB  pt has gastric pacemaker, monitored by Dr. Lehman-BB  hx of epilepsy, last seizure during chi    ESOPHAGOGASTRODUODENOSCOPY N/A 9/10/2020    Procedure: ESOPHAGOGASTRODUODENOSCOPY (EGD);  Surgeon: Zack Lehman MD;  Location: [facility] (4TH FLR);  Service: Endoscopy;  Laterality: N/A;  3 days Full liquid diet/ 1 day Clear liquids  waiting for Pt to cb with date - ERW  Left upper arm -  PICC  COVID screening 9/7/20 [facility]- [facility]    ESOPHAGOGASTRODUODENOSCOPY N/A 11/3/2021    Procedure: EGD (ESOPHAGOGASTRODUODENOSCOPY);  Surgeon: Zack Lehman MD;  Location: [facility] (4TH FLR);  Service: Endoscopy;  Laterality: N/A;  10/15 fully vaccinated as of 7/15/21; gastric pacemaker-pt does not have remote;  pt has a port does not want IV started; 3 days full liquid diet; instr. to portal-st    GASTRIC STIMULATOR IMPLANT SURGERY      left side on 03/30/2020    gastric stimulator placement      LASIK Bilateral 2006    MANDIBLE SURGERY Bilateral 10/17/2016    OOPHORECTOMY Bilateral     PYLOROPLASTY  03/2016    STOMACH SURGERY      gastric pacemaker    TEMPOROMANDIBULAR JOINT SURGERY  12/2016    TONSILLECTOMY      TOTAL ABDOMINAL HYSTERECTOMY  " 2005    EUGENIA/BSO, Trachelectomy  2012    UPPER GASTROINTESTINAL ENDOSCOPY      WRIST SURGERY       Social History     Socioeconomic History    Marital status:    Tobacco Use    Smoking status: Current Every Day Smoker     Packs/day: 1.00     Years: 20.00     Pack years: 20.00     Start date: 1986    Smokeless tobacco: Never Used   Substance and Sexual Activity    Alcohol use: No     Alcohol/week: 0.0 standard drinks    Drug use: No    Sexual activity: Yes     Partners: Male     Comment:  since      Family History   Problem Relation Age of Onset    Coronary artery disease Mother     Hodgkin's lymphoma Mother     Hypertension Mother     Coronary artery disease Father 43    Hypertension Father     Heart attacks under age 50 Father     Lymphoma Sister     Coronary artery disease Paternal Grandfather     Stroke Paternal Grandfather     Glaucoma Maternal Grandmother     Pancreatic cancer Paternal Aunt     Cancer Paternal Aunt         pancreatic    Stroke Maternal Grandfather     Colon cancer Neg Hx     Breast cancer Neg Hx     Ovarian cancer Neg Hx     Cervical cancer Neg Hx     Endometrial cancer Neg Hx     Vaginal cancer Neg Hx     Diabetes Neg Hx      OB History        0    Para   0    Term   0       0    AB   0    Living   0       SAB   0    IAB   0    Ectopic   0    Multiple   0    Live Births                     BP (!) 149/79   Ht 5' 8" (1.727 m)   Wt 113.9 kg (251 lb 1.6 oz)   BMI 38.18 kg/m²         GYN & OB History    Date of Last Pap: No result found    OB History    Para Term  AB Living   0 0 0 0 0 0   SAB IAB Ectopic Multiple Live Births   0 0 0 0         Review of Systems  Review of Systems   Constitutional: Negative for activity change, appetite change, fatigue and unexpected weight change.   HENT: Negative.    Eyes: Negative for visual disturbance.   Respiratory: Negative for shortness of breath and wheezing.  "   Cardiovascular: Negative for chest pain, palpitations and leg swelling.   Gastrointestinal: Negative for abdominal pain, bloating and blood in stool.   Endocrine: Negative for diabetes and hair loss.   Genitourinary: Negative for decreased libido, dyspareunia, hot flashes and vaginal bleeding.   Musculoskeletal: Negative for back pain and joint swelling.   Integumentary:  Negative for acne, hair changes and nipple discharge.   Neurological: Negative for headaches.   Hematological: Does not bruise/bleed easily.   Psychiatric/Behavioral: Negative for depression and sleep disturbance. The patient is not nervous/anxious.    Breast: Negative for mastodynia and nipple discharge          Objective:      Physical Exam:   Constitutional: She is oriented to person, place, and time. She appears well-developed and well-nourished.    HENT:   Head: Normocephalic and atraumatic.    Eyes: Pupils are equal, round, and reactive to light. EOM are normal.     Cardiovascular: Normal rate and regular rhythm.     Pulmonary/Chest: Effort normal and breath sounds normal.   BREASTS:  no mass, no tenderness, no deformity and no retraction. Right breast exhibits no inverted nipple, no mass, no nipple discharge, no skin change, no tenderness, no bleeding and no swelling. Left breast exhibits no inverted nipple, no mass, no nipple discharge, no skin change, no tenderness, no bleeding and no swelling. Breasts are symmetrical.      Tenderness in Right Axilla        Abdominal: Soft. Bowel sounds are normal.     Genitourinary:    Pelvic exam was performed with patient supine.      Genitourinary Comments: PELVIC: Normal external female genitalia without lesions. Normal hair distribution. Adequate perineal body, normal urethral meatus. Vagina Moist and Well Rugated without lesions or discharge. No significant cystocele or rectocele. Bimanual exam shows uterus and cervix to be surgically absent. Adnexa without masses or tenderness.  RECTAL:  Deferred               Musculoskeletal: Normal range of motion and moves all extremeties.       Neurological: She is alert and oriented to person, place, and time.    Skin: Skin is warm and dry.    Psychiatric: She has a normal mood and affect.              Assessment:        1. Encounter for gynecological examination without abnormal finding    2. Menopause    3. Screening mammogram, encounter for    4. Axillary pain, right                  Plan:            1. Encounter for gynecological examination without abnormal finding  COUNSELING:  The patient was counseled today on regular weight bearing exercise. Patient was counseled today on the new ACS guidelines for cervical cytology screening as well as the current recommendations for breast cancer screening. Counseling session lasted approximately 10 minutes, and all her questions were answered. She was advised to see her primary care physician for all other health maintenance.   FOLLOW-UP with me for next routine visit.         2. Menopause      - estradioL (ESTRACE) 2 MG tablet; Take 1.5 tablets (3 mg total) by mouth once daily.  Dispense: 135 tablet; Refill: 3    3. Screening mammogram, encounter for        4. Axillary pain, right  (scheduled for Diagnostic mammo soon as plans to have Breast Reduction Surgery)    - US Breast Right Limited; Future       Follow up in about 1 year (around 8/2/2023).

## 2022-08-02 NOTE — TELEPHONE ENCOUNTER
[facility]. Needed a separate order for measuring pressures. Order placed as requested. She is adding to myelogram order.

## 2022-08-02 NOTE — PROGRESS NOTES
Subjective:       Patient ID: Nan Betts is a 52 y.o. female.    Chief Complaint: Follow-up (Gastric pacemaker check)    HPI     52-year-old woman for follow-up visit.  Last visit was in November.  Very complicated case it comes here from [place].  Longstanding idiopathic gastroparesis.  Status post gastric stimulator placement and pyloromyotomy.  Related to the gastroparesis she has gastroesophageal reflux which is severe.  She also has home IV care related to this which includes IV Phenergan, IV fluids p.r.n., and IV pantoprazole    On her last visit we tried switching to IV pantoprazole for better control of her reflux symptoms.  We are using that twice a day.  That is better however still not as good as the Dexilant b.i.d. which is not covered by her pharmacy insurance plan.  She continues with reflux and regurgitation.  Especially she vita over or bends down or lays down.  She continues with dysphagia PS she has dysphagia for solids and liquids and pills.  Sometimes the pills will become up on their own.    She has diarrhea on a daily basis as she has had in the past.  She takes Imodium for that she has intermittent abdominal cramping with the diarrhea.  In the past which tried various IBS medicines including Bentyl and Levsin without any success.    She has a variety of other concerns.  She is concerned about a lymph node that was recently found.  She saw a hematologist at home we want to order a PET scan but that was not approved.  The hematologist found that she had iron deficiency and set her up for iron infusions.  I do not have those labs.  I would like to pursue that.  The oncologist recommended that she consider having a follow-up colonoscopy but she did have a good quality exam 4 years ago without a significant lesions.  Other concerns include pressure problems, palpitations, tachycardia.    Past Medical History:   Diagnosis Date    Abnormal Pap smear of vagina     Anxiety     Cataract      Chronic pain     TMJ and abdomen    Colon polyp     Epilepsy     as a child    Gastroparesis     Idiopathic    GERD (gastroesophageal reflux disease)     H/O abnormal cervical Papanicolaou smear     Hypercholesteremia 6/21/2016    Hyperlipidemia     IC (interstitial cystitis)     Internal hemorrhoids     Interstitial cystitis     Irritable bowel syndrome (IBS)     Irritable bowel syndrome with diarrhea 6/21/2016    Spastic colon     TMJ (temporomandibular joint disorder)     TMJ (temporomandibular joint syndrome) 6/21/2016       Review of patient's allergies indicates:   Allergen Reactions    Corticosteroids (glucocorticoids) Nausea And Vomiting and Other (See Comments)     Acid reflux  Acid reflux  Increases acid reflux    Ketorolac Anaphylaxis and Shortness Of Breath     Other reaction(s): Throat swelling, Unknown    Morphine Anaphylaxis    Prednisone Other (See Comments)     Increases acid reflux    Tramadol Shortness Of Breath     Other reaction(s): Throat swelling, Unknown    Codeine Itching    Gabapentin Other (See Comments)     Causes suicidal thoughts  Other reaction(s): suicidal thoughts  Causes suicidal thoughts  Causes suicidal thoughts      Ibuprofen Other (See Comments)     G L Bleeding  G L Bleeding  G L Bleeding    Nsaids (non-steroidal anti-inflammatory drug) Other (See Comments)     GI BLEEDING  Other reaction(s): Other: See Comments  GI BLEEDING  GI BLEEDING    Hydrocodone      Other reaction(s): Unknown    Hydrocodone-acetaminophen     Metoclopramide Other (See Comments)     Other reaction(s): Headache, Other (See Comments)  Headaches and insomnia  Headaches and insomnia  Headaches and insomnia      Morphine sulfate      Other reaction(s): Unknown    Reglan [metoclopramide hcl] Other (See Comments)     Headaches and insomnia      Topiramate Other (See Comments)     Ulcers in the mouth and dry mouth  Other reaction(s): Other: See Comments, Unknown  per pt, gets  ""fog brain"  per pt, gets "fog brain"  Ulcers in the mouth and dry mouth  Ulcers in the mouth and dry mouth          Family History   Problem Relation Age of Onset    Coronary artery disease Mother     Hodgkin's lymphoma Mother     Hypertension Mother     Coronary artery disease Father 43    Hypertension Father     Heart attacks under age 50 Father     Lymphoma Sister     Coronary artery disease Paternal Grandfather     Stroke Paternal Grandfather     Glaucoma Maternal Grandmother     Pancreatic cancer Paternal Aunt     Cancer Paternal Aunt         pancreatic    Stroke Maternal Grandfather     Colon cancer Neg Hx     Breast cancer Neg Hx     Ovarian cancer Neg Hx     Cervical cancer Neg Hx     Endometrial cancer Neg Hx     Vaginal cancer Neg Hx     Diabetes Neg Hx        Social History     Tobacco Use    Smoking status: Current Every Day Smoker     Packs/day: 1.00     Years: 20.00     Pack years: 20.00     Start date: 4/14/1986    Smokeless tobacco: Never Used   Substance Use Topics    Alcohol use: No     Alcohol/week: 0.0 standard drinks    Drug use: No        Review of Systems   Constitutional: Positive for fatigue. Negative for activity change, appetite change, chills, diaphoresis, fever and unexpected weight change.   HENT: Negative for nasal congestion, ear pain, mouth sores, nosebleeds, postnasal drip, rhinorrhea, sinus pressure/congestion, sore throat, trouble swallowing and voice change.    Eyes: Positive for visual disturbance. Negative for pain.   Respiratory: Negative for cough, shortness of breath and wheezing.    Cardiovascular: Positive for palpitations. Negative for chest pain and leg swelling.   Genitourinary: Negative for difficulty urinating, dysuria, flank pain, hematuria and menstrual problem.   Musculoskeletal: Negative for arthralgias, back pain, gait problem, joint swelling, myalgias and neck pain.   Integumentary:  Negative for rash.   Neurological: Positive for " headaches. Negative for dizziness, tremors, syncope and numbness.   Hematological: Negative for adenopathy. Does not bruise/bleed easily.   Psychiatric/Behavioral: Negative for agitation, behavioral problems, confusion, decreased concentration and dysphoric mood. The patient is not nervous/anxious.            No results found for this or any previous visit from the past 365 days.             Objective:      Physical Exam  Constitutional:       General: She is not in acute distress.     Appearance: She is well-developed.   HENT:      Head: Normocephalic and atraumatic.      Right Ear: External ear normal.      Left Ear: External ear normal.      Nose: Nose normal.      Mouth/Throat:      Pharynx: No oropharyngeal exudate.   Eyes:      General: No scleral icterus.     Conjunctiva/sclera: Conjunctivae normal.      Pupils: Pupils are equal, round, and reactive to light.   Neck:      Thyroid: No thyromegaly.   Cardiovascular:      Rate and Rhythm: Normal rate and regular rhythm.      Heart sounds: Normal heart sounds. No murmur heard.    No gallop.   Pulmonary:      Effort: Pulmonary effort is normal.      Breath sounds: Normal breath sounds. No wheezing or rales.   Abdominal:      General: Abdomen is protuberant. Bowel sounds are normal. There is no distension. There are no signs of injury.      Palpations: Abdomen is soft. There is no shifting dullness, hepatomegaly or mass.      Tenderness: There is no abdominal tenderness.   Musculoskeletal:         General: No tenderness. Normal range of motion.      Cervical back: Normal range of motion and neck supple.   Lymphadenopathy:      Cervical: No cervical adenopathy.   Skin:     General: Skin is warm and dry.      Findings: No rash.   Neurological:      Mental Status: She is alert and oriented to person, place, and time.      Cranial Nerves: No cranial nerve deficit.   Psychiatric:         Behavior: Behavior normal.         Thought Content: Thought content normal.          Judgment: Judgment normal.         Assessment & Plan:       Iron deficiency anemia due to chronic blood loss  -     CBC Auto Differential; Future; Expected date: 08/02/2022  -     Comprehensive Metabolic Panel; Future; Expected date: 08/02/2022  -     Iron and TIBC; Future; Expected date: 08/02/2022    Gastroparesis    Gastroesophageal reflux disease without esophagitis    Irritable bowel syndrome with diarrhea    History of colon polyps     Assessment.  1. Iron deficiency anemia.  Gastroparesis patients often do have anemia.  I am not sure if there is any significant chronic blood loss going on.  Like to see those labs before recommending any some additional studies like video capsule or colonoscopy.  We will recheck iron studies and CBC today.  As mentioned she did have a good quality colonoscopy 4 years ago which only showed 1 single small hyperplastic polyp therefore by colon cancer screening she does not need a follow-up exam for probably another 6 years.  Of course that would be different if we do have a significant iron deficiency anemia.  2. Gastroparesis.  Complicated case obviously treated by gastric stimulator in gastric pyloromyotomy.  I interrogated the gastric stimulator today and showed that the battery is completely depleted.  Therefore she will need to have surgery to replace the battery.  It is my understanding that Dr. Celeste at the [facility] hears no longer treating her but I will get in touch with him to see if there are any other doctors who are willing to do that surgery for this patient.  3. Gastroesophageal reflux.  As is often the case many patients with gastroparesis due suffer from severe reflux.  Right now will continue on the pantoprazole twice a day.  She inquired again about surgical therapy of the reflux including fundoplication but again I discouraged that because of the problems associated with gastroparesis.  She also asked if she could take an extra Dexilant during the day  but I think that probably most of the regurgitation she is having is not acidic.  One could consider in the future a 24 hour study while on medication.  I will talk about this with her on her next visit.  4. IBS with diarrhea.  We tried various agents in the past.  I am going to try Creon prescription now to see if that helps her diarrhea in case there is any component of the PI  5. History of hyperplastic colon polyps  6. Over site of home IV care    Recommendations  Labs right now to include CBC CMP iron TIBC  2. Prescription for Creon  3. Determine if we can find a surgeon to replace the battery    45 minutes appointment greater half time face-to-face counseling  This note was created with voice recognition dictation technology.  There may be errors that I did not see, detect or correct.       Zack Lehman MD

## 2022-08-02 NOTE — PROGRESS NOTES
Neurosurgery  Established Patient    SUBJECTIVE:     History of Present Illness:  Ms. Betts, is a 52 year old female, with multiple complaints of headache, dizziness, bilateral arm and bilateral leg paresthesias, in a non dermatomal fashion.  She has a past medical history of interstitial cystitis, gastroparesis, status post gastric pacemaker in place, GERD, GI bleed x2 and cannot take NSAIDs, bilateral cubital tunnel release and a previous back injury in her 20s.  Upon workup for headaches and dizziness she underwent a CTA of the head which showed some anatomical variance with some concern for a partially empty sella.  She notes that her headaches were previously 2 to 3 times a month are now daily.  They are 6-7/10 out of severity want to the time.  She denies any visual obscurations, she denies any tinnitus, she denies any dermatomal quality to her paresthesias in the upper lower extremities.  She does note some neck pain.  She is unable to get a MRI secondary to her gastric pacemaker.  She most recently underwent an EMG of the bilateral lower extremities and has 1 plan for the bilateral upper extremities.  She is seeing Dr. Dan for her neck and back pain, and she has a CT myelogram planned.  She had also been evaluated by an ophthalmologist, outside the [facility], with no evidence, per report, of any papilledema, or any visual field abnormalities.  She does have a 37 year pack history of smoking.  She does not have any history of diabetes or IV drug use.  She is not on any anticoagulation or take chronic narcotics.  She is accompanied by her spouse.      Review of patient's allergies indicates:   Allergen Reactions    Corticosteroids (glucocorticoids) Nausea And Vomiting and Other (See Comments)     Acid reflux  Acid reflux  Increases acid reflux    Ketorolac Anaphylaxis and Shortness Of Breath     Other reaction(s): Throat swelling, Unknown    Morphine Anaphylaxis    Prednisone Other (See Comments)      "Increases acid reflux    Tramadol Shortness Of Breath     Other reaction(s): Throat swelling, Unknown    Codeine Itching    Gabapentin Other (See Comments)     Causes suicidal thoughts  Other reaction(s): suicidal thoughts  Causes suicidal thoughts  Causes suicidal thoughts      Ibuprofen Other (See Comments)     G L Bleeding  G L Bleeding  G L Bleeding    Nsaids (non-steroidal anti-inflammatory drug) Other (See Comments)     GI BLEEDING  Other reaction(s): Other: See Comments  GI BLEEDING  GI BLEEDING    Hydrocodone      Other reaction(s): Unknown    Hydrocodone-acetaminophen     Metoclopramide Other (See Comments)     Other reaction(s): Headache, Other (See Comments)  Headaches and insomnia  Headaches and insomnia  Headaches and insomnia      Morphine sulfate      Other reaction(s): Unknown    Reglan [metoclopramide hcl] Other (See Comments)     Headaches and insomnia      Topiramate Other (See Comments)     Ulcers in the mouth and dry mouth  Other reaction(s): Other: See Comments, Unknown  per pt, gets "fog brain"  per pt, gets "fog brain"  Ulcers in the mouth and dry mouth  Ulcers in the mouth and dry mouth         Current Outpatient Medications   Medication Sig Dispense Refill    (Magic mouthwash) 1:1:1 diphenhydramine(Benadryl) 12.5mg/5ml liq, aluminum & magnesium hydroxide-simethicone (Maalox), LIDOcaine viscous 2% Swish and spit 5 mLs every 4 (four) hours as needed (mouth sores). for mouth sores 240 mL 3    ALPRAZolam (XANAX) 1 MG tablet Take 1 mg by mouth 2 (two) times daily as needed.       busPIRone (BUSPAR) 5 MG Tab Take 5 mg by mouth 2 (two) times daily.      DEXILANT 60 mg capsule Take 1 capsule by mouth once daily.      diphenhydrAMINE (BENADRYL) 50 MG tablet Take 50 mg by mouth every 4 (four) hours as needed for Itching.      estradioL (ESTRACE) 2 MG tablet TAKE 1 AND 1/2 TABLETS BY  MOUTH ONCE DAILY 135 tablet 0    flavoxATE (URISPAS) 100 mg Tab 1 tablet      fluticasone " propionate (FLONASE) 50 mcg/actuation nasal spray 1 spray by Each Nostril route once daily.      fremanezumab-vfrm (AJOVY AUTOINJECTOR) 225 mg/1.5 mL autoinjector Inject 1.5 mLs (225 mg total) into the skin every 28 days. 1 each 10    Lactobacillus rhamnosus GG (CULTURELLE) 10 billion cell capsule Take 1 capsule by mouth once daily.      lipase-protease-amylase 24,000-76,000-120,000 units (CREON) 24,000-76,000 -120,000 unit capsule Take 1 capsule by mouth 3 (three) times daily with meals. 270 capsule 1    loperamide (IMODIUM) 2 mg capsule TAKE 2 CAPSULES BY MOUTH  TWICE DAILY AS NEEDED 360 capsule 1    meclizine (ANTIVERT) 25 mg tablet Take 25 mg by mouth 3 (three) times daily as needed.      methocarbamol (ROBAXIN-750 ORAL) Take by mouth.      metoprolol succinate (TOPROL-XL) 25 MG 24 hr tablet       metoprolol tartrate (LOPRESSOR) 25 MG tablet Take 25 mg by mouth 2 (two) times daily.      pantoprazole (PROTONIX) 40 mg injection Inject 40 mg into the vein 2 (two) times a day. 60 each 11    promethazine (PHENERGAN) 25 mg/mL injection Inject 25 mg into the vein every 4 (four) hours.      simvastatin (ZOCOR) 40 MG tablet Take 40 mg by mouth every evening.       SODIUM CHLORIDE 0.45 % IV Inject 1,000 mLs into the vein as needed.       sodium chloride 0.9% 0.9 % SolP 50 mL with promethazine 25 mg/mL Soln Inject 25 mg into the vein every 4 (four) hours. Thru IV      sodium chloride 0.9% SolP 100 mL with pantoprazole 40 mg SolR 40 mg Inject 40 mg into the vein every 12 (twelve) hours. 60 vial 0    sucralfate (CARAFATE) 1 gram tablet TAKE 1 TABLET BY MOUTH 4  TIMES DAILY BEFORE MEALS  AND AT NIGHT 360 tablet 3    tiZANidine (ZANAFLEX) 4 MG tablet Take 4 mg by mouth 3 (three) times daily.      triamcinolone acetonide 0.1% (KENALOG) 0.1 % ointment AAA hands bid - may occlude qhs 454 g 3     No current facility-administered medications for this visit.       Past Medical History:   Diagnosis Date    Abnormal  Pap smear of vagina     Anxiety     Cataract     Chronic pain     TMJ and abdomen    Colon polyp     Epilepsy     as a child    Gastroparesis     Idiopathic    GERD (gastroesophageal reflux disease)     H/O abnormal cervical Papanicolaou smear     Hypercholesteremia 6/21/2016    Hyperlipidemia     IC (interstitial cystitis)     Internal hemorrhoids     Interstitial cystitis     Irritable bowel syndrome (IBS)     Irritable bowel syndrome with diarrhea 6/21/2016    Spastic colon     TMJ (temporomandibular joint disorder)     TMJ (temporomandibular joint syndrome) 6/21/2016     Past Surgical History:   Procedure Laterality Date    APPENDECTOMY      BREAST BIOPSY Bilateral     CATARACT EXTRACTION      CHOLECYSTECTOMY      COLONOSCOPY      COLONOSCOPY N/A 7/31/2018    Procedure: COLONOSCOPY;  Surgeon: Zack Lehman MD;  Location: [facility] (4TH FLR);  Service: Endoscopy;  Laterality: N/A;    CYSTOSCOPY N/A 8/25/2020    Procedure: CYSTOSCOPY;  Surgeon: Christal Khan MD;  [facility];  Service: Urology;  Laterality: N/A;    ELBOW SURGERY      ESOPHAGOGASTRODUODENOSCOPY N/A 7/31/2018    Procedure: EGD (ESOPHAGOGASTRODUODENOSCOPY);  Surgeon: Zack Lehman MD;  Location: [facility] (4TH FLR);  Service: Endoscopy;  Laterality: N/A;  RUQ gastric pacemaker    Left upper arm PICC line    PONV    ESOPHAGOGASTRODUODENOSCOPY N/A 9/3/2019    Procedure: EGD (ESOPHAGOGASTRODUODENOSCOPY);  Surgeon: Zack Lehman MD;  Location: [facility] (4TH FLR);  Service: Endoscopy;  Laterality: N/A;  history of gastoparesis, per change in protocol, pt instructed to do full liquid diet x 3 days prior to procedure and clear liquids x 1 day prior to procedure-BB  pt has gastric pacemaker, monitored by Dr. Lehman-BB  hx of epilepsy, last seizure during chi    ESOPHAGOGASTRODUODENOSCOPY N/A 9/10/2020    Procedure: ESOPHAGOGASTRODUODENOSCOPY (EGD);  Surgeon: Zack Lehman MD;  Location: [facility] (4TH FLR);   "Service: Endoscopy;  Laterality: N/A;  3 days Full liquid diet/ 1 day Clear liquids  waiting for Pt to cb with date - ERW  Left upper arm -  PICC  COVID screening 9/7/20 [place] [facility]- [facility]    ESOPHAGOGASTRODUODENOSCOPY N/A 11/3/2021    Procedure: EGD (ESOPHAGOGASTRODUODENOSCOPY);  Surgeon: Zack Lehman MD;  Location: [facility] ([address] [place] [address]);  Service: Endoscopy;  Laterality: N/A;  10/15 fully vaccinated as of 7/15/21; gastric pacemaker-pt does not have remote;  pt has a port does not want IV started; 3 days full liquid diet; instr. to portal-st    GASTRIC STIMULATOR IMPLANT SURGERY      left side on 03/30/2020    gastric stimulator placement      LASIK Bilateral 2006    MANDIBLE SURGERY Bilateral 10/17/2016    OOPHORECTOMY Bilateral     PYLOROPLASTY  03/2016    STOMACH SURGERY      gastric pacemaker    TEMPOROMANDIBULAR JOINT SURGERY  12/2016    TONSILLECTOMY      TOTAL ABDOMINAL HYSTERECTOMY  2005    EUGENIA/BSO, Trachelectomy  2012    UPPER GASTROINTESTINAL ENDOSCOPY      WRIST SURGERY       Family History     Problem Relation (Age of Onset)    Cancer Paternal Aunt    Coronary artery disease Mother, Father (43), Paternal Grandfather    Glaucoma Maternal Grandmother    Heart attacks under age 50 Father    Hodgkin's lymphoma Mother    Hypertension Mother, Father    Lymphoma Sister    Pancreatic cancer Paternal Aunt    Stroke Paternal Grandfather, Maternal Grandfather        Social History     Socioeconomic History    Marital status:    Tobacco Use    Smoking status: Current Every Day Smoker     Packs/day: 1.00     Years: 20.00     Pack years: 20.00     Start date: 4/14/1986    Smokeless tobacco: Never Used   Substance and Sexual Activity    Alcohol use: No     Alcohol/week: 0.0 standard drinks    Drug use: No    Sexual activity: Yes     Partners: Male     Comment:  since 2012       Review of Systems    OBJECTIVE:     Vital Signs  Pulse: 76  BP: 121/80  Pain Score:   3  Height: 5' 8" " (172.7 cm)  Weight: 113.9 kg (251 lb)  Body mass index is 38.16 kg/m².    Neurosurgery Physical Exam  General: no acute distress  Head: Non-traumatic, normocephalic  Eyes: Pupils equal, EOMI  Neck: Supple, normal ROM, no tenderness to palpation  CVS: Normal rate and rhythm, distal pulses present  Pulm: Symmetric expansion, no respiratory distress  GI: Abdomen nondistended, nontender    MSK: Moves all extremities without restriction, atraumatic  Skin: Dry, intact  Psych: Normal thought content and cognition    Neuro:  Alert, awake, oriented, to self, place, time  Language:  Speech is fluent, goal directed without any noted dysarthria or aphasia    Cranial nerves:    CNII-XII: Intact on fine exam,   Pupils equal round react to light,   Extraocular muscles are intact  V1 to V3 is intact to light touch,   no facial asymmetry,   Hearing is intact to finger rub and voice  tongue/uvula/palate midline,   shoulder shrug equal,     No pronator drift    Extremities:  Motor:  Upper Extremity    Deltoid Triceps Biceps Wrist  Extension Wrist  Flexion Interosseous   R 5/5 5/5 5/5 5/5 5/5 5/5   L 5/5 5/5 5/5 5/5 5/5 5/5       Thumb   Abduction Thumb  ADDuction Finger  Flexion Finger  Extension     R 5/5 5/5 5/5 5/5     L 5/5 5/5 5/5 5/5        Lower Extremity     Iliopsoas Quadriceps Hamstring Plantarflexion Dorsiflexion EHL   R 5/5 5/5 5/5 5/5 5/5 5/5   L 5/5 5/5 5/5 5/5 5/5 5/5       Reflexes:     DTR: 2+ biceps    2+ triceps   2+ brachioradialis   2+ patellar  2+ Achilles     Abbott's: Negative     Clonus: Negative     Sensory:      Sensation intact to light touch, temperature sensation, vibration, pinprick     Coordination:      Coordination intact throughout, no dysmetria, normal rapid alternating movements, no dysdiadochokinesia  Cerebellar:  Normal finger-to-nose, normal heel-to-shin  Cervical spine:  No midline cervical tenderness, negative Lhermitte, negative Spurling  Thoracic spine:  No midline thoracic  tenderness  Lumbar spine:  No midline lumbar tenderness    Diagnostic Results:  I personally reviewed patient's Diagnostic Imaging.  CTA of the head and neck does not show any significant large vessel extracranial carotid, or extracranial vertebral artery stenosis.  There is a diminutive right P1 segment with the dominant/fetal PCA from the right ICA distribution.  This likely represents a normal variant within the [place].  There is no clear obvious evidence of any aneurysm, av fistula.  Both transverse and sigmoid sinuses are patent, there is apparent dominance of the right transverse sigmoid system.  The sella does appear to be partially empty with a clearly visible pituitary gland and stalk on CT scan.    ASSESSMENT/PLAN:     52-year-old female with multiple complaints, nondermatomal upper lower extremity paresthesias as well as headache and dizziness.  Concern for reading of partial empty sella.    1. No focal neurologic deficits on examination here in clinic  2. CTA of the head and neck did not show any extracranial carotid or vertebral artery stenosis.  There is a diminutive normal variant with a hypoplastic right P1 and a dominant/fetal PCA.  Normal-appearing bilateral SCA configuration.  3. Had long discussion with patient and patient's spouse we thoroughly went over the patient's imaging.  I noted the patient has a normal anatomic variant with respect to the hypoplastic right P1 in the dominant fetal type PCA.  Of the partially empty sella could potentially be connected to idiopathic intracranial hypertension.  She does have a report of a normal ophthalmologic exam without any evidence of papilledema, and per report normal visual fields.  I would recommend CT V, given patient is not able to undergo MRI secondary to her gastric pacemaker.  Would also recommend at the time she undergoes her CT myelogram, that opening pressure is measured when the lumbar punctures done, as well as CSF withdrawal of  15-20 cc and closing pressure as well.  CT myelogram most certainly be useful and ruling out any cervical, thoracic, or lumbar pathology as a source of her symptomatology as well.  I recommend continued follow-up in Headache Clinic, as well as follow up with General Neurology for her other symptoms as well.  I answered all the patient's questions and to her satisfaction.    Thank you so very much for allowing me to participate in the care of this patient.  Please feel free to call any questions, comments, or concerns.    Dennis Valiente MD,MSc  Department of Neurosurgery   Department of Radiology  Department of Neurology  [facility]    [facility]   [employer] [facility] [employer] [facility]    Total time spent in counseling and discussion about further management options including relevant lab work, treatment,  prognosis, medications and intended side effects was more than 60 minutes. More than 50 % of the time was spent in counseling and coordination of care.  I spent a total of 79 minutes on the day of the visit.This includes face to face time and non-face to face time preparing to see the patient (eg, review of tests), Obtaining and/or reviewing separately obtained history, Documenting clinical information in the electronic or other health record, Independently interpreting resultsand communicating results to the patient/family/caregiver, or Care coordination          Note dictated with voice recognition software, please excuse any grammatical errors.

## 2022-08-03 ENCOUNTER — PATIENT MESSAGE (OUTPATIENT)
Dept: ORTHOPEDICS | Facility: CLINIC | Age: 52
End: 2022-08-03
Payer: COMMERCIAL

## 2022-08-03 ENCOUNTER — PATIENT MESSAGE (OUTPATIENT)
Dept: PLASTIC SURGERY | Facility: CLINIC | Age: 52
End: 2022-08-03
Payer: COMMERCIAL

## 2022-08-03 ENCOUNTER — TELEPHONE (OUTPATIENT)
Dept: GASTROENTEROLOGY | Facility: CLINIC | Age: 52
End: 2022-08-03
Payer: COMMERCIAL

## 2022-08-03 ENCOUNTER — TELEPHONE (OUTPATIENT)
Dept: ORTHOPEDICS | Facility: CLINIC | Age: 52
End: 2022-08-03
Payer: COMMERCIAL

## 2022-08-03 ENCOUNTER — TELEPHONE (OUTPATIENT)
Dept: NEUROSURGERY | Facility: CLINIC | Age: 52
End: 2022-08-03
Payer: COMMERCIAL

## 2022-08-03 ENCOUNTER — PATIENT MESSAGE (OUTPATIENT)
Dept: SURGERY | Facility: CLINIC | Age: 52
End: 2022-08-03
Payer: COMMERCIAL

## 2022-08-03 ENCOUNTER — TELEPHONE (OUTPATIENT)
Dept: BARIATRICS | Facility: CLINIC | Age: 52
End: 2022-08-03
Payer: COMMERCIAL

## 2022-08-03 ENCOUNTER — PATIENT MESSAGE (OUTPATIENT)
Dept: GASTROENTEROLOGY | Facility: CLINIC | Age: 52
End: 2022-08-03
Payer: COMMERCIAL

## 2022-08-03 RX ORDER — DIAZEPAM 5 MG/1
5 TABLET ORAL
Qty: 2 TABLET | Refills: 0 | Status: SHIPPED | OUTPATIENT
Start: 2022-08-03 | End: 2022-10-18

## 2022-08-03 NOTE — TELEPHONE ENCOUNTER
Spoke to patient at length about her upcoming myelogram. She reports having a very bad experience with a myelogram years ago and she is having extreme anxiety about upcoming myelogram. She requested Valium to take before the procedure and  sent 2 tablets to the pharmacy. She then messaged me asking if she could take both Xanax and Valium and I told her no multiple times. She verbalizing understanding. She also requested an order be put in for IV sedation prior to the procedure.She said the nurse she spoke to about the procedure said it can be done but they need an order. I sent in basket to  and Maria De Jesus to see if this is possible. I told her I would let her know but it would probably not be today. She again verbalized understanding.

## 2022-08-03 NOTE — TELEPHONE ENCOUNTER
Returned pt's call. Informed her that Dr. Haskins does not specialize in what she is requesting and offered her Dr. David Celeste's phone number as a possible option.    ----- Message from Christal Finch sent at 8/3/2022  1:02 PM CDT -----  Contact: [phone]  Pt is calling to see if the dr does the Gastric pacemaker or stimulator surgery.    Pt would like a callback.    [phone]

## 2022-08-03 NOTE — TELEPHONE ENCOUNTER
Spoke with pt. Informed GI providers in [place] do not replace batteries in gastric pacemakers or stimulators. Pt verbalized understanding

## 2022-08-03 NOTE — TELEPHONE ENCOUNTER
----- Message from Christal Finch sent at 8/3/2022  1:02 PM CDT -----  Contact: [phone]  Pt is calling to see if the dr does the Gastric pacemaker or stimulator surgery.    Pt would like a callback.    [phone]

## 2022-08-04 ENCOUNTER — TELEPHONE (OUTPATIENT)
Dept: NEUROSURGERY | Facility: CLINIC | Age: 52
End: 2022-08-04
Payer: COMMERCIAL

## 2022-08-04 ENCOUNTER — PATIENT MESSAGE (OUTPATIENT)
Dept: NEUROSURGERY | Facility: CLINIC | Age: 52
End: 2022-08-04
Payer: COMMERCIAL

## 2022-08-04 DIAGNOSIS — R51.9 NONINTRACTABLE HEADACHE, UNSPECIFIED CHRONICITY PATTERN, UNSPECIFIED HEADACHE TYPE: Primary | ICD-10-CM

## 2022-08-08 ENCOUNTER — PATIENT MESSAGE (OUTPATIENT)
Dept: GASTROENTEROLOGY | Facility: CLINIC | Age: 52
End: 2022-08-08
Payer: COMMERCIAL

## 2022-08-08 ENCOUNTER — TELEPHONE (OUTPATIENT)
Dept: NEUROSURGERY | Facility: CLINIC | Age: 52
End: 2022-08-08
Payer: COMMERCIAL

## 2022-08-08 NOTE — TELEPHONE ENCOUNTER
Ps contact [facility]  to ask for an updated list of local surgeons.      I think Dr. Keven Shah places stimulators  [facility] (Office)  [address] [address] [phone]/ [phone] (F)        Physicians that can monitor and adjust gastric stimulator       Dr. Ernesto Shin, GI  [facility]  [address] [place] [address] [phone] (P) / [phone] (F)     Dr. Real May, GI  [facility]  [address] [place], [place] [id number] [phone] (P) / [phone] (F)     Dr. Sai Jackson, Gen Surg**  [place] [facility]  [address]  [place] [address]  [phone]/ [phone] (F)    Dr. Keven Shah, Gen Surg**  [facility] (Office)  [address] [place] [address] [phone]/ [phone] (F)    Dr. David Celeste, Gen Surg**  [facility]  [address] [place] [address]  [phone]/ [phone] (F)    Dr. Rush Dominique, GI [facility]  [address]  [place] [address]  (P) [phone]    Dr. Narinder Win, GI [facility] [place]  [address] [place] [address],[address]  [place] [address] (P) [phone]/ [phone] (F)    Dr.Todd Paul [facility]  [address] [place] [address] (P) [phone]/ [phone] (F)     Dr. Huma Gaytan, Gen Surg** [facility] [place] [address] [place] [address] (P) [phone]/ [phone] (F)    Dr. Rudi Doherty, / NP [facility]  [facility] [place] [address]  (P) [phone]    Dr. Laz Rendon, [facility]. 3  [address] [place] [address], [place] [address]  (P) [phone]    N.P. Ariel Encarnaciondell  [facility]  [address] [place] [address]  (P) [phone]    Dr. Luis Yancey, [facility]  [address] [place] [address]  (P) [phone]    (s)Cricket [place]/Sandie Roblero  N.PDaniel London  [facility]  [address]  [place] [address]  (P) [phone]    N.P.s -Ivett Schwartz/ Dannielle Kaur  [facility]  [address] [place] [address]  (P) [phone]    Dr. Buck Orourke/ N.PDaniel Flores [place] [address] [place] [address]  (P) [phone]

## 2022-08-09 ENCOUNTER — PATIENT MESSAGE (OUTPATIENT)
Dept: PLASTIC SURGERY | Facility: CLINIC | Age: 52
End: 2022-08-09
Payer: COMMERCIAL

## 2022-08-09 ENCOUNTER — PATIENT MESSAGE (OUTPATIENT)
Dept: OBSTETRICS AND GYNECOLOGY | Facility: CLINIC | Age: 52
End: 2022-08-09
Payer: COMMERCIAL

## 2022-08-09 ENCOUNTER — PATIENT MESSAGE (OUTPATIENT)
Dept: NEUROSURGERY | Facility: CLINIC | Age: 52
End: 2022-08-09
Payer: COMMERCIAL

## 2022-08-09 ENCOUNTER — TELEPHONE (OUTPATIENT)
Dept: RADIOLOGY | Facility: HOSPITAL | Age: 52
End: 2022-08-09
Payer: COMMERCIAL

## 2022-08-09 NOTE — TELEPHONE ENCOUNTER
Received call from patient to schedule bilateral diag mammo and u/s, patient requested appts on 8/30/22 as she [place] [facility] And [facility] appts on the [facility], would like to have her mammo and us here as it is on the way back. Schedule on 8/30/22 at 2 pm at [address] [facility] radiology

## 2022-08-12 ENCOUNTER — PATIENT MESSAGE (OUTPATIENT)
Dept: NEUROSURGERY | Facility: CLINIC | Age: 52
End: 2022-08-12
Payer: COMMERCIAL

## 2022-08-12 ENCOUNTER — PATIENT MESSAGE (OUTPATIENT)
Dept: GASTROENTEROLOGY | Facility: CLINIC | Age: 52
End: 2022-08-12
Payer: COMMERCIAL

## 2022-08-12 NOTE — TELEPHONE ENCOUNTER
Returned call to pt. Pt is requesting that the fluid that is going to be discarded for the myelogram be tested for anything and everything to see if would help determine what is going on with her. Explained Dr. Valiente will not be in until Tuesday but will ask then. Pt verbalized understanding.

## 2022-08-16 ENCOUNTER — TELEPHONE (OUTPATIENT)
Dept: GASTROENTEROLOGY | Facility: CLINIC | Age: 52
End: 2022-08-16
Payer: COMMERCIAL

## 2022-08-16 ENCOUNTER — PATIENT MESSAGE (OUTPATIENT)
Dept: NEUROSURGERY | Facility: CLINIC | Age: 52
End: 2022-08-16
Payer: COMMERCIAL

## 2022-08-16 ENCOUNTER — TELEPHONE (OUTPATIENT)
Dept: NEUROSURGERY | Facility: CLINIC | Age: 52
End: 2022-08-16
Payer: COMMERCIAL

## 2022-08-16 DIAGNOSIS — M51.36 DDD (DEGENERATIVE DISC DISEASE), LUMBAR: Primary | ICD-10-CM

## 2022-08-16 NOTE — TELEPHONE ENCOUNTER
----- Message from Belia Dalton MA sent at 8/16/2022  2:03 PM CDT -----  Contact: Nan Bliss returning call to staff. Patient stated she just missed a call. Please contact patient. Patient stated she is currently at a Dr. Appointment will be free around 3:30.      Confirmed Contact below:  Contact Name:Nan Betts  Phone Number: [phone]

## 2022-08-16 NOTE — TELEPHONE ENCOUNTER
----- Message from Johanne Brewster sent at 8/16/2022 11:40 AM CDT -----  Contact: pt  Pt calling in regards to a new Rx , pt says Pharmacy has been trying to get in touch for 2 days .     Confirmed patient's contact info below:  Contact Name: Nan Betts  Phone Number: [phone]

## 2022-08-16 NOTE — TELEPHONE ENCOUNTER
Spoke with patient.   Aware I have returned every call the pharmacist from [facility] has placed.  Each time not reaching him and having to leave a message.  Nan is aware I will continue trying throughout the day.  Angela

## 2022-08-16 NOTE — TELEPHONE ENCOUNTER
----- Message from Crescencio Vargas sent at 8/16/2022 10:45 AM CDT -----  Contact: Tesh with [facility] select the prompts to get through to a person  Caller returning a missed call from vivian Queen return call

## 2022-08-16 NOTE — TELEPHONE ENCOUNTER
----- Message from Crescencio Vargas sent at 8/16/2022  1:00 PM CDT -----  Regarding: pls call  Contact: Tesh with [place] select the prompts to get through to a person  Caller returning a missed call from vivian Queen return call       
Will need to speak with Dr.James Lehman and patient.  Will then call back.   Angela  
mg by mouth daily   0       Patient has no known allergies. Social History     Tobacco Use   Smoking Status Former Smoker    Packs/day: 2.00    Years: 45.00    Pack years: 90.00    Types: Cigarettes    Start date: 10/4/1970    Last attempt to quit: 2013    Years since quittin.1   Smokeless Tobacco Never Used   Tobacco Comment    quit 3 yrs ago      (If patient a smoker, smoking cessation counseling offered)   Social History     Substance and Sexual Activity   Alcohol Use No    Alcohol/week: 0.0 oz       REVIEW OF SYSTEMS:  Review of Systems    Physical Exam:    This a 66 y.o. female      Vitals:    19 1030   BP: (!) 160/90   Pulse: 58   Temp: 98.1 °F (36.7 °C)     Body mass index is 32.99 kg/m². Physical Exam  Constitutional: Patient in no acute distress, ggod grooming, appropriately dressed  Neuro: Alert and oriented to person, place and time. Psych:Mood normal, affect normal  Skin: No rash noted  HEENT: Head: Normocephalic and atraumatic,Conjunctivae and EOM are normal,Nose- normal, Right/Left External Ear: normal, Mouth: Mucosa Moist  Neck: Supple  Lungs: Respiratory effort is normal  Cardiovascular: strong and regular, no lower leg edema  Abdomen: Soft, non-tender, non-distended with no CVA,    Lymphatics: No cervical palpable lymphadenopathy. Bladder non-tender and not distended. Musculoskeletal: Normal gait and station        Assessment and Plan      1. Hypertrophy of prostate with urinary obstruction    2.  Urge incontinence of urine    3. OAB (overactive bladder)            Plan:    flomax first  Later add vesicare as needed    Prescriptions Ordered:  Orders Placed This Encounter   Medications    tamsulosin (FLOMAX) 0.4 MG capsule     Sig: Take 1 capsule by mouth daily Take one capsule daily to facilitate passage of ureteral stone     Dispense:  30 capsule     Refill:  0      Orders Placed:  Orders Placed This Encounter   Procedures    PSA, Diagnostic     Standing Status:

## 2022-08-17 ENCOUNTER — TELEPHONE (OUTPATIENT)
Dept: NEUROLOGY | Facility: CLINIC | Age: 52
End: 2022-08-17
Payer: COMMERCIAL

## 2022-08-17 NOTE — TELEPHONE ENCOUNTER
Received message from [facility] neuro navigator asking pt be scheduled for paraesthesia to BUE and BLE. LVM for the pt to call back to discuss further. (EMG complete in system)

## 2022-08-18 ENCOUNTER — TELEPHONE (OUTPATIENT)
Dept: NEUROLOGY | Facility: CLINIC | Age: 52
End: 2022-08-18
Payer: COMMERCIAL

## 2022-08-18 NOTE — TELEPHONE ENCOUNTER
Called patient and notified that provider is no longer working at 3:30 so her appointment will have to be reschedule. Date/Time confirmed. Verbalized understanding.

## 2022-08-22 ENCOUNTER — ANESTHESIA (OUTPATIENT)
Dept: ENDOSCOPY | Facility: HOSPITAL | Age: 52
End: 2022-08-22
Payer: COMMERCIAL

## 2022-08-22 ENCOUNTER — ANESTHESIA EVENT (OUTPATIENT)
Dept: ENDOSCOPY | Facility: HOSPITAL | Age: 52
End: 2022-08-22
Payer: COMMERCIAL

## 2022-08-22 ENCOUNTER — HOSPITAL ENCOUNTER (OUTPATIENT)
Dept: RADIOLOGY | Facility: HOSPITAL | Age: 52
Discharge: HOME | End: 2022-08-22
Attending: ORTHOPAEDIC SURGERY
Payer: COMMERCIAL

## 2022-08-22 ENCOUNTER — HOSPITAL ENCOUNTER (OUTPATIENT)
Facility: HOSPITAL | Age: 52
Discharge: HOME | End: 2022-08-22
Attending: ANESTHESIOLOGY | Admitting: ANESTHESIOLOGY
Payer: COMMERCIAL

## 2022-08-22 ENCOUNTER — HOSPITAL ENCOUNTER (OUTPATIENT)
Dept: RADIOLOGY | Facility: HOSPITAL | Age: 52
Discharge: HOME | End: 2022-08-22
Attending: ANESTHESIOLOGY | Admitting: ANESTHESIOLOGY
Payer: COMMERCIAL

## 2022-08-22 VITALS
OXYGEN SATURATION: 99 % | RESPIRATION RATE: 20 BRPM | BODY MASS INDEX: 38.06 KG/M2 | SYSTOLIC BLOOD PRESSURE: 130 MMHG | DIASTOLIC BLOOD PRESSURE: 71 MMHG | WEIGHT: 251.13 LBS | TEMPERATURE: 98 F | HEART RATE: 62 BPM | HEIGHT: 68 IN

## 2022-08-22 DIAGNOSIS — M51.36 DDD (DEGENERATIVE DISC DISEASE), LUMBAR: ICD-10-CM

## 2022-08-22 DIAGNOSIS — M50.30 DDD (DEGENERATIVE DISC DISEASE), CERVICAL: Primary | ICD-10-CM

## 2022-08-22 DIAGNOSIS — G89.29 CHRONIC NONINTRACTABLE HEADACHE, UNSPECIFIED HEADACHE TYPE: ICD-10-CM

## 2022-08-22 DIAGNOSIS — M54.9 MID BACK PAIN: ICD-10-CM

## 2022-08-22 DIAGNOSIS — R51.9 CHRONIC NONINTRACTABLE HEADACHE, UNSPECIFIED HEADACHE TYPE: ICD-10-CM

## 2022-08-22 PROCEDURE — 72128 CT CHEST SPINE W/O DYE: CPT | Mod: TC

## 2022-08-22 PROCEDURE — 62304 MYELOGRAPHY LUMBAR INJECTION: CPT | Mod: ,,, | Performed by: RADIOLOGY

## 2022-08-22 PROCEDURE — 37000008 HC ANESTHESIA 1ST 15 MINUTES

## 2022-08-22 PROCEDURE — D9220A PRA ANESTHESIA: Mod: ANES,,, | Performed by: ANESTHESIOLOGY

## 2022-08-22 PROCEDURE — 63600175 PHARM REV CODE 636 W HCPCS: Performed by: ANESTHESIOLOGY

## 2022-08-22 PROCEDURE — 71000044 HC DOSC ROUTINE RECOVERY FIRST HOUR

## 2022-08-22 PROCEDURE — 63600175 PHARM REV CODE 636 W HCPCS

## 2022-08-22 PROCEDURE — 25000003 PHARM REV CODE 250: Performed by: ANESTHESIOLOGY

## 2022-08-22 PROCEDURE — 72131 CT LUMBAR SPINE W/O DYE: CPT | Mod: 26,,, | Performed by: RADIOLOGY

## 2022-08-22 PROCEDURE — 72128 CT CHEST SPINE W/O DYE: CPT | Mod: 26,,, | Performed by: RADIOLOGY

## 2022-08-22 PROCEDURE — 72125 CT MYELOGRAPHY ENTIRE SPINE (XPD): ICD-10-PCS | Mod: 26,,, | Performed by: RADIOLOGY

## 2022-08-22 PROCEDURE — 25500020 PHARM REV CODE 255: Performed by: ORTHOPAEDIC SURGERY

## 2022-08-22 PROCEDURE — 72131 CT MYELOGRAPHY ENTIRE SPINE (XPD): ICD-10-PCS | Mod: 26,,, | Performed by: RADIOLOGY

## 2022-08-22 PROCEDURE — 63600175 PHARM REV CODE 636 W HCPCS: Performed by: NURSE ANESTHETIST, CERTIFIED REGISTERED

## 2022-08-22 PROCEDURE — D9220A PRA ANESTHESIA: ICD-10-PCS | Mod: ANES,,, | Performed by: ANESTHESIOLOGY

## 2022-08-22 PROCEDURE — 62304 MYELOGRAPHY LUMBAR INJECTION: CPT

## 2022-08-22 PROCEDURE — 25000003 PHARM REV CODE 250: Performed by: NURSE ANESTHETIST, CERTIFIED REGISTERED

## 2022-08-22 PROCEDURE — D9220A PRA ANESTHESIA: ICD-10-PCS | Mod: CRNA,,, | Performed by: REGISTERED NURSE

## 2022-08-22 PROCEDURE — 72125 CT NECK SPINE W/O DYE: CPT | Mod: 26,,, | Performed by: RADIOLOGY

## 2022-08-22 PROCEDURE — 62304 FL MYELOGRAM LUMBAR WITH CT TO FOLLOW: ICD-10-PCS | Mod: ,,, | Performed by: RADIOLOGY

## 2022-08-22 PROCEDURE — 37000009 HC ANESTHESIA EA ADD 15 MINS

## 2022-08-22 PROCEDURE — 72128 CT MYELOGRAPHY ENTIRE SPINE (XPD): ICD-10-PCS | Mod: 26,,, | Performed by: RADIOLOGY

## 2022-08-22 PROCEDURE — D9220A PRA ANESTHESIA: Mod: CRNA,,, | Performed by: REGISTERED NURSE

## 2022-08-22 RX ORDER — DIPHENHYDRAMINE HYDROCHLORIDE 50 MG/ML
25 INJECTION INTRAMUSCULAR; INTRAVENOUS EVERY 6 HOURS PRN
Status: DISCONTINUED | OUTPATIENT
Start: 2022-08-22 | End: 2022-08-22 | Stop reason: HOSPADM

## 2022-08-22 RX ORDER — HEPARIN 100 UNIT/ML
5 SYRINGE INTRAVENOUS ONCE
Status: COMPLETED | OUTPATIENT
Start: 2022-08-22 | End: 2022-08-22

## 2022-08-22 RX ORDER — OXYCODONE AND ACETAMINOPHEN 5; 325 MG/1; MG/1
1 TABLET ORAL
Status: DISCONTINUED | OUTPATIENT
Start: 2022-08-22 | End: 2022-08-22 | Stop reason: HOSPADM

## 2022-08-22 RX ORDER — HYDROMORPHONE HYDROCHLORIDE 1 MG/ML
0.2 INJECTION, SOLUTION INTRAMUSCULAR; INTRAVENOUS; SUBCUTANEOUS EVERY 5 MIN PRN
Status: DISCONTINUED | OUTPATIENT
Start: 2022-08-22 | End: 2022-08-22 | Stop reason: HOSPADM

## 2022-08-22 RX ORDER — ONDANSETRON 2 MG/ML
INJECTION INTRAMUSCULAR; INTRAVENOUS
Status: DISCONTINUED | OUTPATIENT
Start: 2022-08-22 | End: 2022-08-22

## 2022-08-22 RX ORDER — ONDANSETRON 2 MG/ML
4 INJECTION INTRAMUSCULAR; INTRAVENOUS DAILY PRN
Status: DISCONTINUED | OUTPATIENT
Start: 2022-08-22 | End: 2022-08-22 | Stop reason: HOSPADM

## 2022-08-22 RX ORDER — ACETAMINOPHEN 10 MG/ML
1000 INJECTION, SOLUTION INTRAVENOUS ONCE
Status: COMPLETED | OUTPATIENT
Start: 2022-08-22 | End: 2022-08-22

## 2022-08-22 RX ORDER — FENTANYL CITRATE 50 UG/ML
INJECTION, SOLUTION INTRAMUSCULAR; INTRAVENOUS
Status: DISCONTINUED | OUTPATIENT
Start: 2022-08-22 | End: 2022-08-22

## 2022-08-22 RX ORDER — SODIUM CHLORIDE 0.9 % (FLUSH) 0.9 %
3 SYRINGE (ML) INJECTION
Status: DISCONTINUED | OUTPATIENT
Start: 2022-08-22 | End: 2022-08-22 | Stop reason: HOSPADM

## 2022-08-22 RX ORDER — PROPOFOL 10 MG/ML
VIAL (ML) INTRAVENOUS
Status: DISCONTINUED | OUTPATIENT
Start: 2022-08-22 | End: 2022-08-22

## 2022-08-22 RX ORDER — SODIUM CHLORIDE 9 MG/ML
INJECTION, SOLUTION INTRAVENOUS CONTINUOUS
Status: DISCONTINUED | OUTPATIENT
Start: 2022-08-22 | End: 2022-08-22 | Stop reason: HOSPADM

## 2022-08-22 RX ORDER — SUCCINYLCHOLINE CHLORIDE 20 MG/ML
INJECTION INTRAMUSCULAR; INTRAVENOUS
Status: DISCONTINUED | OUTPATIENT
Start: 2022-08-22 | End: 2022-08-22

## 2022-08-22 RX ORDER — ROCURONIUM BROMIDE 10 MG/ML
INJECTION, SOLUTION INTRAVENOUS
Status: DISCONTINUED | OUTPATIENT
Start: 2022-08-22 | End: 2022-08-22

## 2022-08-22 RX ORDER — MIDAZOLAM HYDROCHLORIDE 1 MG/ML
INJECTION, SOLUTION INTRAMUSCULAR; INTRAVENOUS
Status: DISCONTINUED | OUTPATIENT
Start: 2022-08-22 | End: 2022-08-22

## 2022-08-22 RX ORDER — LIDOCAINE HYDROCHLORIDE 10 MG/ML
1 INJECTION, SOLUTION EPIDURAL; INFILTRATION; INTRACAUDAL; PERINEURAL ONCE
Status: DISCONTINUED | OUTPATIENT
Start: 2022-08-22 | End: 2022-08-22 | Stop reason: HOSPADM

## 2022-08-22 RX ORDER — LIDOCAINE HYDROCHLORIDE 20 MG/ML
INJECTION INTRAVENOUS
Status: DISCONTINUED | OUTPATIENT
Start: 2022-08-22 | End: 2022-08-22

## 2022-08-22 RX ORDER — SIMETHICONE 125 MG
250 CAPSULE ORAL 2 TIMES DAILY
COMMUNITY
End: 2022-10-28

## 2022-08-22 RX ORDER — HALOPERIDOL 5 MG/ML
0.5 INJECTION INTRAMUSCULAR EVERY 10 MIN PRN
Status: DISCONTINUED | OUTPATIENT
Start: 2022-08-22 | End: 2022-08-22 | Stop reason: HOSPADM

## 2022-08-22 RX ORDER — DIPHENHYDRAMINE HYDROCHLORIDE 50 MG/ML
INJECTION INTRAMUSCULAR; INTRAVENOUS
Status: COMPLETED
Start: 2022-08-22 | End: 2022-08-22

## 2022-08-22 RX ADMIN — HYDROMORPHONE HYDROCHLORIDE 0.2 MG: 1 INJECTION, SOLUTION INTRAMUSCULAR; INTRAVENOUS; SUBCUTANEOUS at 04:08

## 2022-08-22 RX ADMIN — ROCURONIUM BROMIDE 5 MG: 10 INJECTION, SOLUTION INTRAVENOUS at 01:08

## 2022-08-22 RX ADMIN — DIPHENHYDRAMINE HYDROCHLORIDE 25 MG: 50 INJECTION, SOLUTION INTRAMUSCULAR; INTRAVENOUS at 05:08

## 2022-08-22 RX ADMIN — IOHEXOL 10 ML: 300 INJECTION, SOLUTION INTRAVENOUS at 03:08

## 2022-08-22 RX ADMIN — Medication 500 UNITS: at 06:08

## 2022-08-22 RX ADMIN — LIDOCAINE HYDROCHLORIDE 100 MG: 20 INJECTION, SOLUTION INTRAVENOUS at 01:08

## 2022-08-22 RX ADMIN — PROPOFOL 150 MG: 10 INJECTION, EMULSION INTRAVENOUS at 01:08

## 2022-08-22 RX ADMIN — FENTANYL CITRATE 25 MCG: 50 INJECTION, SOLUTION INTRAMUSCULAR; INTRAVENOUS at 03:08

## 2022-08-22 RX ADMIN — ACETAMINOPHEN 1000 MG: 10 INJECTION INTRAVENOUS at 05:08

## 2022-08-22 RX ADMIN — PROPOFOL 20 MG: 10 INJECTION, EMULSION INTRAVENOUS at 03:08

## 2022-08-22 RX ADMIN — SUCCINYLCHOLINE CHLORIDE 150 MG: 20 INJECTION, SOLUTION INTRAMUSCULAR; INTRAVENOUS at 01:08

## 2022-08-22 RX ADMIN — DIPHENHYDRAMINE HYDROCHLORIDE 25 MG: 50 INJECTION INTRAMUSCULAR; INTRAVENOUS at 05:08

## 2022-08-22 RX ADMIN — HYDROMORPHONE HYDROCHLORIDE 0.2 MG: 1 INJECTION, SOLUTION INTRAMUSCULAR; INTRAVENOUS; SUBCUTANEOUS at 05:08

## 2022-08-22 RX ADMIN — FENTANYL CITRATE 25 MCG: 50 INJECTION, SOLUTION INTRAMUSCULAR; INTRAVENOUS at 04:08

## 2022-08-22 RX ADMIN — SODIUM CHLORIDE: 0.9 INJECTION, SOLUTION INTRAVENOUS at 01:08

## 2022-08-22 RX ADMIN — PROMETHAZINE HYDROCHLORIDE 25 MG: 25 INJECTION INTRAMUSCULAR; INTRAVENOUS at 05:08

## 2022-08-22 RX ADMIN — ONDANSETRON 4 MG: 2 INJECTION INTRAMUSCULAR; INTRAVENOUS at 03:08

## 2022-08-22 RX ADMIN — MIDAZOLAM HYDROCHLORIDE 2 MG: 1 INJECTION, SOLUTION INTRAMUSCULAR; INTRAVENOUS at 01:08

## 2022-08-22 RX ADMIN — SODIUM CHLORIDE: 0.9 INJECTION, SOLUTION INTRAVENOUS at 02:08

## 2022-08-22 RX ADMIN — FENTANYL CITRATE 50 MCG: 50 INJECTION, SOLUTION INTRAMUSCULAR; INTRAVENOUS at 01:08

## 2022-08-22 RX ADMIN — ONDANSETRON 4 MG: 2 INJECTION INTRAMUSCULAR; INTRAVENOUS at 01:08

## 2022-08-22 NOTE — ANESTHESIA PREPROCEDURE EVALUATION
08/22/2022  Nan Betts is a 52 y.o., female.      Procedure: Myelogram lumbar (N/A )   Anesthesia type: General   Diagnosis: DDD (degenerative disc disease), lumbar [M51.36]       Present Prior to Hospital Arrival?: No; Placement Date: 11/23/16; Placement Time: 0954; Method of Intubation: Direct laryngoscopy; Inserted by: Anesthesia Resident; Airway Device: Endotracheal Tube; Mask Ventilation: Not Attempted; Intubated: Postinduction; Blade: Glasgow #2; Airway Device Size: 7.0; Style: Cuffed; Cuff Inflation: Minimal occlusive pressure; Placement Verified By: Auscultation, Capnometry, ETT Condensation; Grade: Grade I; Complicating Factors: Obesity; Intubation Findings: Positive EtCO2, Bilateral breath sounds, Atraumatic/Condition of teeth unchanged;  Depth of Insertion: 21; Securment: Lips; Complications: None; Breath Sounds: Equal Bilateral; Insertion Attempts: 1; Removal Date: 11/23/16;  Removal Time: 1128    Has gastroparesis with pacemaker whose battery is dying,  Nausea increasing very much lately, a little more emesis.       Pre-operative evaluation for Procedure(s) (LRB):  Myelogram lumbar (N/A)    @[id number]@@    Encounter Diagnosis   Name Primary?    Chronic nonintractable headache, unspecified headache type        Review of patient's allergies indicates:   Allergen Reactions    Corticosteroids (glucocorticoids) Nausea And Vomiting and Other (See Comments)     Acid reflux  Acid reflux  Increases acid reflux    Ketorolac Anaphylaxis and Shortness Of Breath     Other reaction(s): Throat swelling, Unknown    Morphine Anaphylaxis    Prednisone Other (See Comments)     Increases acid reflux    Tramadol Shortness Of Breath     Other reaction(s): Throat swelling, Unknown    Codeine Itching    Gabapentin Other (See Comments)     Causes suicidal thoughts        Ibuprofen Other (See Comments)      "G L Bleeding        Nsaids (non-steroidal anti-inflammatory drug) Other (See Comments)     GI BLEEDING        Hydrocodone      Other reaction(s): Unknown    Hydrocodone-acetaminophen     Metoclopramide Other (See Comments)     Other reaction(s): Headache, Other (See Comments)  Headaches and insomnia  Headaches and insomnia  Headaches and insomnia      Morphine sulfate      Other reaction(s): Unknown    Reglan [metoclopramide hcl] Other (See Comments)     Headaches and insomnia      Topiramate Other (See Comments)     Ulcers in the mouth and dry mouth  Other reaction(s): Other: See Comments, Unknown  per pt, gets "fog brain"  per pt, gets "fog brain"  Ulcers in the mouth and dry mouth  Ulcers in the mouth and dry mouth         Medications Prior to Admission   Medication Sig Dispense Refill Last Dose    (Magic mouthwash) 1:1:1 diphenhydramine(Benadryl) 12.5mg/5ml liq, aluminum & magnesium hydroxide-simethicone (Maalox), LIDOcaine viscous 2% Swish and spit 5 mLs every 4 (four) hours as needed (mouth sores). for mouth sores 240 mL 3 Past Month at Unknown time    ALPRAZolam (XANAX) 1 MG tablet Take 1 mg by mouth 2 (two) times daily as needed.    8/22/2022 at 0640    busPIRone (BUSPAR) 5 MG Tab Take 5 mg by mouth 2 (two) times daily.   8/22/2022 at 0640    diphenhydrAMINE (BENADRYL) 50 MG tablet Take 50 mg by mouth every 4 (four) hours as needed for Itching.   8/22/2022 at 0640 25 mg    estradioL (ESTRACE) 2 MG tablet Take 1.5 tablets (3 mg total) by mouth once daily. 135 tablet 3 8/21/2022 at 2130    flavoxATE (URISPAS) 100 mg Tab 1 tablet   8/22/2022 at 0640    LIDOCAINE VISCOUS 2 % solution Take 5 mLs by mouth 4 (four) times daily as needed.   Past Month at Unknown time    lipase-protease-amylase 24,000-76,000-120,000 units (CREON) 24,000-76,000 -120,000 unit capsule Take 1 capsule by mouth 3 (three) times daily with meals. 270 capsule 1 8/22/2022 at 0640    loperamide (IMODIUM) 2 mg capsule TAKE 2 " CAPSULES BY MOUTH  TWICE DAILY AS NEEDED 360 capsule 1 8/22/2022 at 0640    methocarbamol (ROBAXIN-750 ORAL) Take by mouth.   8/22/2022 at 640    metoprolol succinate (TOPROL-XL) 25 MG 24 hr tablet    8/22/2022 at 0640    pantoprazole (PROTONIX) 40 mg injection Inject 40 mg into the vein 2 (two) times a day. 60 each 11 8/22/2022 at 0655    promethazine (PHENERGAN) 25 mg/mL injection Inject 25 mg into the vein every 4 (four) hours.   8/22/2022 at 1100    simethicone (MYLICON) 125 mg Cap capsule Take 250 mg by mouth 2 (two) times a day.   8/22/2022 at 0640    simvastatin (ZOCOR) 40 MG tablet Take 40 mg by mouth every evening.    8/21/2022 at 2130    SODIUM CHLORIDE 0.45 % IV Inject 1,000 mLs into the vein as needed.    8/21/2022 at Unknown time    sucralfate (CARAFATE) 1 gram tablet TAKE 1 TABLET BY MOUTH 4  TIMES DAILY BEFORE MEALS  AND AT NIGHT 360 tablet 3 8/22/2022 at 0640    tiZANidine (ZANAFLEX) 4 MG tablet Take 4 mg by mouth 3 (three) times daily.   8/21/2022 at 2130    DEXILANT 60 mg capsule TAKE 1 CAPSULE BY MOUTH  ONCE DAILY 90 capsule 3 More than a month at Unknown time    diazePAM (VALIUM) 5 MG tablet Take 1 tablet (5 mg total) by mouth as needed for Anxiety (Myelogram anxiety). 2 tablet 0     fluticasone propionate (FLONASE) 50 mcg/actuation nasal spray 1 spray by Each Nostril route once daily.   More than a month at Unknown time    fremanezumab-vfrm (AJOVY AUTOINJECTOR) 225 mg/1.5 mL autoinjector Inject 1.5 mLs (225 mg total) into the skin every 28 days. 1 each 10 8/6/2022    Lactobacillus rhamnosus GG (CULTURELLE) 10 billion cell capsule Take 1 capsule by mouth once daily.       meclizine (ANTIVERT) 25 mg tablet Take by mouth 3 (three) times daily as needed.   8/19/2022    metoprolol tartrate (LOPRESSOR) 25 MG tablet Take 25 mg by mouth 2 (two) times daily.       sodium chloride 0.9% 0.9 % SolP 50 mL with promethazine 25 mg/mL Soln Inject 25 mg into the vein every 4 (four) hours. Thru  IV       sodium chloride 0.9% SolP 100 mL with pantoprazole 40 mg SolR 40 mg Inject 40 mg into the vein every 12 (twelve) hours. 60 vial 0     triamcinolone acetonide 0.1% (KENALOG) 0.1 % ointment AAA hands bid - may occlude qhs 454 g 3 8/20/2022         sodium chloride 0.9%         No current facility-administered medications on file prior to encounter.     Current Outpatient Medications on File Prior to Encounter   Medication Sig Dispense Refill    (Magic mouthwash) 1:1:1 diphenhydramine(Benadryl) 12.5mg/5ml liq, aluminum & magnesium hydroxide-simethicone (Maalox), LIDOcaine viscous 2% Swish and spit 5 mLs every 4 (four) hours as needed (mouth sores). for mouth sores 240 mL 3    ALPRAZolam (XANAX) 1 MG tablet Take 1 mg by mouth 2 (two) times daily as needed.       busPIRone (BUSPAR) 5 MG Tab Take 5 mg by mouth 2 (two) times daily.      diphenhydrAMINE (BENADRYL) 50 MG tablet Take 50 mg by mouth every 4 (four) hours as needed for Itching.      estradioL (ESTRACE) 2 MG tablet Take 1.5 tablets (3 mg total) by mouth once daily. 135 tablet 3    flavoxATE (URISPAS) 100 mg Tab 1 tablet      LIDOCAINE VISCOUS 2 % solution Take 5 mLs by mouth 4 (four) times daily as needed.      lipase-protease-amylase 24,000-76,000-120,000 units (CREON) 24,000-76,000 -120,000 unit capsule Take 1 capsule by mouth 3 (three) times daily with meals. 270 capsule 1    loperamide (IMODIUM) 2 mg capsule TAKE 2 CAPSULES BY MOUTH  TWICE DAILY AS NEEDED 360 capsule 1    methocarbamol (ROBAXIN-750 ORAL) Take by mouth.      metoprolol succinate (TOPROL-XL) 25 MG 24 hr tablet       pantoprazole (PROTONIX) 40 mg injection Inject 40 mg into the vein 2 (two) times a day. 60 each 11    promethazine (PHENERGAN) 25 mg/mL injection Inject 25 mg into the vein every 4 (four) hours.      simethicone (MYLICON) 125 mg Cap capsule Take 250 mg by mouth 2 (two) times a day.      simvastatin (ZOCOR) 40 MG tablet Take 40 mg by mouth every evening.        SODIUM CHLORIDE 0.45 % IV Inject 1,000 mLs into the vein as needed.       sucralfate (CARAFATE) 1 gram tablet TAKE 1 TABLET BY MOUTH 4  TIMES DAILY BEFORE MEALS  AND AT NIGHT 360 tablet 3    tiZANidine (ZANAFLEX) 4 MG tablet Take 4 mg by mouth 3 (three) times daily.      DEXILANT 60 mg capsule TAKE 1 CAPSULE BY MOUTH  ONCE DAILY 90 capsule 3    diazePAM (VALIUM) 5 MG tablet Take 1 tablet (5 mg total) by mouth as needed for Anxiety (Myelogram anxiety). 2 tablet 0    fluticasone propionate (FLONASE) 50 mcg/actuation nasal spray 1 spray by Each Nostril route once daily.      fremanezumab-vfrm (AJOVY AUTOINJECTOR) 225 mg/1.5 mL autoinjector Inject 1.5 mLs (225 mg total) into the skin every 28 days. 1 each 10    Lactobacillus rhamnosus GG (CULTURELLE) 10 billion cell capsule Take 1 capsule by mouth once daily.      meclizine (ANTIVERT) 25 mg tablet Take by mouth 3 (three) times daily as needed.      metoprolol tartrate (LOPRESSOR) 25 MG tablet Take 25 mg by mouth 2 (two) times daily.      sodium chloride 0.9% 0.9 % SolP 50 mL with promethazine 25 mg/mL Soln Inject 25 mg into the vein every 4 (four) hours. Thru IV      sodium chloride 0.9% SolP 100 mL with pantoprazole 40 mg SolR 40 mg Inject 40 mg into the vein every 12 (twelve) hours. 60 vial 0    triamcinolone acetonide 0.1% (KENALOG) 0.1 % ointment AAA hands bid - may occlude qhs 454 g 3       Past Medical History:  No date: Abnormal Pap smear of vagina  No date: Anxiety  No date: Cataract  No date: Chronic pain      Comment:  TMJ and abdomen  No date: Colon polyp  No date: Epilepsy      Comment:  as a child  No date: Gastroparesis      Comment:  Idiopathic  No date: GERD (gastroesophageal reflux disease)  No date: H/O abnormal cervical Papanicolaou smear  6/21/2016: Hypercholesteremia  No date: Hyperlipidemia  No date: IC (interstitial cystitis)  No date: Internal hemorrhoids  No date: Interstitial cystitis  No date: Irritable bowel syndrome  (IBS)  6/21/2016: Irritable bowel syndrome with diarrhea  No date: Spastic colon  No date: TMJ (temporomandibular joint disorder)  6/21/2016: TMJ (temporomandibular joint syndrome)    Past Surgical History:   Procedure Laterality Date    APPENDECTOMY      BREAST BIOPSY Bilateral     CATARACT EXTRACTION      CHOLECYSTECTOMY      COLONOSCOPY      COLONOSCOPY N/A 7/31/2018    Procedure: COLONOSCOPY;  Surgeon: Zack Lehman MD;  Location: [facility] (4TH FLR);  Service: Endoscopy;  Laterality: N/A;    CYSTOSCOPY N/A 8/25/2020    Procedure: CYSTOSCOPY;  Surgeon: Christal Khan MD;  Location: [facility] OR [facility] FLR;  Service: Urology;  Laterality: N/A;    ELBOW SURGERY      ESOPHAGOGASTRODUODENOSCOPY N/A 7/31/2018    Procedure: EGD (ESOPHAGOGASTRODUODENOSCOPY);  Surgeon: Zack Lehman MD;  Location: [facility] ENDO (4TH FLR);  Service: Endoscopy;  Laterality: N/A;  RUQ gastric pacemaker    Left upper arm PICC line    PONV    ESOPHAGOGASTRODUODENOSCOPY N/A 9/3/2019    Procedure: EGD (ESOPHAGOGASTRODUODENOSCOPY);  Surgeon: Zack Lehman MD;  Location: [facility] ENDO (4TH FLR);  Service: Endoscopy;  Laterality: N/A;  history of gastoparesis, per change in protocol, pt instructed to do full liquid diet x 3 days prior to procedure and clear liquids x 1 day prior to procedure-BB  pt has gastric pacemaker, monitored by Dr. Lehman-BB  hx of epilepsy, last seizure during chi    ESOPHAGOGASTRODUODENOSCOPY N/A 9/10/2020    Procedure: ESOPHAGOGASTRODUODENOSCOPY (EGD);  Surgeon: Zack Lehman MD;  Location: [facility] ENDO (4TH FLR);  Service: Endoscopy;  Laterality: N/A;  3 days Full liquid diet/ 1 day Clear liquids  waiting for Pt to cb with date - ERW  Left upper arm -  PICC  COVID screening 9/7/20 [facility]- [facility]    ESOPHAGOGASTRODUODENOSCOPY N/A 11/3/2021    Procedure: EGD (ESOPHAGOGASTRODUODENOSCOPY);  Surgeon: Zack Lehman MD;  Location: [facility] ENDO (4TH FLR);  Service: Endoscopy;  Laterality: N/A;  10/15 fully vaccinated as  of 7/15/21; gastric pacemaker-pt does not have remote;  pt has a port does not want IV started; 3 days full liquid diet; instr. to portal-st    GASTRIC STIMULATOR IMPLANT SURGERY      left side on 03/30/2020    gastric stimulator placement      LASIK Bilateral 2006    MANDIBLE SURGERY Bilateral 10/17/2016    OOPHORECTOMY Bilateral     PYLOROPLASTY  03/2016    STOMACH SURGERY      gastric pacemaker    TEMPOROMANDIBULAR JOINT SURGERY  12/2016    TONSILLECTOMY      TOTAL ABDOMINAL HYSTERECTOMY  2005    EUGENIA/BSO, Trachelectomy  2012    UPPER GASTROINTESTINAL ENDOSCOPY      WRIST SURGERY         Social History     Tobacco Use   Smoking Status Current Every Day Smoker    Packs/day: 1.00    Years: 20.00    Pack years: 20.00    Start date: 4/14/1986   Smokeless Tobacco Never Used       Social History     Substance and Sexual Activity   Alcohol Use No    Alcohol/week: 0.0 standard drinks       Physical Activity: Not on file         No results for input(s): HCT in the last 72 hours.  No results for input(s): PLT in the last 72 hours.  No results for input(s): K in the last 72 hours.  No results for input(s): CREATININE in the last 72 hours.  No results for input(s): GLU in the last 72 hours.  No results for input(s): PT in the last 72 hours.                      Pre-op Assessment          Review of Systems  Anesthesia Hx:  No problems with previous Anesthesia    Hematology/Oncology:  Hematology Normal   Oncology Normal     Cardiovascular:   Hypertension, well controlled Denies MI.    Denies Angina. 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  Denies Asthma.  Denies Shortness of breath.  Denies Sleep Apnea.    Renal/:   Denies Chronic Renal Disease.     Hepatic/GI:   GERD, poorly controlled Denies Liver Disease.    Neurological:   Denies TIA. Denies CVA. Denies Seizures. (as child only)    Endocrine:   Denies Diabetes.        Physical  Exam  General: Well nourished, Cooperative, Alert and Oriented    Airway:  Mallampati: II   Mouth Opening: Normal  TM Distance: Normal  Tongue: Normal  Neck ROM: Normal ROM        Anesthesia Plan  Type of Anesthesia, risks & benefits discussed:    Anesthesia Type: Gen ETT  Intra-op Monitoring Plan: Standard ASA Monitors  Post Op Pain Control Plan: multimodal analgesia and IV/PO Opioids PRN  Induction:  IV  Informed Consent: Informed consent signed with the Patient and all parties understand the risks and agree with anesthesia plan.  All questions answered.   ASA Score: 2  Day of Surgery Review of History & Physical: H&P Update referred to the surgeon/provider.    Ready For Surgery From Anesthesia Perspective.     .

## 2022-08-22 NOTE — PROCEDURES
Radiology Post-Procedure Note    Pre Op Diagnosis: Upper and lower extremities paraesthesia   Post Op Diagnosis: Same    Procedure: Lumbar puncture followed by whole spine myelogram     Procedure performed by: Beltran Kaiser MD and Zack Allen MD    Written Informed Consent Obtained: Yes    Specimen Removed: 20 mL CSF    Estimated Blood Loss: Minimal    Findings:   Following written informed consent and sterile prep and drape, a 22 gauge 5 inch spinal needle was inserted at L4 - L5 intralaminar space under fluoroscopic surveillance.     Opening pressure is 35 cmH2O  20 mL clear CSF removed.  Closing pressure is 19 cmH2O    10 ml of Omnipaque 300 is injected intrathecally under fluoro guidance    There were no complications. The entire procedure is done under general anesthesia.    Woodrow Kaiser MD  Radiology Department/ PGY-5  [facility]

## 2022-08-22 NOTE — PROGRESS NOTES
"Patient complains of severe headache and sensitivity to light. Patient states "last time this happened I needed a blood patch." IR on call paged.   "

## 2022-08-22 NOTE — H&P
Radiology History & Physical      SUBJECTIVE:     History of Present Illness:  Nan Betts is a 52 y.o. female with upper and lower extremities paraesthesia who presents for imaging guided lumbar puncture for whole spine myelogram. Pt can't get MRI 2/2 gastric pacemaker.    Past Medical History:   Diagnosis Date    Abnormal Pap smear of vagina     Anxiety     Cataract     Chronic pain     TMJ and abdomen    Colon polyp     Epilepsy     as a child    Gastroparesis     Idiopathic    GERD (gastroesophageal reflux disease)     H/O abnormal cervical Papanicolaou smear     Hypercholesteremia 6/21/2016    Hyperlipidemia     IC (interstitial cystitis)     Internal hemorrhoids     Interstitial cystitis     Irritable bowel syndrome (IBS)     Irritable bowel syndrome with diarrhea 6/21/2016    Spastic colon     TMJ (temporomandibular joint disorder)     TMJ (temporomandibular joint syndrome) 6/21/2016     Past Surgical History:   Procedure Laterality Date    APPENDECTOMY      BREAST BIOPSY Bilateral     CATARACT EXTRACTION      CHOLECYSTECTOMY      COLONOSCOPY      COLONOSCOPY N/A 7/31/2018    Procedure: COLONOSCOPY;  Surgeon: Zack Lehman MD;  [facility]  Service: Endoscopy;  Laterality: N/A;    CYSTOSCOPY N/A 8/25/2020    Procedure: CYSTOSCOPY;  Surgeon: Christal Khan MD;  [facility];  Service: Urology;  Laterality: N/A;    ELBOW SURGERY      ESOPHAGOGASTRODUODENOSCOPY N/A 7/31/2018    Procedure: EGD (ESOPHAGOGASTRODUODENOSCOPY);  Surgeon: Zack Lehman MD;  [facility]  Service: Endoscopy;  Laterality: N/A;  RUQ gastric pacemaker    Left upper arm PICC line    PONV    ESOPHAGOGASTRODUODENOSCOPY N/A 9/3/2019    Procedure: EGD (ESOPHAGOGASTRODUODENOSCOPY);  Surgeon: Zack Lehman MD;  [facility]  Service: Endoscopy;  Laterality: N/A;  history of gastoparesis, per change in protocol, pt instructed to do full liquid  diet x 3 days prior to procedure and clear liquids x 1 day prior to procedure-BB  pt has gastric pacemaker, monitored by Dr. Lehman-BB  hx of epilepsy, last seizure during chi    ESOPHAGOGASTRODUODENOSCOPY N/A 9/10/2020    Procedure: ESOPHAGOGASTRODUODENOSCOPY (EGD);  Surgeon: Zack Lehman MD;  Location: [facility] (4TH FLR);  Service: Endoscopy;  Laterality: N/A;  3 days Full liquid diet/ 1 day Clear liquids  waiting for Pt to cb with date - ERW  Left upper arm -  PICC  COVID screening 9/7/20 [place] [facility]- [facility]    ESOPHAGOGASTRODUODENOSCOPY N/A 11/3/2021    Procedure: EGD (ESOPHAGOGASTRODUODENOSCOPY);  Surgeon: Zack Lehman MD;  Location: [facility] (4TH FLR);  Service: Endoscopy;  Laterality: N/A;  10/15 fully vaccinated as of 7/15/21; gastric pacemaker-pt does not have remote;  pt has a port does not want IV started; 3 days full liquid diet; instr. to portal-st    GASTRIC STIMULATOR IMPLANT SURGERY      left side on 03/30/2020    gastric stimulator placement      LASIK Bilateral 2006    MANDIBLE SURGERY Bilateral 10/17/2016    OOPHORECTOMY Bilateral     PYLOROPLASTY  03/2016    STOMACH SURGERY      gastric pacemaker    TEMPOROMANDIBULAR JOINT SURGERY  12/2016    TONSILLECTOMY      TOTAL ABDOMINAL HYSTERECTOMY  2005    EUGENIA/BSO, Trachelectomy  2012    UPPER GASTROINTESTINAL ENDOSCOPY      WRIST SURGERY         Home Meds:   Prior to Admission medications    Medication Sig Start Date End Date Taking? Authorizing Provider   (Magic mouthwash) 1:1:1 diphenhydramine(Benadryl) 12.5mg/5ml liq, aluminum & magnesium hydroxide-simethicone (Maalox), LIDOcaine viscous 2% Swish and spit 5 mLs every 4 (four) hours as needed (mouth sores). for mouth sores 6/6/22   Zack Lehman MD   ALPRAZolam (XANAX) 1 MG tablet Take 1 mg by mouth 2 (two) times daily as needed.  2/26/20   Historical Provider   busPIRone (BUSPAR) 5 MG Tab Take 5 mg by mouth 2 (two) times daily. 8/1/22   Historical Provider   DEXILANT 60 mg  capsule TAKE 1 CAPSULE BY MOUTH  ONCE DAILY 8/14/22   Zack Lehman MD   diazePAM (VALIUM) 5 MG tablet Take 1 tablet (5 mg total) by mouth as needed for Anxiety (Myelogram anxiety). 8/3/22   Vladimir Dan MD   diphenhydrAMINE (BENADRYL) 50 MG tablet Take 50 mg by mouth every 4 (four) hours as needed for Itching.    Historical Provider   estradioL (ESTRACE) 2 MG tablet Take 1.5 tablets (3 mg total) by mouth once daily. 8/2/22   Niesha Dunn MD   flavoxATE (URISPAS) 100 mg Tab 1 tablet    Historical Provider   fluticasone propionate (FLONASE) 50 mcg/actuation nasal spray 1 spray by Each Nostril route once daily.    Historical Provider   fremanezumab-vfrm (AJOVY AUTOINJECTOR) 225 mg/1.5 mL autoinjector Inject 1.5 mLs (225 mg total) into the skin every 28 days. 7/19/22   Ananth Dillon PA-C   Lactobacillus rhamnosus GG (CULTURELLE) 10 billion cell capsule Take 1 capsule by mouth once daily.    Historical Provider   LIDOCAINE VISCOUS 2 % solution Take 5 mLs by mouth 4 (four) times daily as needed. 7/25/22   Historical Provider   lipase-protease-amylase 24,000-76,000-120,000 units (CREON) 24,000-76,000 -120,000 unit capsule Take 1 capsule by mouth 3 (three) times daily with meals. 8/2/22 8/2/23  Zack Lehman MD   loperamide (IMODIUM) 2 mg capsule TAKE 2 CAPSULES BY MOUTH  TWICE DAILY AS NEEDED 5/28/22   Zack Lehman MD   meclizine (ANTIVERT) 25 mg tablet Take by mouth 3 (three) times daily as needed.    Historical Provider   methocarbamol (ROBAXIN-750 ORAL) Take by mouth.    Historical Provider   metoprolol succinate (TOPROL-XL) 25 MG 24 hr tablet  8/2/22   Historical Provider   metoprolol tartrate (LOPRESSOR) 25 MG tablet Take 25 mg by mouth 2 (two) times daily.    Historical Provider   pantoprazole (PROTONIX) 40 mg injection Inject 40 mg into the vein 2 (two) times a day. 1/6/22 1/6/23  Zack Lehman MD   promethazine (PHENERGAN) 25 mg/mL injection Inject 25 mg into the vein every 4 (four) hours.     Historical Provider   simethicone (MYLICON) 125 mg Cap capsule Take 250 mg by mouth 2 (two) times a day.    Historical Provider   simvastatin (ZOCOR) 40 MG tablet Take 40 mg by mouth every evening.  9/8/16   Historical Provider   SODIUM CHLORIDE 0.45 % IV Inject 1,000 mLs into the vein as needed.     Historical Provider   sodium chloride 0.9% 0.9 % SolP 50 mL with promethazine 25 mg/mL Soln Inject 25 mg into the vein every 4 (four) hours. Thru IV    Historical Provider   sodium chloride 0.9% SolP 100 mL with pantoprazole 40 mg SolR 40 mg Inject 40 mg into the vein every 12 (twelve) hours. 1/6/22   Zack Lehman MD   sucralfate (CARAFATE) 1 gram tablet TAKE 1 TABLET BY MOUTH 4  TIMES DAILY BEFORE MEALS  AND AT NIGHT 5/28/22   Zack Lehman MD   tiZANidine (ZANAFLEX) 4 MG tablet Take 4 mg by mouth 3 (three) times daily. 1/10/21   Historical Provider   triamcinolone acetonide 0.1% (KENALOG) 0.1 % ointment AAA hands bid - may occlude qhs 4/26/22   Jen Thomas MD       Allergies:   Review of patient's allergies indicates:   Allergen Reactions    Corticosteroids (glucocorticoids) Nausea And Vomiting and Other (See Comments)     Acid reflux  Acid reflux  Increases acid reflux    Ketorolac Anaphylaxis and Shortness Of Breath     Other reaction(s): Throat swelling, Unknown    Morphine Anaphylaxis    Prednisone Other (See Comments)     Increases acid reflux    Tramadol Shortness Of Breath     Other reaction(s): Throat swelling, Unknown    Codeine Itching    Gabapentin Other (See Comments)     Causes suicidal thoughts        Ibuprofen Other (See Comments)     G L Bleeding        Nsaids (non-steroidal anti-inflammatory drug) Other (See Comments)     GI BLEEDING        Hydrocodone      Other reaction(s): Unknown    Hydrocodone-acetaminophen     Metoclopramide Other (See Comments)     Headaches and insomnia          Morphine sulfate      Other reaction(s): Unknown    Reglan [metoclopramide hcl] Other (See  "Comments)     Headaches and insomnia      Topiramate Other (See Comments)     Ulcers in the mouth and dry mouth  per pt, gets "fog brain"               Sedation History:  no adverse reactions    Review of Systems:   Hematological: no known coagulopathies  Respiratory: no shortness of breath  Cardiovascular: no chest pain  Gastrointestinal: no abdominal pain  Genito-Urinary: no dysuria  Musculoskeletal: negative  Neurological: no TIA or stroke symptoms       OBJECTIVE:     Laboratory  Lab Results   Component Value Date    INR 1.1 01/27/2022       Lab Results   Component Value Date    WBC 11.33 01/27/2022    HGB 13.2 01/27/2022    HCT 36 04/26/2022    MCV 88 01/27/2022     01/27/2022      Lab Results   Component Value Date    GLU 87 01/27/2022     01/27/2022    K 4.2 01/27/2022     01/27/2022    CO2 23 01/27/2022    BUN 17 01/27/2022    CREATININE 0.8 01/27/2022    CALCIUM 9.5 01/27/2022    MG 1.8 12/18/2019    ALT 16 01/27/2022    AST 28 01/27/2022    ALBUMIN 3.9 01/27/2022    BILITOT 0.3 01/27/2022       Vitals:        Physical Exam:  ASA: per anesthesia  Mallampati: per anesthesia    General: no acute distress  Mental Status: alert and oriented to person, place and time  HEENT: normocephalic, atraumatic  Chest: unlabored breathing  Heart: regular heart rate  Abdomen: nondistended  Extremity: moves all extremities       Anticoagulants/Antiplatelets: no anticoagulation    Sedation Plan: per anesthesia     Plan: Imaging guided LP for whole spine myelogram    Woodrow Kaiser MD  Radiology Department/ PGY-5  [facility]      "

## 2022-08-22 NOTE — TRANSFER OF CARE
"Anesthesia Transfer of Care Note    Patient: Nan Betts    Procedure(s) Performed: Procedure(s) (LRB):  Myelogram lumbar (N/A)    Patient location: [facility]    Anesthesia Type: general    Transport from OR: Transported from OR on room air with adequate spontaneous ventilation    Post pain: adequate analgesia    Post assessment: no apparent anesthetic complications    Post vital signs: stable    Level of consciousness: awake, alert and oriented    Nausea/Vomiting: no nausea/vomiting    Complications: none    Transfer of care protocol was followed      Last vitals:   Visit Vitals  BP (!) 144/70 (BP Location: Left arm, Patient Position: Lying)   Pulse 63   Temp 36.7 °C (98 °F) (Oral)   Resp 16   Ht 5' 8" (1.727 m)   Wt 113.9 kg (251 lb 1.7 oz)   SpO2 98%   Breastfeeding No   BMI 38.18 kg/m²     "

## 2022-08-22 NOTE — ANESTHESIA PROCEDURE NOTES
Intubation    Date/Time: 8/22/2022 1:36 PM  Performed by: Azra Villanueva CRNA  Authorized by: David Gilbert Jr., MD     Intubation:     Induction:  Rapid sequence induction    Intubated:  Postinduction    Mask Ventilation:  N/a    Attempts:  1    Attempted By:  CRNA    Method of Intubation:  Video laryngoscopy    Blade:  Tate 3    Laryngeal View Grade: Grade I - full view of cords      Difficult Airway Encountered?: No      Complications:  None    Airway Device:  Oral endotracheal tube    Airway Device Size:  7.0    Style/Cuff Inflation:  Cuffed    Inflation Amount (mL):  5    Tube secured:  21    Secured at:  The lips    Placement Verified By:  Capnometry    Complicating Factors:  Oropharyngeal edema or fat    Findings Post-Intubation:  BS equal bilateral and atraumatic/condition of teeth unchanged

## 2022-08-23 ENCOUNTER — PATIENT MESSAGE (OUTPATIENT)
Dept: ORTHOPEDICS | Facility: CLINIC | Age: 52
End: 2022-08-23
Payer: COMMERCIAL

## 2022-08-23 NOTE — ANESTHESIA POSTPROCEDURE EVALUATION
Anesthesia Post Evaluation    Patient: Nan Betts    Procedure(s) Performed: Procedure(s) (LRB):  Myelogram lumbar (N/A)    Final Anesthesia Type: general      Patient location during evaluation: PACU  Patient participation: Yes- Able to Participate  Level of consciousness: awake and alert  Post-procedure vital signs: reviewed and stable  Pain management: adequate  Airway patency: patent    PONV status at discharge: No PONV  Anesthetic complications: no      Cardiovascular status: blood pressure returned to baseline  Respiratory status: unassisted  Follow-up not needed.          Vitals Value Taken Time   /71 08/22/22 1747   Temp 36.8 °C (98.3 °F) 08/22/22 1745   Pulse 66 08/22/22 1749   Resp 20 08/22/22 1745   SpO2 98 % 08/22/22 1749   Vitals shown include unvalidated device data.      No case tracking events are documented in the log.      Pain/Chelsy Score: Pain Rating Prior to Med Admin: 10 (8/22/2022  5:40 PM)  Pain Rating Post Med Admin: 5 (8/22/2022  5:45 PM)  Chelsy Score: 10 (8/22/2022  4:15 PM)

## 2022-08-25 ENCOUNTER — PATIENT MESSAGE (OUTPATIENT)
Dept: GASTROENTEROLOGY | Facility: CLINIC | Age: 52
End: 2022-08-25
Payer: COMMERCIAL

## 2022-08-25 ENCOUNTER — TELEPHONE (OUTPATIENT)
Dept: ENDOSCOPY | Facility: HOSPITAL | Age: 52
End: 2022-08-25
Payer: COMMERCIAL

## 2022-08-25 ENCOUNTER — PATIENT MESSAGE (OUTPATIENT)
Dept: ORTHOPEDICS | Facility: CLINIC | Age: 52
End: 2022-08-25
Payer: COMMERCIAL

## 2022-08-26 ENCOUNTER — PATIENT MESSAGE (OUTPATIENT)
Dept: NEUROSURGERY | Facility: HOSPITAL | Age: 52
End: 2022-08-26
Payer: COMMERCIAL

## 2022-08-26 ENCOUNTER — PATIENT MESSAGE (OUTPATIENT)
Dept: NEUROSURGERY | Facility: CLINIC | Age: 52
End: 2022-08-26
Payer: COMMERCIAL

## 2022-08-26 ENCOUNTER — PATIENT MESSAGE (OUTPATIENT)
Dept: ORTHOPEDICS | Facility: CLINIC | Age: 52
End: 2022-08-26
Payer: COMMERCIAL

## 2022-08-26 DIAGNOSIS — M79.606 PAIN OF LOWER EXTREMITY, UNSPECIFIED LATERALITY: Primary | ICD-10-CM

## 2022-08-26 RX ORDER — PREGABALIN 75 MG/1
75 CAPSULE ORAL 2 TIMES DAILY
Qty: 60 CAPSULE | Refills: 6 | Status: SHIPPED | OUTPATIENT
Start: 2022-08-26 | End: 2022-10-18

## 2022-08-30 ENCOUNTER — PATIENT MESSAGE (OUTPATIENT)
Dept: NEUROSURGERY | Facility: CLINIC | Age: 52
End: 2022-08-30
Payer: COMMERCIAL

## 2022-08-30 ENCOUNTER — HOSPITAL ENCOUNTER (OUTPATIENT)
Dept: RADIOLOGY | Facility: HOSPITAL | Age: 52
Discharge: HOME | End: 2022-08-30
Attending: OBSTETRICS & GYNECOLOGY
Payer: COMMERCIAL

## 2022-08-30 ENCOUNTER — HOSPITAL ENCOUNTER (OUTPATIENT)
Dept: RADIOLOGY | Facility: HOSPITAL | Age: 52
Discharge: HOME | End: 2022-08-30
Attending: SURGERY
Payer: COMMERCIAL

## 2022-08-30 ENCOUNTER — OFFICE VISIT (OUTPATIENT)
Dept: ORTHOPEDICS | Facility: CLINIC | Age: 52
End: 2022-08-30
Payer: COMMERCIAL

## 2022-08-30 ENCOUNTER — HOSPITAL ENCOUNTER (OUTPATIENT)
Dept: RADIOLOGY | Facility: HOSPITAL | Age: 52
Discharge: HOME | End: 2022-08-30
Attending: NEUROLOGICAL SURGERY
Payer: COMMERCIAL

## 2022-08-30 VITALS
HEIGHT: 68 IN | WEIGHT: 251 LBS | WEIGHT: 251.13 LBS | BODY MASS INDEX: 38.04 KG/M2 | HEIGHT: 68 IN | BODY MASS INDEX: 38.06 KG/M2

## 2022-08-30 DIAGNOSIS — M53.3 SACROILIAC PAIN: ICD-10-CM

## 2022-08-30 DIAGNOSIS — N64.89 GALACTOCELE: ICD-10-CM

## 2022-08-30 DIAGNOSIS — G56.00 CTS (CARPAL TUNNEL SYNDROME): ICD-10-CM

## 2022-08-30 DIAGNOSIS — M50.30 DDD (DEGENERATIVE DISC DISEASE), CERVICAL: ICD-10-CM

## 2022-08-30 DIAGNOSIS — G56.01 CARPAL TUNNEL SYNDROME OF RIGHT WRIST: Primary | ICD-10-CM

## 2022-08-30 DIAGNOSIS — M79.621 AXILLARY PAIN, RIGHT: ICD-10-CM

## 2022-08-30 DIAGNOSIS — M51.36 DDD (DEGENERATIVE DISC DISEASE), LUMBAR: Primary | ICD-10-CM

## 2022-08-30 DIAGNOSIS — R51.9 NONINTRACTABLE HEADACHE, UNSPECIFIED CHRONICITY PATTERN, UNSPECIFIED HEADACHE TYPE: ICD-10-CM

## 2022-08-30 PROCEDURE — 77062 MAMMO DIGITAL DIAGNOSTIC BILAT WITH TOMO: ICD-10-PCS | Mod: 26,,, | Performed by: RADIOLOGY

## 2022-08-30 PROCEDURE — 76642 ULTRASOUND BREAST LIMITED: CPT | Mod: 26,RT,, | Performed by: RADIOLOGY

## 2022-08-30 PROCEDURE — 3008F BODY MASS INDEX DOCD: CPT | Mod: CPTII,S$GLB,, | Performed by: ORTHOPAEDIC SURGERY

## 2022-08-30 PROCEDURE — 25500020 PHARM REV CODE 255: Performed by: NEUROLOGICAL SURGERY

## 2022-08-30 PROCEDURE — 77062 BREAST TOMOSYNTHESIS BI: CPT | Mod: 26,,, | Performed by: RADIOLOGY

## 2022-08-30 PROCEDURE — 99999 PR PBB SHADOW E&M-EST. PATIENT-LVL III: ICD-10-PCS | Mod: PBBFAC,,, | Performed by: ORTHOPAEDIC SURGERY

## 2022-08-30 PROCEDURE — 70496 CT ANGIOGRAPHY HEAD: CPT | Mod: 26,,, | Performed by: RADIOLOGY

## 2022-08-30 PROCEDURE — 99999 PR PBB SHADOW E&M-EST. PATIENT-LVL III: CPT | Mod: PBBFAC,,, | Performed by: ORTHOPAEDIC SURGERY

## 2022-08-30 PROCEDURE — 3008F PR BODY MASS INDEX (BMI) DOCUMENTED: ICD-10-PCS | Mod: CPTII,S$GLB,, | Performed by: ORTHOPAEDIC SURGERY

## 2022-08-30 PROCEDURE — 77062 BREAST TOMOSYNTHESIS BI: CPT | Mod: TC,PO

## 2022-08-30 PROCEDURE — 99214 PR OFFICE/OUTPT VISIT, EST, LEVL IV, 30-39 MIN: ICD-10-PCS | Mod: S$GLB,,, | Performed by: ORTHOPAEDIC SURGERY

## 2022-08-30 PROCEDURE — 70496 CTA HEAD: ICD-10-PCS | Mod: 26,,, | Performed by: RADIOLOGY

## 2022-08-30 PROCEDURE — 1160F PR REVIEW ALL MEDS BY PRESCRIBER/CLIN PHARMACIST DOCUMENTED: ICD-10-PCS | Mod: CPTII,S$GLB,, | Performed by: ORTHOPAEDIC SURGERY

## 2022-08-30 PROCEDURE — 76642 US BREAST RIGHT LIMITED: ICD-10-PCS | Mod: 26,RT,, | Performed by: RADIOLOGY

## 2022-08-30 PROCEDURE — 1159F MED LIST DOCD IN RCRD: CPT | Mod: CPTII,S$GLB,, | Performed by: ORTHOPAEDIC SURGERY

## 2022-08-30 PROCEDURE — 99214 OFFICE O/P EST MOD 30 MIN: CPT | Mod: S$GLB,,, | Performed by: ORTHOPAEDIC SURGERY

## 2022-08-30 PROCEDURE — 77066 MAMMO DIGITAL DIAGNOSTIC BILAT WITH TOMO: ICD-10-PCS | Mod: 26,,, | Performed by: RADIOLOGY

## 2022-08-30 PROCEDURE — 76642 ULTRASOUND BREAST LIMITED: CPT | Mod: TC,PO,RT

## 2022-08-30 PROCEDURE — 77066 DX MAMMO INCL CAD BI: CPT | Mod: 26,,, | Performed by: RADIOLOGY

## 2022-08-30 PROCEDURE — 1160F RVW MEDS BY RX/DR IN RCRD: CPT | Mod: CPTII,S$GLB,, | Performed by: ORTHOPAEDIC SURGERY

## 2022-08-30 PROCEDURE — 70496 CT ANGIOGRAPHY HEAD: CPT | Mod: TC

## 2022-08-30 PROCEDURE — 1159F PR MEDICATION LIST DOCUMENTED IN MEDICAL RECORD: ICD-10-PCS | Mod: CPTII,S$GLB,, | Performed by: ORTHOPAEDIC SURGERY

## 2022-08-30 RX ORDER — MUPIROCIN 20 MG/G
OINTMENT TOPICAL
Status: CANCELLED | OUTPATIENT
Start: 2022-08-30

## 2022-08-30 RX ORDER — CEFAZOLIN SODIUM 2 G/50ML
2 SOLUTION INTRAVENOUS
Status: CANCELLED | OUTPATIENT
Start: 2022-08-30

## 2022-08-30 RX ADMIN — IOHEXOL 100 ML: 350 INJECTION, SOLUTION INTRAVENOUS at 11:08

## 2022-08-30 NOTE — TELEPHONE ENCOUNTER
----- Message from Zack Lehman MD sent at 10/15/2018 11:41 AM CDT -----  Contact: PatChrist Praneeth- [phone]  ok  ----- Message -----  From: Bernice Blackburn MA  Sent: 10/15/2018  10:09 AM  To: Zack Lehman MD    Patient requesting to change the rate from over a 4 hour period to a 1 hour period.  ----- Message -----  From: Marisol Saldivar  Sent: 10/15/2018   9:58 AM  To: Dominik Dacosta called to discuss changing the rate of infusion for the SODIUM CHLORIDE 0.45 % IV - please contact him at [phone]       Date Of Previous Surgery (Optional): 8/30/22

## 2022-08-30 NOTE — PROGRESS NOTES
[facility]  History & Physical  Orthopedics    SUBJECTIVE:      COVID-19 attestation:  This patient was treated during the COVID-19 pandemic.  This was discussed with the patient, they are aware of our current policies and procedures, were given the option of delaying their visit and or switching to a virtual visit, delaying their surgery when applicable, and they elect to proceed.    Chief Complaint: Bilateral hand numbness and tingling    Referring Provider: No ref. provider found     History of Present Illness:  Patient is a 52 y.o. right hand dominant female who presents today with complaints of  Bilateral hand numbness and tingling.  The patient notes that she is status post bilateral ulnar nerve decompressions by Dr. Lopez in [facility].  The right side was done March 30, 2020 in the left side was done May 7, 2020 20 per her report.  She is also status post a left de Quervain release on January 7, 2020.  These initially all have done well although she notes that she started having some recurrence in her numbness in bilateral hands in the right greater than left hands with involvement of basically the entire right hand and in the left hand is worst to the thumb and index fingers greater than the ring and small fingers.  She notes that her symptoms began about 5-6 months ago.  She has no other complaints today presents for initial evaluation.     Interval history August 30, 2022: The patient returns today for re-evaluation.  She notes that the numbness in her right hand involving the median nerve distribution worsened since visit.  She was also sent for an EMG.  She has been attempting splints no relief.  She returns for re-evaluation complaints.    The patient is a/an  caretaker.    Onset of symptoms/DOI was  5-6 months.    Symptoms are aggravated by activity and movement.    Symptoms are alleviated by rest.    Symptoms consist of pain and numbness/tingling.    The patient rates  their pain as  mild    Attempted treatment(s) and/or interventions include activity modifications, rest, surgical intervention.     The patient denies any fevers, chills, N/V, D/C and presents for evaluation.       Past Medical History:   Diagnosis Date    Abnormal Pap smear of vagina     Anxiety     Cataract     Chronic pain     TMJ and abdomen    Colon polyp     Epilepsy     as a child    Gastroparesis     Idiopathic    GERD (gastroesophageal reflux disease)     H/O abnormal cervical Papanicolaou smear     Hypercholesteremia 6/21/2016    Hyperlipidemia     IC (interstitial cystitis)     Internal hemorrhoids     Interstitial cystitis     Irritable bowel syndrome (IBS)     Irritable bowel syndrome with diarrhea 6/21/2016    Spastic colon     TMJ (temporomandibular joint disorder)     TMJ (temporomandibular joint syndrome) 6/21/2016     Past Surgical History:   Procedure Laterality Date    APPENDECTOMY      BREAST BIOPSY Bilateral     CATARACT EXTRACTION      CHOLECYSTECTOMY      COLONOSCOPY      COLONOSCOPY N/A 7/31/2018    Procedure: COLONOSCOPY;  Surgeon: Zack Lehman MD;  Location: [facility] ([address] [place] [address]);  Service: Endoscopy;  Laterality: N/A;    CYSTOSCOPY N/A 8/25/2020    Procedure: CYSTOSCOPY;  Surgeon: Christal Khan MD;  Location: [address];  Service: Urology;  Laterality: N/A;    ELBOW SURGERY      ESOPHAGOGASTRODUODENOSCOPY N/A 7/31/2018    Procedure: EGD (ESOPHAGOGASTRODUODENOSCOPY);  Surgeon: Zack Lehman MD;  [facility]  Service: Endoscopy;  Laterality: N/A;  RUQ gastric pacemaker    Left upper arm PICC line    PONV    ESOPHAGOGASTRODUODENOSCOPY N/A 9/3/2019    Procedure: EGD (ESOPHAGOGASTRODUODENOSCOPY);  Surgeon: Zack Lehman MD;  Location: [address]);  Service: Endoscopy;  Laterality: N/A;  history of gastoparesis, per change in protocol, pt instructed to do full liquid diet x 3 days prior to procedure and clear liquids x 1 day prior to procedure-BB  pt has  gastric pacemaker, monitored by Dr. Lehman-BB  hx of epilepsy, last seizure during chi    ESOPHAGOGASTRODUODENOSCOPY N/A 9/10/2020    Procedure: ESOPHAGOGASTRODUODENOSCOPY (EGD);  Surgeon: Zack Lehman MD;  Location: [facility] (4TH FLR);  Service: Endoscopy;  Laterality: N/A;  3 days Full liquid diet/ 1 day Clear liquids  waiting for Pt to cb with date - ERW  Left upper arm -  PICC  COVID screening 9/7/20 [facility]- [facility]    ESOPHAGOGASTRODUODENOSCOPY N/A 11/3/2021    Procedure: EGD (ESOPHAGOGASTRODUODENOSCOPY);  Surgeon: Zack Lehman MD;  Location: [facility] (4TH FLR);  Service: Endoscopy;  Laterality: N/A;  10/15 fully vaccinated as of 7/15/21; gastric pacemaker-pt does not have remote;  pt has a port does not want IV started; 3 days full liquid diet; instr. to portal-st    GASTRIC STIMULATOR IMPLANT SURGERY      left side on 03/30/2020    gastric stimulator placement      LASIK Bilateral 2006    MANDIBLE SURGERY Bilateral 10/17/2016    MYELOGRAPHY N/A 8/22/2022    Procedure: Myelogram lumbar;  Surgeon: Malia Surgeon;  Location: [facility];  Service: Anesthesiology;  Laterality: N/A;    OOPHORECTOMY Bilateral     PYLOROPLASTY  03/2016    STOMACH SURGERY      gastric pacemaker    TEMPOROMANDIBULAR JOINT SURGERY  12/2016    TONSILLECTOMY      TOTAL ABDOMINAL HYSTERECTOMY  2005    EUGENIA/BSO, Trachelectomy  2012    UPPER GASTROINTESTINAL ENDOSCOPY      WRIST SURGERY       Review of patient's allergies indicates:   Allergen Reactions    Ketorolac Anaphylaxis and Shortness Of Breath     Other reaction(s): Throat swelling, Unknown    Morphine Anaphylaxis    Tramadol Shortness Of Breath     Other reaction(s): Throat swelling, Unknown    Codeine Itching    Gabapentin Other (See Comments)     Causes suicidal thoughts        Ibuprofen Other (See Comments)     G L Bleeding        Nsaids (non-steroidal anti-inflammatory drug) Other (See Comments)     GI BLEEDING        Hydrocodone      Other reaction(s): Unknown     "Hydrocodone-acetaminophen     Metoclopramide Other (See Comments)     Headaches and insomnia          Reglan [metoclopramide hcl] Other (See Comments)     Headaches and insomnia      Topiramate Other (See Comments)     Ulcers in the mouth and dry mouth  per pt, gets "fog brain"            Corticosteroids (glucocorticoids) Nausea And Vomiting and Other (See Comments)     Acid reflux  Acid reflux  Increases acid reflux    Prednisone Other (See Comments)     Increases acid reflux     Social History     Social History Narrative    Not on file     Family History   Problem Relation Age of Onset    Coronary artery disease Mother     Hodgkin's lymphoma Mother     Hypertension Mother     Coronary artery disease Father 43    Hypertension Father     Heart attacks under age 50 Father     Lymphoma Sister     Coronary artery disease Paternal Grandfather     Stroke Paternal Grandfather     Glaucoma Maternal Grandmother     Pancreatic cancer Paternal Aunt     Cancer Paternal Aunt         pancreatic    Stroke Maternal Grandfather     Colon cancer Neg Hx     Breast cancer Neg Hx     Ovarian cancer Neg Hx     Cervical cancer Neg Hx     Endometrial cancer Neg Hx     Vaginal cancer Neg Hx     Diabetes Neg Hx          Current Outpatient Medications:     (Magic mouthwash) 1:1:1 diphenhydramine(Benadryl) 12.5mg/5ml liq, aluminum & magnesium hydroxide-simethicone (Maalox), LIDOcaine viscous 2%, Swish and spit 5 mLs every 4 (four) hours as needed (mouth sores). for mouth sores, Disp: 240 mL, Rfl: 3    ALPRAZolam (XANAX) 1 MG tablet, Take 1 mg by mouth 2 (two) times daily as needed. , Disp: , Rfl:     busPIRone (BUSPAR) 5 MG Tab, Take 5 mg by mouth 2 (two) times daily., Disp: , Rfl:     DEXILANT 60 mg capsule, TAKE 1 CAPSULE BY MOUTH  ONCE DAILY, Disp: 90 capsule, Rfl: 3    diazePAM (VALIUM) 5 MG tablet, Take 1 tablet (5 mg total) by mouth as needed for Anxiety (Myelogram anxiety)., Disp: 2 tablet, Rfl: 0    diphenhydrAMINE (BENADRYL) 50 MG " tablet, Take 50 mg by mouth every 4 (four) hours as needed for Itching., Disp: , Rfl:     estradioL (ESTRACE) 2 MG tablet, Take 1.5 tablets (3 mg total) by mouth once daily., Disp: 135 tablet, Rfl: 3    flavoxATE (URISPAS) 100 mg Tab, 1 tablet, Disp: , Rfl:     fluticasone propionate (FLONASE) 50 mcg/actuation nasal spray, 1 spray by Each Nostril route once daily., Disp: , Rfl:     fremanezumab-vfrm (AJOVY AUTOINJECTOR) 225 mg/1.5 mL autoinjector, Inject 1.5 mLs (225 mg total) into the skin every 28 days., Disp: 1 each, Rfl: 10    Lactobacillus rhamnosus GG (CULTURELLE) 10 billion cell capsule, Take 1 capsule by mouth once daily., Disp: , Rfl:     LIDOCAINE VISCOUS 2 % solution, Take 5 mLs by mouth 4 (four) times daily as needed., Disp: , Rfl:     lipase-protease-amylase 24,000-76,000-120,000 units (CREON) 24,000-76,000 -120,000 unit capsule, Take 1 capsule by mouth 3 (three) times daily with meals., Disp: 270 capsule, Rfl: 1    loperamide (IMODIUM) 2 mg capsule, TAKE 2 CAPSULES BY MOUTH  TWICE DAILY AS NEEDED, Disp: 360 capsule, Rfl: 1    meclizine (ANTIVERT) 25 mg tablet, Take by mouth 3 (three) times daily as needed., Disp: , Rfl:     methocarbamol (ROBAXIN-750 ORAL), Take by mouth., Disp: , Rfl:     metoprolol succinate (TOPROL-XL) 25 MG 24 hr tablet, , Disp: , Rfl:     metoprolol tartrate (LOPRESSOR) 25 MG tablet, Take 25 mg by mouth 2 (two) times daily., Disp: , Rfl:     pantoprazole (PROTONIX) 40 mg injection, Inject 40 mg into the vein 2 (two) times a day., Disp: 60 each, Rfl: 11    pregabalin (LYRICA) 75 MG capsule, Take 1 capsule (75 mg total) by mouth 2 (two) times daily., Disp: 60 capsule, Rfl: 6    promethazine (PHENERGAN) 25 mg/mL injection, Inject 25 mg into the vein every 4 (four) hours., Disp: , Rfl:     simethicone (MYLICON) 125 mg Cap capsule, Take 250 mg by mouth 2 (two) times a day., Disp: , Rfl:     simvastatin (ZOCOR) 40 MG tablet, Take 40 mg by mouth every evening. , Disp: , Rfl:     SODIUM  "CHLORIDE 0.45 % IV, Inject 1,000 mLs into the vein as needed. , Disp: , Rfl:     sodium chloride 0.9% 0.9 % SolP 50 mL with promethazine 25 mg/mL Soln, Inject 25 mg into the vein every 4 (four) hours. Thru IV, Disp: , Rfl:     sodium chloride 0.9% SolP 100 mL with pantoprazole 40 mg SolR 40 mg, Inject 40 mg into the vein every 12 (twelve) hours., Disp: 60 vial, Rfl: 0    sucralfate (CARAFATE) 1 gram tablet, TAKE 1 TABLET BY MOUTH 4  TIMES DAILY BEFORE MEALS  AND AT NIGHT, Disp: 360 tablet, Rfl: 3    tiZANidine (ZANAFLEX) 4 MG tablet, Take 4 mg by mouth 3 (three) times daily., Disp: , Rfl:     triamcinolone acetonide 0.1% (KENALOG) 0.1 % ointment, AAA hands bid - may occlude qhs, Disp: 454 g, Rfl: 3      Review of Systems:  As per HPI otherwise noncontributory    OBJECTIVE:      Vital Signs (Most Recent):  Vitals:    08/30/22 0819   Weight: 113.9 kg (251 lb)   Height: 5' 8" (1.727 m)     Body mass index is 38.16 kg/m².      Physical Exam:  Constitutional: The patient appears well-developed and well-nourished. No distress.   Skin: No lesions appreciated  Head: Normocephalic and atraumatic.   Nose: Nose normal.   Ears: No deformities seen  Eyes: Conjunctivae and EOM are normal.   Neck: No tracheal deviation present.   Cardiovascular: Normal rate and intact distal pulses.    Pulmonary/Chest: Effort normal. No respiratory distress.   Abdominal: There is no guarding.   Neurological: The patient is alert.   Psychiatric: The patient has a normal mood and affect.     Bilateral Hand/Wrist Examination:    Observation/Inspection:  Swelling  none    Deformity  none  Discoloration  none     Scars    bilateral cubital tunnel and left de Quervain, all well-healed ; small questionable scar volar right wrist   Atrophy  none    HAND/WRIST EXAMINATION:  Finkelstein's Test   Neg  WHAT Test    Neg  Snuff box tenderness   Neg  Reed's Test    Neg  Hook of Hamate Tenderness  Neg  CMC grind    Neg  Circumduction test   Neg    Neurovascular " Exam:  Digits WWP, brisk CR < 3s throughout  NVI motor/LTS to M/R/U nerves, radial pulse 2+  Tinel's Test - Carpal Tunnel  Neg  Tinel's Test - Cubital Tunnel  Neg  Phalen's Test    Neg  Median Nerve Compression Test positive    ROM hand full, painless except some difficulty with opposition the small finger    ROM wrist full, painless    ROM elbow full, painless    Abdomen not guarded  Respirations nonlabored  Perfusion intact    Diagnostic Results:     Imaging - I independently viewed the patient's imaging as well as the radiology report.  Xrays of the patient's  Bilateral hands  demonstrate no evidence of any acute fractures or dislocations or significant degenerative changes.    EMG -  Impression:  There is electrophysiologic evidence of a right sensory median mononeuropathy across the wrist (I.e. Carpal tunnel syndrome).  There is no motor axonal loss.  There is no active denervation.  This is graded as Mild in severity on the right.            ASSESSMENT/PLAN:      52 y.o. yo female with  right carpal tunnel syndrome Plan: The patient and I had a thorough discussion today.  We discussed the working diagnosis as well as several other potential alternative diagnoses.  Treatment options were discussed, both conservative and surgical.  Conservative treatment options would include things such as activity modifications, workplace modifications, a period of rest, oral vs topical OTC and prescription anti-inflammatory medications, occupational therapy, splinting/bracing, immobilization, corticosteroid injections, and others.  Surgical options were discussed as well.     At this time, the patient  would like to proceed with endoscopic versus open right carpal tunnel release on September 30, 2022.  I will get this set.  She has had bilateral ulnar nerve decompressions done in [place] previously but adamantly states that she has never had a carpal tunnel release.  We discussed that she does have a very small possible  and questionable scar at the volar aspect of the right wrist but she is sure that this was not from a carpal tunnel release.  We will proceed as scheduled.  She will undergo endoscopic versus open carpal tunnel release.      The patient has not responded to adequate non operative treatment at this time and/or non operative treatment is not indicated. Thus, the risks, benefits and alternatives to surgery were discussed with the patient in detail.  Specific risks include but are not limited to bleeding, infection, vessel and/or nerve damage, pain, numbness, tingling, complex regional pain syndrome, compartment syndrome, failure to return to pre-injury and/or preoperative functional status, scar sensitivity, delayed healing, inability to return to work, pulley injury, tendon injury, bowstringing, partial and/or incomplete relief of symptoms, weakness, persistence of and/or worsening of symptoms, surgical failure, osteomyelitis, amputation, loss of function, stiffness, functional debility, dysfunction, decreased  strength, need for prolonged postoperative rehabilitation, need for further surgery, deep venous thrombosis, pulmonary embolism, arthritis and death.  The patient states an understanding and wishes to proceed with surgery.   All questions were answered.  No guarantees were implied or stated.  Written informed consent was obtained.    Should the patient's symptoms worsen, persist, or fail to improve they should return for reevaluation and I would be happy to see them back anytime.        William Laboy M.D.    Please be aware that this note has been generated with the assistance of [employer] voice-to-text.  Please excuse any spelling or grammatical errors.    Thank you for choosing Dr. William Laboy for your orthopedic hand and upper extremity care. It is our goal to provide you with exceptional care that will help keep you healthy, active, and get you back in the game.     If you felt that you received  exemplary care today, please consider leaving feedback for Dr. Laboy on [email or web address] at [email or web address].    Please do not hesitate to reach out to us via email, phone, or MyChart with any questions, concerns, or feedback.

## 2022-08-30 NOTE — DISCHARGE SUMMARY
Radiology Discharge Summary      Hospital Course: No complications    Admit Date: 8/22/2022  Discharge Date: 08/30/2022     Instructions Given to Patient: Yes  Diet: Resume prior diet  Activity: activity as tolerated and no driving for today    Description of Condition on Discharge: Stable  Vital Signs (Most Recent): Temp: 98.3 °F (36.8 °C) (08/22/22 1745)  Pulse: 62 (08/22/22 1745)  Resp: 20 (08/22/22 1745)  BP: 130/71 (08/22/22 1745)  SpO2: 99 % (08/22/22 1745)    Discharge Disposition: Home    Discharge Diagnosis: S/P myelogram     Follow-up: surgery clinic    Zack Allen MD  Attending Radiologist  Interventional Neuroradiology

## 2022-08-30 NOTE — PROGRESS NOTES
"DATE: 8/30/2022  PATIENT: Nan Betts    Attending Physician: Vladimir Dan M.D.    HISTORY:  Nan Betts is a 52 y.o. female w/ a hx of interstitial cystitis, gastroparesis (gastric pacemaker in place), GERD, GI bleeds X2 (cannot take NSAIDs), b/l cubital tunnel syndrome (s/p releases), and back injury (in her 20s) who returns to me today for CT myelogram review. Patient stated that she still has LBP and neck pain and BUE n/t. She saw Dr. Laboy who will do R CTR. She saw NSGY who ordered LP.    PMH/PSH/FamHx/SocHx:  Unchanged from prior visit    ROS:  Positive for LBP, neck and BUE pain  Denies myelopathic symptoms, perineal paresthesias, bowel or bladder incontinence    EXAM:  Ht 5' 8" (1.727 m)   Wt 113.9 kg (251 lb 1.7 oz)   BMI 38.18 kg/m²     My physical examination was notable for the following findings: motor intact BUE and BLE; SILT    IMAGING:  Today I independently reviewed the following images and my interpretations are as follows:    Previous AP and Lat upright C-spine films showed cervical DDD.    Lumbar Xrs showed lumbar spondylosis and L5-S1 DDD.    EMG/NCV BLE was normal    CT myelogram of entire spine showed no significant central or foraminal stenosis.    ASSESSMENT/PLAN:  Patient has L5-S1 DDD. I discussed the natural history of their diagnoses as well as surgical and nonsurgical treatment options. I educated the patient on the importance of core/back strengthening, correct posture, bending/lifting ergonomics, and low-impact aerobic exercises (walking, elliptical, and aquatherapy). Continue medications and exercises. I referred her to Pain Management. No ortho spine surgical intervention warranted. She will also FU with LIZ. FU PRN.    Vladimir Dan MD  Orthopaedic Spine Surgeon  Department of Orthopaedic Surgery  [phone]  "

## 2022-08-30 NOTE — H&P
[facility]  History & Physical  Orthopedics     SUBJECTIVE:       COVID-19 attestation:  This patient was treated during the COVID-19 pandemic.  This was discussed with the patient, they are aware of our current policies and procedures, were given the option of delaying their visit and or switching to a virtual visit, delaying their surgery when applicable, and they elect to proceed.     Chief Complaint: Bilateral hand numbness and tingling     Referring Provider: No ref. provider found      History of Present Illness:  Patient is a 52 y.o. right hand dominant female who presents today with complaints of  Bilateral hand numbness and tingling.  The patient notes that she is status post bilateral ulnar nerve decompressions by Dr. Lopez in [facility].  The right side was done March 30, 2020 in the left side was done May 7, 2020 20 per her report.  She is also status post a left de Quervain release on January 7, 2020.  These initially all have done well although she notes that she started having some recurrence in her numbness in bilateral hands in the right greater than left hands with involvement of basically the entire right hand and in the left hand is worst to the thumb and index fingers greater than the ring and small fingers.  She notes that her symptoms began about 5-6 months ago.  She has no other complaints today presents for initial evaluation.      Interval history August 30, 2022: The patient returns today for re-evaluation.  She notes that the numbness in her right hand involving the median nerve distribution worsened since visit.  She was also sent for an EMG.  She has been attempting splints no relief.  She returns for re-evaluation complaints.     The patient is a/an  caretaker.     Onset of symptoms/DOI was  5-6 months.     Symptoms are aggravated by activity and movement.     Symptoms are alleviated by rest.     Symptoms consist of pain and numbness/tingling.     The  patient rates their pain as  mild     Attempted treatment(s) and/or interventions include activity modifications, rest, surgical intervention.     The patient denies any fevers, chills, N/V, D/C and presents for evaluation.             Past Medical History:   Diagnosis Date    Abnormal Pap smear of vagina      Anxiety      Cataract      Chronic pain       TMJ and abdomen    Colon polyp      Epilepsy       as a child    Gastroparesis       Idiopathic    GERD (gastroesophageal reflux disease)      H/O abnormal cervical Papanicolaou smear      Hypercholesteremia 6/21/2016    Hyperlipidemia      IC (interstitial cystitis)      Internal hemorrhoids      Interstitial cystitis      Irritable bowel syndrome (IBS)      Irritable bowel syndrome with diarrhea 6/21/2016    Spastic colon      TMJ (temporomandibular joint disorder)      TMJ (temporomandibular joint syndrome) 6/21/2016            Past Surgical History:   Procedure Laterality Date    APPENDECTOMY        BREAST BIOPSY Bilateral      CATARACT EXTRACTION        CHOLECYSTECTOMY        COLONOSCOPY        COLONOSCOPY N/A 7/31/2018     Procedure: COLONOSCOPY;  Surgeon: Zack Lehman MD;  Location: [address]);  Service: Endoscopy;  Laterality: N/A;    CYSTOSCOPY N/A 8/25/2020     Procedure: CYSTOSCOPY;  Surgeon: Christal Khan MD;  Location: [address];  Service: Urology;  Laterality: N/A;    ELBOW SURGERY        ESOPHAGOGASTRODUODENOSCOPY N/A 7/31/2018     Procedure: EGD (ESOPHAGOGASTRODUODENOSCOPY);  Surgeon: Zack Lehman MD;  Location: [address]);  Service: Endoscopy;  Laterality: N/A;  RUQ gastric pacemaker     Left upper arm PICC line     PONV    ESOPHAGOGASTRODUODENOSCOPY N/A 9/3/2019     Procedure: EGD (ESOPHAGOGASTRODUODENOSCOPY);  Surgeon: Zack Lehman MD;  Location: [address]);  Service: Endoscopy;  Laterality: N/A;  history of gastoparesis, per change in protocol, pt instructed to do full liquid diet x 3 days prior to  procedure and clear liquids x 1 day prior to procedure-BB  pt has gastric pacemaker, monitored by Dr. Lehman-YASMANY  hx of epilepsy, last seizure during chi    ESOPHAGOGASTRODUODENOSCOPY N/A 9/10/2020     Procedure: ESOPHAGOGASTRODUODENOSCOPY (EGD);  Surgeon: Zack Lehman MD;  Location: [facility] (4TH FLR);  Service: Endoscopy;  Laterality: N/A;  3 days Full liquid diet/ 1 day Clear liquids  waiting for Pt to cb with date - ERW  Left upper arm -  PICC  COVID screening 9/7/20 [facility]- [facility]    ESOPHAGOGASTRODUODENOSCOPY N/A 11/3/2021     Procedure: EGD (ESOPHAGOGASTRODUODENOSCOPY);  Surgeon: Zack Lehman MD;  Location: [facility] (4TH FLR);  Service: Endoscopy;  Laterality: N/A;  10/15 fully vaccinated as of 7/15/21; gastric pacemaker-pt does not have remote;  pt has a port does not want IV started; 3 days full liquid diet; instr. to portal-st    GASTRIC STIMULATOR IMPLANT SURGERY         left side on 03/30/2020    gastric stimulator placement        LASIK Bilateral 2006    MANDIBLE SURGERY Bilateral 10/17/2016    MYELOGRAPHY N/A 8/22/2022     Procedure: Myelogram lumbar;  Surgeon: Malia Surgeon;  Location: [facility];  Service: Anesthesiology;  Laterality: N/A;    OOPHORECTOMY Bilateral      PYLOROPLASTY   03/2016    STOMACH SURGERY         gastric pacemaker    TEMPOROMANDIBULAR JOINT SURGERY   12/2016    TONSILLECTOMY        TOTAL ABDOMINAL HYSTERECTOMY   2005     EUGENIA/BSO, Trachelectomy  2012    UPPER GASTROINTESTINAL ENDOSCOPY        WRIST SURGERY                Review of patient's allergies indicates:   Allergen Reactions    Ketorolac Anaphylaxis and Shortness Of Breath       Other reaction(s): Throat swelling, Unknown    Morphine Anaphylaxis    Tramadol Shortness Of Breath       Other reaction(s): Throat swelling, Unknown    Codeine Itching    Gabapentin Other (See Comments)       Causes suicidal thoughts          Ibuprofen Other (See Comments)       G L Bleeding          Nsaids (non-steroidal  "anti-inflammatory drug) Other (See Comments)       GI BLEEDING          Hydrocodone         Other reaction(s): Unknown    Hydrocodone-acetaminophen      Metoclopramide Other (See Comments)       Headaches and insomnia             Reglan [metoclopramide hcl] Other (See Comments)       Headaches and insomnia       Topiramate Other (See Comments)       Ulcers in the mouth and dry mouth  per pt, gets "fog brain"                Corticosteroids (glucocorticoids) Nausea And Vomiting and Other (See Comments)       Acid reflux  Acid reflux  Increases acid reflux    Prednisone Other (See Comments)       Increases acid reflux      Social History          Social History Narrative    Not on file            Family History   Problem Relation Age of Onset    Coronary artery disease Mother      Hodgkin's lymphoma Mother      Hypertension Mother      Coronary artery disease Father 43    Hypertension Father      Heart attacks under age 50 Father      Lymphoma Sister      Coronary artery disease Paternal Grandfather      Stroke Paternal Grandfather      Glaucoma Maternal Grandmother      Pancreatic cancer Paternal Aunt      Cancer Paternal Aunt           pancreatic    Stroke Maternal Grandfather      Colon cancer Neg Hx      Breast cancer Neg Hx      Ovarian cancer Neg Hx      Cervical cancer Neg Hx      Endometrial cancer Neg Hx      Vaginal cancer Neg Hx      Diabetes Neg Hx              Current Outpatient Medications:     (Magic mouthwash) 1:1:1 diphenhydramine(Benadryl) 12.5mg/5ml liq, aluminum & magnesium hydroxide-simethicone (Maalox), LIDOcaine viscous 2%, Swish and spit 5 mLs every 4 (four) hours as needed (mouth sores). for mouth sores, Disp: 240 mL, Rfl: 3    ALPRAZolam (XANAX) 1 MG tablet, Take 1 mg by mouth 2 (two) times daily as needed. , Disp: , Rfl:     busPIRone (BUSPAR) 5 MG Tab, Take 5 mg by mouth 2 (two) times daily., Disp: , Rfl:     DEXILANT 60 mg capsule, TAKE 1 CAPSULE BY MOUTH  ONCE DAILY, Disp: 90 capsule, Rfl: " 3    diazePAM (VALIUM) 5 MG tablet, Take 1 tablet (5 mg total) by mouth as needed for Anxiety (Myelogram anxiety)., Disp: 2 tablet, Rfl: 0    diphenhydrAMINE (BENADRYL) 50 MG tablet, Take 50 mg by mouth every 4 (four) hours as needed for Itching., Disp: , Rfl:     estradioL (ESTRACE) 2 MG tablet, Take 1.5 tablets (3 mg total) by mouth once daily., Disp: 135 tablet, Rfl: 3    flavoxATE (URISPAS) 100 mg Tab, 1 tablet, Disp: , Rfl:     fluticasone propionate (FLONASE) 50 mcg/actuation nasal spray, 1 spray by Each Nostril route once daily., Disp: , Rfl:     fremanezumab-vfrm (AJOVY AUTOINJECTOR) 225 mg/1.5 mL autoinjector, Inject 1.5 mLs (225 mg total) into the skin every 28 days., Disp: 1 each, Rfl: 10    Lactobacillus rhamnosus GG (CULTURELLE) 10 billion cell capsule, Take 1 capsule by mouth once daily., Disp: , Rfl:     LIDOCAINE VISCOUS 2 % solution, Take 5 mLs by mouth 4 (four) times daily as needed., Disp: , Rfl:     lipase-protease-amylase 24,000-76,000-120,000 units (CREON) 24,000-76,000 -120,000 unit capsule, Take 1 capsule by mouth 3 (three) times daily with meals., Disp: 270 capsule, Rfl: 1    loperamide (IMODIUM) 2 mg capsule, TAKE 2 CAPSULES BY MOUTH  TWICE DAILY AS NEEDED, Disp: 360 capsule, Rfl: 1    meclizine (ANTIVERT) 25 mg tablet, Take by mouth 3 (three) times daily as needed., Disp: , Rfl:     methocarbamol (ROBAXIN-750 ORAL), Take by mouth., Disp: , Rfl:     metoprolol succinate (TOPROL-XL) 25 MG 24 hr tablet, , Disp: , Rfl:     metoprolol tartrate (LOPRESSOR) 25 MG tablet, Take 25 mg by mouth 2 (two) times daily., Disp: , Rfl:     pantoprazole (PROTONIX) 40 mg injection, Inject 40 mg into the vein 2 (two) times a day., Disp: 60 each, Rfl: 11    pregabalin (LYRICA) 75 MG capsule, Take 1 capsule (75 mg total) by mouth 2 (two) times daily., Disp: 60 capsule, Rfl: 6    promethazine (PHENERGAN) 25 mg/mL injection, Inject 25 mg into the vein every 4 (four) hours., Disp: , Rfl:     simethicone (MYLICON)  "125 mg Cap capsule, Take 250 mg by mouth 2 (two) times a day., Disp: , Rfl:     simvastatin (ZOCOR) 40 MG tablet, Take 40 mg by mouth every evening. , Disp: , Rfl:     SODIUM CHLORIDE 0.45 % IV, Inject 1,000 mLs into the vein as needed. , Disp: , Rfl:     sodium chloride 0.9% 0.9 % SolP 50 mL with promethazine 25 mg/mL Soln, Inject 25 mg into the vein every 4 (four) hours. Thru IV, Disp: , Rfl:     sodium chloride 0.9% SolP 100 mL with pantoprazole 40 mg SolR 40 mg, Inject 40 mg into the vein every 12 (twelve) hours., Disp: 60 vial, Rfl: 0    sucralfate (CARAFATE) 1 gram tablet, TAKE 1 TABLET BY MOUTH 4  TIMES DAILY BEFORE MEALS  AND AT NIGHT, Disp: 360 tablet, Rfl: 3    tiZANidine (ZANAFLEX) 4 MG tablet, Take 4 mg by mouth 3 (three) times daily., Disp: , Rfl:     triamcinolone acetonide 0.1% (KENALOG) 0.1 % ointment, AAA hands bid - may occlude qhs, Disp: 454 g, Rfl: 3        Review of Systems:  As per HPI otherwise noncontributory     OBJECTIVE:       Vital Signs (Most Recent):  Vitals       Vitals:     08/30/22 0819   Weight: 113.9 kg (251 lb)   Height: 5' 8" (1.727 m)         Body mass index is 38.16 kg/m².        Physical Exam:  Constitutional: The patient appears well-developed and well-nourished. No distress.   Skin: No lesions appreciated  Head: Normocephalic and atraumatic.   Nose: Nose normal.   Ears: No deformities seen  Eyes: Conjunctivae and EOM are normal.   Neck: No tracheal deviation present.   Cardiovascular: Normal rate and intact distal pulses.    Pulmonary/Chest: Effort normal. No respiratory distress.   Abdominal: There is no guarding.   Neurological: The patient is alert.   Psychiatric: The patient has a normal mood and affect.      Bilateral Hand/Wrist Examination:     Observation/Inspection:  Swelling                       none                  Deformity                     none  Discoloration               none                  Scars                            bilateral cubital tunnel and " left de Quervain, all well-healed ; small questionable scar volar right wrist       Atrophy                        none     HAND/WRIST EXAMINATION:  Finkelstein's Test                                Neg  WHAT Test                                         Neg  Snuff box tenderness                          Neg  Reed's Test                                     Neg  Hook of Hamate Tenderness              Neg  CMC grind                                           Neg  Circumduction test                              Neg     Neurovascular Exam:  Digits WWP, brisk CR < 3s throughout  NVI motor/LTS to M/R/U nerves, radial pulse 2+  Tinel's Test - Carpal Tunnel                Neg  Tinel's Test - Cubital Tunnel               Neg  Phalen's Test                                      Neg  Median Nerve Compression Test       positive     ROM hand full, painless except some difficulty with opposition the small finger     ROM wrist full, painless    ROM elbow full, painless     Abdomen not guarded  Respirations nonlabored  Perfusion intact     Diagnostic Results:     Imaging - I independently viewed the patient's imaging as well as the radiology report.  Xrays of the patient's  Bilateral hands  demonstrate no evidence of any acute fractures or dislocations or significant degenerative changes.     EMG -  Impression:  There is electrophysiologic evidence of a right sensory median mononeuropathy across the wrist (I.e. Carpal tunnel syndrome).  There is no motor axonal loss.  There is no active denervation.  This is graded as Mild in severity on the right.             ASSESSMENT/PLAN:       52 y.o. yo female with  right carpal tunnel syndrome Plan: The patient and I had a thorough discussion today.  We discussed the working diagnosis as well as several other potential alternative diagnoses.  Treatment options were discussed, both conservative and surgical.  Conservative treatment options would include things such as activity modifications,  workplace modifications, a period of rest, oral vs topical OTC and prescription anti-inflammatory medications, occupational therapy, splinting/bracing, immobilization, corticosteroid injections, and others.  Surgical options were discussed as well.      At this time, the patient  would like to proceed with endoscopic versus open right carpal tunnel release on September 30, 2022.  I will get this set.  She has had bilateral ulnar nerve decompressions done in [place] previously but adamantly states that she has never had a carpal tunnel release.  We discussed that she does have a very small possible and questionable scar at the volar aspect of the right wrist but she is sure that this was not from a carpal tunnel release.  We will proceed as scheduled.  She will undergo endoscopic versus open carpal tunnel release.      The patient has not responded to adequate non operative treatment at this time and/or non operative treatment is not indicated. Thus, the risks, benefits and alternatives to surgery were discussed with the patient in detail.  Specific risks include but are not limited to bleeding, infection, vessel and/or nerve damage, pain, numbness, tingling, complex regional pain syndrome, compartment syndrome, failure to return to pre-injury and/or preoperative functional status, scar sensitivity, delayed healing, inability to return to work, pulley injury, tendon injury, bowstringing, partial and/or incomplete relief of symptoms, weakness, persistence of and/or worsening of symptoms, surgical failure, osteomyelitis, amputation, loss of function, stiffness, functional debility, dysfunction, decreased  strength, need for prolonged postoperative rehabilitation, need for further surgery, deep venous thrombosis, pulmonary embolism, arthritis and death.  The patient states an understanding and wishes to proceed with surgery.   All questions were answered.  No guarantees were implied or stated.  Written informed  consent was obtained.     Should the patient's symptoms worsen, persist, or fail to improve they should return for reevaluation and I would be happy to see them back anytime.        William Laboy M.D.     Please be aware that this note has been generated with the assistance of [employer] voice-to-text.  Please excuse any spelling or grammatical errors.     Thank you for choosing Dr. William Laboy for your orthopedic hand and upper extremity care. It is our goal to provide you with exceptional care that will help keep you healthy, active, and get you back in the game.     If you felt that you received exemplary care today, please consider leaving feedback for Dr. Laboy on [employer] at [email or web address].     Please do not hesitate to reach out to us via email, phone, or MyChart with any questions, concerns, or feedback.

## 2022-08-30 NOTE — NURSING
patient arrived for CT scan with port already accessed, port with good blood return and flushes easily, port connected to CT power injector using aseptic technique, will disconnect port when scan complete.

## 2022-09-01 ENCOUNTER — PATIENT MESSAGE (OUTPATIENT)
Dept: GASTROENTEROLOGY | Facility: CLINIC | Age: 52
End: 2022-09-01
Payer: COMMERCIAL

## 2022-09-07 ENCOUNTER — TELEPHONE (OUTPATIENT)
Dept: ENDOSCOPY | Facility: HOSPITAL | Age: 52
End: 2022-09-07
Payer: COMMERCIAL

## 2022-09-07 NOTE — TELEPHONE ENCOUNTER
----- Message from Bernice Blackburn MA sent at 9/7/2022  6:49 AM CDT -----  Anitha,  I cannot call him from here.  The patient stated he wanted more clinical information regarding her gastric pacemaker.    sent her what information we have.  Angela  ----- Message -----  From: Andreia Izaguirre MA  Sent: 9/1/2022   7:29 PM CDT  To: Bernice Blackburn MA      ----- Message -----  From: Ahmet Paniagua MA  Sent: 9/1/2022   4:56 PM CDT  To: Dominik BASS Staff    Dr Tsai is calling in regards to the patient. Question about the patient. Please call the doctor and advise.    Dr Tsai [phone] ext [phone]

## 2022-09-07 NOTE — TELEPHONE ENCOUNTER
Spoke with Anastacia/ Dr Tsai. Informed that we sent the information we have to them. We do not have any additional information on the gastric pacemaker.

## 2022-09-15 ENCOUNTER — PATIENT MESSAGE (OUTPATIENT)
Dept: PLASTIC SURGERY | Facility: CLINIC | Age: 52
End: 2022-09-15
Payer: COMMERCIAL

## 2022-09-19 ENCOUNTER — ANESTHESIA EVENT (OUTPATIENT)
Dept: SURGERY | Facility: HOSPITAL | Age: 52
End: 2022-09-19
Payer: COMMERCIAL

## 2022-09-19 NOTE — ANESTHESIA PAT ROS NOTE
Chart review complete. Patient's medical history reviewed.  OK to proceed at [facility] under regional anesthesia.    Patient with GERD and gastroparesis.  Avoid opiate medications.    Wes Stahl MD   9/19/2022

## 2022-09-26 ENCOUNTER — PATIENT MESSAGE (OUTPATIENT)
Dept: SURGERY | Facility: HOSPITAL | Age: 52
End: 2022-09-26
Payer: COMMERCIAL

## 2022-09-26 ENCOUNTER — TELEPHONE (OUTPATIENT)
Dept: FAMILY MEDICINE | Facility: CLINIC | Age: 52
End: 2022-09-26
Payer: COMMERCIAL

## 2022-09-26 NOTE — TELEPHONE ENCOUNTER
----- Message from Kristina Whyte sent at 9/26/2022  9:52 AM CDT -----  Regarding: self  [phone]  .Type: Patient Call Back    Who called: self     What is the request in detail: Pt id requesting to rs sx date     Can the clinic reply by [facility]? Call back     Would the patient rather a call back or a response via [facility]?  Call back     Best call back number: .[phone]

## 2022-09-27 ENCOUNTER — PATIENT MESSAGE (OUTPATIENT)
Dept: PLASTIC SURGERY | Facility: CLINIC | Age: 52
End: 2022-09-27
Payer: COMMERCIAL

## 2022-09-30 ENCOUNTER — ANESTHESIA (OUTPATIENT)
Dept: SURGERY | Facility: HOSPITAL | Age: 52
End: 2022-09-30
Payer: COMMERCIAL

## 2022-09-30 ENCOUNTER — PATIENT MESSAGE (OUTPATIENT)
Dept: SURGERY | Facility: HOSPITAL | Age: 52
End: 2022-09-30
Payer: COMMERCIAL

## 2022-09-30 ENCOUNTER — PATIENT MESSAGE (OUTPATIENT)
Dept: ORTHOPEDICS | Facility: CLINIC | Age: 52
End: 2022-09-30
Payer: COMMERCIAL

## 2022-10-02 ENCOUNTER — PATIENT MESSAGE (OUTPATIENT)
Dept: ADMINISTRATIVE | Facility: OTHER | Age: 52
End: 2022-10-02
Payer: COMMERCIAL

## 2022-10-06 ENCOUNTER — TELEPHONE (OUTPATIENT)
Dept: ORTHOPEDICS | Facility: CLINIC | Age: 52
End: 2022-10-06
Payer: COMMERCIAL

## 2022-10-06 RX ORDER — ACETAMINOPHEN 500 MG
1000 TABLET ORAL 2 TIMES DAILY PRN
Qty: 30 TABLET | Refills: 0 | Status: SHIPPED | OUTPATIENT
Start: 2022-10-06 | End: 2022-10-14

## 2022-10-06 NOTE — TELEPHONE ENCOUNTER
Spoke with the patient. Notified of 6 AM arrival time to the [facility].  Informed of current visitor policy.  Reminded of NPO and need for transportation. Patient verbalized understanding to all.    Margaret Perry MS, OTC  Clinical Assistant to Dr. Ross Dunbar [facility]

## 2022-10-07 ENCOUNTER — TELEPHONE (OUTPATIENT)
Dept: ORTHOPEDICS | Facility: CLINIC | Age: 52
End: 2022-10-07
Payer: COMMERCIAL

## 2022-10-07 ENCOUNTER — HOSPITAL ENCOUNTER (OUTPATIENT)
Facility: HOSPITAL | Age: 52
Discharge: HOME | End: 2022-10-07
Attending: ORTHOPAEDIC SURGERY | Admitting: ORTHOPAEDIC SURGERY
Payer: COMMERCIAL

## 2022-10-07 ENCOUNTER — PATIENT MESSAGE (OUTPATIENT)
Dept: SURGERY | Facility: HOSPITAL | Age: 52
End: 2022-10-07
Payer: COMMERCIAL

## 2022-10-07 DIAGNOSIS — G56.00 CARPAL TUNNEL SYNDROME, UNSPECIFIED LATERALITY: ICD-10-CM

## 2022-10-07 DIAGNOSIS — G56.00 CTS (CARPAL TUNNEL SYNDROME): ICD-10-CM

## 2022-10-07 DIAGNOSIS — G56.00 CARPAL TUNNEL SYNDROME, UNSPECIFIED LATERALITY: Primary | ICD-10-CM

## 2022-10-07 DIAGNOSIS — G56.01 CARPAL TUNNEL SYNDROME OF RIGHT WRIST: Primary | ICD-10-CM

## 2022-10-07 PROCEDURE — 36000706: Performed by: ORTHOPAEDIC SURGERY

## 2022-10-07 PROCEDURE — 25000003 PHARM REV CODE 250: Performed by: NURSE ANESTHETIST, CERTIFIED REGISTERED

## 2022-10-07 PROCEDURE — 27201423 OPTIME MED/SURG SUP & DEVICES STERILE SUPPLY: Performed by: ORTHOPAEDIC SURGERY

## 2022-10-07 PROCEDURE — 76942 ECHO GUIDE FOR BIOPSY: CPT | Performed by: SURGERY

## 2022-10-07 PROCEDURE — 29848 WRIST ENDOSCOPY/SURGERY: CPT | Mod: RT,,, | Performed by: ORTHOPAEDIC SURGERY

## 2022-10-07 PROCEDURE — D9220A PRA ANESTHESIA: Mod: CRNA,,, | Performed by: NURSE ANESTHETIST, CERTIFIED REGISTERED

## 2022-10-07 PROCEDURE — 63600175 PHARM REV CODE 636 W HCPCS: Performed by: SURGERY

## 2022-10-07 PROCEDURE — 99900035 HC TECH TIME PER 15 MIN (STAT)

## 2022-10-07 PROCEDURE — 71000033 HC RECOVERY, INTIAL HOUR: Performed by: ORTHOPAEDIC SURGERY

## 2022-10-07 PROCEDURE — 76942 RIGHT SUPRACLAVICULAR SINGLE INJECTION: ICD-10-PCS | Mod: 26,,, | Performed by: SURGERY

## 2022-10-07 PROCEDURE — 64415 RIGHT SUPRACLAVICULAR SINGLE INJECTION: ICD-10-PCS | Mod: 59,RT,, | Performed by: SURGERY

## 2022-10-07 PROCEDURE — 63600175 PHARM REV CODE 636 W HCPCS: Performed by: NURSE ANESTHETIST, CERTIFIED REGISTERED

## 2022-10-07 PROCEDURE — 94761 N-INVAS EAR/PLS OXIMETRY MLT: CPT

## 2022-10-07 PROCEDURE — 25000003 PHARM REV CODE 250: Performed by: SURGERY

## 2022-10-07 PROCEDURE — 29848 PR WRIST ARTHROSCOP,RELEASE XVERS LIG: ICD-10-PCS | Mod: RT,,, | Performed by: ORTHOPAEDIC SURGERY

## 2022-10-07 PROCEDURE — D9220A PRA ANESTHESIA: ICD-10-PCS | Mod: CRNA,,, | Performed by: NURSE ANESTHETIST, CERTIFIED REGISTERED

## 2022-10-07 PROCEDURE — 36000707: Performed by: ORTHOPAEDIC SURGERY

## 2022-10-07 PROCEDURE — 37000009 HC ANESTHESIA EA ADD 15 MINS: Performed by: ORTHOPAEDIC SURGERY

## 2022-10-07 PROCEDURE — 25000003 PHARM REV CODE 250: Performed by: ORTHOPAEDIC SURGERY

## 2022-10-07 PROCEDURE — 63600175 PHARM REV CODE 636 W HCPCS: Performed by: ORTHOPAEDIC SURGERY

## 2022-10-07 PROCEDURE — 37000008 HC ANESTHESIA 1ST 15 MINUTES: Performed by: ORTHOPAEDIC SURGERY

## 2022-10-07 PROCEDURE — 64415 NJX AA&/STRD BRCH PLXS IMG: CPT | Mod: 59,RT,, | Performed by: SURGERY

## 2022-10-07 PROCEDURE — D9220A PRA ANESTHESIA: ICD-10-PCS | Mod: ANES,,, | Performed by: SURGERY

## 2022-10-07 PROCEDURE — 71000015 HC POSTOP RECOV 1ST HR: Performed by: ORTHOPAEDIC SURGERY

## 2022-10-07 PROCEDURE — D9220A PRA ANESTHESIA: Mod: ANES,,, | Performed by: SURGERY

## 2022-10-07 RX ORDER — ACETAMINOPHEN 500 MG
1000 TABLET ORAL
Status: COMPLETED | OUTPATIENT
Start: 2022-10-07 | End: 2022-10-07

## 2022-10-07 RX ORDER — LIDOCAINE HCL/PF 100 MG/5ML
SYRINGE (ML) INTRAVENOUS
Status: DISCONTINUED | OUTPATIENT
Start: 2022-10-07 | End: 2022-10-07

## 2022-10-07 RX ORDER — OXYCODONE AND ACETAMINOPHEN 5; 325 MG/1; MG/1
1 TABLET ORAL EVERY 6 HOURS PRN
Qty: 28 TABLET | Refills: 0 | Status: SHIPPED | OUTPATIENT
Start: 2022-10-07 | End: 2022-10-07

## 2022-10-07 RX ORDER — MIDAZOLAM HYDROCHLORIDE 1 MG/ML
.5-4 INJECTION INTRAMUSCULAR; INTRAVENOUS
Status: DISPENSED | OUTPATIENT
Start: 2022-10-07

## 2022-10-07 RX ORDER — KETAMINE HCL IN 0.9 % NACL 50 MG/5 ML
SYRINGE (ML) INTRAVENOUS
Status: DISCONTINUED | OUTPATIENT
Start: 2022-10-07 | End: 2022-10-07

## 2022-10-07 RX ORDER — LIDOCAINE HYDROCHLORIDE 10 MG/ML
1 INJECTION, SOLUTION EPIDURAL; INFILTRATION; INTRACAUDAL; PERINEURAL ONCE
Status: ACTIVE | OUTPATIENT
Start: 2022-10-07

## 2022-10-07 RX ORDER — SODIUM CHLORIDE 9 MG/ML
INJECTION, SOLUTION INTRAVENOUS CONTINUOUS
Status: DISCONTINUED | OUTPATIENT
Start: 2022-10-07 | End: 2022-10-07 | Stop reason: HOSPADM

## 2022-10-07 RX ORDER — SODIUM CHLORIDE 9 MG/ML
INJECTION, SOLUTION INTRAVENOUS CONTINUOUS PRN
Status: DISCONTINUED | OUTPATIENT
Start: 2022-10-07 | End: 2022-10-07

## 2022-10-07 RX ORDER — HEPARIN 100 UNIT/ML
5 SYRINGE INTRAVENOUS ONCE
Status: COMPLETED | OUTPATIENT
Start: 2022-10-07 | End: 2022-10-07

## 2022-10-07 RX ORDER — OXYCODONE AND ACETAMINOPHEN 5; 325 MG/1; MG/1
1 TABLET ORAL EVERY 6 HOURS PRN
Qty: 28 TABLET | Refills: 0 | Status: SHIPPED | OUTPATIENT
Start: 2022-10-07 | End: 2022-10-07 | Stop reason: SDUPTHER

## 2022-10-07 RX ORDER — CEFAZOLIN SODIUM 1 G/3ML
2 INJECTION, POWDER, FOR SOLUTION INTRAMUSCULAR; INTRAVENOUS
Status: DISCONTINUED | OUTPATIENT
Start: 2022-10-07 | End: 2022-10-07 | Stop reason: HOSPADM

## 2022-10-07 RX ORDER — HEPARIN 100 UNIT/ML
5 SYRINGE INTRAVENOUS ONCE
Status: DISCONTINUED | OUTPATIENT
Start: 2022-10-07 | End: 2022-10-07 | Stop reason: HOSPADM

## 2022-10-07 RX ORDER — ACETAMINOPHEN 500 MG
500 TABLET ORAL EVERY 6 HOURS PRN
Status: ON HOLD | COMMUNITY
End: 2022-10-07 | Stop reason: HOSPADM

## 2022-10-07 RX ORDER — PROPOFOL 10 MG/ML
VIAL (ML) INTRAVENOUS CONTINUOUS PRN
Status: DISCONTINUED | OUTPATIENT
Start: 2022-10-07 | End: 2022-10-07

## 2022-10-07 RX ORDER — HALOPERIDOL 5 MG/ML
INJECTION INTRAMUSCULAR
Status: DISCONTINUED | OUTPATIENT
Start: 2022-10-07 | End: 2022-10-07

## 2022-10-07 RX ORDER — MUPIROCIN 20 MG/G
OINTMENT TOPICAL
Status: DISCONTINUED | OUTPATIENT
Start: 2022-10-07 | End: 2022-10-07 | Stop reason: HOSPADM

## 2022-10-07 RX ORDER — SODIUM CHLORIDE 0.9 % (FLUSH) 0.9 %
10 SYRINGE (ML) INJECTION
Status: DISCONTINUED | OUTPATIENT
Start: 2022-10-07 | End: 2022-10-07 | Stop reason: HOSPADM

## 2022-10-07 RX ORDER — ONDANSETRON 2 MG/ML
INJECTION INTRAMUSCULAR; INTRAVENOUS
Status: DISCONTINUED | OUTPATIENT
Start: 2022-10-07 | End: 2022-10-07

## 2022-10-07 RX ADMIN — SODIUM CHLORIDE: 0.9 INJECTION, SOLUTION INTRAVENOUS at 07:10

## 2022-10-07 RX ADMIN — MIDAZOLAM HYDROCHLORIDE 2 MG: 1 INJECTION, SOLUTION INTRAMUSCULAR; INTRAVENOUS at 07:10

## 2022-10-07 RX ADMIN — MUPIROCIN: 20 OINTMENT TOPICAL at 07:10

## 2022-10-07 RX ADMIN — Medication 10 MG: at 08:10

## 2022-10-07 RX ADMIN — ONDANSETRON 4 MG: 2 INJECTION INTRAMUSCULAR; INTRAVENOUS at 08:10

## 2022-10-07 RX ADMIN — MEPIVACAINE HYDROCHLORIDE 30 ML: 15 INJECTION, SOLUTION EPIDURAL; INFILTRATION at 07:10

## 2022-10-07 RX ADMIN — HALOPERIDOL LACTATE 1 MG: 5 INJECTION, SOLUTION INTRAMUSCULAR at 08:10

## 2022-10-07 RX ADMIN — HALOPERIDOL LACTATE 1.5 MG: 5 INJECTION, SOLUTION INTRAMUSCULAR at 08:10

## 2022-10-07 RX ADMIN — MIDAZOLAM HYDROCHLORIDE 4 MG: 1 INJECTION, SOLUTION INTRAMUSCULAR; INTRAVENOUS at 07:10

## 2022-10-07 RX ADMIN — ACETAMINOPHEN 1000 MG: 500 TABLET ORAL at 07:10

## 2022-10-07 RX ADMIN — CEFAZOLIN 2 G: 330 INJECTION, POWDER, FOR SOLUTION INTRAMUSCULAR; INTRAVENOUS at 08:10

## 2022-10-07 RX ADMIN — PROPOFOL 100 MCG/KG/MIN: 10 INJECTION, EMULSION INTRAVENOUS at 08:10

## 2022-10-07 RX ADMIN — LIDOCAINE HYDROCHLORIDE 100 MG: 20 INJECTION, SOLUTION INTRAVENOUS at 08:10

## 2022-10-07 RX ADMIN — HEPARIN 500 UNITS: 100 SYRINGE at 10:10

## 2022-10-07 NOTE — H&P
[facility]  History & Physical  Orthopedics     SUBJECTIVE:       COVID-19 attestation:  This patient was treated during the COVID-19 pandemic.  This was discussed with the patient, they are aware of our current policies and procedures, were given the option of delaying their visit and or switching to a virtual visit, delaying their surgery when applicable, and they elect to proceed.     Chief Complaint: Bilateral hand numbness and tingling     Referring Provider: No ref. provider found      History of Present Illness:  Patient is a 52 y.o. right hand dominant female who presents today with complaints of  Bilateral hand numbness and tingling.  The patient notes that she is status post bilateral ulnar nerve decompressions by Dr. Lopez in [facility].  The right side was done March 30, 2020 in the left side was done May 7, 2020 20 per her report.  She is also status post a left de Quervain release on January 7, 2020.  These initially all have done well although she notes that she started having some recurrence in her numbness in bilateral hands in the right greater than left hands with involvement of basically the entire right hand and in the left hand is worst to the thumb and index fingers greater than the ring and small fingers.  She notes that her symptoms began about 5-6 months ago.  She has no other complaints today presents for initial evaluation.      Interval history August 30, 2022: The patient returns today for re-evaluation.  She notes that the numbness in her right hand involving the median nerve distribution worsened since visit.  She was also sent for an EMG.  She has been attempting splints no relief.  She returns for re-evaluation complaints.     The patient is a/an  caretaker.     Onset of symptoms/DOI was  5-6 months.     Symptoms are aggravated by activity and movement.     Symptoms are alleviated by rest.     Symptoms consist of pain and numbness/tingling.     The  patient rates their pain as  mild     Attempted treatment(s) and/or interventions include activity modifications, rest, surgical intervention.     The patient denies any fevers, chills, N/V, D/C and presents for evaluation.             Past Medical History:   Diagnosis Date    Abnormal Pap smear of vagina      Anxiety      Cataract      Chronic pain       TMJ and abdomen    Colon polyp      Epilepsy       as a child    Gastroparesis       Idiopathic    GERD (gastroesophageal reflux disease)      H/O abnormal cervical Papanicolaou smear      Hypercholesteremia 6/21/2016    Hyperlipidemia      IC (interstitial cystitis)      Internal hemorrhoids      Interstitial cystitis      Irritable bowel syndrome (IBS)      Irritable bowel syndrome with diarrhea 6/21/2016    Spastic colon      TMJ (temporomandibular joint disorder)      TMJ (temporomandibular joint syndrome) 6/21/2016            Past Surgical History:   Procedure Laterality Date    APPENDECTOMY        BREAST BIOPSY Bilateral      CATARACT EXTRACTION        CHOLECYSTECTOMY        COLONOSCOPY        COLONOSCOPY N/A 7/31/2018     Procedure: COLONOSCOPY;  Surgeon: Zack Lehman MD;  Location: [address]);  Service: Endoscopy;  Laterality: N/A;    CYSTOSCOPY N/A 8/25/2020     Procedure: CYSTOSCOPY;  Surgeon: Christal Khan MD;  Location: [address];  Service: Urology;  Laterality: N/A;    ELBOW SURGERY        ESOPHAGOGASTRODUODENOSCOPY N/A 7/31/2018     Procedure: EGD (ESOPHAGOGASTRODUODENOSCOPY);  Surgeon: Zack Lehman MD;  Location: [address]);  Service: Endoscopy;  Laterality: N/A;  RUQ gastric pacemaker     Left upper arm PICC line     PONV    ESOPHAGOGASTRODUODENOSCOPY N/A 9/3/2019     Procedure: EGD (ESOPHAGOGASTRODUODENOSCOPY);  Surgeon: Zack Lehman MD;  Location: [address]);  Service: Endoscopy;  Laterality: N/A;  history of gastoparesis, per change in protocol, pt instructed to do full liquid diet x 3 days prior to  procedure and clear liquids x 1 day prior to procedure-BB  pt has gastric pacemaker, monitored by Dr. Lehman-YASMANY  hx of epilepsy, last seizure during chi    ESOPHAGOGASTRODUODENOSCOPY N/A 9/10/2020     Procedure: ESOPHAGOGASTRODUODENOSCOPY (EGD);  Surgeon: Zack Lehman MD;  Location: [facility] (4TH FLR);  Service: Endoscopy;  Laterality: N/A;  3 days Full liquid diet/ 1 day Clear liquids  waiting for Pt to cb with date - ERW  Left upper arm -  PICC  COVID screening 9/7/20 [place] [facility]- [facility]    ESOPHAGOGASTRODUODENOSCOPY N/A 11/3/2021     Procedure: EGD (ESOPHAGOGASTRODUODENOSCOPY);  Surgeon: Zack Lehman MD;  Location: [facility] (4TH FLR);  Service: Endoscopy;  Laterality: N/A;  10/15 fully vaccinated as of 7/15/21; gastric pacemaker-pt does not have remote;  pt has a port does not want IV started; 3 days full liquid diet; instr. to portal-st    GASTRIC STIMULATOR IMPLANT SURGERY         left side on 03/30/2020    gastric stimulator placement        LASIK Bilateral 2006    MANDIBLE SURGERY Bilateral 10/17/2016    MYELOGRAPHY N/A 8/22/2022     Procedure: Myelogram lumbar;  Surgeon: Malia Surgeon;  Location: [facility];  Service: Anesthesiology;  Laterality: N/A;    OOPHORECTOMY Bilateral      PYLOROPLASTY   03/2016    STOMACH SURGERY         gastric pacemaker    TEMPOROMANDIBULAR JOINT SURGERY   12/2016    TONSILLECTOMY        TOTAL ABDOMINAL HYSTERECTOMY   2005     EUGENIA/BSO, Trachelectomy  2012    UPPER GASTROINTESTINAL ENDOSCOPY        WRIST SURGERY                Review of patient's allergies indicates:   Allergen Reactions    Ketorolac Anaphylaxis and Shortness Of Breath       Other reaction(s): Throat swelling, Unknown    Morphine Anaphylaxis    Tramadol Shortness Of Breath       Other reaction(s): Throat swelling, Unknown    Codeine Itching    Gabapentin Other (See Comments)       Causes suicidal thoughts          Ibuprofen Other (See Comments)       G L Bleeding          Nsaids (non-steroidal  "anti-inflammatory drug) Other (See Comments)       GI BLEEDING          Hydrocodone         Other reaction(s): Unknown    Hydrocodone-acetaminophen      Metoclopramide Other (See Comments)       Headaches and insomnia             Reglan [metoclopramide hcl] Other (See Comments)       Headaches and insomnia       Topiramate Other (See Comments)       Ulcers in the mouth and dry mouth  per pt, gets "fog brain"                Corticosteroids (glucocorticoids) Nausea And Vomiting and Other (See Comments)       Acid reflux  Acid reflux  Increases acid reflux    Prednisone Other (See Comments)       Increases acid reflux      Social History          Social History Narrative    Not on file            Family History   Problem Relation Age of Onset    Coronary artery disease Mother      Hodgkin's lymphoma Mother      Hypertension Mother      Coronary artery disease Father 43    Hypertension Father      Heart attacks under age 50 Father      Lymphoma Sister      Coronary artery disease Paternal Grandfather      Stroke Paternal Grandfather      Glaucoma Maternal Grandmother      Pancreatic cancer Paternal Aunt      Cancer Paternal Aunt           pancreatic    Stroke Maternal Grandfather      Colon cancer Neg Hx      Breast cancer Neg Hx      Ovarian cancer Neg Hx      Cervical cancer Neg Hx      Endometrial cancer Neg Hx      Vaginal cancer Neg Hx      Diabetes Neg Hx              Current Outpatient Medications:     (Magic mouthwash) 1:1:1 diphenhydramine(Benadryl) 12.5mg/5ml liq, aluminum & magnesium hydroxide-simethicone (Maalox), LIDOcaine viscous 2%, Swish and spit 5 mLs every 4 (four) hours as needed (mouth sores). for mouth sores, Disp: 240 mL, Rfl: 3    ALPRAZolam (XANAX) 1 MG tablet, Take 1 mg by mouth 2 (two) times daily as needed. , Disp: , Rfl:     busPIRone (BUSPAR) 5 MG Tab, Take 5 mg by mouth 2 (two) times daily., Disp: , Rfl:     DEXILANT 60 mg capsule, TAKE 1 CAPSULE BY MOUTH  ONCE DAILY, Disp: 90 capsule, Rfl: " 3    diazePAM (VALIUM) 5 MG tablet, Take 1 tablet (5 mg total) by mouth as needed for Anxiety (Myelogram anxiety)., Disp: 2 tablet, Rfl: 0    diphenhydrAMINE (BENADRYL) 50 MG tablet, Take 50 mg by mouth every 4 (four) hours as needed for Itching., Disp: , Rfl:     estradioL (ESTRACE) 2 MG tablet, Take 1.5 tablets (3 mg total) by mouth once daily., Disp: 135 tablet, Rfl: 3    flavoxATE (URISPAS) 100 mg Tab, 1 tablet, Disp: , Rfl:     fluticasone propionate (FLONASE) 50 mcg/actuation nasal spray, 1 spray by Each Nostril route once daily., Disp: , Rfl:     fremanezumab-vfrm (AJOVY AUTOINJECTOR) 225 mg/1.5 mL autoinjector, Inject 1.5 mLs (225 mg total) into the skin every 28 days., Disp: 1 each, Rfl: 10    Lactobacillus rhamnosus GG (CULTURELLE) 10 billion cell capsule, Take 1 capsule by mouth once daily., Disp: , Rfl:     LIDOCAINE VISCOUS 2 % solution, Take 5 mLs by mouth 4 (four) times daily as needed., Disp: , Rfl:     lipase-protease-amylase 24,000-76,000-120,000 units (CREON) 24,000-76,000 -120,000 unit capsule, Take 1 capsule by mouth 3 (three) times daily with meals., Disp: 270 capsule, Rfl: 1    loperamide (IMODIUM) 2 mg capsule, TAKE 2 CAPSULES BY MOUTH  TWICE DAILY AS NEEDED, Disp: 360 capsule, Rfl: 1    meclizine (ANTIVERT) 25 mg tablet, Take by mouth 3 (three) times daily as needed., Disp: , Rfl:     methocarbamol (ROBAXIN-750 ORAL), Take by mouth., Disp: , Rfl:     metoprolol succinate (TOPROL-XL) 25 MG 24 hr tablet, , Disp: , Rfl:     metoprolol tartrate (LOPRESSOR) 25 MG tablet, Take 25 mg by mouth 2 (two) times daily., Disp: , Rfl:     pantoprazole (PROTONIX) 40 mg injection, Inject 40 mg into the vein 2 (two) times a day., Disp: 60 each, Rfl: 11    pregabalin (LYRICA) 75 MG capsule, Take 1 capsule (75 mg total) by mouth 2 (two) times daily., Disp: 60 capsule, Rfl: 6    promethazine (PHENERGAN) 25 mg/mL injection, Inject 25 mg into the vein every 4 (four) hours., Disp: , Rfl:     simethicone (MYLICON)  "125 mg Cap capsule, Take 250 mg by mouth 2 (two) times a day., Disp: , Rfl:     simvastatin (ZOCOR) 40 MG tablet, Take 40 mg by mouth every evening. , Disp: , Rfl:     SODIUM CHLORIDE 0.45 % IV, Inject 1,000 mLs into the vein as needed. , Disp: , Rfl:     sodium chloride 0.9% 0.9 % SolP 50 mL with promethazine 25 mg/mL Soln, Inject 25 mg into the vein every 4 (four) hours. Thru IV, Disp: , Rfl:     sodium chloride 0.9% SolP 100 mL with pantoprazole 40 mg SolR 40 mg, Inject 40 mg into the vein every 12 (twelve) hours., Disp: 60 vial, Rfl: 0    sucralfate (CARAFATE) 1 gram tablet, TAKE 1 TABLET BY MOUTH 4  TIMES DAILY BEFORE MEALS  AND AT NIGHT, Disp: 360 tablet, Rfl: 3    tiZANidine (ZANAFLEX) 4 MG tablet, Take 4 mg by mouth 3 (three) times daily., Disp: , Rfl:     triamcinolone acetonide 0.1% (KENALOG) 0.1 % ointment, AAA hands bid - may occlude qhs, Disp: 454 g, Rfl: 3        Review of Systems:  As per HPI otherwise noncontributory     OBJECTIVE:       Vital Signs (Most Recent):  Vitals       Vitals:     08/30/22 0819   Weight: 113.9 kg (251 lb)   Height: 5' 8" (1.727 m)         Body mass index is 38.16 kg/m².        Physical Exam:  Constitutional: The patient appears well-developed and well-nourished. No distress.   Skin: No lesions appreciated  Head: Normocephalic and atraumatic.   Nose: Nose normal.   Ears: No deformities seen  Eyes: Conjunctivae and EOM are normal.   Neck: No tracheal deviation present.   Cardiovascular: Normal rate and intact distal pulses.    Pulmonary/Chest: Effort normal. No respiratory distress.   Abdominal: There is no guarding.   Neurological: The patient is alert.   Psychiatric: The patient has a normal mood and affect.      Bilateral Hand/Wrist Examination:     Observation/Inspection:  Swelling                       none                  Deformity                     none  Discoloration               none                  Scars                            bilateral cubital tunnel and " left de Quervain, all well-healed ; small questionable scar volar right wrist       Atrophy                        none     HAND/WRIST EXAMINATION:  Finkelstein's Test                                Neg  WHAT Test                                         Neg  Snuff box tenderness                          Neg  Reed's Test                                     Neg  Hook of Hamate Tenderness              Neg  CMC grind                                           Neg  Circumduction test                              Neg     Neurovascular Exam:  Digits WWP, brisk CR < 3s throughout  NVI motor/LTS to M/R/U nerves, radial pulse 2+  Tinel's Test - Carpal Tunnel                Neg  Tinel's Test - Cubital Tunnel               Neg  Phalen's Test                                      Neg  Median Nerve Compression Test       positive     ROM hand full, painless except some difficulty with opposition the small finger     ROM wrist full, painless    ROM elbow full, painless     Abdomen not guarded  Respirations nonlabored  Perfusion intact     Diagnostic Results:     Imaging - I independently viewed the patient's imaging as well as the radiology report.  Xrays of the patient's  Bilateral hands  demonstrate no evidence of any acute fractures or dislocations or significant degenerative changes.     EMG -  Impression:  There is electrophysiologic evidence of a right sensory median mononeuropathy across the wrist (I.e. Carpal tunnel syndrome).  There is no motor axonal loss.  There is no active denervation.  This is graded as Mild in severity on the right.             ASSESSMENT/PLAN:       52 y.o. yo female with  right carpal tunnel syndrome Plan: The patient and I had a thorough discussion today.  We discussed the working diagnosis as well as several other potential alternative diagnoses.  Treatment options were discussed, both conservative and surgical.  Conservative treatment options would include things such as activity modifications,  workplace modifications, a period of rest, oral vs topical OTC and prescription anti-inflammatory medications, occupational therapy, splinting/bracing, immobilization, corticosteroid injections, and others.  Surgical options were discussed as well.      At this time, the patient  would like to proceed with endoscopic versus open right carpal tunnel release on September 30, 2022.  I will get this set.  She has had bilateral ulnar nerve decompressions done in [place] previously but adamantly states that she has never had a carpal tunnel release.  We discussed that she does have a very small possible and questionable scar at the volar aspect of the right wrist but she is sure that this was not from a carpal tunnel release.  We will proceed as scheduled.  She will undergo endoscopic versus open carpal tunnel release.      The patient has not responded to adequate non operative treatment at this time and/or non operative treatment is not indicated. Thus, the risks, benefits and alternatives to surgery were discussed with the patient in detail.  Specific risks include but are not limited to bleeding, infection, vessel and/or nerve damage, pain, numbness, tingling, complex regional pain syndrome, compartment syndrome, failure to return to pre-injury and/or preoperative functional status, scar sensitivity, delayed healing, inability to return to work, pulley injury, tendon injury, bowstringing, partial and/or incomplete relief of symptoms, weakness, persistence of and/or worsening of symptoms, surgical failure, osteomyelitis, amputation, loss of function, stiffness, functional debility, dysfunction, decreased  strength, need for prolonged postoperative rehabilitation, need for further surgery, deep venous thrombosis, pulmonary embolism, arthritis and death.  The patient states an understanding and wishes to proceed with surgery.   All questions were answered.  No guarantees were implied or stated.  Written informed  consent was obtained.     Should the patient's symptoms worsen, persist, or fail to improve they should return for reevaluation and I would be happy to see them back anytime.        William Laboy M.D.     Please be aware that this note has been generated with the assistance of [employer] voice-to-text.  Please excuse any spelling or grammatical errors.     Thank you for choosing Dr. William Laboy for your orthopedic hand and upper extremity care. It is our goal to provide you with exceptional care that will help keep you healthy, active, and get you back in the game.     If you felt that you received exemplary care today, please consider leaving feedback for Dr. Laboy on [employer] at [email or web address].     Please do not hesitate to reach out to us via email, phone, or MyChart with any questions, concerns, or feedback.

## 2022-10-07 NOTE — OP NOTE
ORTHOPEDIC SURGERY OPERATIVE NOTE    SERVICE: ORTHOPEDICS     DATE OF PROCEDURE: 10/7/2022     SURGEON: William Laboy M.D.    ASSISTANT: SMA Vicky    PREOPERATIVE DIAGNOSIS: Right sided carpal tunnel syndrome    POSTOPERATIVE DIAGNOSIS: Right sided carpal tunnel syndrome    PROCEDURE PERFORMED: Endoscopic Right sided carpal tunnel release, CPT code 37067    ANESTHESIA: Regional/MAC    ESTIMATED BLOOD LOSS: Minimal           SPECIMENS: None    COMPLICATIONS: None              CONDITION: Stable    IV FLUIDS: Crystalloid per anesthesia    IMPLANTS: None    TOURNIQUET TIME: 25 minutes at 250 mmHg    INDICATIONS FOR PROCEDURE: Nan Betts is a 52 y.o. female who presented to clinic with findings and workup consistent with carpal tunnel syndrome of the right hand.  The patient had not responded to nonoperative management and wished to proceed with surgical intervention.  The risks, benefits and alternatives to surgery were discussed with the patient at great length and in great detail.  Specific risks discussed include but are not limited to bleeding, infection, vessel damage, nerve damage, pain, numbness, tingling, weakness, stiffness, complex regional pain syndrome, hardware/surgical failure, need for further surgery, DVT, PE, arthritis, scar sensitivity, delayed healing, need for prolonged postoperative rehabilitation, and death amongst others.  The patient stated an understanding of the risks and wished to proceed with surgery.   All questions were answered.  No guarantees were implied or stated.  Informed consent was obtained.    PROCEDURE IN DETAIL: With the patient's participation in the preoperative holding area, the right upper extremity was marked as the surgical site. Preoperative checks were completed and consents were verified.  Regional anesthesia was administered.  The patient was brought to the Operating Room and placed in supine position.  A well-padded nonsterile tourniquet was placed on the upper  arm.  The right arm was then prepped and draped in the usual sterile fashion.  Timeout was called for to verify the correct site, procedure and patient.  All were in agreement and we proceeded.  MAC anesthesia was introduced.  Esmarch was utilized to exsanguinate the arm and tourniquet was insufflated to 250mmHg where it remained for the duration of the procedure.    Next, a small 1 cm transverse incision was made in line with a proximal wrist flexion crease beginning radially at the palmaris longus and extending ulnarly for 1 cm.  Dissection was continued to level of antebrachial fascia.  This was transversely incised in line with the skin incision.  A narrow double skin hook was then utilized to elevate the distal most aspect of the incision to gain access to the carpal tunnel.  A series of small and large dilators were utilized to dilate our planned path with the endoscope.  A synovial elevator was then utilized to free synovium from the undersurface of the transverse carpal ligament.  Washboard effect was confirmed.  At this time, the Arthrex endoscopic carpal tunnel system was introduced into the incision.  Confirmation of placement within the carpal tunnel was confirmed.  The endoscope was advanced the distal aspect of the transverse carpal ligament and the distal fat was visualized.  The median nerve was identified radial to the endoscope and was protected throughout the duration of the procedure.  Excellent visualization of the transverse carpal ligament along its entire length was confirmed with no interposed soft tissue.  I then began my division of the transverse carpal ligament from distally and extending in a proximal direction.  The knife was deployed and complete full-thickness transection of the transverse carpal ligament was performed beginning distally and working my way more proximally.  Complete division of the ligament was performed. Care was taken distally to ensure not to cut past [place]  Cardinal Line or injure the superficial palmar arch. The distal fat was visualized at the distalmost aspect of the ligament division. Tension and pressure within the carpal tunnel was dramatically improved and decreased status post ligament division.  The endoscope was then once again advanced more distally and completion of the division was confirmed.  Fat protruded through the divided ligament throughout.  The median nerve was identified along the length of our ligament division and remained intact and uninjured.  The endoscope was then removed from the wound and the antebrachial forearm fascia was divided in line with the ligament division by hand with tenotomy scissors.  The wound was then irrigated with copious amounts of sterile saline.  Skin was closed with 4 0 nylon suture.        Sterile dressings were applied consisting of Xeroform 4 x 4 gauze cast padding and 2 in Ace wrap to the hand.  Tourniquet was deflated and brisk cap refill in [place] throughout the operative upper extremity.  There was no significant bleeding.      DISPOSITION: The patient will be discharged home with followup in approximately 2 weeks for suture removal.  Early active range of motion in the acute postoperative period was discussed and encouraged.  They are to keep the dressing clean, dry, and intact until that time.

## 2022-10-07 NOTE — PLAN OF CARE
VSS. Patient able to tolerate oral liquids. Patient denies c/o pain. Dressing intact. Knee TEDs intact. Patient instructed not to wear FANTA hose without wearing closed-back shoes or  socks due to increased risk of falls, verbalized understanding. Sling on patient. No distress noted. Patient states she is ready for discharge. Discharge instructions reviewed with patient and family, verbalized understanding. Patient's port heparin locked, charted in MAR. Family at bedside to help patient dress. Patient states she would like a prescription for pain medication. She states she has taken percocet before with benadryl and did not have a reaction. Notified Dr. Laboy's staff. Aniket at bedside. Verified with patient she is okay to take percocet with bendryl at home. Paper prescription provided. Patient wheeled to lobby via staff.

## 2022-10-07 NOTE — BRIEF OP NOTE
[facility] - Surgery ([facility])  Brief Operative Note    Surgery Date: 10/7/2022     Surgeon(s) and Role:     * William Laboy MD - Primary    Assisting Surgeon: None    Pre-op Diagnosis:  Carpal tunnel syndrome of right wrist [G56.01]    Post-op Diagnosis:  Post-Op Diagnosis Codes:     * Carpal tunnel syndrome of right wrist [G56.01]    Procedure(s) (LRB):  RELEASE, CARPAL TUNNEL, ENDOSCOPIC (Right)    Anesthesia: Regional    Operative Findings: see OP note    Estimated Blood Loss: < 1 mL         Specimens:   Specimen (24h ago, onward)      None              Discharge Note    OUTCOME: Patient tolerated treatment/procedure well without complication and is now ready for discharge.    DISPOSITION: Home or Self Care    FINAL DIAGNOSIS:  Right carpal tunnel syndrome    FOLLOWUP: In clinic    DISCHARGE INSTRUCTIONS:    Discharge Procedure Orders   Diet general     Call MD for:  temperature >100.4     Call MD for:  persistent nausea and vomiting     Call MD for:  severe uncontrolled pain     Call MD for:  difficulty breathing, headache or visual disturbances     Call MD for:  redness, tenderness, or signs of infection (pain, swelling, redness, odor or green/yellow discharge around incision site)     Call MD for:  hives     Call MD for:  persistent dizziness or light-headedness     Call MD for:  extreme fatigue     Lifting restrictions   Order Comments: Light lifting (< 5 lbs)     Leave dressing on - Keep it clean, dry, and intact until clinic visit

## 2022-10-07 NOTE — ANESTHESIA POSTPROCEDURE EVALUATION
Anesthesia Post Evaluation    Patient: Nan Betts    Procedure(s) Performed: Procedure(s) (LRB):  RELEASE, CARPAL TUNNEL, ENDOSCOPIC (Right)    Final Anesthesia Type: general      Patient location during evaluation: PACU  Patient participation: Yes- Able to Participate  Level of consciousness: awake and alert and oriented  Post-procedure vital signs: reviewed and stable  Pain management: adequate  Airway patency: patent  SPRING mitigation strategies: Multimodal analgesia  PONV status at discharge: No PONV  Anesthetic complications: no      Cardiovascular status: blood pressure returned to baseline and hemodynamically stable  Respiratory status: unassisted, spontaneous ventilation and room air  Hydration status: euvolemic  Follow-up not needed.          Vitals Value Taken Time   /70 10/07/22 0947   Temp 36.3 °C (97.3 °F) 10/07/22 0920   Pulse 68 10/07/22 0955   Resp 28 10/07/22 0954   SpO2 97 % 10/07/22 0955   Vitals shown include unvalidated device data.      Event Time   Out of Recovery 09:55:00         Pain/Chelsy Score: Pain Rating Prior to Med Admin: 0 (10/7/2022  7:20 AM)  Chelsy Score: 9 (10/7/2022  9:16 AM)

## 2022-10-07 NOTE — TELEPHONE ENCOUNTER
Spoke to  and [facility] is going to E-scribe a new RX and send it to the pharmacy which should clear the prescription issue.  The new RX was sent electronically as of 3:52p.m. on 10/7/22.      Scarlett Hannon MA  Medical Assistant to Dr. Ross Dunbar [facility]

## 2022-10-07 NOTE — ANESTHESIA PREPROCEDURE EVALUATION
"                                                                                                             10/07/2022  [facility]  Anesthesia Pre-Operative Evaluation         Patient Name: Nan Betts  YOB: 1970  MRN: [id number]    SUBJECTIVE:     Pre-operative evaluation for Procedure(s) (LRB):  RELEASE, CARPAL TUNNEL, ENDOSCOPIC (Right)     10/07/2022    Nan Betts is a 52 y.o. female w/ a significant PMHx listed below who presents for the above procedure(s) for right carpal tunnel syndrome.      Patient Active Problem List   Diagnosis    GERD (gastroesophageal reflux disease)    Gastroparesis    Urge urinary incontinence    Interstitial cystitis    Anxiety    Vaginal atrophy    Vaginal pain    Dyspareunia    Intractable nausea and vomiting    Hypercholesteremia    Irritable bowel syndrome with diarrhea    Left lower quadrant pain    Diarrhea    Presence of gastric pacemaker    H/O pyloroplasty    Bloating    Epigastric pain    Colon polyp       Review of patient's allergies indicates:   Allergen Reactions    Ketorolac Anaphylaxis and Shortness Of Breath     Other reaction(s): Throat swelling, Unknown    Morphine Anaphylaxis    Tramadol Shortness Of Breath     Other reaction(s): Throat swelling, Unknown    Codeine Itching    Gabapentin Other (See Comments)     Causes suicidal thoughts        Ibuprofen Other (See Comments)     G L Bleeding        Nsaids (non-steroidal anti-inflammatory drug) Other (See Comments)     GI BLEEDING        Hydrocodone      Other reaction(s): Unknown    Hydrocodone-acetaminophen     Metoclopramide Other (See Comments)     Headaches and insomnia          Reglan [metoclopramide hcl] Other (See Comments)     Headaches and insomnia      Topiramate Other (See Comments)     Ulcers in the mouth and dry mouth  per pt, gets "fog brain"            Corticosteroids (glucocorticoids) Nausea And Vomiting and Other (See Comments) "     Acid reflux  Acid reflux  Increases acid reflux    Prednisone Other (See Comments)     Increases acid reflux       Current Outpatient Medications:    Current Facility-Administered Medications:     0.9%  NaCl infusion, , Intravenous, Continuous, William Laboy MD    acetaminophen tablet 1,000 mg, 1,000 mg, Oral, Once Pre-Op, Wes Stahl MD    ceFAZolin injection 2 g, 2 g, Intravenous, On Call Procedure, William Laboy MD    LIDOcaine (PF) 10 mg/ml (1%) injection 10 mg, 1 mL, Intradermal, Once, Wes Stahl MD    midazolam (VERSED) 1 mg/mL injection 0.5-4 mg, 0.5-4 mg, Intravenous, PRN, Wes Stahl MD    mupirocin 2 % ointment, , Nasal, On Call Procedure, William Laboy MD    Past Surgical History:   Procedure Laterality Date    APPENDECTOMY      BREAST BIOPSY Bilateral     CATARACT EXTRACTION      CHOLECYSTECTOMY      COLONOSCOPY      COLONOSCOPY N/A 7/31/2018    Procedure: COLONOSCOPY;  Surgeon: Zack Lehman MD;  [facility]  Service: Endoscopy;  Laterality: N/A;    CYSTOSCOPY N/A 8/25/2020    Procedure: CYSTOSCOPY;  Surgeon: Christal Khan MD;  [facility];  Service: Urology;  Laterality: N/A;    ELBOW SURGERY      ESOPHAGOGASTRODUODENOSCOPY N/A 7/31/2018    Procedure: EGD (ESOPHAGOGASTRODUODENOSCOPY);  Surgeon: Zack Lehman MD;  Location: [facility] (4TH FLR);  Service: Endoscopy;  Laterality: N/A;  RUQ gastric pacemaker    Left upper arm PICC line    PONV    ESOPHAGOGASTRODUODENOSCOPY N/A 9/3/2019    Procedure: EGD (ESOPHAGOGASTRODUODENOSCOPY);  Surgeon: Zack Lehman MD;  Location: [facility] (4TH FLR);  Service: Endoscopy;  Laterality: N/A;  history of gastoparesis, per change in protocol, pt instructed to do full liquid diet x 3 days prior to procedure and clear liquids x 1 day prior to procedure-BB  pt has gastric pacemaker, monitored by Dr. Lehman-BB  hx of epilepsy, last seizure during chi    ESOPHAGOGASTRODUODENOSCOPY  N/A 9/10/2020    Procedure: ESOPHAGOGASTRODUODENOSCOPY (EGD);  Surgeon: Zack Lehman MD;  Location: [facility] (4TH FLR);  Service: Endoscopy;  Laterality: N/A;  3 days Full liquid diet/ 1 day Clear liquids  waiting for Pt to cb with date - ERW  Left upper arm -  PICC  COVID screening [facility]- [facility]    ESOPHAGOGASTRODUODENOSCOPY N/A 11/3/2021    Procedure: EGD (ESOPHAGOGASTRODUODENOSCOPY);  Surgeon: Zack Lehman MD;  Location: [facility] (4TH FLR);  Service: Endoscopy;  Laterality: N/A;  10/15 fully vaccinated as of 7/15/21; gastric pacemaker-pt does not have remote;  pt has a port does not want IV started; 3 days full liquid diet; instr. to portal-st    GASTRIC STIMULATOR IMPLANT SURGERY      left side on 2020    gastric stimulator placement      LASIK Bilateral     MANDIBLE SURGERY Bilateral 10/17/2016    MYELOGRAPHY N/A 2022    Procedure: Myelogram lumbar;  Surgeon: Malia Surgeon;  Location: [facility];  Service: Anesthesiology;  Laterality: N/A;    OOPHORECTOMY Bilateral     PYLOROPLASTY  2016    STOMACH SURGERY      gastric pacemaker    TEMPOROMANDIBULAR JOINT SURGERY  2016    TONSILLECTOMY      TOTAL ABDOMINAL HYSTERECTOMY  2005    EUGENIA/BSO, Trachelectomy      UPPER GASTROINTESTINAL ENDOSCOPY      WRIST SURGERY         Social History     Socioeconomic History    Marital status:    Tobacco Use    Smoking status: Former     Packs/day: 1.00     Years: 20.00     Pack years: 20.00     Types: Cigarettes     Start date: 1986     Quit date: 2022     Years since quittin.0    Smokeless tobacco: Never   Substance and Sexual Activity    Alcohol use: No     Alcohol/week: 0.0 standard drinks    Drug use: No    Sexual activity: Yes     Partners: Male     Comment:  since        OBJECTIVE:     Vital Signs Range (Last 24H):  Temp:  [36.7 °C (98 °F)]   Pulse:  [62]   Resp:  [18]   BP: (139)/(67)   SpO2:  [97 %]       Significant  Labs:  Lab Results   Component Value Date    WBC 11.33 01/27/2022    HGB 13.2 01/27/2022    HCT 36 04/26/2022     01/27/2022    ALT 16 01/27/2022    AST 28 01/27/2022     01/27/2022    K 4.2 01/27/2022     01/27/2022    CREATININE 0.8 01/27/2022    BUN 17 01/27/2022    CO2 23 01/27/2022    TSH 2.086 11/07/2016    INR 1.1 01/27/2022       Diagnostic Studies:    EKG:   Results for orders placed or performed during the hospital encounter of 04/13/17   SCHEDULED EKG 12-LEAD (to Muse)    Collection Time: 04/13/17  9:16 AM    Narrative    Test Reason : R07.9  Blood Pressure : **/** mmHG  Vent. Rate : 064 BPM     Atrial Rate : 064 BPM     P-R Int : 144 ms          QRS Dur : 090 ms      QT Int : 434 ms       P-R-T Axes : 053 026 028 degrees     QTc Int : 447 ms    Normal sinus rhythm  Normal ECG  No previous ECGs available  Confirmed by Eduardo CASSIDY, Lilly ([id number]) on 4/13/2017 2:24:36 PM    Referred By: SHARON BEEYR           Confirmed By:Lilly Clark MD           ASSESSMENT/PLAN:     Pre-op Assessment          Review of Systems  Anesthesia Hx:  No problems with previous Anesthesia    Hematology/Oncology:  Hematology Normal   Oncology Normal     Cardiovascular:   Hypertension, well controlled Denies MI.    Denies Angina. 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  Denies Asthma.  Denies Shortness of breath.  Denies Sleep Apnea.    Renal/:   Denies Chronic Renal Disease.     Hepatic/GI:   GERD, poorly controlled Denies Liver Disease.    Neurological:   Denies TIA. Denies CVA. Denies Seizures. (as child only)    Endocrine:   Denies Diabetes.        Physical Exam  General: Well nourished, Cooperative, Alert and Oriented    Airway:  Mallampati: II   Mouth Opening: Normal  TM Distance: Normal  Tongue: Normal  Neck ROM: Normal ROM    Chest/Lungs:  Normal Respiratory Rate    Heart:  Rate: Normal  Rhythm: Regular Rhythm        Anesthesia  Plan  Type of Anesthesia, risks & benefits discussed:    Anesthesia Type: Gen Supraglottic Airway, Gen Natural Airway, Regional  Intra-op Monitoring Plan: Standard ASA Monitors  Post Op Pain Control Plan: multimodal analgesia  Induction:  IV  Airway Plan: Direct and Video, Post-Induction  Informed Consent: Informed consent signed with the Patient and all parties understand the risks and agree with anesthesia plan.  All questions answered. Patient consented to blood products? Yes  ASA Score: 2  Day of Surgery Review of History & Physical: H&P Update referred to the surgeon/provider.    Ready For Surgery From Anesthesia Perspective.     .

## 2022-10-07 NOTE — PLAN OF CARE
Pre op complete. Patient resting comfortably. Call light in reach.  at bedside, will take some belongings, others in locker. Patient has port to right chest, infusing normal saline. Patient has gastric pacemaker in place on left side. Patient administered home dose of phenergan to port at approximately 7AM. Dr. Jonny hagen.

## 2022-10-07 NOTE — ANESTHESIA PROCEDURE NOTES
Right Supraclavicular Single Injection    Patient location during procedure: pre-op   Block not for primary anesthetic.  Reason for block: at surgeon's request and post-op pain management   Post-op Pain Location: right wrist   Start time: 10/7/2022 7:53 AM  Timeout: 10/7/2022 7:52 AM   End time: 10/7/2022 7:58 AM    Staffing  Authorizing Provider: Wes Stahl MD  Performing Provider: Wes Stahl MD    Preanesthetic Checklist  Completed: patient identified, IV checked, site marked, risks and benefits discussed, surgical consent, monitors and equipment checked, pre-op evaluation and timeout performed  Peripheral Block  Patient position: supine  Prep: ChloraPrep  Patient monitoring: heart rate, cardiac monitor, continuous pulse ox, continuous capnometry and frequent blood pressure checks  Block type: supraclavicular  Laterality: right  Injection technique: single shot  Needle  Needle type: Echogenic   Needle gauge: 20 G  Needle length: 4 in  Needle localization: anatomical landmarks and ultrasound guidance   -ultrasound image captured on disc.  Assessment  Injection assessment: negative aspiration, negative parasthesia and local visualized surrounding nerve  Paresthesia pain: none  Heart rate change: no  Slow fractionated injection: yes    Medications:    Medications: mepivacaine (CARBOCAINE) injection 15 mg/mL (1.5%) - Perineural   30 mL - 10/7/2022 7:58:00 AM    Additional Notes  VSS.  [facility] RN monitoring vitals throughout procedure.  Patient tolerated procedure well.

## 2022-10-07 NOTE — TRANSFER OF CARE
"Anesthesia Transfer of Care Note    Patient: Nan Betts    Procedure(s) Performed: Procedure(s) (LRB):  RELEASE, CARPAL TUNNEL, ENDOSCOPIC (Right)    Patient location: PACU    Anesthesia Type: general    Transport from OR: Transported from OR on 6-10 L/min O2 by face mask with adequate spontaneous ventilation    Post pain: adequate analgesia    Post assessment: no apparent anesthetic complications    Post vital signs: stable    Level of consciousness: sedated    Nausea/Vomiting: no nausea/vomiting    Complications: none    Transfer of care protocol was followed      Last vitals:   Visit Vitals  /77 (BP Location: Left arm, Patient Position: Lying)   Pulse 71   Temp 36.7 °C (98 °F) (Oral)   Resp 15   Ht 5' 8" (1.727 m)   Wt 113.4 kg (250 lb)   SpO2 99%   Breastfeeding No   BMI 38.01 kg/m²     "

## 2022-10-07 NOTE — PROGRESS NOTES
Discussion had with the patient regarding pain medications, given her allergies. Patient states she has taken percocet before in the past without issues and requesting percocet for her postop pain control.

## 2022-10-10 VITALS
HEART RATE: 64 BPM | WEIGHT: 250 LBS | HEIGHT: 68 IN | OXYGEN SATURATION: 96 % | SYSTOLIC BLOOD PRESSURE: 131 MMHG | TEMPERATURE: 98 F | DIASTOLIC BLOOD PRESSURE: 70 MMHG | BODY MASS INDEX: 37.89 KG/M2 | RESPIRATION RATE: 14 BRPM

## 2022-10-14 ENCOUNTER — PATIENT MESSAGE (OUTPATIENT)
Dept: ORTHOPEDICS | Facility: CLINIC | Age: 52
End: 2022-10-14
Payer: COMMERCIAL

## 2022-10-14 RX ORDER — ACETAMINOPHEN 500 MG
500 TABLET ORAL EVERY 6 HOURS PRN
Qty: 28 TABLET | Refills: 0 | Status: SHIPPED | OUTPATIENT
Start: 2022-10-14

## 2022-10-14 RX ORDER — OXYCODONE AND ACETAMINOPHEN 5; 325 MG/1; MG/1
1 TABLET ORAL EVERY 8 HOURS PRN
Qty: 10 TABLET | Refills: 0 | Status: SHIPPED | OUTPATIENT
Start: 2022-10-14 | End: 2022-10-28

## 2022-10-14 NOTE — TELEPHONE ENCOUNTER
----- Message from Johanne Brewster sent at 10/14/2022 11:53 AM CDT -----  Contact: pt  Refill request.   Pharmacy needs a correct S[facility] number to fill prescription , pharmacy says residents cant send in Rx       acetaminophen (TYLENOL) 500 MG tablet        [facility] [place] - [address] [id number] [facility] [address]  Phone: [phone] Fax: [phone]      Confirmed patient's contact info below:  Contact Name: Nan Betts  Phone Number: [phone]

## 2022-10-17 NOTE — PROGRESS NOTES
Chronic Pain - New Consult    Referring Physician: Vladimir Dan MD    Date: 10/18/2022     Re: Nan Betts  MR#: [id number]  YOB: 1970  Age: 52 y.o.    Chief Complaint:   Chief Complaint   Patient presents with    Low-back Pain    Mid-back Pain    Back Pain    Shoulder Pain     L    Leg Pain     B/L     **This note is dictated using the M*Modal Fluency Direct word recognition program. There are word recognition mistakes that are occasionally missed on review.**    ASSESSMENT: 52 y.o. year old female with whole back pain, consistent with     1. Chronic bilateral low back pain without sciatica        2. Sacroiliac pain  Ambulatory referral/consult to Pain Clinic      3. Numbness and tingling in both hands        4. Numbness and tingling of both feet        5. Idiopathic peripheral neuropathy        6. Obesity (BMI 35.0-39.9 without comorbidity)              PLAN:     Idiopathic peripheral neuropathy  -failed gabapentin, topiramate, pregabalin, amitryptyline  -discussed milnacipran. Does not want to try new medications.  -has appointment with neurology  -provided information on supplements for neuropathy. Recommended Vitamin B12 and PEA.    Lumbar spondylosis  -discussed bilateral L5-S1 FJI vs MBB/RFA. Recommended FJI first since it is a one-time injection vs RFA which requries multiple visits and she has to drive from [place]. Patient not interested at this time.  -patient is not interested in any more injections.  -has follow up with neurosurgeon  -recommended that patient find someone closer to home.    Chronic pain syndrome / fibromyalgia  -discussed milnacipran.  Patient defers due to potential cost.    - RTC PRN  - Counseled patient regarding the importance of weight loss and activity modification and physical therapy.    The above plan and management options were discussed at length with patient. Patient is in agreement with the above and verbalized understanding. It will be communicated  with the referring physician via electronic record, fax, or mail.  Lab/study reports reviewed were important and necessary because subsequent medical and treatment recommendations required review of the above lab/study reports. Images viewed/reviewed above were important and necessary because subsequent medical and treatment recommendations required review of the reviewed image(s).     Electronically signed by:  Ozzie Garces DO  10/18/2022    Patient was seen in clinic today.  The total amount of time spent on this patient was greater than  75 minutes .  Due to medical complexity and multiple issues discussed/addressed I am billing for a level 5 visit. This includes face to face time and non-face to face time preparing to see the patient by reviewing previous labs/imaging, obtaining and/or reviewing separately obtained history, documenting clinical information in the electronic or other health record, independently interpreting results and communicating results to the patient/family/caregiver.      =========================================================================================================    SUBJECTIVE:    Nan Betts is a 52 y.o. female presents to the clinic for the evaluation of whole back pain. The pain started 15 + years ago following fall and symptoms have been worsening.  The patient saw Dr. Dan who did not think there was anything surgical.  She states that her whole back is painful.  The low back she states she had a disc herniation in the past.  She has done lots of therapy in the past.  She refuses to get back on opioids.  She was on opioids for years, and has been off them for several years now.  The worst pain is in the low back.  She describes it as a severe pain. She uses a heating pad frequently. Laying flat makes it worse.    She states her arms/hands/legs/feet are going numb.  She has seem pain management before.  She has had some trigger point injections with steroid in  the past which she has not found particularly helpful.     She has had surgery for battery replacement of her gastric pacemaker in 09/2022 and then just had CTS surgery on 10/7/22.    Pain Description:    The pain is located in the whole back area and radiates to the left shoulder down the arm to the fingers, and boths legs to the toes .    At BEST  4/10   At WORST  10/10 on the WORST day.    On average pain is rated as 5/10.   Today the pain is rated as 5/10  The pain is continuous.  The pain is described as aching, numbing, and sharp    Symptoms interfere with daily activity and sleeping.   Exacerbating factors: Standing, Bending, Walking, Lifting, and Getting out of bed/chair.    Mitigating factors nothing and heat.   She reports 4 hours of sleep per night.    Physical Therapy/Home Exercise: No, not currently in physical therapy or home exercise program.  She has completed multiple rounds of PT    Current Pain Medications:    - tylenol, tizanidine qhs, methocarbamol qam    Failed Pain Medications:    - cannot take NSAIDs due to prior GI bleeds, opioids, gabapentin (suicidal thoughts), pregabalin (not that helpful), tizanidine, methocarbamol, soma, flexeril, cymbalta, topiramate.    Pain Treatment Therapies:    Pain procedures: TPI  Physical Therapy: yes multiple rounds  Chiropractor: tried, no help  Acupuncture: none  TENS unit: none  Spinal decompression: none  Joint replacement: none    Patient denies urinary incontinence and bowel incontinence.  Patient denies any suicidal or homicidal ideations     report:  Reviewed and consistent with medication use as prescribed.    Imaging:   CT myelogram 08/2022:  Vertebral body: The vertebral bodies are normal in height and morphology without evidence of fracture or osseous destructive process.     Discs: No significant height loss.     Cord: Spinal cord maintains normal caliber. No intraspinal mass or fluid collections are identified.  Conus terminalis at the level  of L1-L2.     Degenerative change:     T12-L1: No spinal canal stenosis or neural foraminal narrowing.     L1-L2: No spinal canal stenosis or neural foraminal narrowing.     L2-L3: No spinal canal stenosis or neural foraminal narrowing.     L3-L4: Mild circumferential disc bulge.  No spinal canal stenosis or neural foraminal narrowing.     L4-L5: Mild circumferential disc bulge.  No spinal canal stenosis or neural foraminal narrowing.     L5-S1: Moderate bilateral facet joint arthropathy, resulting in mild bilateral neural foraminal narrowing.  Minimal retrolisthesis and slight posterior disc bulge and osteophyte are present.  No spinal canal stenosis.     MISCELLANEOUS:     Paraspinal soft tissues exhibit no acute abnormalities.     Right-sided Port-A catheter with the tip terminates in the right atrium.     Bibasilar subsegmental atelectasis with no consolidation or pleural effusion.     Small-sized hiatal hernia.     Surgical clips in the right upper quadrant, likely related to prior cholecystectomy.     Impression:     L5-S1 has degenerative changes with small posterior disc bulge and osteophyte, slight retrolisthesis, and bilateral facet arthropathy.  There is mild bilateral foramen narrowing.     No significant spinal canal stenosis or neural foraminal narrowing throughout the cervical and thoracic spine.    Past Medical History:   Diagnosis Date    Abnormal Pap smear of vagina     Anxiety     Cataract     Chronic pain     TMJ and abdomen    Colon polyp     Epilepsy     as a child    Essential (primary) hypertension     Gastroparesis     Idiopathic    GERD (gastroesophageal reflux disease)     H/O abnormal cervical Papanicolaou smear     Hypercholesteremia 06/21/2016    Hyperlipidemia     IC (interstitial cystitis)     Internal hemorrhoids     Interstitial cystitis     Irritable bowel syndrome (IBS)     Irritable bowel syndrome with diarrhea 06/21/2016    Rapid heart rate     Spastic colon     TMJ  (temporomandibular joint disorder)     TMJ (temporomandibular joint syndrome) 06/21/2016     Past Surgical History:   Procedure Laterality Date    APPENDECTOMY      BREAST BIOPSY Bilateral     CATARACT EXTRACTION      CHOLECYSTECTOMY      COLONOSCOPY      COLONOSCOPY N/A 7/31/2018    Procedure: COLONOSCOPY;  Surgeon: Zack Lehman MD;  Location: [facility] (4TH FLR);  Service: Endoscopy;  Laterality: N/A;    CYSTOSCOPY N/A 8/25/2020    Procedure: CYSTOSCOPY;  Surgeon: Christal Khan MD;  Location: [facility] 1ST FLR;  Service: Urology;  Laterality: N/A;    ELBOW SURGERY      ENDOSCOPIC CARPAL TUNNEL RELEASE Right 10/7/2022    Procedure: RELEASE, CARPAL TUNNEL, ENDOSCOPIC;  Surgeon: William Laboy MD;  Location: [facility];  Service: Orthopedics;  Laterality: Right;    ESOPHAGOGASTRODUODENOSCOPY N/A 7/31/2018    Procedure: EGD (ESOPHAGOGASTRODUODENOSCOPY);  Surgeon: Zack Lehman MD;  Location: [facility] (4TH FLR);  Service: Endoscopy;  Laterality: N/A;  RUQ gastric pacemaker    Left upper arm PICC line    PONV    ESOPHAGOGASTRODUODENOSCOPY N/A 9/3/2019    Procedure: EGD (ESOPHAGOGASTRODUODENOSCOPY);  Surgeon: Zack Lehman MD;  Location: [facility] (4TH FLR);  Service: Endoscopy;  Laterality: N/A;  history of gastoparesis, per change in protocol, pt instructed to do full liquid diet x 3 days prior to procedure and clear liquids x 1 day prior to procedure-BB  pt has gastric pacemaker, monitored by Dr. Lehman-BB  hx of epilepsy, last seizure during chi    ESOPHAGOGASTRODUODENOSCOPY N/A 9/10/2020    Procedure: ESOPHAGOGASTRODUODENOSCOPY (EGD);  Surgeon: Zack Lehman MD;  Location: [facility] (4TH FLR);  Service: Endoscopy;  Laterality: N/A;  3 days Full liquid diet/ 1 day Clear liquids  waiting for Pt to cb with date - ERW  Left upper arm -  PICC  COVID screening 9/7/20 [place] [facility]- [facility]    ESOPHAGOGASTRODUODENOSCOPY N/A 11/3/2021    Procedure: EGD (ESOPHAGOGASTRODUODENOSCOPY);  Surgeon: Zack Lehman MD;   Location: [facility] (4TH FLR);  Service: Endoscopy;  Laterality: N/A;  10/15 fully vaccinated as of 7/15/21; gastric pacemaker-pt does not have remote;  pt has a port does not want IV started; 3 days full liquid diet; instr. to portal-st    GASTRIC STIMULATOR IMPLANT SURGERY      left side on 2020    gastric stimulator placement      LASIK Bilateral     MANDIBLE SURGERY Bilateral 10/17/2016    MYELOGRAPHY N/A 2022    Procedure: Myelogram lumbar;  Surgeon: Malia Surgeon;  Location: [facility];  Service: Anesthesiology;  Laterality: N/A;    OOPHORECTOMY Bilateral     PYLOROPLASTY  2016    STOMACH SURGERY      gastric pacemaker    TEMPOROMANDIBULAR JOINT SURGERY  2016    TONSILLECTOMY      TOTAL ABDOMINAL HYSTERECTOMY      EUGENIA/BSO, Trachelectomy      UPPER GASTROINTESTINAL ENDOSCOPY      WRIST SURGERY       Social History     Socioeconomic History    Marital status:    Tobacco Use    Smoking status: Former     Packs/day: 1.00     Years: 20.00     Pack years: 20.00     Types: Cigarettes     Start date: 1986     Quit date: 2022     Years since quittin.1    Smokeless tobacco: Never   Substance and Sexual Activity    Alcohol use: No     Alcohol/week: 0.0 standard drinks    Drug use: No    Sexual activity: Yes     Partners: Male     Comment:  since      Family History   Problem Relation Age of Onset    Coronary artery disease Mother     Hodgkin's lymphoma Mother     Hypertension Mother     Coronary artery disease Father 43    Hypertension Father     Heart attacks under age 50 Father     Lymphoma Sister     Coronary artery disease Paternal Grandfather     Stroke Paternal Grandfather     Glaucoma Maternal Grandmother     Pancreatic cancer Paternal Aunt     Cancer Paternal Aunt         pancreatic    Stroke Maternal Grandfather     Colon cancer Neg Hx     Breast cancer Neg Hx     Ovarian cancer Neg Hx     Cervical cancer Neg Hx     Endometrial cancer Neg Hx     Vaginal  "cancer Neg Hx     Diabetes Neg Hx        Review of patient's allergies indicates:   Allergen Reactions    Ketorolac Anaphylaxis and Shortness Of Breath     Other reaction(s): Throat swelling, Unknown    Morphine Anaphylaxis    Tramadol Shortness Of Breath     Other reaction(s): Throat swelling, Unknown    Codeine Itching    Gabapentin Other (See Comments)     Causes suicidal thoughts        Ibuprofen Other (See Comments)     G L Bleeding        Nsaids (non-steroidal anti-inflammatory drug) Other (See Comments)     GI BLEEDING        Hydrocodone      Other reaction(s): Unknown    Hydrocodone-acetaminophen     Metoclopramide Other (See Comments)     Headaches and insomnia          Reglan [metoclopramide hcl] Other (See Comments)     Headaches and insomnia      Topiramate Other (See Comments)     Ulcers in the mouth and dry mouth  per pt, gets "fog brain"            Corticosteroids (glucocorticoids) Nausea And Vomiting and Other (See Comments)     Acid reflux  Acid reflux  Increases acid reflux    Prednisone Other (See Comments)     Increases acid reflux       Current Outpatient Medications   Medication Sig    (Magic mouthwash) 1:1:1 diphenhydramine(Benadryl) 12.5mg/5ml liq, aluminum & magnesium hydroxide-simethicone (Maalox), LIDOcaine viscous 2% Swish and spit 5 mLs every 4 (four) hours as needed (mouth sores). for mouth sores    acetaminophen (TYLENOL) 500 MG tablet Take 1 tablet (500 mg total) by mouth every 6 (six) hours as needed for Pain.    ALPRAZolam (XANAX) 1 MG tablet Take 1 mg by mouth 2 (two) times daily as needed.     busPIRone (BUSPAR) 5 MG Tab Take 5 mg by mouth 2 (two) times daily.    CREON 24,000-76,000 -120,000 unit capsule TAKE 1 CAPSULE BY MOUTH 3  TIMES DAILY WITH MEALS    DEXILANT 60 mg capsule TAKE 1 CAPSULE BY MOUTH  ONCE DAILY    diphenhydrAMINE (BENADRYL) 50 MG tablet Take 50 mg by mouth every 4 (four) hours as needed for Itching.    estradioL (ESTRACE) 2 MG tablet Take 1.5 tablets (3 mg " total) by mouth once daily.    flavoxATE (URISPAS) 100 mg Tab 1 tablet    fluticasone propionate (FLONASE) 50 mcg/actuation nasal spray 1 spray by Each Nostril route once daily.    fremanezumab-vfrm (AJOVY AUTOINJECTOR) 225 mg/1.5 mL autoinjector Inject 1.5 mLs (225 mg total) into the skin every 28 days.    LIDOCAINE VISCOUS 2 % solution Take 5 mLs by mouth 4 (four) times daily as needed.    loperamide (IMODIUM) 2 mg capsule TAKE 2 CAPSULES BY MOUTH  TWICE DAILY AS NEEDED    meclizine (ANTIVERT) 25 mg tablet Take by mouth 3 (three) times daily as needed.    methocarbamol (ROBAXIN-750 ORAL) Take by mouth.    metoprolol succinate (TOPROL-XL) 25 MG 24 hr tablet     oxyCODONE-acetaminophen (PERCOCET) 5-325 mg per tablet Take 1 tablet by mouth every 8 (eight) hours as needed for Pain.    pantoprazole (PROTONIX) 40 mg injection Inject 40 mg into the vein 2 (two) times a day.    promethazine (PHENERGAN) 25 mg/mL injection Inject 25 mg into the vein every 4 (four) hours.    simethicone (MYLICON) 125 mg Cap capsule Take 250 mg by mouth 2 (two) times a day.    simvastatin (ZOCOR) 40 MG tablet Take 40 mg by mouth every evening.     SODIUM CHLORIDE 0.45 % IV Inject 1,000 mLs into the vein as needed.     sodium chloride 0.9% SolP 100 mL with pantoprazole 40 mg SolR 40 mg Inject 40 mg into the vein every 12 (twelve) hours.    sucralfate (CARAFATE) 1 gram tablet TAKE 1 TABLET BY MOUTH 4  TIMES DAILY BEFORE MEALS  AND AT NIGHT    tiZANidine (ZANAFLEX) 4 MG tablet Take 4 mg by mouth 3 (three) times daily.    triamcinolone acetonide 0.1% (KENALOG) 0.1 % ointment AAA hands bid - may occlude qhs    diazePAM (VALIUM) 5 MG tablet Take 1 tablet (5 mg total) by mouth as needed for Anxiety (Myelogram anxiety).    Lactobacillus rhamnosus GG (CULTURELLE) 10 billion cell capsule Take 1 capsule by mouth once daily.    metoprolol tartrate (LOPRESSOR) 25 MG tablet Take 25 mg by mouth 2 (two) times daily.    pregabalin (LYRICA) 75 MG capsule Take  "1 capsule (75 mg total) by mouth 2 (two) times daily.    sodium chloride 0.9% 0.9 % SolP 50 mL with promethazine 25 mg/mL Soln Inject 25 mg into the vein every 4 (four) hours. Thru IV     No current facility-administered medications for this visit.     Facility-Administered Medications Ordered in Other Visits   Medication    LIDOcaine (PF) 10 mg/ml (1%) injection 10 mg    midazolam (VERSED) 1 mg/mL injection 0.5-4 mg       REVIEW OF SYSTEMS:    GENERAL:  No weight loss, malaise or fevers.  HEENT:   No recent changes in vision or hearing+ vision   NECK:  Negative for lumps, no difficulty with swallowing.+ difficulty swallowing  RESPIRATORY:  Negative for cough, wheezing or shortness of breath, patient denies any recent URI.  CARDIOVASCULAR:  Negative for chest pain, leg swelling or palpitations.  GI:  Negative for abdominal discomfort, blood in stools or black stools or change in bowel habits.+ abdominal discomfort  MUSCULOSKELETAL:  See HPI.  SKIN:  Negative for lesions, rash, and itching.  PSYCH:  No mood disorder or recent psychosocial stressors.  Patients sleep is not disturbed secondary to pain.  HEMATOLOGY/LYMPHOLOGY:  Negative for prolonged bleeding, bruising easily or swollen nodes.  Patient is not currently taking any anti-coagulants + swollen nodes(2)  NEURO:   No history of headaches, syncope, paralysis, seizures or tremors. + headaches   All other reviewed and negative other than HPI.    OBJECTIVE:    BP (!) 140/77   Pulse 73   Resp 18   Ht 5' 8" (1.727 m)   Wt 113.4 kg (250 lb)   BMI 38.01 kg/m²     PHYSICAL EXAMINATION:    GENERAL: Well appearing, in no acute distress, alert and oriented x3.  PSYCH:  Mood and affect appropriate.  SKIN: Skin color, texture, turgor normal, no rashes or lesions.  HEAD/FACE:  Normocephalic, atraumatic. Cranial nerves grossly intact.  CV: RRR with palpation of the radial artery.  PULM: CTAB. No evidence of respiratory difficulty, symmetric chest rise.  GI:  Soft and " non-tender.    BACK: diffusely tender  - No obvious deformity or signs of trauma, Normal lumbar lordotic curve  - Positive spinous process tenderness  - Positive paravertebral tenderness  - Positive pain to palpation over the facet joints of the lumbar spine.   - Positive QL / Iliac crest / Glut tenderness  - Slump test is Negative for radicular pain  - Slump test is Negative for back pain  - Supine Straight leg raising is Negative for radicular pain  - Supine Straight leg raising is Negative for back pain  - Lumbar ROM is diminished in Flexion without pain  - Lumbar ROM is diminished in Extension with pain  - Lumbar ROM is diminished in Lateral Flexion with pain  - Lumbar ROM is normal in Rotation with pain  - Positive Sustained Hip Flexion test (for discogenic pain)  - Positive Altered Gait, Posture  - Axial facet loading test Positive on the bilateral side(s)    SI Joint exam:  - Positive SI joint tenderness to palpation  - Orestes's sign Negative  - Yeoman's Test: Did not perform for SI joint pain indicating anterior SI ligament involvement. Did not perform for anterior thigh pain/paresthesia which indicates femoral nerve stretch.  - Gaenslen's Test:Negative  - Finger Priyanka's Sign:Negative  - SI compression test:Negative  - SI distraction test:Negative  - Thigh Thrust: Negative  - SI Thrust: Negative    MUSKULOSKELETAL:    EXTREMITIES:   Hip Exam:  - Log Roll Positive  - FADIR Negative  - Stinchfield Negative  - Hip Scour Negative  - GTB Tenderness Positive      MUSCULOSKELETAL:  No atrophy or tone abnormalities are noted in the UE or LE.  No deformities, edema, or skin discoloration are noted on visible skin. Good capillary refill.    NEURO: Bilateral upper and lower extremity coordination and muscle stretch reflexes are physiologic and symmetric.      NEUROLOGICAL EXAM:  MENTAL STATUS: A x O x 3, good concentration, speech is fluent and goal directed  MEMORY: recent and remote are intact  CN: CN2-12 grossly  intact  MOTOR: 5/5 in all muscle groups of LE  DTRs: 2+ intact symmetric  Sensation:    -no Loss of sensation in a left lower and right lower L-1, L-2, L-3, L-4, and L-5 bilaterally distribution.  Babinski: absent on the bilateral side(s)  Abbott: absent on the bilateral side(s)  Clonus: absent on the bilateral side(s)

## 2022-10-18 ENCOUNTER — PATIENT MESSAGE (OUTPATIENT)
Dept: PLASTIC SURGERY | Facility: CLINIC | Age: 52
End: 2022-10-18
Payer: COMMERCIAL

## 2022-10-18 ENCOUNTER — OFFICE VISIT (OUTPATIENT)
Dept: NEUROSURGERY | Facility: CLINIC | Age: 52
End: 2022-10-18
Payer: COMMERCIAL

## 2022-10-18 ENCOUNTER — OFFICE VISIT (OUTPATIENT)
Dept: ORTHOPEDICS | Facility: CLINIC | Age: 52
End: 2022-10-18
Payer: COMMERCIAL

## 2022-10-18 ENCOUNTER — PATIENT MESSAGE (OUTPATIENT)
Dept: GASTROENTEROLOGY | Facility: CLINIC | Age: 52
End: 2022-10-18
Payer: COMMERCIAL

## 2022-10-18 ENCOUNTER — OFFICE VISIT (OUTPATIENT)
Dept: PAIN MEDICINE | Facility: CLINIC | Age: 52
End: 2022-10-18
Payer: COMMERCIAL

## 2022-10-18 ENCOUNTER — PATIENT MESSAGE (OUTPATIENT)
Dept: OBSTETRICS AND GYNECOLOGY | Facility: CLINIC | Age: 52
End: 2022-10-18
Payer: COMMERCIAL

## 2022-10-18 VITALS
HEIGHT: 68 IN | WEIGHT: 250 LBS | SYSTOLIC BLOOD PRESSURE: 140 MMHG | RESPIRATION RATE: 18 BRPM | BODY MASS INDEX: 37.89 KG/M2 | DIASTOLIC BLOOD PRESSURE: 77 MMHG | HEART RATE: 73 BPM

## 2022-10-18 VITALS
HEART RATE: 73 BPM | BODY MASS INDEX: 37.89 KG/M2 | DIASTOLIC BLOOD PRESSURE: 88 MMHG | TEMPERATURE: 98 F | WEIGHT: 250 LBS | HEIGHT: 68 IN | SYSTOLIC BLOOD PRESSURE: 146 MMHG

## 2022-10-18 DIAGNOSIS — R51.9 NONINTRACTABLE HEADACHE, UNSPECIFIED CHRONICITY PATTERN, UNSPECIFIED HEADACHE TYPE: Primary | ICD-10-CM

## 2022-10-18 DIAGNOSIS — G60.9 IDIOPATHIC PERIPHERAL NEUROPATHY: ICD-10-CM

## 2022-10-18 DIAGNOSIS — E66.9 OBESITY (BMI 35.0-39.9 WITHOUT COMORBIDITY): ICD-10-CM

## 2022-10-18 DIAGNOSIS — R59.9 ENLARGED LYMPH NODES: Primary | ICD-10-CM

## 2022-10-18 DIAGNOSIS — R20.0 NUMBNESS AND TINGLING OF BOTH FEET: ICD-10-CM

## 2022-10-18 DIAGNOSIS — R20.0 NUMBNESS AND TINGLING IN BOTH HANDS: ICD-10-CM

## 2022-10-18 DIAGNOSIS — M79.606 PAIN OF LOWER EXTREMITY, UNSPECIFIED LATERALITY: ICD-10-CM

## 2022-10-18 DIAGNOSIS — G89.29 CHRONIC BILATERAL LOW BACK PAIN WITHOUT SCIATICA: Primary | ICD-10-CM

## 2022-10-18 DIAGNOSIS — M53.3 SACROILIAC PAIN: ICD-10-CM

## 2022-10-18 DIAGNOSIS — R20.2 NUMBNESS AND TINGLING IN BOTH HANDS: ICD-10-CM

## 2022-10-18 DIAGNOSIS — Z98.890 POSTOPERATIVE STATE: ICD-10-CM

## 2022-10-18 DIAGNOSIS — R20.2 NUMBNESS AND TINGLING OF BOTH FEET: ICD-10-CM

## 2022-10-18 DIAGNOSIS — M54.50 CHRONIC BILATERAL LOW BACK PAIN WITHOUT SCIATICA: Primary | ICD-10-CM

## 2022-10-18 DIAGNOSIS — G56.01 CARPAL TUNNEL SYNDROME OF RIGHT WRIST: Primary | ICD-10-CM

## 2022-10-18 PROCEDURE — 99999 PR PBB SHADOW E&M-EST. PATIENT-LVL V: CPT | Mod: PBBFAC,,, | Performed by: STUDENT IN AN ORGANIZED HEALTH CARE EDUCATION/TRAINING PROGRAM

## 2022-10-18 PROCEDURE — 3079F DIAST BP 80-89 MM HG: CPT | Mod: CPTII,S$GLB,, | Performed by: NEUROLOGICAL SURGERY

## 2022-10-18 PROCEDURE — 99205 PR OFFICE/OUTPT VISIT, NEW, LEVL V, 60-74 MIN: ICD-10-PCS | Mod: S$GLB,,, | Performed by: STUDENT IN AN ORGANIZED HEALTH CARE EDUCATION/TRAINING PROGRAM

## 2022-10-18 PROCEDURE — 1159F MED LIST DOCD IN RCRD: CPT | Mod: CPTII,S$GLB,, | Performed by: STUDENT IN AN ORGANIZED HEALTH CARE EDUCATION/TRAINING PROGRAM

## 2022-10-18 PROCEDURE — 99215 PR OFFICE/OUTPT VISIT, EST, LEVL V, 40-54 MIN: ICD-10-PCS | Mod: S$GLB,,, | Performed by: NEUROLOGICAL SURGERY

## 2022-10-18 PROCEDURE — 3077F SYST BP >= 140 MM HG: CPT | Mod: CPTII,S$GLB,, | Performed by: NEUROLOGICAL SURGERY

## 2022-10-18 PROCEDURE — 1159F MED LIST DOCD IN RCRD: CPT | Mod: CPTII,S$GLB,, | Performed by: NEUROLOGICAL SURGERY

## 2022-10-18 PROCEDURE — 99024 PR POST-OP FOLLOW-UP VISIT: ICD-10-PCS | Mod: S$GLB,,, | Performed by: PHYSICIAN ASSISTANT

## 2022-10-18 PROCEDURE — 1159F PR MEDICATION LIST DOCUMENTED IN MEDICAL RECORD: ICD-10-PCS | Mod: CPTII,S$GLB,, | Performed by: PHYSICIAN ASSISTANT

## 2022-10-18 PROCEDURE — 1159F MED LIST DOCD IN RCRD: CPT | Mod: CPTII,S$GLB,, | Performed by: PHYSICIAN ASSISTANT

## 2022-10-18 PROCEDURE — 3077F PR MOST RECENT SYSTOLIC BLOOD PRESSURE >= 140 MM HG: ICD-10-PCS | Mod: CPTII,S$GLB,, | Performed by: NEUROLOGICAL SURGERY

## 2022-10-18 PROCEDURE — 1160F PR REVIEW ALL MEDS BY PRESCRIBER/CLIN PHARMACIST DOCUMENTED: ICD-10-PCS | Mod: CPTII,S$GLB,, | Performed by: NEUROLOGICAL SURGERY

## 2022-10-18 PROCEDURE — 3077F SYST BP >= 140 MM HG: CPT | Mod: CPTII,S$GLB,, | Performed by: STUDENT IN AN ORGANIZED HEALTH CARE EDUCATION/TRAINING PROGRAM

## 2022-10-18 PROCEDURE — 3079F PR MOST RECENT DIASTOLIC BLOOD PRESSURE 80-89 MM HG: ICD-10-PCS | Mod: CPTII,S$GLB,, | Performed by: NEUROLOGICAL SURGERY

## 2022-10-18 PROCEDURE — 99999 PR PBB SHADOW E&M-EST. PATIENT-LVL III: ICD-10-PCS | Mod: PBBFAC,,, | Performed by: PHYSICIAN ASSISTANT

## 2022-10-18 PROCEDURE — 99417 PR PROLONGED SVC, OUTPT, W/WO DIRECT PT CONTACT,  EA ADDTL 15 MIN: ICD-10-PCS | Mod: S$GLB,,, | Performed by: NEUROLOGICAL SURGERY

## 2022-10-18 PROCEDURE — 1159F PR MEDICATION LIST DOCUMENTED IN MEDICAL RECORD: ICD-10-PCS | Mod: CPTII,S$GLB,, | Performed by: STUDENT IN AN ORGANIZED HEALTH CARE EDUCATION/TRAINING PROGRAM

## 2022-10-18 PROCEDURE — 1159F PR MEDICATION LIST DOCUMENTED IN MEDICAL RECORD: ICD-10-PCS | Mod: CPTII,S$GLB,, | Performed by: NEUROLOGICAL SURGERY

## 2022-10-18 PROCEDURE — 1160F RVW MEDS BY RX/DR IN RCRD: CPT | Mod: CPTII,S$GLB,, | Performed by: STUDENT IN AN ORGANIZED HEALTH CARE EDUCATION/TRAINING PROGRAM

## 2022-10-18 PROCEDURE — 3078F DIAST BP <80 MM HG: CPT | Mod: CPTII,S$GLB,, | Performed by: STUDENT IN AN ORGANIZED HEALTH CARE EDUCATION/TRAINING PROGRAM

## 2022-10-18 PROCEDURE — 99999 PR PBB SHADOW E&M-EST. PATIENT-LVL V: CPT | Mod: PBBFAC,,, | Performed by: NEUROLOGICAL SURGERY

## 2022-10-18 PROCEDURE — 99999 PR PBB SHADOW E&M-EST. PATIENT-LVL V: ICD-10-PCS | Mod: PBBFAC,,, | Performed by: NEUROLOGICAL SURGERY

## 2022-10-18 PROCEDURE — 99024 POSTOP FOLLOW-UP VISIT: CPT | Mod: S$GLB,,, | Performed by: PHYSICIAN ASSISTANT

## 2022-10-18 PROCEDURE — 99999 PR PBB SHADOW E&M-EST. PATIENT-LVL V: ICD-10-PCS | Mod: PBBFAC,,, | Performed by: STUDENT IN AN ORGANIZED HEALTH CARE EDUCATION/TRAINING PROGRAM

## 2022-10-18 PROCEDURE — 3078F PR MOST RECENT DIASTOLIC BLOOD PRESSURE < 80 MM HG: ICD-10-PCS | Mod: CPTII,S$GLB,, | Performed by: STUDENT IN AN ORGANIZED HEALTH CARE EDUCATION/TRAINING PROGRAM

## 2022-10-18 PROCEDURE — 99417 PROLNG OP E/M EACH 15 MIN: CPT | Mod: S$GLB,,, | Performed by: NEUROLOGICAL SURGERY

## 2022-10-18 PROCEDURE — 99205 OFFICE O/P NEW HI 60 MIN: CPT | Mod: S$GLB,,, | Performed by: STUDENT IN AN ORGANIZED HEALTH CARE EDUCATION/TRAINING PROGRAM

## 2022-10-18 PROCEDURE — 1160F PR REVIEW ALL MEDS BY PRESCRIBER/CLIN PHARMACIST DOCUMENTED: ICD-10-PCS | Mod: CPTII,S$GLB,, | Performed by: STUDENT IN AN ORGANIZED HEALTH CARE EDUCATION/TRAINING PROGRAM

## 2022-10-18 PROCEDURE — 3077F PR MOST RECENT SYSTOLIC BLOOD PRESSURE >= 140 MM HG: ICD-10-PCS | Mod: CPTII,S$GLB,, | Performed by: STUDENT IN AN ORGANIZED HEALTH CARE EDUCATION/TRAINING PROGRAM

## 2022-10-18 PROCEDURE — 99215 OFFICE O/P EST HI 40 MIN: CPT | Mod: S$GLB,,, | Performed by: NEUROLOGICAL SURGERY

## 2022-10-18 PROCEDURE — 1160F RVW MEDS BY RX/DR IN RCRD: CPT | Mod: CPTII,S$GLB,, | Performed by: NEUROLOGICAL SURGERY

## 2022-10-18 PROCEDURE — 99999 PR PBB SHADOW E&M-EST. PATIENT-LVL III: CPT | Mod: PBBFAC,,, | Performed by: PHYSICIAN ASSISTANT

## 2022-10-18 NOTE — PROGRESS NOTES
Dr. Laboy is the supervising physician for this encounter/patient    Nan Betts presents for post-operative evaluation.  The patient is now 2 weeks s/p Right endoscopic carpal tunnel release with Dr. Laboy on 10/7/22.  Overall the patient reports doing well.  She does reports 6/10 pain, burning pain to the palm, occasionally will get shooting pain up to the elbow. She denies any f/c/s. She is taking Percocet as needed for pain. Unable to take NSAIDs, gabapentin causes suicidal ideations.    PE:    AA&O x 4.  NAD  HEENT:  NCAT, sclera nonicteric  Lungs:  Respirations are equal and unlabored.  CV:  2+ bilateral upper and lower extremity pulses.  MSK: The wound is healing well with no signs of erythema or warmth.  There is no drainage.  No clinical signs or symptoms of infection are present.  Mild edema present to the thenar, without TTP. Full hand motion present. SILT. DNVI.    A/P: Status post above, doing well  1) Continue with activity as tolerated. She declines OT referral today.  2) All sutures removed today. Wound care and signs of infection discussed. Discussed ice/heat to help with swelling.  3) F/U 4 weeks with Dr. Laboy to discuss left side.  4) Call with any questions/concerns in the interim      Jamie Russo-DiGeorge PA-C

## 2022-10-18 NOTE — PATIENT INSTRUCTIONS
Here are some supplements that may help in the treatment of chronic pain:    PEA or palmitoylethanolamide which is an over the counter nutritional supplement that can be purchased online or from a supplement store.  Palmitoylethanolamide (PEA) is an endogenous fatty acid amide, a naturally occurring compound that plays a significant role in intracellular signaling mechanisms. In both humans and animals, palmitoylethanolamide is produced primarily as a biological repair mechanism, and thus serves as an effective modulator of inflammation and chronic and/or nerve pain.  In animal studies the micronized or ultramicronized palmitoylethanolamide was found to be better than non-micronized, but there are no studies in humans to recommend one type over the other.  We recommend starting at a dose of 400mg twice a day.    Turmeric is a common spice and a major ingredient in fischer powder. Its primary active ingredients, curcuminoids, are yellow and used to color foods and cosmetics.  Turmeric is used as a dietary supplement for inflammation; arthritis; stomach, skin, liver, and gallbladder problems; cancer; and other conditions.  Claims that curcuminoids found in turmeric help to reduce inflammation arent supported by strong studies.  Preliminary studies found that curcuminoids may reduce the number of heart attacks bypass patients had after surgery, and control knee pain from osteoarthritis as well as ibuprofen did.  Turmeric in amounts tested for health purposes is generally considered safe when taken by mouth or applied to the skin. High doses or long-term use of turmeric may cause gastrointestinal problems. Consuming tumeric in its raw form (I.e. in food) is generally considered a better way to absorb the medication than in a capsule or pill.     Fish oil is a popular alternative for joint pain relief. Rich in omega 3s, fish oil has health benefits for a variety of conditions ranging from cholesterol problems to diabetes.  One of its many benefits is its anti-inflammatory properties, which play a role in its effectiveness as an aid for joint pain.  One-to-three grams of fish oil each day can help reduce the intensity of joint symptoms like morning stiffness, tenderness, swelling, and discomfort. The omega-3 fatty acids present in this amount can also increase blood flow throughout the body during exercise, which can help reduce joint pain and swelling.  Fish oil contains the omega-3 fatty acids Eicosapentatonic acid (EPA) and decoxahexaeonic acid (DHA). These particular acids benefit the body by promoting the reduction of inflammation in joints. EPA and DHA also limit the production of certain negative proteins that inhibit certain types of arthritis.      Vitamin D supplementation has been shown to be beneficial in patients with chronic pain.  Vitamin D has been shown to play a role in the pathophysiology of fibromyalgia and chronic widespread pain through autoimmune and neural regulation, modulation of neurotransmitters, and upregulation of inflammatory pathways in chronic pain.  Some of the studies that evaluated the relationship between vitamin D deficiency and various types of chronic painful conditions have reported a relationship whereas other studies did not report any relationship.  Because of its low side effect profile, the risks of trying vitamin D are very low (although if you have any kidney problems you should discuss this with your physician first).  A recommended starting dose would be 2000 international unit(s) daily.  A normal multivitamin contains about 200-400 international unit(s). The vitamin D supplements should be offered as vitamin D3 (cholecalciferol) and, for normal healthy adults, without calcium.      Vitamin B12 has been looked at in multiple studies.  Animal studies support multiple beneficial effects of vitamin B12 including the regeneration of nerves and the inhibition of cyclooxygenase enzymes and  other pain-signaling pathways. In addition, animal studies have demonstrated synergistic benefits of vitamin B12 combined with other pain medications, including nonsteroidal anti-inflammatory drugs and opiates. Clinical trials provide evidence for the effectiveness of vitamin B12 for the treatment of low back pain and neuralgia, although data is still fairly limited and optimal treatment regimens have not been identified.  There is also an association with vitamin B12 deficiency and chronic fatigue syndrome.     Magnesium plays an important role in the prevention of central sensitization and in the attenuation of established pain hypersensitivity.  Its main mode of action appears to involve its action at N-methyl-D-aspartate (NMDA) receptors.  Magnesium has been investigated in various clinical conditions associated with acute and chronic pain including CRPS, fibromyalgia, low back pain.  The beneficial effects of magnesium treatment have been mixed in patients suffering from neuropathic pain, but have been shown more effective when combined with other medications. While severe deficiency is uncommon, magnesium deficiency is not uncommon among the general populations of western countries.   Foods with chlorophyll (and thus green vegetables such as spinach) is our major food source of magnesium. Nuts, seeds and unprocessed cereals are also rich in magnesium.  In people with kidney disease, magnesium supplementation should be avoided until discussed with your physician.  Magnesium Citrate at a dose of 200-250mg daily would be a recommended starting dose.  Side effects include diarrhea, loose stools, and abdominal pain.

## 2022-10-19 ENCOUNTER — TELEPHONE (OUTPATIENT)
Dept: NEUROSURGERY | Facility: CLINIC | Age: 52
End: 2022-10-19
Payer: COMMERCIAL

## 2022-10-19 DIAGNOSIS — I67.1 CEREBRAL ANEURYSM, NONRUPTURED: ICD-10-CM

## 2022-10-25 ENCOUNTER — PATIENT MESSAGE (OUTPATIENT)
Dept: NEUROLOGY | Facility: CLINIC | Age: 52
End: 2022-10-25
Payer: COMMERCIAL

## 2022-10-25 ENCOUNTER — PATIENT MESSAGE (OUTPATIENT)
Dept: SURGERY | Facility: CLINIC | Age: 52
End: 2022-10-25
Payer: COMMERCIAL

## 2022-10-26 ENCOUNTER — TELEPHONE (OUTPATIENT)
Dept: NEUROSURGERY | Facility: CLINIC | Age: 52
End: 2022-10-26
Payer: COMMERCIAL

## 2022-10-26 ENCOUNTER — OFFICE VISIT (OUTPATIENT)
Dept: SURGERY | Facility: CLINIC | Age: 52
End: 2022-10-26
Payer: COMMERCIAL

## 2022-10-26 ENCOUNTER — PATIENT MESSAGE (OUTPATIENT)
Dept: NEUROSURGERY | Facility: CLINIC | Age: 52
End: 2022-10-26
Payer: COMMERCIAL

## 2022-10-26 ENCOUNTER — OFFICE VISIT (OUTPATIENT)
Dept: PLASTIC SURGERY | Facility: CLINIC | Age: 52
End: 2022-10-26
Payer: COMMERCIAL

## 2022-10-26 VITALS — OXYGEN SATURATION: 98 % | DIASTOLIC BLOOD PRESSURE: 77 MMHG | HEART RATE: 69 BPM | SYSTOLIC BLOOD PRESSURE: 171 MMHG

## 2022-10-26 VITALS
WEIGHT: 250 LBS | HEART RATE: 69 BPM | BODY MASS INDEX: 37.89 KG/M2 | HEIGHT: 68 IN | SYSTOLIC BLOOD PRESSURE: 171 MMHG | DIASTOLIC BLOOD PRESSURE: 77 MMHG

## 2022-10-26 DIAGNOSIS — R21 EXCORIATED RASH: ICD-10-CM

## 2022-10-26 DIAGNOSIS — M54.9 CHRONIC UPPER BACK PAIN: ICD-10-CM

## 2022-10-26 DIAGNOSIS — R59.9 ENLARGED LYMPH NODES: ICD-10-CM

## 2022-10-26 DIAGNOSIS — M54.2 CHRONIC NECK PAIN: Primary | ICD-10-CM

## 2022-10-26 DIAGNOSIS — N62 MACROMASTIA: ICD-10-CM

## 2022-10-26 DIAGNOSIS — G89.29 CHRONIC NECK PAIN: Primary | ICD-10-CM

## 2022-10-26 DIAGNOSIS — G89.29 CHRONIC UPPER BACK PAIN: ICD-10-CM

## 2022-10-26 PROCEDURE — 99212 OFFICE O/P EST SF 10 MIN: CPT | Mod: S$GLB,,, | Performed by: SURGERY

## 2022-10-26 PROCEDURE — 3077F SYST BP >= 140 MM HG: CPT | Mod: CPTII,S$GLB,, | Performed by: PHYSICIAN ASSISTANT

## 2022-10-26 PROCEDURE — 99999 PR PBB SHADOW E&M-EST. PATIENT-LVL V: ICD-10-PCS | Mod: PBBFAC,,, | Performed by: PHYSICIAN ASSISTANT

## 2022-10-26 PROCEDURE — 1160F RVW MEDS BY RX/DR IN RCRD: CPT | Mod: CPTII,S$GLB,, | Performed by: PHYSICIAN ASSISTANT

## 2022-10-26 PROCEDURE — 99999 PR PBB SHADOW E&M-EST. PATIENT-LVL III: CPT | Mod: PBBFAC,,, | Performed by: SURGERY

## 2022-10-26 PROCEDURE — 1159F PR MEDICATION LIST DOCUMENTED IN MEDICAL RECORD: ICD-10-PCS | Mod: CPTII,S$GLB,, | Performed by: PHYSICIAN ASSISTANT

## 2022-10-26 PROCEDURE — 99999 PR PBB SHADOW E&M-EST. PATIENT-LVL III: ICD-10-PCS | Mod: PBBFAC,,, | Performed by: SURGERY

## 2022-10-26 PROCEDURE — 1160F RVW MEDS BY RX/DR IN RCRD: CPT | Mod: CPTII,S$GLB,, | Performed by: SURGERY

## 2022-10-26 PROCEDURE — 3078F PR MOST RECENT DIASTOLIC BLOOD PRESSURE < 80 MM HG: ICD-10-PCS | Mod: CPTII,S$GLB,, | Performed by: SURGERY

## 2022-10-26 PROCEDURE — 3077F PR MOST RECENT SYSTOLIC BLOOD PRESSURE >= 140 MM HG: ICD-10-PCS | Mod: CPTII,S$GLB,, | Performed by: PHYSICIAN ASSISTANT

## 2022-10-26 PROCEDURE — 3078F PR MOST RECENT DIASTOLIC BLOOD PRESSURE < 80 MM HG: ICD-10-PCS | Mod: CPTII,S$GLB,, | Performed by: PHYSICIAN ASSISTANT

## 2022-10-26 PROCEDURE — 3078F DIAST BP <80 MM HG: CPT | Mod: CPTII,S$GLB,, | Performed by: PHYSICIAN ASSISTANT

## 2022-10-26 PROCEDURE — 3077F PR MOST RECENT SYSTOLIC BLOOD PRESSURE >= 140 MM HG: ICD-10-PCS | Mod: CPTII,S$GLB,, | Performed by: SURGERY

## 2022-10-26 PROCEDURE — 99215 PR OFFICE/OUTPT VISIT, EST, LEVL V, 40-54 MIN: ICD-10-PCS | Mod: S$GLB,,, | Performed by: PHYSICIAN ASSISTANT

## 2022-10-26 PROCEDURE — 99215 OFFICE O/P EST HI 40 MIN: CPT | Mod: S$GLB,,, | Performed by: PHYSICIAN ASSISTANT

## 2022-10-26 PROCEDURE — 99212 PR OFFICE/OUTPT VISIT, EST, LEVL II, 10-19 MIN: ICD-10-PCS | Mod: S$GLB,,, | Performed by: SURGERY

## 2022-10-26 PROCEDURE — 99999 PR PBB SHADOW E&M-EST. PATIENT-LVL V: CPT | Mod: PBBFAC,,, | Performed by: PHYSICIAN ASSISTANT

## 2022-10-26 PROCEDURE — 1159F MED LIST DOCD IN RCRD: CPT | Mod: CPTII,S$GLB,, | Performed by: PHYSICIAN ASSISTANT

## 2022-10-26 PROCEDURE — 1160F PR REVIEW ALL MEDS BY PRESCRIBER/CLIN PHARMACIST DOCUMENTED: ICD-10-PCS | Mod: CPTII,S$GLB,, | Performed by: SURGERY

## 2022-10-26 PROCEDURE — 3078F DIAST BP <80 MM HG: CPT | Mod: CPTII,S$GLB,, | Performed by: SURGERY

## 2022-10-26 PROCEDURE — 1159F PR MEDICATION LIST DOCUMENTED IN MEDICAL RECORD: ICD-10-PCS | Mod: CPTII,S$GLB,, | Performed by: SURGERY

## 2022-10-26 PROCEDURE — 3077F SYST BP >= 140 MM HG: CPT | Mod: CPTII,S$GLB,, | Performed by: SURGERY

## 2022-10-26 PROCEDURE — 1159F MED LIST DOCD IN RCRD: CPT | Mod: CPTII,S$GLB,, | Performed by: SURGERY

## 2022-10-26 PROCEDURE — 1160F PR REVIEW ALL MEDS BY PRESCRIBER/CLIN PHARMACIST DOCUMENTED: ICD-10-PCS | Mod: CPTII,S$GLB,, | Performed by: PHYSICIAN ASSISTANT

## 2022-10-26 NOTE — PROGRESS NOTES
History & Physical    SUBJECTIVE:   Chief complaint: large breasts    History of Present Illness:    Nan Betts presents to [facility] 2ND FLOOR on 10/26/2022 for evaluation for bilateral breast reduction secondary to symptomatic bilateral large pendulous breast. She has a chief complaint of chronic neck and back pain for 30years. She has tried tylenol, chiropractor, exercise, PT with or without alleviation of pain. She also complains of deep shoulder grooving from her bra straps as well as macerating rashes below each breast that have not significantly improved with application of medicated ointments and creams.  She currently reports a bra size of 44DD. She is unemployed. She denies smoking tobacco or the use of any nicotine containing products. Patient has extensive medical history including gastroparesis and gastric pacemaker. Grounding pads cannot be placed on left side has to be on right only so it does not shut down pacemaker. Patient has port and port needs to be used for blood draws, IVF and medications because she has poor peripheral vasculature. Patient is able to take hydrocodone, dilaudid, demerol and oxycodone despite allergy to morphine (may need benadryl for minor itching). Patient needs Phenergan 25 mg IV because she takes this dose at home.      Past Medical History:   Diagnosis Date    Abnormal Pap smear of vagina     Anxiety     Cataract     Chronic pain     TMJ and abdomen    Colon polyp     Epilepsy     as a child    Essential (primary) hypertension     Gastroparesis     Idiopathic    GERD (gastroesophageal reflux disease)     H/O abnormal cervical Papanicolaou smear     Hypercholesteremia 06/21/2016    Hyperlipidemia     IC (interstitial cystitis)     Internal hemorrhoids     Interstitial cystitis     Irritable bowel syndrome (IBS)     Irritable bowel syndrome with diarrhea 06/21/2016    Rapid heart rate     Spastic colon     TMJ (temporomandibular joint disorder)     TMJ  (temporomandibular joint syndrome) 06/21/2016       Past Surgical History:   Procedure Laterality Date    APPENDECTOMY      BREAST BIOPSY Bilateral     CATARACT EXTRACTION      CHOLECYSTECTOMY      COLONOSCOPY      COLONOSCOPY N/A 7/31/2018    Procedure: COLONOSCOPY;  Surgeon: Zack Lehman MD;  Location: [facility] ENDO (4TH FLR);  Service: Endoscopy;  Laterality: N/A;    CYSTOSCOPY N/A 8/25/2020    Procedure: CYSTOSCOPY;  Surgeon: Christal Khan MD;  Location: [facility] 1ST FLR;  Service: Urology;  Laterality: N/A;    ELBOW SURGERY      ENDOSCOPIC CARPAL TUNNEL RELEASE Right 10/7/2022    Procedure: RELEASE, CARPAL TUNNEL, ENDOSCOPIC;  Surgeon: William Laboy MD;  Location: [facility] OR;  Service: Orthopedics;  Laterality: Right;    ESOPHAGOGASTRODUODENOSCOPY N/A 7/31/2018    Procedure: EGD (ESOPHAGOGASTRODUODENOSCOPY);  Surgeon: Zack Lehman MD;  Location: [facility] ENDO (4TH FLR);  Service: Endoscopy;  Laterality: N/A;  RUQ gastric pacemaker    Left upper arm PICC line    PONV    ESOPHAGOGASTRODUODENOSCOPY N/A 9/3/2019    Procedure: EGD (ESOPHAGOGASTRODUODENOSCOPY);  Surgeon: Zack Lehman MD;  Location: [facility] (4TH FLR);  Service: Endoscopy;  Laterality: N/A;  history of gastoparesis, per change in protocol, pt instructed to do full liquid diet x 3 days prior to procedure and clear liquids x 1 day prior to procedure-BB  pt has gastric pacemaker, monitored by Dr. Lehman-BB  hx of epilepsy, last seizure during chi    ESOPHAGOGASTRODUODENOSCOPY N/A 9/10/2020    Procedure: ESOPHAGOGASTRODUODENOSCOPY (EGD);  Surgeon: Zack Lehman MD;  Location: [facility] ENDO (4TH FLR);  Service: Endoscopy;  Laterality: N/A;  3 days Full liquid diet/ 1 day Clear liquids  waiting for Pt to cb with date - ERW  Left upper arm -  PICC  COVID screening 9/7/20 [facility]- [facility]    ESOPHAGOGASTRODUODENOSCOPY N/A 11/3/2021    Procedure: EGD (ESOPHAGOGASTRODUODENOSCOPY);  Surgeon: Zack Lehman MD;  Location: [facility] ENDO (4TH FLR);  Service:  Endoscopy;  Laterality: N/A;  10/15 fully vaccinated as of 7/15/21; gastric pacemaker-pt does not have remote;  pt has a port does not want IV started; 3 days full liquid diet; instr. to portal-st    GASTRIC STIMULATOR IMPLANT SURGERY      left side on 2020    gastric stimulator placement      LASIK Bilateral     MANDIBLE SURGERY Bilateral 10/17/2016    MYELOGRAPHY N/A 2022    Procedure: Myelogram lumbar;  Surgeon: Malia Surgeon;  Location: [facility];  Service: Anesthesiology;  Laterality: N/A;    OOPHORECTOMY Bilateral     PYLOROPLASTY  2016    STOMACH SURGERY      gastric pacemaker    TEMPOROMANDIBULAR JOINT SURGERY  2016    TONSILLECTOMY      TOTAL ABDOMINAL HYSTERECTOMY      EUGENIA/BSO, Trachelectomy      UPPER GASTROINTESTINAL ENDOSCOPY      WRIST SURGERY         Family History   Problem Relation Age of Onset    Coronary artery disease Mother     Hodgkin's lymphoma Mother     Hypertension Mother     Coronary artery disease Father 43    Hypertension Father     Heart attacks under age 50 Father     Lymphoma Sister     Coronary artery disease Paternal Grandfather     Stroke Paternal Grandfather     Glaucoma Maternal Grandmother     Pancreatic cancer Paternal Aunt     Cancer Paternal Aunt         pancreatic    Stroke Maternal Grandfather     Colon cancer Neg Hx     Breast cancer Neg Hx     Ovarian cancer Neg Hx     Cervical cancer Neg Hx     Endometrial cancer Neg Hx     Vaginal cancer Neg Hx     Diabetes Neg Hx        Social History     Socioeconomic History    Marital status:    Tobacco Use    Smoking status: Former     Packs/day: 1.00     Years: 20.00     Pack years: 20.00     Types: Cigarettes     Start date: 1986     Quit date: 2022     Years since quittin.1    Smokeless tobacco: Never   Substance and Sexual Activity    Alcohol use: No     Alcohol/week: 0.0 standard drinks    Drug use: No    Sexual activity: Yes     Partners: Male     Comment:  since         Current Outpatient Medications   Medication Sig Dispense Refill    (Magic mouthwash) 1:1:1 diphenhydramine(Benadryl) 12.5mg/5ml liq, aluminum & magnesium hydroxide-simethicone (Maalox), LIDOcaine viscous 2% Swish and spit 5 mLs every 4 (four) hours as needed (mouth sores). for mouth sores 240 mL 3    acetaminophen (TYLENOL) 500 MG tablet Take 1 tablet (500 mg total) by mouth every 6 (six) hours as needed for Pain. 28 tablet 0    ALPRAZolam (XANAX) 1 MG tablet Take 1 mg by mouth 2 (two) times daily as needed.       busPIRone (BUSPAR) 5 MG Tab Take 5 mg by mouth 2 (two) times daily.      CREON 24,000-76,000 -120,000 unit capsule TAKE 1 CAPSULE BY MOUTH 3  TIMES DAILY WITH MEALS 500 capsule 3    DEXILANT 60 mg capsule TAKE 1 CAPSULE BY MOUTH  ONCE DAILY 90 capsule 3    diphenhydrAMINE (BENADRYL) 50 MG tablet Take 50 mg by mouth every 4 (four) hours as needed for Itching.      estradioL (ESTRACE) 2 MG tablet Take 1.5 tablets (3 mg total) by mouth once daily. 135 tablet 3    flavoxATE (URISPAS) 100 mg Tab 1 tablet      fluticasone propionate (FLONASE) 50 mcg/actuation nasal spray 1 spray by Each Nostril route once daily.      fremanezumab-vfrm (AJOVY AUTOINJECTOR) 225 mg/1.5 mL autoinjector Inject 1.5 mLs (225 mg total) into the skin every 28 days. 1 each 10    LIDOCAINE VISCOUS 2 % solution Take 5 mLs by mouth 4 (four) times daily as needed.      loperamide (IMODIUM) 2 mg capsule TAKE 2 CAPSULES BY MOUTH  TWICE DAILY AS NEEDED 360 capsule 1    meclizine (ANTIVERT) 25 mg tablet Take by mouth 3 (three) times daily as needed.      methocarbamol (ROBAXIN-750 ORAL) Take by mouth.      metoprolol succinate (TOPROL-XL) 25 MG 24 hr tablet       oxyCODONE-acetaminophen (PERCOCET) 5-325 mg per tablet Take 1 tablet by mouth every 8 (eight) hours as needed for Pain. 10 tablet 0    pantoprazole (PROTONIX) 40 mg injection Inject 40 mg into the vein 2 (two) times a day. 60 each 11    promethazine (PHENERGAN) 25 mg/mL injection  Inject 25 mg into the vein every 4 (four) hours.      simethicone (MYLICON) 125 mg Cap capsule Take 250 mg by mouth 2 (two) times a day.      simvastatin (ZOCOR) 40 MG tablet Take 40 mg by mouth every evening.       SODIUM CHLORIDE 0.45 % IV Inject 1,000 mLs into the vein as needed.       sodium chloride 0.9% SolP 100 mL with pantoprazole 40 mg SolR 40 mg Inject 40 mg into the vein every 12 (twelve) hours. 60 vial 0    sucralfate (CARAFATE) 1 gram tablet TAKE 1 TABLET BY MOUTH 4  TIMES DAILY BEFORE MEALS  AND AT NIGHT 360 tablet 3    tiZANidine (ZANAFLEX) 4 MG tablet Take 4 mg by mouth 3 (three) times daily.      triamcinolone acetonide 0.1% (KENALOG) 0.1 % ointment AAA hands bid - may occlude qhs 454 g 3     No current facility-administered medications for this visit.     Facility-Administered Medications Ordered in Other Visits   Medication Dose Route Frequency Provider Last Rate Last Admin    LIDOcaine (PF) 10 mg/ml (1%) injection 10 mg  1 mL Intradermal Once Wes Stahl MD        midazolam (VERSED) 1 mg/mL injection 0.5-4 mg  0.5-4 mg Intravenous PRN Wes Stahl MD   2 mg at 10/07/22 0754       Review of patient's allergies indicates:   Allergen Reactions    Ketorolac Anaphylaxis and Shortness Of Breath     Other reaction(s): Throat swelling, Unknown    Morphine Anaphylaxis    Tramadol Shortness Of Breath     Other reaction(s): Throat swelling, Unknown    Codeine Itching    Gabapentin Other (See Comments)     Causes suicidal thoughts        Ibuprofen Other (See Comments)     G L Bleeding        Nsaids (non-steroidal anti-inflammatory drug) Other (See Comments)     GI BLEEDING        Hydrocodone      Other reaction(s): Unknown    Hydrocodone-acetaminophen     Metoclopramide Other (See Comments)     Headaches and insomnia          Reglan [metoclopramide hcl] Other (See Comments)     Headaches and insomnia      Topiramate Other (See Comments)     Ulcers in the mouth and dry mouth  per pt, gets  ""fog brain"            Corticosteroids (glucocorticoids) Nausea And Vomiting and Other (See Comments)     Acid reflux  Acid reflux  Increases acid reflux    Prednisone Other (See Comments)     Increases acid reflux         Review of Systems:    Review of Systems   HENT: Positive for neck pain.    Musculoskeletal: Positive for back pain.   Neurological: Negative for headaches or dizziness      OBJECTIVE:     BP (!) 171/77   Pulse 69   SpO2 98%       Physical Exam:    Physical Exam   Constitutional: She is oriented to person, place, and time. She appears well-developed and well-nourished.   Neck: Normal range of motion. Neck supple. No tracheal deviation present.   Cardiovascular: Normal rate, regular rhythm and normal heart sounds.    Pulmonary/Chest: Effort normal and breath sounds normal. bilaterally enlarged breasts, evidence of previous rashes, shoulder grooving, no palpable masses, nipple everted  Abdominal: Soft. Bowel sounds are normal.   Musculoskeletal: Normal range of motion.   Neurological: She is alert and oriented to person, place, and time.   Skin: Skin is warm.       Last MMG 8/30/2022    ASSESSMENT/PLAN:     1.Symptomatic Bilateral Macromastia  2. Chronic neck pain  3. Chronic back pain  4. Chronic breast rashes    PLAN:Plan    -Will need 1000 gm reduction per side  -Will submit paper work for insurance approval  -Photos obtained  -Risk, benefits, and alternatives explained. She understands that the risks include but are not limited to bleeding, scarring, infection, pain, numbness, asymmetry, deformity, open wound, skin necrosis, wound dehiscence, permanent or temporary loss of sensation to the nipple, partial or total nipple loss requiring removal, poor cosmetic outcome, hematoma, seroma and pulmonary emobolus.   - Patient would like to proceed with scheduling bilateral breast reduction pending insurance authorization    All questions were answered. The patient was advised to call the clinic with " any questions or concerns prior to their next visit.

## 2022-10-26 NOTE — TELEPHONE ENCOUNTER
Called Imaging center in regards to CT V. Resident stated that there was great venous enhancement on CT. He feels if another scan is ordered it probably would not get any better imaging.

## 2022-10-26 NOTE — Clinical Note
Ayes Suarez,   This lady saw yall Wednesday for possible breast reduction. She saw me after to discuss a benign node of her right axilla. Patient is interested in removing node at time of surgery. Do you think that is something Estefani would be interested in doing or would she need to see one of breast surgeons and coordinate surgery with them for removal? Let me know!   Thank you and Happy Friday! Megan Saenz PA-C Breast Surgery

## 2022-10-27 ENCOUNTER — PATIENT MESSAGE (OUTPATIENT)
Dept: NEUROLOGY | Facility: CLINIC | Age: 52
End: 2022-10-27
Payer: COMMERCIAL

## 2022-10-27 ENCOUNTER — TELEPHONE (OUTPATIENT)
Dept: NEUROLOGY | Facility: CLINIC | Age: 52
End: 2022-10-27
Payer: COMMERCIAL

## 2022-10-27 ENCOUNTER — PATIENT MESSAGE (OUTPATIENT)
Dept: GASTROENTEROLOGY | Facility: CLINIC | Age: 52
End: 2022-10-27
Payer: COMMERCIAL

## 2022-10-27 NOTE — TELEPHONE ENCOUNTER
Called and spoke w pt about cancelling her 10/28 appt w Dr./ Jackson. I explained that Dr. Jackson has no more appointments available for the month of November and that his schedule has not been released yet for December. I stated that I could schedule her w Dr. West in January or we can set up a recall to remind pt to call and schedule appt at end of November when Dr. Jackson's schedule is released. Pt stated that she preferred to set up the recall. I set it up and pt verbalized understanding.

## 2022-10-28 ENCOUNTER — PATIENT MESSAGE (OUTPATIENT)
Dept: SURGERY | Facility: CLINIC | Age: 52
End: 2022-10-28
Payer: COMMERCIAL

## 2022-10-28 NOTE — PROGRESS NOTES
[facility]  Department of Surgery      REFERRING PROVIDER: Niesha Dunn Md  [address] [address]   CHIEF COMPLAINT: axilla lump      Subjective:      Nan Betts is a 52 y.o. premenopausal female referred for evaluation of a breast mass. Change was noted several years ago.  Patient does routinely do self breast exams.  Patient has noted a change on breast exam.  Patient denies nipple discharge. Patient denies to previous breast biopsy. Patient denies a personal history of breast cancer.    Patient seen prior to our visit today by Dr. Jara to plan bilateral breast reduction. She has had the palpable node of her right armpit evaluate with imaging with US/mammogram, BIRADS 2. Patient is concerned given her family history of lymphoma in mom and sister. Node has been present for 10+ years, but is tender and bothersome.     GYN History:  Age of menarche was 14.   Age of menopause was 35.      FAMILY history:  Mother: Hodgkin's lymphoma  Sister: lymphoma    Past Medical History:   Diagnosis Date    Abnormal Pap smear of vagina     Anxiety     Cataract     Chronic pain     TMJ and abdomen    Colon polyp     Epilepsy     as a child    Essential (primary) hypertension     Gastroparesis     Idiopathic    GERD (gastroesophageal reflux disease)     H/O abnormal cervical Papanicolaou smear     Hypercholesteremia 06/21/2016    Hyperlipidemia     IC (interstitial cystitis)     Internal hemorrhoids     Interstitial cystitis     Irritable bowel syndrome (IBS)     Irritable bowel syndrome with diarrhea 06/21/2016    Rapid heart rate     Spastic colon     TMJ (temporomandibular joint disorder)     TMJ (temporomandibular joint syndrome) 06/21/2016     Past Surgical History:   Procedure Laterality Date    APPENDECTOMY      BREAST BIOPSY Bilateral     CATARACT EXTRACTION      CHOLECYSTECTOMY      COLONOSCOPY      COLONOSCOPY N/A 7/31/2018    Procedure: COLONOSCOPY;  Surgeon: Zack BASS  MD Dominik;  Location: [facility] (4TH FLR);  Service: Endoscopy;  Laterality: N/A;    CYSTOSCOPY N/A 8/25/2020    Procedure: CYSTOSCOPY;  Surgeon: Christal Khan MD;  Location: [facility] 1ST FLR;  Service: Urology;  Laterality: N/A;    ELBOW SURGERY      ENDOSCOPIC CARPAL TUNNEL RELEASE Right 10/7/2022    Procedure: RELEASE, CARPAL TUNNEL, ENDOSCOPIC;  Surgeon: William Laboy MD;  Location: [facility];  Service: Orthopedics;  Laterality: Right;    ESOPHAGOGASTRODUODENOSCOPY N/A 7/31/2018    Procedure: EGD (ESOPHAGOGASTRODUODENOSCOPY);  Surgeon: Zack Lehman MD;  Location: [facility] (4TH FLR);  Service: Endoscopy;  Laterality: N/A;  RUQ gastric pacemaker    Left upper arm PICC line    PONV    ESOPHAGOGASTRODUODENOSCOPY N/A 9/3/2019    Procedure: EGD (ESOPHAGOGASTRODUODENOSCOPY);  Surgeon: Zack Lehman MD;  Location: [facility] (4TH FLR);  Service: Endoscopy;  Laterality: N/A;  history of gastoparesis, per change in protocol, pt instructed to do full liquid diet x 3 days prior to procedure and clear liquids x 1 day prior to procedure-BB  pt has gastric pacemaker, monitored by Dr. Lehman-BB  hx of epilepsy, last seizure during chi    ESOPHAGOGASTRODUODENOSCOPY N/A 9/10/2020    Procedure: ESOPHAGOGASTRODUODENOSCOPY (EGD);  Surgeon: Zack Lehman MD;  Location: [facility] (4TH FLR);  Service: Endoscopy;  Laterality: N/A;  3 days Full liquid diet/ 1 day Clear liquids  waiting for Pt to cb with date - ERW  Left upper arm -  PICC  COVID screening 9/7/20 [place] [facility]- ERW    ESOPHAGOGASTRODUODENOSCOPY N/A 11/3/2021    Procedure: EGD (ESOPHAGOGASTRODUODENOSCOPY);  Surgeon: Zakc Lehman MD;  Location: [facility] (4TH FLR);  Service: Endoscopy;  Laterality: N/A;  10/15 fully vaccinated as of 7/15/21; gastric pacemaker-pt does not have remote;  pt has a port does not want IV started; 3 days full liquid diet; instr. to portal-st    GASTRIC STIMULATOR IMPLANT SURGERY      left side on 03/30/2020    gastric stimulator  placement      LASIK Bilateral 2006    MANDIBLE SURGERY Bilateral 10/17/2016    MYELOGRAPHY N/A 8/22/2022    Procedure: Myelogram lumbar;  Surgeon: Malia Surgeon;  Location: [facility];  Service: Anesthesiology;  Laterality: N/A;    OOPHORECTOMY Bilateral     PYLOROPLASTY  03/2016    STOMACH SURGERY      gastric pacemaker    TEMPOROMANDIBULAR JOINT SURGERY  12/2016    TONSILLECTOMY      TOTAL ABDOMINAL HYSTERECTOMY  2005    EUGENIA/BSO, Trachelectomy  2012    UPPER GASTROINTESTINAL ENDOSCOPY      WRIST SURGERY       Current Outpatient Medications on File Prior to Visit   Medication Sig Dispense Refill    (Magic mouthwash) 1:1:1 diphenhydramine(Benadryl) 12.5mg/5ml liq, aluminum & magnesium hydroxide-simethicone (Maalox), LIDOcaine viscous 2% Swish and spit 5 mLs every 4 (four) hours as needed (mouth sores). for mouth sores 240 mL 3    acetaminophen (TYLENOL) 500 MG tablet Take 1 tablet (500 mg total) by mouth every 6 (six) hours as needed for Pain. 28 tablet 0    ALPRAZolam (XANAX) 1 MG tablet Take 1 mg by mouth 2 (two) times daily as needed.       busPIRone (BUSPAR) 5 MG Tab Take 5 mg by mouth 2 (two) times daily.      CREON 24,000-76,000 -120,000 unit capsule TAKE 1 CAPSULE BY MOUTH 3  TIMES DAILY WITH MEALS 500 capsule 3    DEXILANT 60 mg capsule TAKE 1 CAPSULE BY MOUTH  ONCE DAILY 90 capsule 3    diphenhydrAMINE (BENADRYL) 50 MG tablet Take 50 mg by mouth every 4 (four) hours as needed for Itching.      estradioL (ESTRACE) 2 MG tablet Take 1.5 tablets (3 mg total) by mouth once daily. 135 tablet 3    flavoxATE (URISPAS) 100 mg Tab 1 tablet      fluticasone propionate (FLONASE) 50 mcg/actuation nasal spray 1 spray by Each Nostril route once daily.      fremanezumab-vfrm (AJOVY AUTOINJECTOR) 225 mg/1.5 mL autoinjector Inject 1.5 mLs (225 mg total) into the skin every 28 days. 1 each 10    LIDOCAINE VISCOUS 2 % solution Take 5 mLs by mouth 4 (four) times daily as needed.      meclizine (ANTIVERT) 25 mg tablet Take by  mouth 3 (three) times daily as needed.      methocarbamol (ROBAXIN-750 ORAL) Take by mouth.      metoprolol succinate (TOPROL-XL) 25 MG 24 hr tablet       pantoprazole (PROTONIX) 40 mg injection Inject 40 mg into the vein 2 (two) times a day. 60 each 11    promethazine (PHENERGAN) 25 mg/mL injection Inject 25 mg into the vein every 4 (four) hours.      simvastatin (ZOCOR) 40 MG tablet Take 40 mg by mouth every evening.       SODIUM CHLORIDE 0.45 % IV Inject 1,000 mLs into the vein as needed.       sodium chloride 0.9% SolP 100 mL with pantoprazole 40 mg SolR 40 mg Inject 40 mg into the vein every 12 (twelve) hours. 60 vial 0    sucralfate (CARAFATE) 1 gram tablet TAKE 1 TABLET BY MOUTH 4  TIMES DAILY BEFORE MEALS  AND AT NIGHT 360 tablet 3    tiZANidine (ZANAFLEX) 4 MG tablet Take 4 mg by mouth 3 (three) times daily.      triamcinolone acetonide 0.1% (KENALOG) 0.1 % ointment AAA hands bid - may occlude qhs 454 g 3    loperamide (IMODIUM) 2 mg capsule TAKE 2 CAPSULES BY MOUTH  TWICE DAILY AS NEEDED 360 capsule 1    oxyCODONE-acetaminophen (PERCOCET) 5-325 mg per tablet Take 1 tablet by mouth every 8 (eight) hours as needed for Pain. 10 tablet 0    simethicone (MYLICON) 125 mg Cap capsule Take 250 mg by mouth 2 (two) times a day.       Current Facility-Administered Medications on File Prior to Visit   Medication Dose Route Frequency Provider Last Rate Last Admin    LIDOcaine (PF) 10 mg/ml (1%) injection 10 mg  1 mL Intradermal Once Wes Stahl MD        midazolam (VERSED) 1 mg/mL injection 0.5-4 mg  0.5-4 mg Intravenous PRN Wes Stahl MD   2 mg at 10/07/22 0754     Social History     Socioeconomic History    Marital status:    Tobacco Use    Smoking status: Former     Packs/day: 1.00     Years: 20.00     Pack years: 20.00     Types: Cigarettes     Start date: 1986     Quit date: 2022     Years since quittin.1    Smokeless tobacco: Never   Substance and Sexual Activity    Alcohol  "use: No     Alcohol/week: 0.0 standard drinks    Drug use: No    Sexual activity: Yes     Partners: Male     Comment:  since 2012     Family History   Problem Relation Age of Onset    Coronary artery disease Mother     Hodgkin's lymphoma Mother     Hypertension Mother     Coronary artery disease Father 43    Hypertension Father     Heart attacks under age 50 Father     Lymphoma Sister     Coronary artery disease Paternal Grandfather     Stroke Paternal Grandfather     Glaucoma Maternal Grandmother     Pancreatic cancer Paternal Aunt     Cancer Paternal Aunt         pancreatic    Stroke Maternal Grandfather     Colon cancer Neg Hx     Breast cancer Neg Hx     Ovarian cancer Neg Hx     Cervical cancer Neg Hx     Endometrial cancer Neg Hx     Vaginal cancer Neg Hx     Diabetes Neg Hx        Review of Systems  Review of Systems   Constitutional:  Negative for chills, fever and malaise/fatigue.   HENT:  Negative for congestion.    Eyes:  Negative for discharge.   Respiratory:  Negative for cough, shortness of breath and stridor.    Cardiovascular:  Negative for chest pain and palpitations.   Gastrointestinal:  Negative for abdominal pain and nausea.   Neurological:  Negative for headaches.   Psychiatric/Behavioral:  The patient is not nervous/anxious.       Objective:        BP (!) 171/77 (BP Location: Right arm, Patient Position: Sitting, BP Method: Large (Automatic))   Pulse 69   Ht 5' 8" (1.727 m)   Wt 113.4 kg (250 lb)   BMI 38.01 kg/m²   Physical Exam   Vitals reviewed.  Constitutional: She is oriented to person, place, and time.   HENT:   Head: Normocephalic and atraumatic.   Nose: Nose normal.   Eyes: Pupils are equal, round, and reactive to light. Right eye exhibits no discharge. Left eye exhibits no discharge.   Pulmonary/Chest: Effort normal and breath sounds normal. No stridor. No respiratory distress. She exhibits no mass, no tenderness and no edema. Right breast exhibits no inverted nipple, no " mass, no nipple discharge, no skin change and no tenderness. Left breast exhibits no inverted nipple, no mass, no nipple discharge, no skin change and no tenderness. No breast swelling or bleeding. Breasts are symmetrical.       Abdominal: Normal appearance.   Genitourinary: No breast swelling or bleeding.   Neurological: She is alert and oriented to person, place, and time.   Skin: Skin is warm and dry.     Psychiatric: Her behavior is normal. Mood, judgment and thought content normal.      Radiology review: Images personally reviewed by me in the clinic.    8/30/22 Bilateral Diagnostic Mammo/US Right:    Findings:  The breasts have scattered areas of fibroglandular density.      Mammo Digital Diagnostic Bilat with Juancarlos  Right  Lymph Node: There is a 12 mm intramammary lymph node seen in the axillary tail region of the right breast in the posterior depth. The lymph node correlates with the palpable mass and tenderness reported by the patient. Compared to the previous study, there are no significant changes.      Left  There is no evidence of suspicious masses, calcifications, or other abnormal findings in the left breast.     US Breast Right Limited  Right  Lymph Node: There is a 15 mm x 8 mm x 11 mm intramammary lymph node with circumscribed margins seen in the axillary tail region of the right breast in the posterior depth. The lymph node correlates with the palpable mass and tenderness noted during physical examination. The lymph node correlates with the lymph node seen on the mammogram.      Impression:  Bilateral  There is no mammographic or sonographic evidence of malignancy.     BI-RADS Category:   Overall: 2 - Benign     Recommendation:  Routine screening mammogram in 1 year is recommended.      Assessment:      Nan Betts is a 52 y.o. premenopausal female with chronically palpable right axilla, benign on recent imaging.      Plan:   Patient planning bilateral breast reduction with Dr. Jara.  Chronically palpable node of the right axilla present for 10 + years but bothersome. Patient interested in possible excision at time of her upcoming surgery.     Will reach out to [facility]'s staff regarding whether her would perform this at time of surgery or if he would prefer she be seen by breast surgeon prior to coordinate a surgical date for them to excise area.     Will reach out to patient with update.     Patient verbalized understanding of plan. All questions asked and answered. Advised to call our office with any new or worsening sxs.       Total time spent with the patient: 45.  30 minutes of face to face consultation and 15 minutes of chart review and coordination of care.    Megan Saenz PA-C  Breast Surgery

## 2022-11-02 ENCOUNTER — PATIENT MESSAGE (OUTPATIENT)
Dept: GASTROENTEROLOGY | Facility: CLINIC | Age: 52
End: 2022-11-02
Payer: COMMERCIAL

## 2022-11-07 ENCOUNTER — PATIENT MESSAGE (OUTPATIENT)
Dept: SURGERY | Facility: CLINIC | Age: 52
End: 2022-11-07
Payer: COMMERCIAL

## 2022-11-07 ENCOUNTER — PATIENT MESSAGE (OUTPATIENT)
Dept: PLASTIC SURGERY | Facility: CLINIC | Age: 52
End: 2022-11-07
Payer: COMMERCIAL

## 2022-11-09 NOTE — TELEPHONE ENCOUNTER
I spoke to him and updated him on her case   Estlander Flap (Upper To Lower Lip) Text: The defect of the lower lip was assessed and measured.  Given the location and size of the defect, an Estlander flap was deemed most appropriate.  Using a sterile surgical marker, an appropriate Estlander flap was measured and drawn on the upper lip. Local anesthesia was then infiltrated. A scalpel was then used to incise the lateral aspect of the flap, through skin, muscle and mucosa, leaving the flap pedicled medially.  The flap was then rotated and positioned to fill the lower lip defect.  The flap was then sutured into place with a three layer technique, closing the orbicularis oris muscle layer with subcutaneous buried sutures, followed by a mucosal layer and an epidermal layer.

## 2022-11-10 DIAGNOSIS — D36.0 BENIGN NEOPLASM OF LATERAL AXILLARY LYMPH NODE: Primary | ICD-10-CM

## 2022-11-11 ENCOUNTER — TELEPHONE (OUTPATIENT)
Dept: SURGERY | Facility: CLINIC | Age: 52
End: 2022-11-11
Payer: COMMERCIAL

## 2022-11-11 ENCOUNTER — PATIENT MESSAGE (OUTPATIENT)
Dept: PLASTIC SURGERY | Facility: CLINIC | Age: 52
End: 2022-11-11
Payer: COMMERCIAL

## 2022-11-11 NOTE — TELEPHONE ENCOUNTER
----- Message from Nikkie Mojica sent at 11/10/2022  8:14 AM CST -----  Regarding: Referral  Good morning,     Dr Jara saw Ms Betts a few weeks for consultation for callie breast reduction.  Pt discussed the presence of axillary nodes that need to be removed.  Dr Jara would like Dr Ghosh to see her to discuss and coordinate surgery at a later date.     Please contact her to schedule.     Thank you

## 2022-11-14 ENCOUNTER — PATIENT MESSAGE (OUTPATIENT)
Dept: ORTHOPEDICS | Facility: CLINIC | Age: 52
End: 2022-11-14
Payer: COMMERCIAL

## 2022-11-14 ENCOUNTER — TELEPHONE (OUTPATIENT)
Dept: PLASTIC SURGERY | Facility: CLINIC | Age: 52
End: 2022-11-14
Payer: COMMERCIAL

## 2022-11-14 ENCOUNTER — PATIENT MESSAGE (OUTPATIENT)
Dept: GASTROENTEROLOGY | Facility: CLINIC | Age: 52
End: 2022-11-14
Payer: COMMERCIAL

## 2022-11-14 DIAGNOSIS — N62 MACROMASTIA: Primary | ICD-10-CM

## 2022-11-14 DIAGNOSIS — M54.2 CHRONIC NECK PAIN: ICD-10-CM

## 2022-11-14 DIAGNOSIS — G89.29 CHRONIC NECK PAIN: ICD-10-CM

## 2022-11-15 ENCOUNTER — PATIENT MESSAGE (OUTPATIENT)
Dept: NEUROLOGY | Facility: CLINIC | Age: 52
End: 2022-11-15
Payer: COMMERCIAL

## 2022-11-16 ENCOUNTER — PATIENT MESSAGE (OUTPATIENT)
Dept: SURGERY | Facility: HOSPITAL | Age: 52
End: 2022-11-16
Payer: COMMERCIAL

## 2022-11-16 ENCOUNTER — PATIENT MESSAGE (OUTPATIENT)
Dept: SURGERY | Facility: CLINIC | Age: 52
End: 2022-11-16
Payer: COMMERCIAL

## 2022-11-17 ENCOUNTER — OFFICE VISIT (OUTPATIENT)
Dept: ORTHOPEDICS | Facility: CLINIC | Age: 52
End: 2022-11-17
Payer: COMMERCIAL

## 2022-11-17 DIAGNOSIS — Z98.890 POSTOPERATIVE STATE: ICD-10-CM

## 2022-11-17 DIAGNOSIS — G56.01 CARPAL TUNNEL SYNDROME OF RIGHT WRIST: Primary | ICD-10-CM

## 2022-11-17 PROCEDURE — 99024 POSTOP FOLLOW-UP VISIT: CPT | Mod: S$GLB,,, | Performed by: PHYSICIAN ASSISTANT

## 2022-11-17 PROCEDURE — 1159F MED LIST DOCD IN RCRD: CPT | Mod: CPTII,S$GLB,, | Performed by: PHYSICIAN ASSISTANT

## 2022-11-17 PROCEDURE — 99999 PR PBB SHADOW E&M-EST. PATIENT-LVL III: ICD-10-PCS | Mod: PBBFAC,,, | Performed by: PHYSICIAN ASSISTANT

## 2022-11-17 PROCEDURE — 1159F PR MEDICATION LIST DOCUMENTED IN MEDICAL RECORD: ICD-10-PCS | Mod: CPTII,S$GLB,, | Performed by: PHYSICIAN ASSISTANT

## 2022-11-17 PROCEDURE — 99024 PR POST-OP FOLLOW-UP VISIT: ICD-10-PCS | Mod: S$GLB,,, | Performed by: PHYSICIAN ASSISTANT

## 2022-11-17 PROCEDURE — 99999 PR PBB SHADOW E&M-EST. PATIENT-LVL III: CPT | Mod: PBBFAC,,, | Performed by: PHYSICIAN ASSISTANT

## 2022-11-17 NOTE — PROGRESS NOTES
"Dr. Laboy is the supervising physician for this encounter/patient    Nan Betts presents for post-operative evaluation.  The patient is now 6 weeks s/p Right endoscopic carpal tunnel release with Dr. Laboy on 10/7/22.  Overall the patient reports doing well.  She does reports 2/10 pain, but this will increase by the end of the day, burning pain to the palm and incision. She tells me that the incision with become very red and "painful bumps" to the incision by the end of the day. She is scheduled to see Dr. Laboy on 11/29/22, but this appt was scheduled because she sent a message stating that her PCM thought she had a hematoma forming. She does continue to get numbness into her long and ring fingers. She declines OT postoperatively.    PE:    AA&O x 4.  NAD  HEENT:  NCAT, sclera nonicteric  Lungs:  Respirations are equal and unlabored.  CV:  2+ bilateral upper and lower extremity pulses.  MSK: The wound is well healed without any signs of infection.  No notable edema present. No ecchymosis or skin discoloration. Full hand/wrist motion present. SILT. DNVI.    A/P: Status post above, doing well  1) NO concern for hematoma or infection. None of what she complains about is present on exam today. I do explain that she could have swelling in the wrist that occurs after activity that causes the increased burning sensation at the end of the day. Unfortunately, she is unable to take NSAIDs or gabapentin. I do recommend she continue with ice/heat and consider wearing her carpal tunnel brace with activity.  2) I also strong encourage occupational therapy. She will find a location in her area and get the fax number for a referral.  3) Keep scheduled appt with Dr. Laboy to discuss this issue further.   4) Call with any questions/concerns in the interim      Jamie Russo-DiGeorge PA-C        "

## 2022-11-18 ENCOUNTER — PATIENT MESSAGE (OUTPATIENT)
Dept: NEUROLOGY | Facility: CLINIC | Age: 52
End: 2022-11-18
Payer: COMMERCIAL

## 2022-11-18 ENCOUNTER — TELEPHONE (OUTPATIENT)
Dept: NEUROSURGERY | Facility: CLINIC | Age: 52
End: 2022-11-18
Payer: COMMERCIAL

## 2022-11-18 NOTE — TELEPHONE ENCOUNTER
Pt called and stated she called [employer] about her gastric pacemaker being MRI incompatible. She stated she was told to have Dr. Valiente call and speak with a [employer] representative. [phone] I called and it was confirmed her her pacemaker is not compatible for MRI imaging. Her model number is [id number], serial # [id number] implanted on 03/30/2020. I spoke with Jazz. She stated there is nothing that can be done to make it MRI compatible.

## 2022-11-29 ENCOUNTER — PATIENT MESSAGE (OUTPATIENT)
Dept: NEUROSURGERY | Facility: CLINIC | Age: 52
End: 2022-11-29
Payer: COMMERCIAL

## 2022-11-29 ENCOUNTER — PATIENT MESSAGE (OUTPATIENT)
Dept: NEUROLOGY | Facility: CLINIC | Age: 52
End: 2022-11-29

## 2022-11-29 ENCOUNTER — OFFICE VISIT (OUTPATIENT)
Dept: ORTHOPEDICS | Facility: CLINIC | Age: 52
End: 2022-11-29
Payer: COMMERCIAL

## 2022-11-29 ENCOUNTER — PATIENT MESSAGE (OUTPATIENT)
Dept: GASTROENTEROLOGY | Facility: CLINIC | Age: 52
End: 2022-11-29
Payer: COMMERCIAL

## 2022-11-29 ENCOUNTER — OFFICE VISIT (OUTPATIENT)
Dept: NEUROLOGY | Facility: CLINIC | Age: 52
End: 2022-11-29
Payer: COMMERCIAL

## 2022-11-29 VITALS — WEIGHT: 250 LBS | BODY MASS INDEX: 37.89 KG/M2 | HEIGHT: 68 IN

## 2022-11-29 VITALS
BODY MASS INDEX: 37.89 KG/M2 | DIASTOLIC BLOOD PRESSURE: 82 MMHG | HEIGHT: 68 IN | WEIGHT: 250 LBS | HEART RATE: 70 BPM | SYSTOLIC BLOOD PRESSURE: 143 MMHG

## 2022-11-29 DIAGNOSIS — G56.01 CARPAL TUNNEL SYNDROME OF RIGHT WRIST: Primary | ICD-10-CM

## 2022-11-29 DIAGNOSIS — R42 VERTIGO: Primary | ICD-10-CM

## 2022-11-29 DIAGNOSIS — R20.2 PARESTHESIAS: ICD-10-CM

## 2022-11-29 PROCEDURE — 3008F BODY MASS INDEX DOCD: CPT | Mod: CPTII,S$GLB,, | Performed by: ORTHOPAEDIC SURGERY

## 2022-11-29 PROCEDURE — 3008F PR BODY MASS INDEX (BMI) DOCUMENTED: ICD-10-PCS | Mod: CPTII,S$GLB,, | Performed by: ORTHOPAEDIC SURGERY

## 2022-11-29 PROCEDURE — 99999 PR PBB SHADOW E&M-EST. PATIENT-LVL III: ICD-10-PCS | Mod: PBBFAC,,, | Performed by: PSYCHIATRY & NEUROLOGY

## 2022-11-29 PROCEDURE — 99215 PR OFFICE/OUTPT VISIT, EST, LEVL V, 40-54 MIN: ICD-10-PCS | Mod: S$GLB,,, | Performed by: PSYCHIATRY & NEUROLOGY

## 2022-11-29 PROCEDURE — 3077F SYST BP >= 140 MM HG: CPT | Mod: CPTII,S$GLB,, | Performed by: PSYCHIATRY & NEUROLOGY

## 2022-11-29 PROCEDURE — 1159F MED LIST DOCD IN RCRD: CPT | Mod: CPTII,S$GLB,, | Performed by: ORTHOPAEDIC SURGERY

## 2022-11-29 PROCEDURE — 3079F PR MOST RECENT DIASTOLIC BLOOD PRESSURE 80-89 MM HG: ICD-10-PCS | Mod: CPTII,S$GLB,, | Performed by: PSYCHIATRY & NEUROLOGY

## 2022-11-29 PROCEDURE — 99215 OFFICE O/P EST HI 40 MIN: CPT | Mod: S$GLB,,, | Performed by: PSYCHIATRY & NEUROLOGY

## 2022-11-29 PROCEDURE — 99999 PR PBB SHADOW E&M-EST. PATIENT-LVL III: ICD-10-PCS | Mod: PBBFAC,,, | Performed by: ORTHOPAEDIC SURGERY

## 2022-11-29 PROCEDURE — 99024 POSTOP FOLLOW-UP VISIT: CPT | Mod: S$GLB,,, | Performed by: ORTHOPAEDIC SURGERY

## 2022-11-29 PROCEDURE — 3079F DIAST BP 80-89 MM HG: CPT | Mod: CPTII,S$GLB,, | Performed by: PSYCHIATRY & NEUROLOGY

## 2022-11-29 PROCEDURE — 1159F PR MEDICATION LIST DOCUMENTED IN MEDICAL RECORD: ICD-10-PCS | Mod: CPTII,S$GLB,, | Performed by: ORTHOPAEDIC SURGERY

## 2022-11-29 PROCEDURE — 3008F PR BODY MASS INDEX (BMI) DOCUMENTED: ICD-10-PCS | Mod: CPTII,S$GLB,, | Performed by: PSYCHIATRY & NEUROLOGY

## 2022-11-29 PROCEDURE — 99024 PR POST-OP FOLLOW-UP VISIT: ICD-10-PCS | Mod: S$GLB,,, | Performed by: ORTHOPAEDIC SURGERY

## 2022-11-29 PROCEDURE — 3077F PR MOST RECENT SYSTOLIC BLOOD PRESSURE >= 140 MM HG: ICD-10-PCS | Mod: CPTII,S$GLB,, | Performed by: PSYCHIATRY & NEUROLOGY

## 2022-11-29 PROCEDURE — 3008F BODY MASS INDEX DOCD: CPT | Mod: CPTII,S$GLB,, | Performed by: PSYCHIATRY & NEUROLOGY

## 2022-11-29 PROCEDURE — 99999 PR PBB SHADOW E&M-EST. PATIENT-LVL III: CPT | Mod: PBBFAC,,, | Performed by: PSYCHIATRY & NEUROLOGY

## 2022-11-29 PROCEDURE — 99999 PR PBB SHADOW E&M-EST. PATIENT-LVL III: CPT | Mod: PBBFAC,,, | Performed by: ORTHOPAEDIC SURGERY

## 2022-11-29 NOTE — PROGRESS NOTES
Nan Betts presents for post-operative evaluation.  The patient is now 7 weeks s/p Right endoscopic carpal tunnel release.  She notes that her median nerve symptoms in the right hand have improved, most notably to the thumb and index finger where her numbness is much better than before surgery.  Of note she is status post bilateral cubital tunnel decompressions with an additional left-sided revision cubital tunnel decompression by surgeon [place].  She unfortunately has persistent ulnar nerve symptoms but again notes that her median nerve symptoms have improved in the right hand status post her carpal tunnel release.  She also notes that she has neuropathy in her legs and is falling.  She has seen spine surgery and is seeing Neurology today for workup over this.  No other complaints today.  PE:    AA&O x 4.  NAD  HEENT:  NCAT, sclera nonicteric  Lungs:  Respirations are equal and unlabored.  CV:  2+ bilateral upper and lower extremity pulses.  MSK: The wound is healing well with no signs of erythema or warmth.  There is no drainage.  No clinical signs or symptoms of infection are present.    Right upper extremity neurovascular intact to the median nerve with good thenar tone with palmar abduction.  Difficulty opposing the small fingers bilaterally, similar to preop.    A/P: Status post above, doing well  1) Continue with   Full weight bearing.  Discussed additional treatment options moving forward.  At this point in time her carpal tunnel release was a success as her symptoms have improved and she has no new symptoms.  She does have persistent ulnar nerve symptoms but is status post bilateral cubital tunnel releases with a revision left ulnar nerve decompression at the elbow by another surgeon.  EMG did not demonstrate any focal ulnar neuropathy.  I would recommend observation at this time.  Follow-up on as needed/ if needed basis    Please be aware that this note has been generated with the assistance of  MModal voice-to-text.  Please excuse any spelling or grammatical errors.

## 2022-11-29 NOTE — PROGRESS NOTES
"Outpatient Neurology Consultation    Requesting physician: Dr. Chambers    Impression:  Paresthesias of uncertain etiology:  small fiber neuropathy is in the differential  Chronic, episodic vertigo:  etiology is unclear.  No associated symptoms to suggest Meniere's.  BPPV and migrainous unlikely. TIAs also felt unlikely.  Will obtain tilt for e/o POTS.  Will consider hyperventilation at f/u.  Mild R CTS, s/p release  Hx B ulnar transposition surgery  Chronic migraine improved to episodic  Cervical myofascial pain  Childhood "epilepsy" (starring but no further details)    Plan:  Skin biopsy (CARLOZ Lenz)  Tilt table test (r/o POTS)  B12  F/U with CARLOZ Dillon re headaches  Consider hyperventilation at f/u if above unremarkable.  If negative, consider audiogram for atypical Meniere's explaining vertigo.  I will arrange f/u after the above      Problem List Items Addressed This Visit    None  Visit Diagnoses       Vertigo    -  Primary    Relevant Orders    Tilt table    Paresthesias        Relevant Orders    Ambulatory consult to Neurology            CC:  vertigo, numbness, headaches.    HPI:  53 y/o WF referred by Dr. Chambers for multiple neurological symptoms.     She was seen by Dr. Chambers for "paresthesias."    She had an LP (OP 35 but prone) which helped headaches.  CT myelogram showed mild spondylosis only.     Current complaints are "dizzy spells" and "numbness"    Dizzy:  vertigo x over 2 years.  Episodic and frequency has increased from every few days to daily.  Duration 15-30 mins.  No tinnitus.  No hearing loss; in fact, she reports hyperaucusis.    No association with headaches.  No trigger/alleviating factors.        Numbness:    Started in her feet over 15 yrs ago only when sitting on the floor. Over the last 2 years, numbness involved the whole legs while sitting.  Both UEs get intermittently numb.  S/P B ulnar transposition.    Intermittent lightheadedness. + gastroparesis. No constipation. No dry eyes " nor dry mouth.  Nl perspiration.  Bladder spasms/interstitial cystitis.  Feels like she is having incomplete emptying.   Occasional palpitations.  Metoprolol has helped with BP and palpitations.    Dr. Chambers recommended CTV    She had a nl EMG in Apr and a more recent EMG showing mild R CTS.  She is s/p release.   Past Medical History:   Diagnosis Date    Abnormal Pap smear of vagina     Anxiety     Cataract     Chronic pain     TMJ and abdomen    Colon polyp     Epilepsy     as a child    Essential (primary) hypertension     Gastroparesis     Idiopathic    GERD (gastroesophageal reflux disease)     H/O abnormal cervical Papanicolaou smear     Hypercholesteremia 06/21/2016    Hyperlipidemia     IC (interstitial cystitis)     Internal hemorrhoids     Interstitial cystitis     Irritable bowel syndrome (IBS)     Irritable bowel syndrome with diarrhea 06/21/2016    Rapid heart rate     Spastic colon     TMJ (temporomandibular joint disorder)     TMJ (temporomandibular joint syndrome) 06/21/2016      Past Surgical History:   Procedure Laterality Date    APPENDECTOMY      BREAST BIOPSY Bilateral     CATARACT EXTRACTION      CHOLECYSTECTOMY      COLONOSCOPY      COLONOSCOPY N/A 7/31/2018    Procedure: COLONOSCOPY;  Surgeon: Zack Lehman MD;  Location: [facility] (4TH FLR);  Service: Endoscopy;  Laterality: N/A;    CYSTOSCOPY N/A 8/25/2020    Procedure: CYSTOSCOPY;  Surgeon: Christal Khan MD;  [address]  Service: Urology;  Laterality: N/A;    ELBOW SURGERY      ENDOSCOPIC CARPAL TUNNEL RELEASE Right 10/7/2022    Procedure: RELEASE, CARPAL TUNNEL, ENDOSCOPIC;  Surgeon: William Laboy MD;  Location: [facility];  Service: Orthopedics;  Laterality: Right;    ESOPHAGOGASTRODUODENOSCOPY N/A 7/31/2018    Procedure: EGD (ESOPHAGOGASTRODUODENOSCOPY);  Surgeon: Zack Lehman MD;  Location: [facility] (4TH FLR);  Service: Endoscopy;  Laterality: N/A;  RUQ gastric pacemaker    Left upper arm PICC line    PONV     ESOPHAGOGASTRODUODENOSCOPY N/A 9/3/2019    Procedure: EGD (ESOPHAGOGASTRODUODENOSCOPY);  Surgeon: Zack Lehman MD;  Location: [facility] (4TH FLR);  Service: Endoscopy;  Laterality: N/A;  history of gastoparesis, per change in protocol, pt instructed to do full liquid diet x 3 days prior to procedure and clear liquids x 1 day prior to procedure-BB  pt has gastric pacemaker, monitored by Dr. Lehman-YASMANY  hx of epilepsy, last seizure during chi    ESOPHAGOGASTRODUODENOSCOPY N/A 9/10/2020    Procedure: ESOPHAGOGASTRODUODENOSCOPY (EGD);  Surgeon: Zack Lehman MD;  Location: [facility] (4TH FLR);  Service: Endoscopy;  Laterality: N/A;  3 days Full liquid diet/ 1 day Clear liquids  waiting for Pt to cb with date - [facility]  Left upper arm -  PICC  COVID screening 9/7/20 [facility]- [facility]    ESOPHAGOGASTRODUODENOSCOPY N/A 11/3/2021    Procedure: EGD (ESOPHAGOGASTRODUODENOSCOPY);  Surgeon: Zack Lehman MD;  Location: [facility] (4TH FLR);  Service: Endoscopy;  Laterality: N/A;  10/15 fully vaccinated as of 7/15/21; gastric pacemaker-pt does not have remote;  pt has a port does not want IV started; 3 days full liquid diet; instr. to portal-st    GASTRIC STIMULATOR IMPLANT SURGERY      left side on 03/30/2020    gastric stimulator placement      LASIK Bilateral 2006    MANDIBLE SURGERY Bilateral 10/17/2016    MYELOGRAPHY N/A 8/22/2022    Procedure: Myelogram lumbar;  Surgeon: Malia Surgeon;  Location: [facility];  Service: Anesthesiology;  Laterality: N/A;    OOPHORECTOMY Bilateral     PYLOROPLASTY  03/2016    STOMACH SURGERY      gastric pacemaker    TEMPOROMANDIBULAR JOINT SURGERY  12/2016    TONSILLECTOMY      TOTAL ABDOMINAL HYSTERECTOMY  2005    EUGENIA/BSO, Trachelectomy  2012    UPPER GASTROINTESTINAL ENDOSCOPY      WRIST SURGERY        Outpatient Medications Marked as Taking for the 11/29/22 encounter (Office Visit) with Amor West MD   Medication Sig Dispense Refill    (Magic mouthwash) 1:1:1  diphenhydrAMINE(Benadryl) 12.5mg/5ml liq, aluminum & magnesium hydroxide-simethicone (Maalox), LIDOcaine viscous 2% Swish and spit 5 mLs every 4 (four) hours as needed (mouth sores). for mouth sores 240 mL 3    acetaminophen (TYLENOL) 500 MG tablet Take 1 tablet (500 mg total) by mouth every 6 (six) hours as needed for Pain. 28 tablet 0    ALPRAZolam (XANAX) 1 MG tablet Take 1 mg by mouth 2 (two) times daily as needed.       busPIRone (BUSPAR) 5 MG Tab Take 5 mg by mouth 2 (two) times daily.      CREON 24,000-76,000 -120,000 unit capsule TAKE 1 CAPSULE BY MOUTH 3  TIMES DAILY WITH MEALS 500 capsule 3    DEXILANT 60 mg capsule TAKE 1 CAPSULE BY MOUTH  ONCE DAILY 90 capsule 3    diphenhydrAMINE (BENADRYL) 50 MG tablet Take 50 mg by mouth every 4 (four) hours as needed for Itching.      estradioL (ESTRACE) 2 MG tablet Take 1.5 tablets (3 mg total) by mouth once daily. 135 tablet 3    flavoxATE (URISPAS) 100 mg Tab 1 tablet      fluticasone propionate (FLONASE) 50 mcg/actuation nasal spray 1 spray by Each Nostril route once daily.      fremanezumab-vfrm (AJOVY AUTOINJECTOR) 225 mg/1.5 mL autoinjector Inject 1.5 mLs (225 mg total) into the skin every 28 days. 1 each 10    LIDOcaine HCl 2% (LIDOCAINE VISCOUS) 2 % Soln SWISH AND SPIT 5 MLS BY MOUTH FOUR TIMES DAILY AS NEEDED FOR MOUTH SORES 80 mL 0    loperamide (IMODIUM) 2 mg capsule TAKE 2 CAPSULES BY MOUTH  TWICE DAILY AS NEEDED 360 capsule 1    meclizine (ANTIVERT) 25 mg tablet Take by mouth 3 (three) times daily as needed.      methocarbamol (ROBAXIN-750 ORAL) Take by mouth.      metoprolol succinate (TOPROL-XL) 25 MG 24 hr tablet       pantoprazole (PROTONIX) 40 mg injection Inject 40 mg into the vein 2 (two) times a day. 60 each 11    promethazine (PHENERGAN) 25 mg/mL injection Inject 25 mg into the vein every 4 (four) hours.      simvastatin (ZOCOR) 40 MG tablet Take 40 mg by mouth every evening.       SODIUM CHLORIDE 0.45 % IV Inject 1,000 mLs into the vein as  "needed.       sodium chloride 0.9% SolP 100 mL with pantoprazole 40 mg SolR 40 mg Inject 40 mg into the vein every 12 (twelve) hours. 60 vial 0    sucralfate (CARAFATE) 1 gram tablet TAKE 1 TABLET BY MOUTH 4  TIMES DAILY BEFORE MEALS  AND AT NIGHT 360 tablet 3    tiZANidine (ZANAFLEX) 4 MG tablet Take 4 mg by mouth 3 (three) times daily.      triamcinolone acetonide 0.1% (KENALOG) 0.1 % ointment AAA hands bid - may occlude qhs 454 g 3      Review of patient's allergies indicates:   Allergen Reactions    Ketorolac Anaphylaxis and Shortness Of Breath     Other reaction(s): Throat swelling, Unknown    Morphine Anaphylaxis    Tramadol Shortness Of Breath     Other reaction(s): Throat swelling, Unknown    Codeine Itching    Gabapentin Other (See Comments)     Causes suicidal thoughts        Ibuprofen Other (See Comments)     G L Bleeding        Nsaids (non-steroidal anti-inflammatory drug) Other (See Comments)     GI BLEEDING        Hydrocodone      Other reaction(s): Unknown    Hydrocodone-acetaminophen     Metoclopramide Other (See Comments)     Headaches and insomnia          Reglan [metoclopramide hcl] Other (See Comments)     Headaches and insomnia      Topiramate Other (See Comments)     Ulcers in the mouth and dry mouth  per pt, gets "fog brain"            Corticosteroids (glucocorticoids) Nausea And Vomiting and Other (See Comments)     Acid reflux  Acid reflux  Increases acid reflux    Prednisone Other (See Comments)     Increases acid reflux      Family History   Problem Relation Age of Onset    Coronary artery disease Mother     Hodgkin's lymphoma Mother     Hypertension Mother     Coronary artery disease Father 43    Hypertension Father     Heart attacks under age 50 Father     Lymphoma Sister     Coronary artery disease Paternal Grandfather     Stroke Paternal Grandfather     Glaucoma Maternal Grandmother     Pancreatic cancer Paternal Aunt     Cancer Paternal Aunt         pancreatic    Stroke Maternal " "Grandfather     Colon cancer Neg Hx     Breast cancer Neg Hx     Ovarian cancer Neg Hx     Cervical cancer Neg Hx     Endometrial cancer Neg Hx     Vaginal cancer Neg Hx     Diabetes Neg Hx       Social History     Socioeconomic History    Marital status:    Tobacco Use    Smoking status: Former     Packs/day: 1.00     Years: 20.00     Pack years: 20.00     Types: Cigarettes     Start date: 1986     Quit date: 2022     Years since quittin.2    Smokeless tobacco: Never   Substance and Sexual Activity    Alcohol use: No     Alcohol/week: 0.0 standard drinks    Drug use: No    Sexual activity: Yes     Partners: Male     Comment:  since      Review Of Systems:  General: Negative for fever   HENT: Negative for tinnitus, nose bleeds, neck stiffness   Cardiac + palpitations   Vascular: Negative for easy bruising   Pulmonary: Negative for cough   Gastrointestinal: Negative for constipation (+ IBS)   Urinary: + incomplete bladder emptying   Musculoskeletal: Negative for muscle aches. Chronic LBP, neck pain   Neurological: As above. Otherwise negative for abnormal movements   Psychiatric:  Negative for depression     BP (!) 143/82   Pulse 70   Ht 5' 8" (1.727 m)   Wt 113.4 kg (250 lb)   BMI 38.01 kg/m²    Orthostatics: supine 133/75 71; sitting 149/90 66; standing 163/94 77    Overweight female  Extremities: no edema    Mental status:   Awake, alert and appropriately oriented   Normal recent and remote memory   Normal attention and concentration   Normal speech and language   Normal fund of knowledge   No extinction  Cranial nerves:   Normal funduscopic - discs sharp   PERRLA   EOMF without nystagmus   VFF   Normal facial sensation   Normal facial movements   Intact hearing bilaterally   Palate elevates symmetrically   Normal SCM and trapezius strength   Tongue midline  Motor:   No pronator drift   Normal FF movements bilaterally   Normal muscle tone, bulk and power   No abnormal " movements  Sensory   Intact to LT  Gradient to temp   DTRs   trace and symmetric   Plantar responses are flexor bilaterally  Coordination   Intact to FNF, RAH, and HTS  Gait   Normal base and gait   Normal toe, heel and tandem   No Romberg    Data Reviewed:    No results found for: [id number]  Lab Results   Component Value Date    TSH 2.086 11/07/2016     No results found for: LABA1C, HGBA1C      Amor West MD

## 2022-11-30 ENCOUNTER — PATIENT MESSAGE (OUTPATIENT)
Dept: GASTROENTEROLOGY | Facility: CLINIC | Age: 52
End: 2022-11-30

## 2022-11-30 ENCOUNTER — OFFICE VISIT (OUTPATIENT)
Dept: GASTROENTEROLOGY | Facility: CLINIC | Age: 52
End: 2022-11-30
Payer: COMMERCIAL

## 2022-11-30 ENCOUNTER — INFUSION (OUTPATIENT)
Dept: INFECTIOUS DISEASES | Facility: HOSPITAL | Age: 52
End: 2022-11-30
Attending: INTERNAL MEDICINE
Payer: COMMERCIAL

## 2022-11-30 ENCOUNTER — TELEPHONE (OUTPATIENT)
Dept: NEUROLOGY | Facility: CLINIC | Age: 52
End: 2022-11-30
Payer: COMMERCIAL

## 2022-11-30 ENCOUNTER — PATIENT MESSAGE (OUTPATIENT)
Dept: NEUROLOGY | Facility: CLINIC | Age: 52
End: 2022-11-30
Payer: COMMERCIAL

## 2022-11-30 VITALS
DIASTOLIC BLOOD PRESSURE: 82 MMHG | HEIGHT: 68 IN | SYSTOLIC BLOOD PRESSURE: 142 MMHG | BODY MASS INDEX: 39.09 KG/M2 | WEIGHT: 257.94 LBS | HEART RATE: 78 BPM

## 2022-11-30 DIAGNOSIS — K31.84 GASTROPARESIS: Primary | ICD-10-CM

## 2022-11-30 DIAGNOSIS — D50.9 IRON DEFICIENCY ANEMIA, UNSPECIFIED IRON DEFICIENCY ANEMIA TYPE: ICD-10-CM

## 2022-11-30 DIAGNOSIS — R20.2 PARESTHESIAS: Primary | ICD-10-CM

## 2022-11-30 DIAGNOSIS — K58.0 IRRITABLE BOWEL SYNDROME WITH DIARRHEA: ICD-10-CM

## 2022-11-30 DIAGNOSIS — R10.13 EPIGASTRIC PAIN: ICD-10-CM

## 2022-11-30 DIAGNOSIS — R20.2 PARESTHESIAS: ICD-10-CM

## 2022-11-30 DIAGNOSIS — K21.9 GASTROESOPHAGEAL REFLUX DISEASE WITHOUT ESOPHAGITIS: ICD-10-CM

## 2022-11-30 LAB — VIT B12 SERPL-MCNC: 438 PG/ML (ref 210–950)

## 2022-11-30 PROCEDURE — 3077F SYST BP >= 140 MM HG: CPT | Mod: CPTII,S$GLB,, | Performed by: INTERNAL MEDICINE

## 2022-11-30 PROCEDURE — 1159F PR MEDICATION LIST DOCUMENTED IN MEDICAL RECORD: ICD-10-PCS | Mod: CPTII,S$GLB,, | Performed by: INTERNAL MEDICINE

## 2022-11-30 PROCEDURE — 99999 PR PBB SHADOW E&M-EST. PATIENT-LVL V: ICD-10-PCS | Mod: PBBFAC,,, | Performed by: INTERNAL MEDICINE

## 2022-11-30 PROCEDURE — 1160F RVW MEDS BY RX/DR IN RCRD: CPT | Mod: CPTII,S$GLB,, | Performed by: INTERNAL MEDICINE

## 2022-11-30 PROCEDURE — 3008F BODY MASS INDEX DOCD: CPT | Mod: CPTII,S$GLB,, | Performed by: INTERNAL MEDICINE

## 2022-11-30 PROCEDURE — 1159F MED LIST DOCD IN RCRD: CPT | Mod: CPTII,S$GLB,, | Performed by: INTERNAL MEDICINE

## 2022-11-30 PROCEDURE — 99215 OFFICE O/P EST HI 40 MIN: CPT | Mod: S$GLB,,, | Performed by: INTERNAL MEDICINE

## 2022-11-30 PROCEDURE — 1160F PR REVIEW ALL MEDS BY PRESCRIBER/CLIN PHARMACIST DOCUMENTED: ICD-10-PCS | Mod: CPTII,S$GLB,, | Performed by: INTERNAL MEDICINE

## 2022-11-30 PROCEDURE — 82607 VITAMIN B-12: CPT | Performed by: PSYCHIATRY & NEUROLOGY

## 2022-11-30 PROCEDURE — 3079F DIAST BP 80-89 MM HG: CPT | Mod: CPTII,S$GLB,, | Performed by: INTERNAL MEDICINE

## 2022-11-30 PROCEDURE — 99215 PR OFFICE/OUTPT VISIT, EST, LEVL V, 40-54 MIN: ICD-10-PCS | Mod: S$GLB,,, | Performed by: INTERNAL MEDICINE

## 2022-11-30 PROCEDURE — 3008F PR BODY MASS INDEX (BMI) DOCUMENTED: ICD-10-PCS | Mod: CPTII,S$GLB,, | Performed by: INTERNAL MEDICINE

## 2022-11-30 PROCEDURE — 3077F PR MOST RECENT SYSTOLIC BLOOD PRESSURE >= 140 MM HG: ICD-10-PCS | Mod: CPTII,S$GLB,, | Performed by: INTERNAL MEDICINE

## 2022-11-30 PROCEDURE — 3079F PR MOST RECENT DIASTOLIC BLOOD PRESSURE 80-89 MM HG: ICD-10-PCS | Mod: CPTII,S$GLB,, | Performed by: INTERNAL MEDICINE

## 2022-11-30 PROCEDURE — 63600175 PHARM REV CODE 636 W HCPCS: Performed by: PSYCHIATRY & NEUROLOGY

## 2022-11-30 PROCEDURE — 36591 DRAW BLOOD OFF VENOUS DEVICE: CPT

## 2022-11-30 PROCEDURE — 99999 PR PBB SHADOW E&M-EST. PATIENT-LVL V: CPT | Mod: PBBFAC,,, | Performed by: INTERNAL MEDICINE

## 2022-11-30 RX ORDER — SODIUM CHLORIDE 0.9 % (FLUSH) 0.9 %
10 SYRINGE (ML) INJECTION
OUTPATIENT
Start: 2022-11-30

## 2022-11-30 RX ORDER — SODIUM CHLORIDE 0.9 % (FLUSH) 0.9 %
10 SYRINGE (ML) INJECTION
Status: DISCONTINUED | OUTPATIENT
Start: 2022-11-30 | End: 2022-11-30 | Stop reason: HOSPADM

## 2022-11-30 RX ORDER — HEPARIN 100 UNIT/ML
500 SYRINGE INTRAVENOUS
OUTPATIENT
Start: 2022-11-30

## 2022-11-30 RX ORDER — HEPARIN 100 UNIT/ML
500 SYRINGE INTRAVENOUS
Status: DISCONTINUED | OUTPATIENT
Start: 2022-11-30 | End: 2022-11-30 | Stop reason: HOSPADM

## 2022-11-30 RX ORDER — LIDOCAINE HYDROCHLORIDE 20 MG/ML
SOLUTION OROPHARYNGEAL EVERY 4 HOURS
Qty: 720 ML | Refills: 1 | Status: SHIPPED | OUTPATIENT
Start: 2022-11-30

## 2022-11-30 RX ADMIN — HEPARIN 500 UNITS: 100 SYRINGE at 02:11

## 2022-11-30 NOTE — PROGRESS NOTES
Pt arrived with port accessed, Vitamin B 12 gold top drawn per MD order, per pt request port flushed with 30ml NS and heparinized, pt tolerated well & left in NAD

## 2022-11-30 NOTE — PROGRESS NOTES
Subjective:       Patient ID: Nan Betts is a 52 y.o. female.    Chief Complaint: Follow-up    Follow-up  Associated symptoms include chest pain and numbness.       Follow-up visit.  Multiple complaints as always.  Chief complaint seems to be reflux.  She has had her gastroparesis stimulator battery replaced.  This was done in [facility].  Battery settings have not been interrogated since that surgery.  She does not feel like her reflux has gotten any better in fact it continues to be worse.  She has burning chest pain and burning epigastric pain.  This occurs when she lays down or vita but also constantly during the day.  Complains of sore throat in the morning.  Complains of bloating.  Still has diarrhea.      Right now reflux treatment includes IV pantoprazole 40 mg twice a day in addition that she takes Carafate and viscous lidocaine in terms of magic mouthwash.    She has a variety other complaints as in the past including chest pain dizziness numbness and has seen multiple specialists since I seen her last     She also complains of persistent abdominal wall pain related to the prior battery pocket site in the abdominal wall    Past Medical History:   Diagnosis Date    Abnormal Pap smear of vagina     Anxiety     Cataract     Chronic pain     TMJ and abdomen    Colon polyp     Epilepsy     as a child    Essential (primary) hypertension     Gastroparesis     Idiopathic    GERD (gastroesophageal reflux disease)     H/O abnormal cervical Papanicolaou smear     Hypercholesteremia 06/21/2016    Hyperlipidemia     IC (interstitial cystitis)     Internal hemorrhoids     Interstitial cystitis     Irritable bowel syndrome (IBS)     Irritable bowel syndrome with diarrhea 06/21/2016    Rapid heart rate     Spastic colon     TMJ (temporomandibular joint disorder)     TMJ (temporomandibular joint syndrome) 06/21/2016       Review of patient's allergies indicates:   Allergen Reactions    Ketorolac Anaphylaxis  "and Shortness Of Breath     Other reaction(s): Throat swelling, Unknown    Morphine Anaphylaxis    Tramadol Shortness Of Breath     Other reaction(s): Throat swelling, Unknown    Codeine Itching    Gabapentin Other (See Comments)     Causes suicidal thoughts        Ibuprofen Other (See Comments)     G L Bleeding        Nsaids (non-steroidal anti-inflammatory drug) Other (See Comments)     GI BLEEDING        Hydrocodone      Other reaction(s): Unknown    Hydrocodone-acetaminophen     Metoclopramide Other (See Comments)     Headaches and insomnia          Reglan [metoclopramide hcl] Other (See Comments)     Headaches and insomnia      Topiramate Other (See Comments)     Ulcers in the mouth and dry mouth  per pt, gets "fog brain"            Corticosteroids (glucocorticoids) Nausea And Vomiting and Other (See Comments)     Acid reflux  Acid reflux  Increases acid reflux    Prednisone Other (See Comments)     Increases acid reflux        Family History   Problem Relation Age of Onset    Coronary artery disease Mother     Hodgkin's lymphoma Mother     Hypertension Mother     Coronary artery disease Father 43    Hypertension Father     Heart attacks under age 50 Father     Lymphoma Sister     Coronary artery disease Paternal Grandfather     Stroke Paternal Grandfather     Glaucoma Maternal Grandmother     Pancreatic cancer Paternal Aunt     Cancer Paternal Aunt         pancreatic    Stroke Maternal Grandfather     Colon cancer Neg Hx     Breast cancer Neg Hx     Ovarian cancer Neg Hx     Cervical cancer Neg Hx     Endometrial cancer Neg Hx     Vaginal cancer Neg Hx     Diabetes Neg Hx        Social History     Tobacco Use    Smoking status: Former     Packs/day: 1.00     Years: 20.00     Pack years: 20.00     Types: Cigarettes     Start date: 1986     Quit date: 2022     Years since quittin.2    Smokeless tobacco: Never   Substance Use Topics    Alcohol use: No     Alcohol/week: 0.0 standard drinks    Drug " use: No        Review of Systems   Cardiovascular:  Positive for chest pain.   Neurological:  Positive for dizziness and numbness.         No results found for this or any previous visit from the past 365 days.             Objective:      Physical Exam  Abdominal:      General: Abdomen is protuberant. Bowel sounds are normal. There is no distension.      Palpations: Abdomen is soft.      Tenderness: There is generalized abdominal tenderness.      Comments: Pacemaker interrogated.    Impedance intact.  Amplitude 4.5   Battery is at factory settings of 0.1 seconds on and 5 seconds off.    I adjusted to 2 seconds on and 3 seconds off       Assessment & Plan:       Gastroparesis    Gastroesophageal reflux disease without esophagitis    Irritable bowel syndrome with diarrhea    Epigastric pain    Iron deficiency anemia, unspecified iron deficiency anemia type     Assessment.  1. Gastroparesis.  Status post pyloromyotomy and gastric stimulator placement.  Battery adjusted today  2. Gastroesophageal reflux.  Severe symptomatic reflux despite aggressive therapy.  I have recommended 24 hour impedance study to document if this is breakthrough reflux if it truly is reflux then we can consider anti-reflux surgery although this has to be carefully considered in the patient with gastroparesis  2. IBS with diarrhea   3. Abdominal wall pain.  There is nothing I can do to help for this but perhaps pain medicine referral with a localized injection might help  5. Iron deficiency anemia.  She showed me her labs.  Her hemoglobin is 12.5.  Ferritin 65.  And iron saturation is 20%.  Although she is being followed by hematologist, I do not think I need to do further workup based on these labs.  She is not due for colonoscopy until next year which we are happy to proceed with    Recommendation  1. Referral to pain specialist  2. She has me to refill her viscous lidocaine in a 90 day amount to mixed with antacid under the direction of her  pharmacist at home  3. We will continue to refill medicines  4. Supervision of home IV therapy  5. Set up 24 hour impedance study     45 minutes appointment greater half time face-to-face counseling  This note was created with voice recognition dictation technology.  There may be errors that I did not see, detect or correct.      Zack Lehman MD

## 2022-12-01 ENCOUNTER — PATIENT MESSAGE (OUTPATIENT)
Dept: NEUROLOGY | Facility: CLINIC | Age: 52
End: 2022-12-01
Payer: COMMERCIAL

## 2022-12-01 ENCOUNTER — OFFICE VISIT (OUTPATIENT)
Dept: SURGERY | Facility: CLINIC | Age: 52
End: 2022-12-01
Payer: COMMERCIAL

## 2022-12-01 ENCOUNTER — PATIENT MESSAGE (OUTPATIENT)
Dept: SURGERY | Facility: HOSPITAL | Age: 52
End: 2022-12-01
Payer: COMMERCIAL

## 2022-12-01 VITALS
HEART RATE: 68 BPM | SYSTOLIC BLOOD PRESSURE: 156 MMHG | BODY MASS INDEX: 39.22 KG/M2 | WEIGHT: 258.81 LBS | OXYGEN SATURATION: 98 % | HEIGHT: 68 IN | DIASTOLIC BLOOD PRESSURE: 74 MMHG

## 2022-12-01 DIAGNOSIS — D36.0 BENIGN NEOPLASM OF LATERAL AXILLARY LYMPH NODE: ICD-10-CM

## 2022-12-01 PROCEDURE — 1160F RVW MEDS BY RX/DR IN RCRD: CPT | Mod: CPTII,S$GLB,, | Performed by: SURGERY

## 2022-12-01 PROCEDURE — 99212 PR OFFICE/OUTPT VISIT, EST, LEVL II, 10-19 MIN: ICD-10-PCS | Mod: S$GLB,,, | Performed by: SURGERY

## 2022-12-01 PROCEDURE — 99999 PR PBB SHADOW E&M-EST. PATIENT-LVL III: CPT | Mod: PBBFAC,,, | Performed by: SURGERY

## 2022-12-01 PROCEDURE — 1160F PR REVIEW ALL MEDS BY PRESCRIBER/CLIN PHARMACIST DOCUMENTED: ICD-10-PCS | Mod: CPTII,S$GLB,, | Performed by: SURGERY

## 2022-12-01 PROCEDURE — 99212 OFFICE O/P EST SF 10 MIN: CPT | Mod: S$GLB,,, | Performed by: SURGERY

## 2022-12-01 PROCEDURE — 1159F PR MEDICATION LIST DOCUMENTED IN MEDICAL RECORD: ICD-10-PCS | Mod: CPTII,S$GLB,, | Performed by: SURGERY

## 2022-12-01 PROCEDURE — 3008F PR BODY MASS INDEX (BMI) DOCUMENTED: ICD-10-PCS | Mod: CPTII,S$GLB,, | Performed by: SURGERY

## 2022-12-01 PROCEDURE — 1159F MED LIST DOCD IN RCRD: CPT | Mod: CPTII,S$GLB,, | Performed by: SURGERY

## 2022-12-01 PROCEDURE — 3078F DIAST BP <80 MM HG: CPT | Mod: CPTII,S$GLB,, | Performed by: SURGERY

## 2022-12-01 PROCEDURE — 99999 PR PBB SHADOW E&M-EST. PATIENT-LVL III: ICD-10-PCS | Mod: PBBFAC,,, | Performed by: SURGERY

## 2022-12-01 PROCEDURE — 3077F PR MOST RECENT SYSTOLIC BLOOD PRESSURE >= 140 MM HG: ICD-10-PCS | Mod: CPTII,S$GLB,, | Performed by: SURGERY

## 2022-12-01 PROCEDURE — 3077F SYST BP >= 140 MM HG: CPT | Mod: CPTII,S$GLB,, | Performed by: SURGERY

## 2022-12-01 PROCEDURE — 3008F BODY MASS INDEX DOCD: CPT | Mod: CPTII,S$GLB,, | Performed by: SURGERY

## 2022-12-01 PROCEDURE — 3078F PR MOST RECENT DIASTOLIC BLOOD PRESSURE < 80 MM HG: ICD-10-PCS | Mod: CPTII,S$GLB,, | Performed by: SURGERY

## 2022-12-02 ENCOUNTER — PATIENT MESSAGE (OUTPATIENT)
Dept: NEUROLOGY | Facility: CLINIC | Age: 52
End: 2022-12-02
Payer: COMMERCIAL

## 2022-12-06 ENCOUNTER — TELEPHONE (OUTPATIENT)
Dept: GASTROENTEROLOGY | Facility: CLINIC | Age: 52
End: 2022-12-06
Payer: COMMERCIAL

## 2022-12-06 NOTE — TELEPHONE ENCOUNTER
Spoke with pharmacist.  They will dispense Lidocaine HCI 2%(Lidocaine Viscous) 2 % solution, 600 ml, 3 month supply.  It is to be mixed with antacid to make magic mouthwash.   Spoke with patients local [facility].  The pharmacist stated patient has been instructed on how to mix the solution to make the magic mouthwash.  Angela

## 2022-12-06 NOTE — TELEPHONE ENCOUNTER
----- Message from Zack Lehman MD sent at 12/5/2022  3:12 PM CST -----  Regarding: RE: Refill  Contact: [phone]  Could you call?  I put in for 720 ml  But I think the right amount is 600 ml   This is three month supply to mix with antacid to make magic mouthwash  She was told be her [facility] to do it this way  If they cannot do it, no problem at all from my perspective, she can just  smaller amounts at her [facility]  ----- Message -----  From: Bernice Blackburn MA  Sent: 12/1/2022   4:02 PM CST  To: Zack Lehman MD  Subject: FW: Refill                                         ----- Message -----  From: Andreia Izaguirre MA  Sent: 12/1/2022   3:09 PM CST  To: Bernice Blackburn MA  Subject: FW: Refill                                         ----- Message -----  From: Nadia Mariscal  Sent: 12/1/2022   3:04 PM CST  To: Dominik BASS Staff  Subject: Refill                                           [facility] calling to get clarification on the following medication LIDOcaine HCl 2% (LIDOCAINE VISCOUS) 2 % Soln, to ensure correct dispense amount please call to discuss further  Ref # [id number]

## 2022-12-09 ENCOUNTER — TELEPHONE (OUTPATIENT)
Dept: NEUROLOGY | Facility: CLINIC | Age: 52
End: 2022-12-09
Payer: COMMERCIAL

## 2022-12-09 ENCOUNTER — PATIENT MESSAGE (OUTPATIENT)
Dept: ELECTROPHYSIOLOGY | Facility: CLINIC | Age: 52
End: 2022-12-09
Payer: COMMERCIAL

## 2022-12-09 ENCOUNTER — PATIENT MESSAGE (OUTPATIENT)
Dept: NEUROLOGY | Facility: CLINIC | Age: 52
End: 2022-12-09
Payer: COMMERCIAL

## 2022-12-09 NOTE — TELEPHONE ENCOUNTER
Called patient to schedule skin puncture biopsy per referral. Patient agreed on 1/3 at 1:00 PM but expressed wanting to speak to Cathy regarding details of procedure and any potential risks as she is having surgery shortly after the 3rd. I told patient I would inform Cathy of this.

## 2022-12-16 ENCOUNTER — PATIENT MESSAGE (OUTPATIENT)
Dept: NEUROSURGERY | Facility: CLINIC | Age: 52
End: 2022-12-16
Payer: COMMERCIAL

## 2022-12-16 ENCOUNTER — PATIENT MESSAGE (OUTPATIENT)
Dept: ORTHOPEDICS | Facility: CLINIC | Age: 52
End: 2022-12-16
Payer: COMMERCIAL

## 2022-12-16 ENCOUNTER — PATIENT MESSAGE (OUTPATIENT)
Dept: NEUROLOGY | Facility: CLINIC | Age: 52
End: 2022-12-16
Payer: COMMERCIAL

## 2022-12-20 ENCOUNTER — OFFICE VISIT (OUTPATIENT)
Dept: NEUROSURGERY | Facility: CLINIC | Age: 52
End: 2022-12-20
Payer: COMMERCIAL

## 2022-12-20 DIAGNOSIS — F41.9 ANXIETY: Primary | ICD-10-CM

## 2022-12-20 PROCEDURE — 1159F MED LIST DOCD IN RCRD: CPT | Mod: CPTII,95,, | Performed by: NEUROLOGICAL SURGERY

## 2022-12-20 PROCEDURE — 1160F RVW MEDS BY RX/DR IN RCRD: CPT | Mod: CPTII,95,, | Performed by: NEUROLOGICAL SURGERY

## 2022-12-20 PROCEDURE — 99215 PR OFFICE/OUTPT VISIT, EST, LEVL V, 40-54 MIN: ICD-10-PCS | Mod: 95,,, | Performed by: NEUROLOGICAL SURGERY

## 2022-12-20 PROCEDURE — 99215 OFFICE O/P EST HI 40 MIN: CPT | Mod: 95,,, | Performed by: NEUROLOGICAL SURGERY

## 2022-12-20 PROCEDURE — 99417 PR PROLONGED SVC, OUTPT, W/WO DIRECT PT CONTACT,  EA ADDTL 15 MIN: ICD-10-PCS | Mod: 95,,, | Performed by: NEUROLOGICAL SURGERY

## 2022-12-20 PROCEDURE — 99417 PROLNG OP E/M EACH 15 MIN: CPT | Mod: 95,,, | Performed by: NEUROLOGICAL SURGERY

## 2022-12-20 PROCEDURE — 1160F PR REVIEW ALL MEDS BY PRESCRIBER/CLIN PHARMACIST DOCUMENTED: ICD-10-PCS | Mod: CPTII,95,, | Performed by: NEUROLOGICAL SURGERY

## 2022-12-20 PROCEDURE — 1159F PR MEDICATION LIST DOCUMENTED IN MEDICAL RECORD: ICD-10-PCS | Mod: CPTII,95,, | Performed by: NEUROLOGICAL SURGERY

## 2022-12-20 NOTE — PROGRESS NOTES
Neurosurgery  Established Patient    SUBJECTIVE:     History of Present Illness:  Ms. Betts, is a 52 year old female, with multiple complaints of headache, dizziness, bilateral arm and bilateral leg paresthesias, in a non dermatomal fashion.  She has a past medical history of interstitial cystitis, gastroparesis, status post gastric pacemaker in place, GERD, GI bleed x2 and cannot take NSAIDs, bilateral cubital tunnel release and a previous back injury in her 20s.  Upon workup for headaches and dizziness she underwent a CTA of the head which showed some anatomical variance with some concern for a partially empty sella.  She notes that her headaches were previously 2 to 3 times a month are now daily.  They are 6-7/10 out of severity want to the time.  She denies any visual obscurations, she denies any tinnitus, she denies any dermatomal quality to her paresthesias in the upper lower extremities.  She does note some neck pain.  She is unable to get a MRI secondary to her gastric pacemaker.  She most recently underwent an EMG of the bilateral lower extremities and has 1 plan for the bilateral upper extremities.  She is seeing Dr. Dan for her neck and back pain, and she has a CT myelogram planned.  She had also been evaluated by an ophthalmologist, outside the [facility] system, with no evidence, per report, of any papilledema, or any visual field abnormalities.  She does have a 37 year pack history of smoking.  She does not have any history of diabetes or IV drug use.  She is not on any anticoagulation or take chronic narcotics.  She is accompanied by her spouse.       Interval history:  Patient is here to discuss on virtual audio visual visit treatment protocol moving forward.  She underwent a CT venogram to assess over patency which did not show any significant stenosis.  She was unable to undergo MRV secondary to her gastric pacemaker.  Nonspecific complaints, she was recently for a fracture of her wrist.      Review of  "patient's allergies indicates:   Allergen Reactions    Ketorolac Anaphylaxis and Shortness Of Breath     Other reaction(s): Throat swelling, Unknown    Morphine Anaphylaxis    Reglan [metoclopramide hcl] Other (See Comments)     Headaches and insomnia      Tramadol Shortness Of Breath     Other reaction(s): Throat swelling, Unknown    Codeine Itching    Gabapentin Other (See Comments)     Causes suicidal thoughts        Ibuprofen Other (See Comments)     G L Bleeding        Nsaids (non-steroidal anti-inflammatory drug) Other (See Comments)     GI BLEEDING        Hydrocodone      Other reaction(s): Unknown    Hydrocodone-acetaminophen     Metoclopramide Other (See Comments)     Headaches and insomnia          Topiramate Other (See Comments)     Ulcers in the mouth and dry mouth  per pt, gets "fog brain"            Corticosteroids (glucocorticoids) Nausea And Vomiting and Other (See Comments)     Acid reflux  Acid reflux  Increases acid reflux    Prednisone Other (See Comments)     Increases acid reflux       Current Outpatient Medications   Medication Sig Dispense Refill    (Magic mouthwash) 1:1:1 diphenhydrAMINE(Benadryl) 12.5mg/5ml liq, aluminum & magnesium hydroxide-simethicone (Maalox), LIDOcaine viscous 2% Swish and spit 5 mLs every 4 (four) hours as needed (mouth sores). for mouth sores 240 mL 3    acetaminophen (TYLENOL) 500 MG tablet Take 1 tablet (500 mg total) by mouth every 6 (six) hours as needed for Pain. 28 tablet 0    ALPRAZolam (XANAX) 1 MG tablet Take 1 mg by mouth 2 (two) times daily as needed.       busPIRone (BUSPAR) 5 MG Tab Take 5 mg by mouth 2 (two) times daily. 10 mg twice daily      CREON 24,000-76,000 -120,000 unit capsule TAKE 1 CAPSULE BY MOUTH 3  TIMES DAILY WITH MEALS 500 capsule 3    DEXILANT 60 mg capsule TAKE 1 CAPSULE BY MOUTH  ONCE DAILY 90 capsule 3    diphenhydrAMINE (BENADRYL) 50 MG tablet Take 50 mg by mouth every 4 (four) hours as needed for Itching.      estradioL (ESTRACE) 2 " MG tablet Take 1.5 tablets (3 mg total) by mouth once daily. 135 tablet 3    flavoxATE (URISPAS) 100 mg Tab 1 tablet      fluticasone propionate (FLONASE) 50 mcg/actuation nasal spray 1 spray by Each Nostril route once daily.      fremanezumab-vfrm (AJOVY AUTOINJECTOR) 225 mg/1.5 mL autoinjector Inject 1.5 mLs (225 mg total) into the skin every 28 days. 1 each 10    LIDOcaine HCl 2% (LIDOCAINE VISCOUS) 2 % Soln SWISH AND SPIT 5 MLS BY MOUTH FOUR TIMES DAILY AS NEEDED FOR MOUTH SORES 80 mL 0    LIDOcaine HCl 2% (LIDOCAINE VISCOUS) 2 % Soln by Mucous Membrane route every 4 (four) hours. To mix with antacid 720 mL 1    loperamide (IMODIUM) 2 mg capsule TAKE 2 CAPSULES BY MOUTH  TWICE DAILY AS NEEDED 360 capsule 1    meclizine (ANTIVERT) 25 mg tablet Take by mouth 3 (three) times daily as needed.      methocarbamol (ROBAXIN-750 ORAL) Take by mouth.      metoprolol succinate (TOPROL-XL) 25 MG 24 hr tablet       pantoprazole (PROTONIX) 40 mg injection Inject 40 mg into the vein 2 (two) times a day. 60 each 11    promethazine (PHENERGAN) 25 mg/mL injection Inject 25 mg into the vein every 4 (four) hours.      simvastatin (ZOCOR) 40 MG tablet Take 40 mg by mouth every evening.       SODIUM CHLORIDE 0.45 % IV Inject 1,000 mLs into the vein as needed.       sodium chloride 0.9% SolP 100 mL with pantoprazole 40 mg SolR 40 mg Inject 40 mg into the vein every 12 (twelve) hours. 60 vial 0    sucralfate (CARAFATE) 1 gram tablet TAKE 1 TABLET BY MOUTH 4  TIMES DAILY BEFORE MEALS  AND AT NIGHT 360 tablet 3    tiZANidine (ZANAFLEX) 4 MG tablet Take 4 mg by mouth 3 (three) times daily.      triamcinolone acetonide 0.1% (KENALOG) 0.1 % ointment AAA hands bid - may occlude qhs 454 g 3     No current facility-administered medications for this visit.     Facility-Administered Medications Ordered in Other Visits   Medication Dose Route Frequency Provider Last Rate Last Admin    LIDOcaine (PF) 10 mg/ml (1%) injection 10 mg  1 mL Intradermal  Once Wes Stahl MD        midazolam (VERSED) 1 mg/mL injection 0.5-4 mg  0.5-4 mg Intravenous PRN Wes Stahl MD   2 mg at 10/07/22 0754       Past Medical History:   Diagnosis Date    Abnormal Pap smear of vagina     Anxiety     Cataract     Chronic pain     TMJ and abdomen    Colon polyp     Epilepsy     as a child    Essential (primary) hypertension     Gastroparesis     Idiopathic    GERD (gastroesophageal reflux disease)     H/O abnormal cervical Papanicolaou smear     Hypercholesteremia 06/21/2016    Hyperlipidemia     IC (interstitial cystitis)     Internal hemorrhoids     Interstitial cystitis     Irritable bowel syndrome (IBS)     Irritable bowel syndrome with diarrhea 06/21/2016    Rapid heart rate     Spastic colon     TMJ (temporomandibular joint disorder)     TMJ (temporomandibular joint syndrome) 06/21/2016     Past Surgical History:   Procedure Laterality Date    APPENDECTOMY      BREAST BIOPSY Bilateral     CATARACT EXTRACTION      CHOLECYSTECTOMY      COLONOSCOPY      COLONOSCOPY N/A 7/31/2018    Procedure: COLONOSCOPY;  Surgeon: Zack Lehman MD;  Location: [facility] (4TH FLR);  Service: Endoscopy;  Laterality: N/A;    CYSTOSCOPY N/A 8/25/2020    Procedure: CYSTOSCOPY;  Surgeon: Christal Khan MD;  [address];  Service: Urology;  Laterality: N/A;    ELBOW SURGERY      ENDOSCOPIC CARPAL TUNNEL RELEASE Right 10/7/2022    Procedure: RELEASE, CARPAL TUNNEL, ENDOSCOPIC;  Surgeon: William Laboy MD;  Location: [facility];  Service: Orthopedics;  Laterality: Right;    ESOPHAGOGASTRODUODENOSCOPY N/A 7/31/2018    Procedure: EGD (ESOPHAGOGASTRODUODENOSCOPY);  Surgeon: Zack Lehman MD;  Location: [facility] (4TH FLR);  Service: Endoscopy;  Laterality: N/A;  RUQ gastric pacemaker    Left upper arm PICC line    PONV    ESOPHAGOGASTRODUODENOSCOPY N/A 9/3/2019    Procedure: EGD (ESOPHAGOGASTRODUODENOSCOPY);  Surgeon: Zack Lheman MD;  Location: [facility] (4TH FLR);  Service:  Endoscopy;  Laterality: N/A;  history of gastoparesis, per change in protocol, pt instructed to do full liquid diet x 3 days prior to procedure and clear liquids x 1 day prior to procedure-BB  pt has gastric pacemaker, monitored by Dr. Lehman-YASMANY  hx of epilepsy, last seizure during chi    ESOPHAGOGASTRODUODENOSCOPY N/A 9/10/2020    Procedure: ESOPHAGOGASTRODUODENOSCOPY (EGD);  Surgeon: Zack Lehman MD;  [facility]  Service: Endoscopy;  Laterality: N/A;  3 days Full liquid diet/ 1 day Clear liquids  waiting for Pt to cb with date - ERW  Left upper arm -  PICC  COVID screening 9/7/20 [place] [facility]- [facility]W    ESOPHAGOGASTRODUODENOSCOPY N/A 11/3/2021    Procedure: EGD (ESOPHAGOGASTRODUODENOSCOPY);  Surgeon: Zack Lehman MD;  Location: [facility] ([address] [place] [address]);  Service: Endoscopy;  Laterality: N/A;  10/15 fully vaccinated as of 7/15/21; gastric pacemaker-pt does not have remote;  pt has a port does not want IV started; 3 days full liquid diet; instr. to portal-st    GASTRIC STIMULATOR IMPLANT SURGERY      left side on 03/30/2020    gastric stimulator placement      LASIK Bilateral 2006    MANDIBLE SURGERY Bilateral 10/17/2016    MYELOGRAPHY N/A 8/22/2022    Procedure: Myelogram lumbar;  Surgeon: Malia Surgeon;  [facility];  Service: Anesthesiology;  Laterality: N/A;    OOPHORECTOMY Bilateral     PYLOROPLASTY  03/2016    STOMACH SURGERY      gastric pacemaker    TEMPOROMANDIBULAR JOINT SURGERY  12/2016    TONSILLECTOMY      TOTAL ABDOMINAL HYSTERECTOMY  2005    EUGENIA/BSO, Trachelectomy  2012    UPPER GASTROINTESTINAL ENDOSCOPY      WRIST SURGERY       Family History       Problem Relation (Age of Onset)    Cancer Paternal Aunt    Coronary artery disease Mother, Father (43), Paternal Grandfather    Glaucoma Maternal Grandmother    Heart attacks under age 50 Father    Hodgkin's lymphoma Mother    Hypertension Mother, Father    Lymphoma Sister    Pancreatic cancer Paternal Aunt    Stroke Paternal  Grandfather, Maternal Grandfather          Social History     Socioeconomic History    Marital status:    Tobacco Use    Smoking status: Former     Packs/day: 1.00     Years: 20.00     Pack years: 20.00     Types: Cigarettes     Start date: 1986     Quit date: 2022     Years since quittin.3    Smokeless tobacco: Never   Substance and Sexual Activity    Alcohol use: No     Alcohol/week: 0.0 standard drinks    Drug use: No    Sexual activity: Yes     Partners: Male     Comment:  since        Review of Systems    OBJECTIVE:     Vital Signs     There is no height or weight on file to calculate BMI.    Neurosurgery Physical Exam  On virtual audio visual visit   Patient is resting in bed comfortably   Head is normocephalic atraumatic   Neck is grossly supple  No appreciable labored breathing on virtual audio visual visit   Patient moves extremities    Patient speech is fluent, goal directed without no dysarthria or aphasia  Unable to evaluate pupils  Face is grossly symmetric   Tongue is grossly midline   Unable to evaluate palate on virtual audio visual visit   Patient able to move extremities       Diagnostic Results:  I personally reviewed patient's Diagnostic Imaging.  Again CTA/V of the head does not show any significant extracranial vertebral artery stenosis.  There is diminutive hypoplastic right P1.  The veins appeared to be patent, with some dominance of the right transverse/sigmoid sinus.  CT myelogram does not show any significant spinal canal stenosis in the cervical, thoracic, or lumbar spine.     ASSESSMENT/PLAN:     52-year-old female with multiple complaints, nondermatomal upper lower extremity paresthesias as well as headache and dizziness.  Concern for reading of partial empty sella.     1. No focal neurologic deficits on virtual audio visual visit.  2. Again CTA/V of the head does not show any significant extracranial vertebral artery stenosis.  There is diminutive  hypoplastic right P1.  The veins appeared to be patent, with some dominance of the right transverse/sigmoid sinus.  CT myelogram does not show any significant spinal canal stenosis in the cervical, thoracic, or lumbar spine.  3. Had long discussion with patient.  I would also recommend continued follow-up with Neurology for her diffuse symptomatology, and any additional MRI imaging suggestion.  She did note that she could potentially be able to remove the gastric pacemaker in order to be able to receive any additional MRI imaging.  I did note that she is unlikely to have any surgical pathology but it could potentially assist my Neurology colleagues piyush in on what her symptomatology may be.  There is some suggestion that she should have a component of small nerve neuropathy.  We will follow-up at the request of Neurology if deemed appropriate.  I answered all the patient's questions and to her satisfaction.     Thank you so very much for allowing me to participate in the care of this patient.  Please feel free to call any questions, comments, or concerns.     Dennis Valiente MD,MSc  Department of Neurosurgery   Department of Radiology  Department of Neurology  [facility]    [facility]   [employer] [facility] [employer]     Total time spent in counseling and discussion about further management options including relevant lab work, treatment,  prognosis, medications and intended side effects was more than 60 minutes. More than 50 % of the time was spent in counseling and coordination of care.  I spent a total of 79 minutes on the day of the visit.This includes face to face time and non-face to face time preparing to see the patient (eg, review of tests), Obtaining and/or reviewing separately obtained history, Documenting clinical information in the electronic or other health record, Independently  interpreting resultsand communicating results to the patient/family/caregiver, or Care coordination             Note dictated with voice recognition software, please excuse any grammatical errors.

## 2022-12-21 ENCOUNTER — TELEPHONE (OUTPATIENT)
Dept: ELECTROPHYSIOLOGY | Facility: CLINIC | Age: 52
End: 2022-12-21
Payer: COMMERCIAL

## 2022-12-21 NOTE — TELEPHONE ENCOUNTER
Spoke with patient to schedule tilt table test. Patient states she is having surgery in mid January (1/17/23) and may need to wait 6-8 weeks afterwards. She will check with the surgeon. I will give her a call back at a later date.

## 2022-12-22 NOTE — TELEPHONE ENCOUNTER
Xin Aguilar,    Thanks for the order, we will ship 1 kit for delivery 1.28.  Please note, the return shipping label(s) will arrive in a separate shipment.  Please make sure you label the UPS/FedEx shipping bag once the samples are packaged and ready for pickup.    Please let me know if you have any questions or if anything further is needed.           Juanita Farrell  Client Success Representative    Fax [phone]  Client Services [phone]  [email or web address]    Every Patient Deserves the Right Answer. Learn More at [email or web address]

## 2023-01-02 ENCOUNTER — PATIENT MESSAGE (OUTPATIENT)
Dept: NEUROLOGY | Facility: CLINIC | Age: 53
End: 2023-01-02
Payer: MEDICAID

## 2023-01-03 ENCOUNTER — TELEPHONE (OUTPATIENT)
Dept: PLASTIC SURGERY | Facility: CLINIC | Age: 53
End: 2023-01-03
Payer: MEDICAID

## 2023-01-03 ENCOUNTER — TELEPHONE (OUTPATIENT)
Dept: NEUROLOGY | Facility: CLINIC | Age: 53
End: 2023-01-03
Payer: MEDICAID

## 2023-01-03 NOTE — TELEPHONE ENCOUNTER
Called patient and told that I received her message that she is not able to attend her skin biopsy scheduled for today. Patient confirmed this and said that she is not able to tell which date she would like to reschedule to, but will call in the future to reschedule. I acknowledged and told patient that her skin biopsy today has been cancelled. Patient expressed understanding.

## 2023-01-03 NOTE — TELEPHONE ENCOUNTER
Contact attempt to reach patient.  Friendly reminder of medical clearance needed from PCP for upcoming planned procedure with Dr Jara.  Instructed pt to contact office for any questions or issues.

## 2023-01-05 ENCOUNTER — TELEPHONE (OUTPATIENT)
Dept: PLASTIC SURGERY | Facility: CLINIC | Age: 53
End: 2023-01-05
Payer: MEDICAID

## 2023-01-05 ENCOUNTER — PATIENT MESSAGE (OUTPATIENT)
Dept: GASTROENTEROLOGY | Facility: CLINIC | Age: 53
End: 2023-01-05
Payer: MEDICAID

## 2023-01-05 NOTE — TELEPHONE ENCOUNTER
CONTACT CALL TO DR MICHAELA PERDOMO AFTER FAXING MEDICAL CLEARANCE FORM, RONALD (STAFF) REPORTS SHE DIDN'T RECEIVE.  REQUESTED EMAIL TO SCAN AND SEND ON PT BEHALF.  EMAIL SENT TO MD STAFF OFFICE AT [email or web address]

## 2023-01-05 NOTE — TELEPHONE ENCOUNTER
Returned pt contact call - RE:  clearance updates.  Pt sent message reporting she did all her testing and should be cleared however office only sends clearances once Medical clearance form is sent from surgeon office.  Task to be completed,  fax number [phone].  Requested office phone number from patient - [phone]. Call ended.

## 2023-01-06 ENCOUNTER — PATIENT MESSAGE (OUTPATIENT)
Dept: GASTROENTEROLOGY | Facility: CLINIC | Age: 53
End: 2023-01-06
Payer: MEDICAID

## 2023-01-09 ENCOUNTER — PATIENT MESSAGE (OUTPATIENT)
Dept: GASTROENTEROLOGY | Facility: CLINIC | Age: 53
End: 2023-01-09
Payer: MEDICAID

## 2023-01-09 ENCOUNTER — TELEPHONE (OUTPATIENT)
Dept: GASTROENTEROLOGY | Facility: CLINIC | Age: 53
End: 2023-01-09
Payer: MEDICAID

## 2023-01-09 NOTE — TELEPHONE ENCOUNTER
----- Message from Elza Maldonado sent at 1/9/2023  2:45 PM CST -----  Regarding: Pt advice  Contact: pt [phone]  Pt is calling to ask has letter of patient medical condition be faxed to library, please call

## 2023-01-10 ENCOUNTER — PATIENT MESSAGE (OUTPATIENT)
Dept: SURGERY | Facility: HOSPITAL | Age: 53
End: 2023-01-10
Payer: MEDICAID

## 2023-01-11 ENCOUNTER — TELEPHONE (OUTPATIENT)
Dept: PLASTIC SURGERY | Facility: CLINIC | Age: 53
End: 2023-01-11
Payer: MEDICAID

## 2023-01-11 ENCOUNTER — PATIENT MESSAGE (OUTPATIENT)
Dept: PLASTIC SURGERY | Facility: CLINIC | Age: 53
End: 2023-01-11
Payer: MEDICAID

## 2023-01-11 ENCOUNTER — CLINICAL SUPPORT (OUTPATIENT)
Dept: ENDOSCOPY | Facility: HOSPITAL | Age: 53
End: 2023-01-11
Attending: INTERNAL MEDICINE
Payer: MEDICAID

## 2023-01-11 VITALS — BODY MASS INDEX: 39.35 KG/M2 | HEIGHT: 68 IN

## 2023-01-11 DIAGNOSIS — K31.84 GASTROPARESIS: ICD-10-CM

## 2023-01-11 NOTE — PLAN OF CARE
Contacted patient for 11:30 am PAT appointment. Patient would like to schedule procedure in March after she is recovered from surgery scheduled on 1/17/23. Case request placed for scheduling.

## 2023-01-11 NOTE — TELEPHONE ENCOUNTER
Surgery clearance received.  In basket message sent to patient to update.  Clearance to be scanned in patient medical record.

## 2023-01-13 ENCOUNTER — ANESTHESIA EVENT (OUTPATIENT)
Dept: SURGERY | Facility: HOSPITAL | Age: 53
End: 2023-01-13
Payer: MEDICAID

## 2023-01-13 ENCOUNTER — TELEPHONE (OUTPATIENT)
Dept: PLASTIC SURGERY | Facility: CLINIC | Age: 53
End: 2023-01-13
Payer: MEDICAID

## 2023-01-13 NOTE — PRE-PROCEDURE INSTRUCTIONS
PreOp Instructions given:   - Verbal medication information (what to hold and what to take)   - NPO guidelines   - Arrival place directions given; time to be given the day before procedure by the   Surgeon's Office [facility]  - Bathing with antibacterial soap   - Don't wear any jewelry or bring any valuables AM of surgery   - No makeup or moisturizer to face   - No perfume/cologne, powder, lotions or aftershave   Pt. verbalized understanding.   Pt reports h/o PONV    *See  needs for CVC and Gastric Stim info*

## 2023-01-13 NOTE — TELEPHONE ENCOUNTER
Contacted pt to discuss arrival time for surgery on Tuesday, January 17, 2023.  Pt advised to arrive for 9:30a.  Pt asked to arrive on the 2nd floor, [facility].  Pt given directions and reminded not to eat or drink after midnight.  Pt also reminded to take a thorough bath or shower prior to arriving. Pt did ask about an earlier arrival time, I told her I would ask and get back with her. Pt verbalized understanding.

## 2023-01-13 NOTE — ANESTHESIA PREPROCEDURE EVALUATION
"                                                                                                             01/13/2023  Pre-operative evaluation for Procedure(s) (LRB):  MAMMOPLASTY, REDUCTION, BILATERAL (Bilateral)  EXCISIONAL BIOPSY - right axilla (Right)    Nan Betts is a 52 y.o. female w/ PMHx of of interstitial cystitis, gastroparesis, status post pyloroplasty with gastric pacemaker in place, GERD, GI bleed x2 and cannot take NSAIDs, back pain    Patient Active Problem List   Diagnosis    GERD (gastroesophageal reflux disease)    Gastroparesis    Urge urinary incontinence    Interstitial cystitis    Anxiety    Vaginal atrophy    Vaginal pain    Dyspareunia    Intractable nausea and vomiting    Hypercholesteremia    Irritable bowel syndrome with diarrhea    Left lower quadrant pain    Diarrhea    Presence of gastric pacemaker    H/O pyloroplasty    Bloating    Epigastric pain    Colon polyp       Review of patient's allergies indicates:   Allergen Reactions    Ketorolac Anaphylaxis and Shortness Of Breath     Other reaction(s): Throat swelling, Unknown    Morphine Anaphylaxis    Reglan [metoclopramide hcl] Other (See Comments)     Headaches and insomnia      Tramadol Shortness Of Breath     Other reaction(s): Throat swelling, Unknown    Gabapentin Other (See Comments)     Causes suicidal thoughts        Ibuprofen Other (See Comments)     G L Bleeding        Nsaids (non-steroidal anti-inflammatory drug) Other (See Comments)     GI BLEEDING        Metoclopramide Other (See Comments)     Headaches and insomnia          Topiramate Other (See Comments)     Ulcers in the mouth and dry mouth  per pt, gets "fog brain"            Corticosteroids (glucocorticoids) Nausea And Vomiting and Other (See Comments)     Acid reflux  Acid reflux  Increases acid reflux    Prednisone Other (See Comments)     Increases acid reflux       Current Facility-Administered Medications on File Prior to " Encounter   Medication Dose Route Frequency Provider Last Rate Last Admin    LIDOcaine (PF) 10 mg/ml (1%) injection 10 mg  1 mL Intradermal Once Wes Stahl MD        midazolam (VERSED) 1 mg/mL injection 0.5-4 mg  0.5-4 mg Intravenous PRN Wes Stahl MD   2 mg at 10/07/22 0754     Current Outpatient Medications on File Prior to Encounter   Medication Sig Dispense Refill    pantoprazole (PROTONIX) 40 mg injection Inject 40 mg into the vein 2 (two) times a day. (Patient taking differently: Inject 40 mg into the vein 2 (two) times a day. Via Power-port Q4hrs//pt) 60 each 11    simvastatin (ZOCOR) 40 MG tablet Take 40 mg by mouth every evening.       (Magic mouthwash) 1:1:1 diphenhydrAMINE(Benadryl) 12.5mg/5ml liq, aluminum & magnesium hydroxide-simethicone (Maalox), LIDOcaine viscous 2% Swish and spit 5 mLs every 4 (four) hours as needed (mouth sores). for mouth sores 240 mL 3    acetaminophen (TYLENOL) 500 MG tablet Take 1 tablet (500 mg total) by mouth every 6 (six) hours as needed for Pain. 28 tablet 0    ALPRAZolam (XANAX) 1 MG tablet Take 1 mg by mouth 2 (two) times daily as needed.       busPIRone (BUSPAR) 5 MG Tab Take 5 mg by mouth 2 (two) times daily. 10 mg twice daily      CREON 24,000-76,000 -120,000 unit capsule TAKE 1 CAPSULE BY MOUTH 3  TIMES DAILY WITH MEALS 500 capsule 3    DEXILANT 60 mg capsule TAKE 1 CAPSULE BY MOUTH  ONCE DAILY (Patient not taking: Reported on 1/13/2023) 90 capsule 3    diphenhydrAMINE (BENADRYL) 50 MG tablet Take 50 mg by mouth every 4 (four) hours as needed for Itching.      estradioL (ESTRACE) 2 MG tablet Take 1.5 tablets (3 mg total) by mouth once daily. (Patient taking differently: Take 3 mg by mouth every evening.) 135 tablet 3    flavoxATE (URISPAS) 100 mg Tab 1 tablet      fluticasone propionate (FLONASE) 50 mcg/actuation nasal spray 1 spray by Each Nostril route once daily.      fremanezumab-vfrm (AJOVY AUTOINJECTOR) 225 mg/1.5 mL  autoinjector Inject 1.5 mLs (225 mg total) into the skin every 28 days. 1 each 10    loperamide (IMODIUM) 2 mg capsule TAKE 2 CAPSULES BY MOUTH  TWICE DAILY AS NEEDED 360 capsule 1    meclizine (ANTIVERT) 25 mg tablet Take by mouth 3 (three) times daily as needed.      methocarbamol (ROBAXIN-750 ORAL) Take by mouth.      metoprolol succinate (TOPROL-XL) 25 MG 24 hr tablet       promethazine (PHENERGAN) 25 mg/mL injection Inject 25 mg into the vein every 4 (four) hours.      SODIUM CHLORIDE 0.45 % IV Inject 1,000 mLs into the vein as needed.       sodium chloride 0.9% SolP 100 mL with pantoprazole 40 mg SolR 40 mg Inject 40 mg into the vein every 12 (twelve) hours. 60 vial 0    sucralfate (CARAFATE) 1 gram tablet TAKE 1 TABLET BY MOUTH 4  TIMES DAILY BEFORE MEALS  AND AT NIGHT 360 tablet 3    tiZANidine (ZANAFLEX) 4 MG tablet Take 4 mg by mouth 3 (three) times daily.      triamcinolone acetonide 0.1% (KENALOG) 0.1 % ointment AAA hands bid - may occlude qhs 454 g 3       Past Surgical History:   Procedure Laterality Date    APPENDECTOMY      BREAST BIOPSY Bilateral     CATARACT EXTRACTION      CHOLECYSTECTOMY      COLONOSCOPY      COLONOSCOPY N/A 7/31/2018    Procedure: COLONOSCOPY;  Surgeon: Zack Lehman MD;  Location: [facility]);  Service: Endoscopy;  Laterality: N/A;    CYSTOSCOPY N/A 8/25/2020    Procedure: CYSTOSCOPY;  Surgeon: Christal Khan MD;  [address]  Service: Urology;  Laterality: N/A;    ELBOW SURGERY      ENDOSCOPIC CARPAL TUNNEL RELEASE Right 10/7/2022    Procedure: RELEASE, CARPAL TUNNEL, ENDOSCOPIC;  Surgeon: William Laboy MD;  Location: [facility];  Service: Orthopedics;  Laterality: Right;    ESOPHAGOGASTRODUODENOSCOPY N/A 7/31/2018    Procedure: EGD (ESOPHAGOGASTRODUODENOSCOPY);  Surgeon: Zack Lehman MD;  Location: [facility]);  Service: Endoscopy;  Laterality: N/A;  RUQ gastric pacemaker    Left upper arm PICC line    PONV     ESOPHAGOGASTRODUODENOSCOPY N/A 9/3/2019    Procedure: EGD (ESOPHAGOGASTRODUODENOSCOPY);  Surgeon: Zack Lehman MD;  Location: [facility] (4TH F[facility]);  Service: Endoscopy;  Laterality: N/A;  history of gastoparesis, per change in protocol, pt instructed to do full liquid diet x 3 days prior to procedure and clear liquids x 1 day prior to procedure-BB  pt has gastric pacemaker, monitored by Dr. Lehman-YASMANY  hx of epilepsy, last seizure during chi    ESOPHAGOGASTRODUODENOSCOPY N/A 9/10/2020    Procedure: ESOPHAGOGASTRODUODENOSCOPY (EGD);  Surgeon: Zack Lehman MD;  Location: [facility] (4TH FLR);  Service: Endoscopy;  Laterality: N/A;  3 days Full liquid diet/ 1 day Clear liquids  waiting for Pt to cb with date - [facility]  Left upper arm -  PICC  COVID screening 9/7/20 [facility]- [facility]    ESOPHAGOGASTRODUODENOSCOPY N/A 11/3/2021    Procedure: EGD (ESOPHAGOGASTRODUODENOSCOPY);  Surgeon: Zack Lehman MD;  Location: [facility] (4TH F[facility]);  Service: Endoscopy;  Laterality: N/A;  10/15 fully vaccinated as of 7/15/21; gastric pacemaker-pt does not have remote;  pt has a port does not want IV started; 3 days full liquid diet; instr. to portal-st    GASTRIC STIMULATOR IMPLANT SURGERY      left side on 03/30/2020    gastric stimulator placement      LASIK Bilateral 2006    MANDIBLE SURGERY Bilateral 10/17/2016    MYELOGRAPHY N/A 8/22/2022    Procedure: Myelogram lumbar;  Surgeon: Malia Surgeon;  Location: [facility];  Service: Anesthesiology;  Laterality: N/A;    OOPHORECTOMY Bilateral     PYLOROPLASTY  03/2016    STOMACH SURGERY      gastric pacemaker    TEMPOROMANDIBULAR JOINT SURGERY  12/2016    TONSILLECTOMY      TOTAL ABDOMINAL HYSTERECTOMY  2005    EUGENIA/BSO, Trachelectomy  2012    UPPER GASTROINTESTINAL ENDOSCOPY      WRIST SURGERY         Social History     Socioeconomic History    Marital status:    Tobacco Use    Smoking status: Former     Packs/day: 1.00     Years: 20.00     Pack years: 20.00      Types: Cigarettes     Start date: 1986     Quit date: 2022     Years since quittin.3    Smokeless tobacco: Never   Substance and Sexual Activity    Alcohol use: No     Alcohol/week: 0.0 standard drinks    Drug use: No    Sexual activity: Yes     Partners: Male     Comment:  since          CBC: No results for input(s): WBC, RBC, HGB, HCT, PLT, MCV, MCH, MCHC in the last 72 hours.    CMP: No results for input(s): NA, K, CL, CO2, BUN, CREATININE, GLU, MG, PHOS, CALCIUM, ALBUMIN, PROT, ALKPHOS, ALT, AST, BILITOT in the last 72 hours.    INR  No results for input(s): PT, INR, PROTIME, APTT in the last 72 hours.        Diagnostic Studies:      EKD Echo:  No results found. However, due to the size of the patient record, not all encounters were searched. Please check Results Review for a complete set of results.    Pre-op Assessment          Review of Systems  Anesthesia Hx:  History of prior surgery of interest to airway management or planning: jaw. Personal Hx of Anesthesia complications, Post-Operative Nausea/Vomiting, in the past, but not with recent anesthetics / prophylaxis  Temporomandibular Joint Symptoms       Physical Exam  General: Well nourished and Cooperative    Airway:  Mallampati: II   Mouth Opening: Normal  TM Distance: Normal  Tongue: Normal  Neck ROM: Normal ROM    Chest/Lungs:  Clear to auscultation, Normal Respiratory Rate    Heart:  Rate: Normal  Rhythm: Regular Rhythm  Sounds: Normal        Anesthesia Plan  Type of Anesthesia, risks & benefits discussed:    Anesthesia Type: Gen ETT  Intra-op Monitoring Plan: Standard ASA Monitors  Post Op Pain Control Plan: multimodal analgesia and IV/PO Opioids PRN  Induction:  IV  Airway Plan: Direct and Video, Post-Induction  Informed Consent: Informed consent signed with the Patient and all parties understand the risks and agree with anesthesia plan.  All questions answered.   ASA Score: 3    Ready For Surgery From Anesthesia  Perspective.     .

## 2023-01-17 ENCOUNTER — ANESTHESIA (OUTPATIENT)
Dept: SURGERY | Facility: HOSPITAL | Age: 53
End: 2023-01-17
Payer: MEDICAID

## 2023-01-17 ENCOUNTER — HOSPITAL ENCOUNTER (OUTPATIENT)
Facility: HOSPITAL | Age: 53
Discharge: HOME | End: 2023-01-17
Attending: SURGERY | Admitting: SURGERY
Payer: MEDICAID

## 2023-01-17 VITALS
SYSTOLIC BLOOD PRESSURE: 140 MMHG | TEMPERATURE: 98 F | RESPIRATION RATE: 18 BRPM | BODY MASS INDEX: 39.49 KG/M2 | OXYGEN SATURATION: 97 % | HEIGHT: 68 IN | WEIGHT: 260.56 LBS | HEART RATE: 75 BPM | DIASTOLIC BLOOD PRESSURE: 30 MMHG

## 2023-01-17 DIAGNOSIS — N62 LARGE BREASTS: Primary | ICD-10-CM

## 2023-01-17 DIAGNOSIS — N62 MACROMASTIA: Primary | ICD-10-CM

## 2023-01-17 DIAGNOSIS — R59.9 ENLARGED LYMPH NODES: ICD-10-CM

## 2023-01-17 DIAGNOSIS — N62 LARGE BREASTS: ICD-10-CM

## 2023-01-17 PROCEDURE — 19318 BREAST REDUCTION: CPT | Mod: 50,,, | Performed by: SURGERY

## 2023-01-17 PROCEDURE — 71000016 HC POSTOP RECOV ADDL HR: Performed by: SURGERY

## 2023-01-17 PROCEDURE — D9220A PRA ANESTHESIA: ICD-10-PCS | Mod: CRNA,,, | Performed by: NURSE ANESTHETIST, CERTIFIED REGISTERED

## 2023-01-17 PROCEDURE — 25000003 PHARM REV CODE 250: Performed by: SURGERY

## 2023-01-17 PROCEDURE — 38525 BIOPSY/REMOVAL LYMPH NODES: CPT | Mod: 53,RT,, | Performed by: SURGERY

## 2023-01-17 PROCEDURE — 38525 PR BIOPSY/REM LYMPH NODES, AXILLARY: ICD-10-PCS | Mod: 53,RT,, | Performed by: SURGERY

## 2023-01-17 PROCEDURE — 25000003 PHARM REV CODE 250

## 2023-01-17 PROCEDURE — D9220A PRA ANESTHESIA: Mod: CRNA,,, | Performed by: NURSE ANESTHETIST, CERTIFIED REGISTERED

## 2023-01-17 PROCEDURE — 36000706: Performed by: SURGERY

## 2023-01-17 PROCEDURE — 63600175 PHARM REV CODE 636 W HCPCS: Performed by: NURSE ANESTHETIST, CERTIFIED REGISTERED

## 2023-01-17 PROCEDURE — 88305 TISSUE EXAM BY PATHOLOGIST: CPT | Mod: 59 | Performed by: STUDENT IN AN ORGANIZED HEALTH CARE EDUCATION/TRAINING PROGRAM

## 2023-01-17 PROCEDURE — 27201423 OPTIME MED/SURG SUP & DEVICES STERILE SUPPLY: Performed by: SURGERY

## 2023-01-17 PROCEDURE — 25000003 PHARM REV CODE 250: Performed by: NURSE ANESTHETIST, CERTIFIED REGISTERED

## 2023-01-17 PROCEDURE — 63600175 PHARM REV CODE 636 W HCPCS: Performed by: STUDENT IN AN ORGANIZED HEALTH CARE EDUCATION/TRAINING PROGRAM

## 2023-01-17 PROCEDURE — 19318 PR REDUCTION OF LARGE BREAST: ICD-10-PCS | Mod: 50,,, | Performed by: SURGERY

## 2023-01-17 PROCEDURE — 37000009 HC ANESTHESIA EA ADD 15 MINS: Performed by: SURGERY

## 2023-01-17 PROCEDURE — 37000008 HC ANESTHESIA 1ST 15 MINUTES: Performed by: SURGERY

## 2023-01-17 PROCEDURE — 36000707: Performed by: SURGERY

## 2023-01-17 PROCEDURE — 71000015 HC POSTOP RECOV 1ST HR: Performed by: SURGERY

## 2023-01-17 PROCEDURE — 25000003 PHARM REV CODE 250: Performed by: STUDENT IN AN ORGANIZED HEALTH CARE EDUCATION/TRAINING PROGRAM

## 2023-01-17 PROCEDURE — 71000044 HC DOSC ROUTINE RECOVERY FIRST HOUR: Performed by: SURGERY

## 2023-01-17 PROCEDURE — 63600175 PHARM REV CODE 636 W HCPCS

## 2023-01-17 PROCEDURE — D9220A PRA ANESTHESIA: ICD-10-PCS | Mod: ANES,,, | Performed by: ANESTHESIOLOGY

## 2023-01-17 PROCEDURE — D9220A PRA ANESTHESIA: Mod: ANES,,, | Performed by: ANESTHESIOLOGY

## 2023-01-17 PROCEDURE — 88305 TISSUE EXAM BY PATHOLOGIST: ICD-10-PCS | Mod: 26,,, | Performed by: STUDENT IN AN ORGANIZED HEALTH CARE EDUCATION/TRAINING PROGRAM

## 2023-01-17 PROCEDURE — 88305 TISSUE EXAM BY PATHOLOGIST: CPT | Mod: 26,,, | Performed by: STUDENT IN AN ORGANIZED HEALTH CARE EDUCATION/TRAINING PROGRAM

## 2023-01-17 PROCEDURE — 01610 ANES ALL PX NRV MUSC SHO&AX: CPT | Performed by: SURGERY

## 2023-01-17 PROCEDURE — C1729 CATH, DRAINAGE: HCPCS | Performed by: SURGERY

## 2023-01-17 RX ORDER — SUCCINYLCHOLINE CHLORIDE 20 MG/ML
INJECTION INTRAMUSCULAR; INTRAVENOUS
Status: DISCONTINUED | OUTPATIENT
Start: 2023-01-17 | End: 2023-01-17

## 2023-01-17 RX ORDER — DEXMEDETOMIDINE HYDROCHLORIDE 100 UG/ML
INJECTION, SOLUTION INTRAVENOUS
Status: DISCONTINUED | OUTPATIENT
Start: 2023-01-17 | End: 2023-01-17

## 2023-01-17 RX ORDER — SODIUM CHLORIDE 9 MG/ML
INJECTION, SOLUTION INTRAVENOUS CONTINUOUS
Status: CANCELLED | OUTPATIENT
Start: 2023-01-17

## 2023-01-17 RX ORDER — SODIUM CHLORIDE 9 MG/ML
INJECTION, SOLUTION INTRAVENOUS CONTINUOUS
Status: DISCONTINUED | OUTPATIENT
Start: 2023-01-17 | End: 2023-01-17 | Stop reason: HOSPADM

## 2023-01-17 RX ORDER — DIPHENHYDRAMINE HYDROCHLORIDE 50 MG/ML
12.5 INJECTION INTRAMUSCULAR; INTRAVENOUS ONCE AS NEEDED
Status: COMPLETED | OUTPATIENT
Start: 2023-01-17 | End: 2023-01-17

## 2023-01-17 RX ORDER — MIDAZOLAM HYDROCHLORIDE 1 MG/ML
INJECTION, SOLUTION INTRAMUSCULAR; INTRAVENOUS
Status: DISCONTINUED | OUTPATIENT
Start: 2023-01-17 | End: 2023-01-17

## 2023-01-17 RX ORDER — OXYCODONE AND ACETAMINOPHEN 5; 325 MG/1; MG/1
1 TABLET ORAL EVERY 4 HOURS PRN
Qty: 20 TABLET | Refills: 0 | Status: SHIPPED | OUTPATIENT
Start: 2023-01-17 | End: 2023-02-15

## 2023-01-17 RX ORDER — HEPARIN SODIUM 5000 [USP'U]/ML
INJECTION, SOLUTION INTRAVENOUS; SUBCUTANEOUS
Status: COMPLETED
Start: 2023-01-17 | End: 2023-01-17

## 2023-01-17 RX ORDER — CLINDAMYCIN HYDROCHLORIDE 300 MG/1
300 CAPSULE ORAL EVERY 6 HOURS
Qty: 28 CAPSULE | Refills: 0 | Status: SHIPPED | OUTPATIENT
Start: 2023-01-17 | End: 2023-01-24

## 2023-01-17 RX ORDER — LIDOCAINE HYDROCHLORIDE AND EPINEPHRINE 10; 10 MG/ML; UG/ML
INJECTION, SOLUTION INFILTRATION; PERINEURAL
Status: DISCONTINUED | OUTPATIENT
Start: 2023-01-17 | End: 2023-01-17 | Stop reason: HOSPADM

## 2023-01-17 RX ORDER — HEPARIN SODIUM 5000 [USP'U]/ML
5000 INJECTION, SOLUTION INTRAVENOUS; SUBCUTANEOUS ONCE
Status: COMPLETED | OUTPATIENT
Start: 2023-01-17 | End: 2023-01-17

## 2023-01-17 RX ORDER — PROCHLORPERAZINE EDISYLATE 5 MG/ML
INJECTION INTRAMUSCULAR; INTRAVENOUS
Status: DISCONTINUED | OUTPATIENT
Start: 2023-01-17 | End: 2023-01-17

## 2023-01-17 RX ORDER — HYDROMORPHONE HYDROCHLORIDE 1 MG/ML
0.2 INJECTION, SOLUTION INTRAMUSCULAR; INTRAVENOUS; SUBCUTANEOUS EVERY 5 MIN PRN
Status: DISCONTINUED | OUTPATIENT
Start: 2023-01-17 | End: 2023-01-17 | Stop reason: HOSPADM

## 2023-01-17 RX ORDER — ONDANSETRON 2 MG/ML
4 INJECTION INTRAMUSCULAR; INTRAVENOUS DAILY PRN
Status: DISCONTINUED | OUTPATIENT
Start: 2023-01-17 | End: 2023-01-17 | Stop reason: HOSPADM

## 2023-01-17 RX ORDER — PROPOFOL 10 MG/ML
VIAL (ML) INTRAVENOUS
Status: DISCONTINUED | OUTPATIENT
Start: 2023-01-17 | End: 2023-01-17

## 2023-01-17 RX ORDER — ONDANSETRON 2 MG/ML
4 INJECTION INTRAMUSCULAR; INTRAVENOUS EVERY 12 HOURS PRN
Status: DISCONTINUED | OUTPATIENT
Start: 2023-01-17 | End: 2023-01-17 | Stop reason: HOSPADM

## 2023-01-17 RX ORDER — OXYCODONE HYDROCHLORIDE 5 MG/1
5 TABLET ORAL
Status: DISCONTINUED | OUTPATIENT
Start: 2023-01-17 | End: 2023-01-17 | Stop reason: HOSPADM

## 2023-01-17 RX ORDER — HYDROMORPHONE HYDROCHLORIDE 2 MG/ML
INJECTION, SOLUTION INTRAMUSCULAR; INTRAVENOUS; SUBCUTANEOUS
Status: DISCONTINUED | OUTPATIENT
Start: 2023-01-17 | End: 2023-01-17

## 2023-01-17 RX ORDER — PROMETHAZINE HYDROCHLORIDE 6.25 MG/5ML
25 SYRUP ORAL ONCE
Status: DISCONTINUED | OUTPATIENT
Start: 2023-01-17 | End: 2023-01-17 | Stop reason: HOSPADM

## 2023-01-17 RX ORDER — HALOPERIDOL 5 MG/ML
0.5 INJECTION INTRAMUSCULAR EVERY 10 MIN PRN
Status: DISCONTINUED | OUTPATIENT
Start: 2023-01-17 | End: 2023-01-17 | Stop reason: HOSPADM

## 2023-01-17 RX ORDER — CEFAZOLIN SODIUM 1 G/3ML
INJECTION, POWDER, FOR SOLUTION INTRAMUSCULAR; INTRAVENOUS
Status: DISCONTINUED | OUTPATIENT
Start: 2023-01-17 | End: 2023-01-17

## 2023-01-17 RX ORDER — FENTANYL CITRATE 50 UG/ML
INJECTION, SOLUTION INTRAMUSCULAR; INTRAVENOUS
Status: DISCONTINUED | OUTPATIENT
Start: 2023-01-17 | End: 2023-01-17

## 2023-01-17 RX ORDER — ROCURONIUM BROMIDE 10 MG/ML
INJECTION, SOLUTION INTRAVENOUS
Status: DISCONTINUED | OUTPATIENT
Start: 2023-01-17 | End: 2023-01-17

## 2023-01-17 RX ORDER — MIDAZOLAM HYDROCHLORIDE 1 MG/ML
.5-4 INJECTION INTRAMUSCULAR; INTRAVENOUS
Status: ACTIVE | OUTPATIENT
Start: 2023-01-17

## 2023-01-17 RX ORDER — PHENYLEPHRINE HYDROCHLORIDE 10 MG/ML
INJECTION INTRAVENOUS
Status: DISCONTINUED | OUTPATIENT
Start: 2023-01-17 | End: 2023-01-17

## 2023-01-17 RX ORDER — FENTANYL CITRATE 50 UG/ML
25 INJECTION, SOLUTION INTRAMUSCULAR; INTRAVENOUS EVERY 5 MIN PRN
Status: DISCONTINUED | OUTPATIENT
Start: 2023-01-17 | End: 2023-01-17 | Stop reason: HOSPADM

## 2023-01-17 RX ORDER — FENTANYL CITRATE 50 UG/ML
25-200 INJECTION, SOLUTION INTRAMUSCULAR; INTRAVENOUS
Status: ACTIVE | OUTPATIENT
Start: 2023-01-17

## 2023-01-17 RX ADMIN — SODIUM CHLORIDE, SODIUM GLUCONATE, SODIUM ACETATE, POTASSIUM CHLORIDE, MAGNESIUM CHLORIDE, SODIUM PHOSPHATE, DIBASIC, AND POTASSIUM PHOSPHATE: .53; .5; .37; .037; .03; .012; .00082 INJECTION, SOLUTION INTRAVENOUS at 04:01

## 2023-01-17 RX ADMIN — OXYCODONE 5 MG: 5 TABLET ORAL at 06:01

## 2023-01-17 RX ADMIN — SUGAMMADEX 200 MG: 100 INJECTION, SOLUTION INTRAVENOUS at 05:01

## 2023-01-17 RX ADMIN — PHENYLEPHRINE HYDROCHLORIDE 100 MCG: 10 INJECTION INTRAVENOUS at 03:01

## 2023-01-17 RX ADMIN — HYDROMORPHONE HYDROCHLORIDE 0.5 MG: 2 INJECTION INTRAMUSCULAR; INTRAVENOUS; SUBCUTANEOUS at 04:01

## 2023-01-17 RX ADMIN — GLYCOPYRROLATE 0.4 MG: 0.2 INJECTION, SOLUTION INTRAMUSCULAR; INTRAVENOUS at 02:01

## 2023-01-17 RX ADMIN — HEPARIN SODIUM 5000 UNITS: 5000 INJECTION, SOLUTION INTRAVENOUS; SUBCUTANEOUS at 01:01

## 2023-01-17 RX ADMIN — SODIUM CHLORIDE: 9 INJECTION, SOLUTION INTRAVENOUS at 01:01

## 2023-01-17 RX ADMIN — CEFAZOLIN 3 G: 330 INJECTION, POWDER, FOR SOLUTION INTRAMUSCULAR; INTRAVENOUS at 02:01

## 2023-01-17 RX ADMIN — HYDROMORPHONE HYDROCHLORIDE 0.2 MG: 1 INJECTION, SOLUTION INTRAMUSCULAR; INTRAVENOUS; SUBCUTANEOUS at 06:01

## 2023-01-17 RX ADMIN — PROPOFOL 160 MG: 10 INJECTION, EMULSION INTRAVENOUS at 02:01

## 2023-01-17 RX ADMIN — PROCHLORPERAZINE EDISYLATE 5 MG: 5 INJECTION INTRAMUSCULAR; INTRAVENOUS at 04:01

## 2023-01-17 RX ADMIN — MIDAZOLAM HYDROCHLORIDE 2 MG: 1 INJECTION, SOLUTION INTRAMUSCULAR; INTRAVENOUS at 01:01

## 2023-01-17 RX ADMIN — FENTANYL CITRATE 100 MCG: 50 INJECTION, SOLUTION INTRAMUSCULAR; INTRAVENOUS at 02:01

## 2023-01-17 RX ADMIN — ROCURONIUM BROMIDE 10 MG: 10 INJECTION INTRAVENOUS at 02:01

## 2023-01-17 RX ADMIN — ROCURONIUM BROMIDE 50 MG: 10 INJECTION INTRAVENOUS at 02:01

## 2023-01-17 RX ADMIN — DEXMEDETOMIDINE HYDROCHLORIDE 4 MCG: 100 INJECTION, SOLUTION INTRAVENOUS at 04:01

## 2023-01-17 RX ADMIN — HALOPERIDOL LACTATE 0.5 MG: 5 INJECTION, SOLUTION INTRAMUSCULAR at 07:01

## 2023-01-17 RX ADMIN — DIPHENHYDRAMINE HYDROCHLORIDE 12.5 MG: 50 INJECTION INTRAMUSCULAR; INTRAVENOUS at 06:01

## 2023-01-17 RX ADMIN — DEXMEDETOMIDINE HYDROCHLORIDE 8 MCG: 100 INJECTION, SOLUTION INTRAVENOUS at 05:01

## 2023-01-17 RX ADMIN — SUCCINYLCHOLINE CHLORIDE 100 MG: 20 INJECTION, SOLUTION INTRAMUSCULAR; INTRAVENOUS at 02:01

## 2023-01-17 RX ADMIN — HEPARIN SODIUM 5000 UNITS: 5000 INJECTION INTRAVENOUS; SUBCUTANEOUS at 01:01

## 2023-01-17 RX ADMIN — HYDROMORPHONE HYDROCHLORIDE 0.2 MG: 1 INJECTION, SOLUTION INTRAMUSCULAR; INTRAVENOUS; SUBCUTANEOUS at 05:01

## 2023-01-17 RX ADMIN — SODIUM CHLORIDE: 0.9 INJECTION, SOLUTION INTRAVENOUS at 01:01

## 2023-01-17 NOTE — OP NOTE
Date of Surgery: 1/17  Pre op Diagnosis:  1 macromastia  2 chronic neck pain  3 chronic back pain  4 skin rash  Post op Diagnosis:  same  Procedure performed: bilateral breast reduction  Surgeon: Dr Jan Jara  Anesthesia: general  Complications none  Blood loss 150cc      The patient was evaluated in the preoperative holding area. Markings for the inferior pedicle bilateral breast reduction were made. The risks and possible complications including but not limited to; infection, bleeding, scarring, loss of sensation to the nipple, partial or total loss of the nipple, breast asymmetry, breast deformity, open wounds and need for further surgery were all explained to the patient. The patient signed the informed consent.     The patient was taken to the operating room and placed in the supine position. After adequate general endotracheal anesthesia, the patient was prepped and draped in the normal sterile fashion. The new nipple areola was marked measuring 42mm. The inferior pedicle was marked measuring 11cm at its base. The inferior pedicle was de-epithelialized. Good punctate bleeding was noted. The supra- medial and supra-lateral flaps were elevated to the clavicle superiorly, the lateral border of the sternum medially and the mid-axillary line laterally. The medial and lateral skin wedges were excise. The inferior pedicle was debulked. A similar procedure was performed on the opposite side. The incision's were closed using interrupted 3.0 monocryl followed by a running 4.0 monocryl subcuticular suture for the infra-mammary incision. The vertical limb and nipple areolar complex were closed using interrupted 4.0 monocryl followed by a running 4.0 monocryl subcuticular suture. A similar closure was performed on the opposite side.    Weight removed right breast: 1100  Weight removed left breast.1100

## 2023-01-17 NOTE — ANESTHESIA PROCEDURE NOTES
Intubation    Date/Time: 1/17/2023 2:06 PM  Performed by: Hue Doe MD  Authorized by: Hue Doe MD     Intubation:     Induction:  Intravenous    Intubated:  Postinduction    Mask Ventilation:  N/a    Attempts:  1    Attempted By:  Staff anesthesiologist    Method of Intubation:  Direct    Blade:  Cash 3    Laryngeal View Grade: Grade I - full view of cords      Difficult Airway Encountered?: No      Complications:  None    Airway Device:  Oral endotracheal tube    Airway Device Size:  7.5    Style/Cuff Inflation:  Cuffed    Inflation Amount (mL):  7    Tube secured:  22    Placement Verified By:  Capnometry    Complicating Factors:  None    Findings Post-Intubation:  BS equal bilateral and atraumatic/condition of teeth unchanged

## 2023-01-17 NOTE — OP NOTE
Date of Procedure - 1/17/2012  Pre operative Diagnosis - Right Axillary Lymphadenopathy  Post Operative Diagnosis - Same  Procedure - Right Axillary Exploration  Surgeon - Augie  Anesthesia - General  EBL -- 0  Indications - Seen prior to planned breast reduction with a palpable right axillary node  Asked to perform excision during her breast reduction  Operative report in detail - Called to surgery after reduction completed  Axilla examined - node not apparent  Clavipectoral fascia opened  Lower axilla examined  An enlarged node was not detected  Procedure aborted due to concern about disrupting tissues unnecessarily  Case turned back over to Plastics  Will see patient post op and plan on ordering imaging  There are 3 possibilities  Node has resolved  Node was resected as part of the breast reduction  Node persists but not apparent after the alteration of tissues by her surgery. If the node is later demonstrated will plan on excision at a later date

## 2023-01-17 NOTE — BRIEF OP NOTE
[facility] - Surgery ([facility])  Brief Operative Note    Surgery Date: 1/17/2023     Surgeon(s) and Role:     * Ariana Jara MD - Primary    Assisting Surgeon: None    Pre-op Diagnosis:  Macromastia [N62]  Chronic neck pain [M54.2, G89.29]    Post-op Diagnosis:  Post-Op Diagnosis Codes:     * Macromastia [N62]     * Chronic neck pain [M54.2, G89.29]    Procedure(s) (LRB):  MAMMOPLASTY, REDUCTION, BILATERAL (Bilateral)    Anesthesia: General    Operative Findings: Bilateral reduction mammoplasty was performed.  No intraoperative complications were noted.  Skin was in good condition and well-approximated at closure.      Estimated Blood Loss: 50 mL         Specimens:   Specimen (24h ago, onward)       Start     Ordered    01/17/23 1608  Specimen to Pathology, Surgery General Surgery  Once        Comments: Pre-op Diagnosis: Macromastia [N62]Chronic neck pain [M54.2, G89.29]Procedure(s):MAMMOPLASTY, REDUCTION, BILATERALEXCISIONAL BIOPSY - right axilla Number of specimens: 2Name of specimens: 1. Right breast tissue - permanent                                  2. Left breast tissue - permanent     References:    Click here for ordering Quick Tip   Question Answer Comment   Procedure Type: General Surgery    Which provider would you like to cc? ARIANA JARA    Release to patient Immediate        01/17/23 1609                      Discharge Note    OUTCOME: Patient tolerated treatment/procedure well without complication and is now ready for discharge.    DISPOSITION: Home or Self Care    FINAL DIAGNOSIS:  Macromastia    FOLLOWUP: In clinic    DISCHARGE INSTRUCTIONS:    Discharge Procedure Orders   Diet general     Call MD for:  temperature >100.4     Call MD for:  persistent nausea and vomiting     Call MD for:  severe uncontrolled pain     Call MD for:  difficulty breathing, headache or visual disturbances     Call MD for:  redness, tenderness, or signs of infection (pain, swelling, redness, odor or  green/yellow discharge around incision site)     Call MD for:  hives     Call MD for:  persistent dizziness or light-headedness        Clinical Reference Documents Added to Patient Instructions         Document    BREAST REDUCTION DISCHARGE INSTRUCTIONS (ENGLISH)    AREN-MARQUEZ DRAIN (ENGLISH)

## 2023-01-17 NOTE — H&P
History & Physical    Interval History 01/17/2023  Patient presents today for Bilateral reduction mammoplasty. Patient was last seen by plastic surgery in clinic on 10/26/23. There have been no changes in their history or physical exam since they were last seen in clinic     SUBJECTIVE:   Chief complaint: large breasts     History of Present Illness:     Nan Betts presents to [facility] 2ND FLOOR on 10/26/2022 for evaluation for bilateral breast reduction secondary to symptomatic bilateral large pendulous breast. She has a chief complaint of chronic neck and back pain for 30years. She has tried tylenol, chiropractor, exercise, PT with or without alleviation of pain. She also complains of deep shoulder grooving from her bra straps as well as macerating rashes below each breast that have not significantly improved with application of medicated ointments and creams.  She currently reports a bra size of 44DD. She is unemployed. She denies smoking tobacco or the use of any nicotine containing products. Patient has extensive medical history including gastroparesis and gastric pacemaker. Grounding pads cannot be placed on left side has to be on right only so it does not shut down pacemaker. Patient has port and port needs to be used for blood draws, IVF and medications because she has poor peripheral vasculature. Patient is able to take hydrocodone, dilaudid, demerol and oxycodone despite allergy to morphine (may need benadryl for minor itching). Patient needs Phenergan 25 mg IV because she takes this dose at home.             Past Medical History:   Diagnosis Date    Abnormal Pap smear of vagina      Anxiety      Cataract      Chronic pain       TMJ and abdomen    Colon polyp      Epilepsy       as a child    Essential (primary) hypertension      Gastroparesis       Idiopathic    GERD (gastroesophageal reflux disease)      H/O abnormal cervical Papanicolaou smear      Hypercholesteremia 06/21/2016     Hyperlipidemia      IC (interstitial cystitis)      Internal hemorrhoids      Interstitial cystitis      Irritable bowel syndrome (IBS)      Irritable bowel syndrome with diarrhea 06/21/2016    Rapid heart rate      Spastic colon      TMJ (temporomandibular joint disorder)      TMJ (temporomandibular joint syndrome) 06/21/2016               Past Surgical History:   Procedure Laterality Date    APPENDECTOMY        BREAST BIOPSY Bilateral      CATARACT EXTRACTION        CHOLECYSTECTOMY        COLONOSCOPY        COLONOSCOPY N/A 7/31/2018     Procedure: COLONOSCOPY;  Surgeon: Zack Lehman MD;  Location: [facility] (4TH FLR);  Service: Endoscopy;  Laterality: N/A;    CYSTOSCOPY N/A 8/25/2020     Procedure: CYSTOSCOPY;  Surgeon: Christal Khan MD;  Location: [facility] 1ST FLR;  Service: Urology;  Laterality: N/A;    ELBOW SURGERY        ENDOSCOPIC CARPAL TUNNEL RELEASE Right 10/7/2022     Procedure: RELEASE, CARPAL TUNNEL, ENDOSCOPIC;  Surgeon: William Laboy MD;  Location: [facility];  Service: Orthopedics;  Laterality: Right;    ESOPHAGOGASTRODUODENOSCOPY N/A 7/31/2018     Procedure: EGD (ESOPHAGOGASTRODUODENOSCOPY);  Surgeon: Zack Lehman MD;  Location: [facility] (4TH FLR);  Service: Endoscopy;  Laterality: N/A;  RUQ gastric pacemaker     Left upper arm PICC line     PONV    ESOPHAGOGASTRODUODENOSCOPY N/A 9/3/2019     Procedure: EGD (ESOPHAGOGASTRODUODENOSCOPY);  Surgeon: Zack Lehman MD;  Location: [facility] (4TH FLR);  Service: Endoscopy;  Laterality: N/A;  history of gastoparesis, per change in protocol, pt instructed to do full liquid diet x 3 days prior to procedure and clear liquids x 1 day prior to procedure-BB  pt has gastric pacemaker, monitored by Dr. Lehman-BB  hx of epilepsy, last seizure during chi    ESOPHAGOGASTRODUODENOSCOPY N/A 9/10/2020     Procedure: ESOPHAGOGASTRODUODENOSCOPY (EGD);  Surgeon: Zack Lehman MD;  Location: [facility] (4TH FLR);  Service: Endoscopy;  Laterality: N/A;  3 days Full  liquid diet/ 1 day Clear liquids  waiting for Pt to cb with date - ERW  Left upper arm -  PICC  COVID screening 9/7/20 [facility]- [facility]    ESOPHAGOGASTRODUODENOSCOPY N/A 11/3/2021     Procedure: EGD (ESOPHAGOGASTRODUODENOSCOPY);  Surgeon: Zack Lehman MD;  Location: [facility] (4TH FLR);  Service: Endoscopy;  Laterality: N/A;  10/15 fully vaccinated as of 7/15/21; gastric pacemaker-pt does not have remote;  pt has a port does not want IV started; 3 days full liquid diet; instr. to portal-st    GASTRIC STIMULATOR IMPLANT SURGERY         left side on 03/30/2020    gastric stimulator placement        LASIK Bilateral 2006    MANDIBLE SURGERY Bilateral 10/17/2016    MYELOGRAPHY N/A 8/22/2022     Procedure: Myelogram lumbar;  Surgeon: Malia Surgeon;  Location: [facility];  Service: Anesthesiology;  Laterality: N/A;    OOPHORECTOMY Bilateral      PYLOROPLASTY   03/2016    STOMACH SURGERY         gastric pacemaker    TEMPOROMANDIBULAR JOINT SURGERY   12/2016    TONSILLECTOMY        TOTAL ABDOMINAL HYSTERECTOMY   2005     EUGENIA/BSO, Trachelectomy  2012    UPPER GASTROINTESTINAL ENDOSCOPY        WRIST SURGERY                   Family History   Problem Relation Age of Onset    Coronary artery disease Mother      Hodgkin's lymphoma Mother      Hypertension Mother      Coronary artery disease Father 43    Hypertension Father      Heart attacks under age 50 Father      Lymphoma Sister      Coronary artery disease Paternal Grandfather      Stroke Paternal Grandfather      Glaucoma Maternal Grandmother      Pancreatic cancer Paternal Aunt      Cancer Paternal Aunt           pancreatic    Stroke Maternal Grandfather      Colon cancer Neg Hx      Breast cancer Neg Hx      Ovarian cancer Neg Hx      Cervical cancer Neg Hx      Endometrial cancer Neg Hx      Vaginal cancer Neg Hx      Diabetes Neg Hx           Social History            Socioeconomic History    Marital status:    Tobacco Use    Smoking status: Former        Packs/day: 1.00       Years: 20.00       Pack years: 20.00       Types: Cigarettes       Start date: 1986       Quit date: 2022       Years since quittin.1    Smokeless tobacco: Never   Substance and Sexual Activity    Alcohol use: No       Alcohol/week: 0.0 standard drinks    Drug use: No    Sexual activity: Yes       Partners: Male       Comment:  since          Current Medications          Current Outpatient Medications   Medication Sig Dispense Refill    (Magic mouthwash) 1:1:1 diphenhydramine(Benadryl) 12.5mg/5ml liq, aluminum & magnesium hydroxide-simethicone (Maalox), LIDOcaine viscous 2% Swish and spit 5 mLs every 4 (four) hours as needed (mouth sores). for mouth sores 240 mL 3    acetaminophen (TYLENOL) 500 MG tablet Take 1 tablet (500 mg total) by mouth every 6 (six) hours as needed for Pain. 28 tablet 0    ALPRAZolam (XANAX) 1 MG tablet Take 1 mg by mouth 2 (two) times daily as needed.         busPIRone (BUSPAR) 5 MG Tab Take 5 mg by mouth 2 (two) times daily.        CREON 24,000-76,000 -120,000 unit capsule TAKE 1 CAPSULE BY MOUTH 3  TIMES DAILY WITH MEALS 500 capsule 3    DEXILANT 60 mg capsule TAKE 1 CAPSULE BY MOUTH  ONCE DAILY 90 capsule 3    diphenhydrAMINE (BENADRYL) 50 MG tablet Take 50 mg by mouth every 4 (four) hours as needed for Itching.        estradioL (ESTRACE) 2 MG tablet Take 1.5 tablets (3 mg total) by mouth once daily. 135 tablet 3    flavoxATE (URISPAS) 100 mg Tab 1 tablet        fluticasone propionate (FLONASE) 50 mcg/actuation nasal spray 1 spray by Each Nostril route once daily.        fremanezumab-vfrm (AJOVY AUTOINJECTOR) 225 mg/1.5 mL autoinjector Inject 1.5 mLs (225 mg total) into the skin every 28 days. 1 each 10    LIDOCAINE VISCOUS 2 % solution Take 5 mLs by mouth 4 (four) times daily as needed.        loperamide (IMODIUM) 2 mg capsule TAKE 2 CAPSULES BY MOUTH  TWICE DAILY AS NEEDED 360 capsule 1    meclizine (ANTIVERT) 25 mg tablet Take by mouth 3  (three) times daily as needed.        methocarbamol (ROBAXIN-750 ORAL) Take by mouth.        metoprolol succinate (TOPROL-XL) 25 MG 24 hr tablet          oxyCODONE-acetaminophen (PERCOCET) 5-325 mg per tablet Take 1 tablet by mouth every 8 (eight) hours as needed for Pain. 10 tablet 0    pantoprazole (PROTONIX) 40 mg injection Inject 40 mg into the vein 2 (two) times a day. 60 each 11    promethazine (PHENERGAN) 25 mg/mL injection Inject 25 mg into the vein every 4 (four) hours.        simethicone (MYLICON) 125 mg Cap capsule Take 250 mg by mouth 2 (two) times a day.        simvastatin (ZOCOR) 40 MG tablet Take 40 mg by mouth every evening.         SODIUM CHLORIDE 0.45 % IV Inject 1,000 mLs into the vein as needed.         sodium chloride 0.9% SolP 100 mL with pantoprazole 40 mg SolR 40 mg Inject 40 mg into the vein every 12 (twelve) hours. 60 vial 0    sucralfate (CARAFATE) 1 gram tablet TAKE 1 TABLET BY MOUTH 4  TIMES DAILY BEFORE MEALS  AND AT NIGHT 360 tablet 3    tiZANidine (ZANAFLEX) 4 MG tablet Take 4 mg by mouth 3 (three) times daily.        triamcinolone acetonide 0.1% (KENALOG) 0.1 % ointment AAA hands bid - may occlude qhs 454 g 3      No current facility-administered medications for this visit.                Facility-Administered Medications Ordered in Other Visits   Medication Dose Route Frequency Provider Last Rate Last Admin    LIDOcaine (PF) 10 mg/ml (1%) injection 10 mg  1 mL Intradermal Once Wes Stahl MD        midazolam (VERSED) 1 mg/mL injection 0.5-4 mg  0.5-4 mg Intravenous PRN Wes Stahl MD   2 mg at 10/07/22 6068                  Review of patient's allergies indicates:   Allergen Reactions    Ketorolac Anaphylaxis and Shortness Of Breath       Other reaction(s): Throat swelling, Unknown    Morphine Anaphylaxis    Tramadol Shortness Of Breath       Other reaction(s): Throat swelling, Unknown    Codeine Itching    Gabapentin Other (See Comments)       Causes suicidal  "thoughts          Ibuprofen Other (See Comments)       G L Bleeding          Nsaids (non-steroidal anti-inflammatory drug) Other (See Comments)       GI BLEEDING          Hydrocodone         Other reaction(s): Unknown    Hydrocodone-acetaminophen      Metoclopramide Other (See Comments)       Headaches and insomnia             Reglan [metoclopramide hcl] Other (See Comments)       Headaches and insomnia       Topiramate Other (See Comments)       Ulcers in the mouth and dry mouth  per pt, gets "fog brain"                Corticosteroids (glucocorticoids) Nausea And Vomiting and Other (See Comments)       Acid reflux  Acid reflux  Increases acid reflux    Prednisone Other (See Comments)       Increases acid reflux            Review of Systems:     Review of Systems   HENT: Positive for neck pain.    Musculoskeletal: Positive for back pain.   Neurological: Negative for headaches or dizziness        OBJECTIVE:      BP (!) 171/77   Pulse 69   SpO2 98%         Physical Exam:     Physical Exam   Constitutional: She is oriented to person, place, and time. She appears well-developed and well-nourished.   Neck: Normal range of motion. Neck supple. No tracheal deviation present.   Cardiovascular: Normal rate, regular rhythm and normal heart sounds.    Pulmonary/Chest: Effort normal and breath sounds normal. bilaterally enlarged breasts, evidence of previous rashes, shoulder grooving, no palpable masses, nipple everted  Abdominal: Soft. Bowel sounds are normal.   Musculoskeletal: Normal range of motion.   Neurological: She is alert and oriented to person, place, and time.   Skin: Skin is warm.         Last MMG 8/30/2022     ASSESSMENT/PLAN:      1.Symptomatic Bilateral Macromastia  2. Chronic neck pain  3. Chronic back pain  4. Chronic breast rashes     PLAN:Plan     -Will need 1000 gm reduction per side  -Will submit paper work for insurance approval  -Photos obtained  -Risk, benefits, and alternatives explained. She " understands that the risks include but are not limited to bleeding, scarring, infection, pain, numbness, asymmetry, deformity, open wound, skin necrosis, wound dehiscence, permanent or temporary loss of sensation to the nipple, partial or total nipple loss requiring removal, poor cosmetic outcome, hematoma, seroma and pulmonary emobolus.   - Patient would like to proceed with scheduling bilateral breast reduction pending insurance authorization     All questions were answered. The patient was advised to call the clinic with any questions or concerns prior to their next visit.

## 2023-01-18 NOTE — ANESTHESIA RELEASE NOTE
"Anesthesia Release from PACU Note    Patient: Nan Betts    Procedure(s) Performed: Procedure(s) (LRB):  MAMMOPLASTY, REDUCTION, BILATERAL (Bilateral)    Anesthesia type: general    Post pain: Adequate analgesia    Post assessment: no apparent anesthetic complications    Last Vitals:   Visit Vitals  BP (!) 140/30   Pulse 75   Temp 36.5 °C (97.7 °F) (Temporal)   Resp 18   Ht 5' 8" (1.727 m)   Wt 118.2 kg (260 lb 9.3 oz)   SpO2 97%   Breastfeeding No   BMI 39.62 kg/m²       Post vital signs: stable    Level of consciousness: awake, alert  and oriented    Nausea/Vomiting: no nausea/no vomiting    Complications: none    Airway Patency: patent    Respiratory: unassisted, room air    Cardiovascular: stable and hypertensive    Hydration: euvolemic  "

## 2023-01-18 NOTE — PLAN OF CARE
Patient tolerated procedure. Pain and nausea treated per order. See MAR. Discharge instructions given and explained to patient and spouse. Both verbalized understanding. Oncology RN came to bedside and accessed port. IV removed. Patient awaiting ride via wheelchair in stable condition with no complaints.

## 2023-01-19 ENCOUNTER — NURSE TRIAGE (OUTPATIENT)
Dept: ADMINISTRATIVE | Facility: CLINIC | Age: 53
End: 2023-01-19
Payer: MEDICAID

## 2023-01-19 ENCOUNTER — PATIENT MESSAGE (OUTPATIENT)
Dept: PLASTIC SURGERY | Facility: CLINIC | Age: 53
End: 2023-01-19
Payer: MEDICAID

## 2023-01-19 NOTE — ANESTHESIA POSTPROCEDURE EVALUATION
Anesthesia Post Evaluation    Patient: Nan Betts    Procedure(s) Performed: Procedure(s) (LRB):  MAMMOPLASTY, REDUCTION, BILATERAL (Bilateral)    Final Anesthesia Type: general      Patient location during evaluation: [facility]  Patient participation: Yes- Able to Participate  Level of consciousness: awake and alert  Post-procedure vital signs: reviewed and stable  Pain management: adequate  Airway patency: patent    PONV status at discharge: No PONV  Anesthetic complications: no      Cardiovascular status: hemodynamically stable  Respiratory status: unassisted, spontaneous ventilation and room air  Hydration status: euvolemic  Follow-up not needed.          Vitals Value Taken Time   /30 01/17/23 2000   Temp 36.5 °C (97.7 °F) 01/17/23 1721   Pulse 75 01/17/23 2000   Resp 18 01/17/23 2000   SpO2 97 % 01/17/23 2000         No case tracking events are documented in the log.      Pain/Chelsy Score: No data recorded

## 2023-01-19 NOTE — ANESTHESIA RELEASE NOTE
"Anesthesia Release from PACU Note    Patient: Nan Betts    Procedure(s) Performed: Procedure(s) (LRB):  MAMMOPLASTY, REDUCTION, BILATERAL (Bilateral)    Anesthesia type: general    Post pain: Adequate analgesia    Post assessment: no apparent anesthetic complications and tolerated procedure well    Last Vitals:   Visit Vitals  BP (!) 140/30   Pulse 75   Temp 36.5 °C (97.7 °F) (Temporal)   Resp 18   Ht 5' 8" (1.727 m)   Wt 118.2 kg (260 lb 9.3 oz)   SpO2 97%   Breastfeeding No   BMI 39.62 kg/m²       Post vital signs: stable    Level of consciousness: awake and alert     Nausea/Vomiting: no nausea/no vomiting    Complications: none    Airway Patency: patent    Respiratory: unassisted, spontaneous ventilation, room air    Cardiovascular: stable and blood pressure at baseline    Hydration: euvolemic  "

## 2023-01-19 NOTE — TELEPHONE ENCOUNTER
"  Reason for Disposition   Caller has URGENT question and triager unable to answer question    Additional Information   Negative: Sounds like a life-threatening emergency to the triager   Negative: Chest pain   Negative: Difficulty breathing   Negative: Acting confused (e.g., disoriented, slurred speech) or excessively sleepy   Negative: Surgical incision symptoms and questions   Negative: Pain or burning with passing urine (urination) and male   Negative: Pain or burning with passing urine (urination) and female   Negative: Constipation   Negative: New or worsening leg (calf, thigh) pain   Negative: New or worsening leg swelling   Negative: Dizziness is severe, or persists > 24 hours after surgery   Negative: Symptoms arising from use of a urinary catheter (Lee or Coude)   Negative: Cast problems or questions   Negative: Medication question   Negative: Bright red, wide-spread, sunburn-like rash   Negative: SEVERE headache and after spinal (epidural) anesthesia   Negative: Vomiting and persists > 4 hours   Negative: Vomiting and abdomen looks much more swollen than usual   Negative: Drinking very little and dehydration suspected (e.g., no urine > 12 hours, very dry mouth, very lightheaded)   Negative: Patient sounds very sick or weak to the triager   Negative: Sounds like a serious complication to the triager   Negative: Fever > 100.4 F (38.0 C)    Protocols used: Post-Op Symptoms and [id number]  Nan called to say she had surgery 01/17/2023 with Dr Jara (breast reduction, lymph node removal) on Thursday.  Taking clindamycin and pain medication.  States she always itching when she takes pain medication.  Now states she has " bumps going up my arm, with red rash on right arm, less so on the left arm."  Has taken benadryl for itching and says feels better.  She states the rash has tiny red bumps, not large nor looking like hives. Wants MD to know and ask MD if clindamycin may be a problem for her. No CP, no " SOB, no other swelling. Also reports some new swelling to her hands and wrists.  Encourage her to elevate her arms when resting, which she states she does.  Of note, she states she has not washed her arms since surgery; says betadine was applied above her elbows to her shoulders, not below her elbows.  She wants MD notified and call back with advice.  Message to Dr Jara clinic, as is open and request call back to Nan as soon as possible, per [facility] triage protocol.  Encouraged call back for any new or worsening symptoms. She said she will.

## 2023-01-20 ENCOUNTER — TELEPHONE (OUTPATIENT)
Dept: PLASTIC SURGERY | Facility: CLINIC | Age: 53
End: 2023-01-20
Payer: MEDICAID

## 2023-01-20 RX ORDER — FLUCONAZOLE 150 MG/1
150 TABLET ORAL ONCE
Qty: 1 TABLET | Refills: 0 | Status: SHIPPED | OUTPATIENT
Start: 2023-01-20 | End: 2023-01-20

## 2023-01-20 NOTE — TELEPHONE ENCOUNTER
Contacted pt regarding reaction rashes that have developed following surgery. Pt sent photos of her skin reaction as of today.  I asked patient to discontinue use of her antibiotic all together.  Pt has been using Benadryl within the last 12 hours but reported no relief yet. Pt verified her pharmacy of choice. Fluconazole was called in. Pt advised that Fluconazole was called in but not to avoid taking it with Xanax. This was stressed at least 5 times throughout our conversation the importance of not mixing this medication. Pt asked if there was someone she could call through the weekend should anything change.  Pt given the on call number, [phone] and told to ask for the Plastic Surgery fellow.  I advised patient again when ending the call of the severity of only taking the Fluconazole and waiting 24 hours before resuming her Xanax and she verbalized understanding.     Will follow up with patient on Monday.

## 2023-01-21 RX ORDER — OXYCODONE AND ACETAMINOPHEN 5; 325 MG/1; MG/1
1 TABLET ORAL EVERY 6 HOURS PRN
Qty: 14 TABLET | Refills: 0 | Status: SHIPPED | OUTPATIENT
Start: 2023-01-22 | End: 2023-02-15

## 2023-01-25 ENCOUNTER — OFFICE VISIT (OUTPATIENT)
Dept: PLASTIC SURGERY | Facility: CLINIC | Age: 53
End: 2023-01-25
Payer: MEDICAID

## 2023-01-25 VITALS
SYSTOLIC BLOOD PRESSURE: 152 MMHG | BODY MASS INDEX: 39.4 KG/M2 | HEART RATE: 70 BPM | DIASTOLIC BLOOD PRESSURE: 69 MMHG | WEIGHT: 260 LBS | HEIGHT: 68 IN

## 2023-01-25 DIAGNOSIS — Z09 SURGERY FOLLOW-UP EXAMINATION: Primary | ICD-10-CM

## 2023-01-25 LAB
FINAL PATHOLOGIC DIAGNOSIS: NORMAL
Lab: NORMAL

## 2023-01-25 PROCEDURE — 99215 OFFICE O/P EST HI 40 MIN: CPT | Mod: PBBFAC | Performed by: SURGERY

## 2023-01-25 PROCEDURE — 99999 PR PBB SHADOW E&M-EST. PATIENT-LVL V: CPT | Mod: PBBFAC,,, | Performed by: SURGERY

## 2023-01-25 PROCEDURE — 99999 PR PBB SHADOW E&M-EST. PATIENT-LVL V: ICD-10-PCS | Mod: PBBFAC,,, | Performed by: SURGERY

## 2023-01-25 PROCEDURE — 99024 POSTOP FOLLOW-UP VISIT: CPT | Mod: ,,, | Performed by: SURGERY

## 2023-01-25 PROCEDURE — 99024 PR POST-OP FOLLOW-UP VISIT: ICD-10-PCS | Mod: ,,, | Performed by: SURGERY

## 2023-01-25 NOTE — PROGRESS NOTES
"        Plastic Surgery Clinic Postop Visit    Subjective:      Nan Betts is a 52 y.o. year old female who presents to the Plastic Surgery Clinic on 01/25/2023 for follow up visit status post bilateral breast reduction.  States she has been having significant pain around her nipples.  Denies fever, chills, nausea, vomiting, or other systemic signs of infection.    Vitals:    01/25/23 1046   BP: (!) 152/69   Pulse: 70        Review of patient's allergies indicates:   Allergen Reactions    Ketorolac Anaphylaxis and Shortness Of Breath     Other reaction(s): Throat swelling, Unknown    Morphine Anaphylaxis    Reglan [metoclopramide hcl] Other (See Comments)     Headaches and insomnia      Tramadol Shortness Of Breath     Other reaction(s): Throat swelling, Unknown    Gabapentin Other (See Comments)     Causes suicidal thoughts        Ibuprofen Other (See Comments)     G L Bleeding        Nsaids (non-steroidal anti-inflammatory drug) Other (See Comments)     GI BLEEDING        Clindamycin     Metoclopramide Other (See Comments)     Headaches and insomnia          Topiramate Other (See Comments)     Ulcers in the mouth and dry mouth  per pt, gets "fog brain"            Corticosteroids (glucocorticoids) Nausea And Vomiting and Other (See Comments)     Acid reflux  Acid reflux  Increases acid reflux    Prednisone Other (See Comments)     Increases acid reflux       Current Outpatient Medications on File Prior to Visit   Medication Sig Dispense Refill    (Magic mouthwash) 1:1:1 diphenhydrAMINE(Benadryl) 12.5mg/5ml liq, aluminum & magnesium hydroxide-simethicone (Maalox), LIDOcaine viscous 2% Swish and spit 5 mLs every 4 (four) hours as needed (mouth sores). for mouth sores 240 mL 3    acetaminophen (TYLENOL) 500 MG tablet Take 1 tablet (500 mg total) by mouth every 6 (six) hours as needed for Pain. 28 tablet 0    ALPRAZolam (XANAX) 1 MG tablet Take 1 mg by mouth 2 (two) times daily as needed.       busPIRone (BUSPAR) 5 " MG Tab Take 5 mg by mouth 2 (two) times daily. 10 mg twice daily      [] clindamycin (CLEOCIN) 300 MG capsule Take 1 capsule (300 mg total) by mouth every 6 (six) hours. for 7 days 28 capsule 0    CREON 24,000-76,000 -120,000 unit capsule TAKE 1 CAPSULE BY MOUTH 3  TIMES DAILY WITH MEALS 500 capsule 3    diphenhydrAMINE (BENADRYL) 50 MG tablet Take 50 mg by mouth every 4 (four) hours as needed for Itching.      estradioL (ESTRACE) 2 MG tablet Take 1.5 tablets (3 mg total) by mouth once daily. (Patient taking differently: Take 3 mg by mouth every evening.) 135 tablet 3    flavoxATE (URISPAS) 100 mg Tab 1 tablet      fluticasone propionate (FLONASE) 50 mcg/actuation nasal spray 1 spray by Each Nostril route once daily.      fremanezumab-vfrm (AJOVY AUTOINJECTOR) 225 mg/1.5 mL autoinjector Inject 1.5 mLs (225 mg total) into the skin every 28 days. 1 each 10    LIDOcaine HCl 2% (LIDOCAINE VISCOUS) 2 % Soln SWISH AND SPIT 5 MLS BY MOUTH FOUR TIMES DAILY AS NEEDED FOR MOUTH SORES 80 mL 0    LIDOcaine HCl 2% (LIDOCAINE VISCOUS) 2 % Soln by Mucous Membrane route every 4 (four) hours. To mix with antacid 720 mL 1    loperamide (IMODIUM) 2 mg capsule TAKE 2 CAPSULES BY MOUTH  TWICE DAILY AS NEEDED 360 capsule 1    meclizine (ANTIVERT) 25 mg tablet Take by mouth 3 (three) times daily as needed.      methocarbamol (ROBAXIN-750 ORAL) Take by mouth.      metoprolol succinate (TOPROL-XL) 25 MG 24 hr tablet       oxyCODONE-acetaminophen (PERCOCET) 5-325 mg per tablet Take 1 tablet by mouth every 4 (four) hours as needed for Pain. 20 tablet 0    oxyCODONE-acetaminophen (PERCOCET) 5-325 mg per tablet Take 1 tablet by mouth every 6 (six) hours as needed for Pain. 14 tablet 0    pantoprazole (PROTONIX) 40 mg injection Inject 40 mg into the vein 2 (two) times a day. (Patient taking differently: Inject 40 mg into the vein 2 (two) times a day. Via Power-port Q4hrs//pt) 60 each 11    promethazine (PHENERGAN) 25 mg/mL injection  Inject 25 mg into the vein every 4 (four) hours.      simvastatin (ZOCOR) 40 MG tablet Take 40 mg by mouth every evening.       SODIUM CHLORIDE 0.45 % IV Inject 1,000 mLs into the vein as needed.       sodium chloride 0.9% SolP 100 mL with pantoprazole 40 mg SolR 40 mg Inject 40 mg into the vein every 12 (twelve) hours. 60 vial 0    sucralfate (CARAFATE) 1 gram tablet TAKE 1 TABLET BY MOUTH 4  TIMES DAILY BEFORE MEALS  AND AT NIGHT 360 tablet 3    tiZANidine (ZANAFLEX) 4 MG tablet Take 4 mg by mouth 3 (three) times daily.      triamcinolone acetonide 0.1% (KENALOG) 0.1 % ointment AAA hands bid - may occlude qhs 454 g 3     Current Facility-Administered Medications on File Prior to Visit   Medication Dose Route Frequency Provider Last Rate Last Admin    fentaNYL 50 mcg/mL injection  mcg   mcg Intravenous PRN Ford Pan MD        LIDOcaine (PF) 10 mg/ml (1%) injection 10 mg  1 mL Intradermal Once Wes Stahl MD        midazolam (VERSED) 1 mg/mL injection 0.5-4 mg  0.5-4 mg Intravenous PRN Wes Stahl MD   2 mg at 10/07/22 0754    midazolam (VERSED) 1 mg/mL injection 0.5-4 mg  0.5-4 mg Intravenous PRN Ford Pan MD           Patient Active Problem List   Diagnosis    GERD (gastroesophageal reflux disease)    Gastroparesis    Urge urinary incontinence    Interstitial cystitis    Anxiety    Vaginal atrophy    Vaginal pain    Dyspareunia    Intractable nausea and vomiting    Hypercholesteremia    Irritable bowel syndrome with diarrhea    Left lower quadrant pain    Diarrhea    Presence of gastric pacemaker    H/O pyloroplasty    Bloating    Epigastric pain    Colon polyp    Macromastia       Past Surgical History:   Procedure Laterality Date    APPENDECTOMY      BREAST BIOPSY Bilateral     CATARACT EXTRACTION      CHOLECYSTECTOMY      COLONOSCOPY      COLONOSCOPY N/A 7/31/2018    Procedure: COLONOSCOPY;  Surgeon: Zack Lehman MD;  Location: [facility] ([address] [place] [address]);  Service: Endoscopy;   Laterality: N/A;    CYSTOSCOPY N/A 8/25/2020    Procedure: CYSTOSCOPY;  Surgeon: Christal Khan MD;  Location: [facility] OR 1ST FLR;  Service: Urology;  Laterality: N/A;    ELBOW SURGERY      ENDOSCOPIC CARPAL TUNNEL RELEASE Right 10/7/2022    Procedure: RELEASE, CARPAL TUNNEL, ENDOSCOPIC;  Surgeon: William Laboy MD;  Location: [facility];  Service: Orthopedics;  Laterality: Right;    ESOPHAGOGASTRODUODENOSCOPY N/A 7/31/2018    Procedure: EGD (ESOPHAGOGASTRODUODENOSCOPY);  Surgeon: Zack Lehman MD;  Location: [facility] (4TH FLR);  Service: Endoscopy;  Laterality: N/A;  RUQ gastric pacemaker    Left upper arm PICC line    PONV    ESOPHAGOGASTRODUODENOSCOPY N/A 9/3/2019    Procedure: EGD (ESOPHAGOGASTRODUODENOSCOPY);  Surgeon: Zack Lehman MD;  Location: [facility] (4TH FLR);  Service: Endoscopy;  Laterality: N/A;  history of gastoparesis, per change in protocol, pt instructed to do full liquid diet x 3 days prior to procedure and clear liquids x 1 day prior to procedure-BB  pt has gastric pacemaker, monitored by Dr. Lehman-BB  hx of epilepsy, last seizure during chi    ESOPHAGOGASTRODUODENOSCOPY N/A 9/10/2020    Procedure: ESOPHAGOGASTRODUODENOSCOPY (EGD);  Surgeon: Zack Lehman MD;  Location: [facility] (4TH FLR);  Service: Endoscopy;  Laterality: N/A;  3 days Full liquid diet/ 1 day Clear liquids  waiting for Pt to cb with date - ERW  Left upper arm -  PICC  COVID screening 9/7/20 [place] [facility]- ERW    ESOPHAGOGASTRODUODENOSCOPY N/A 11/3/2021    Procedure: EGD (ESOPHAGOGASTRODUODENOSCOPY);  Surgeon: Zack Lehman MD;  Location: [facility] (4TH FLR);  Service: Endoscopy;  Laterality: N/A;  10/15 fully vaccinated as of 7/15/21; gastric pacemaker-pt does not have remote;  pt has a port does not want IV started; 3 days full liquid diet; instr. to portal-st    GASTRIC STIMULATOR IMPLANT SURGERY      left side on 03/30/2020    gastric stimulator placement      LASIK Bilateral 2006    MANDIBLE SURGERY Bilateral  10/17/2016    MYELOGRAPHY N/A 2022    Procedure: Myelogram lumbar;  Surgeon: Malia Surgeon;  Location[facility];  Service: Anesthesiology;  Laterality: N/A;    OOPHORECTOMY Bilateral     PYLOROPLASTY  2016    REDUCTION OF BOTH BREASTS Bilateral 2023    Procedure: MAMMOPLASTY, REDUCTION, BILATERAL;  Surgeon: Jan Jara MD;  Location: [address];  Service: Plastics;  Laterality: Bilateral;    STOMACH SURGERY      gastric pacemaker    TEMPOROMANDIBULAR JOINT SURGERY  2016    TONSILLECTOMY      TOTAL ABDOMINAL HYSTERECTOMY  2005    EUGENIA/BSO, Trachelectomy      UPPER GASTROINTESTINAL ENDOSCOPY      WRIST SURGERY         Social History     Socioeconomic History    Marital status:    Tobacco Use    Smoking status: Former     Packs/day: 1.00     Years: 20.00     Pack years: 20.00     Types: Cigarettes     Start date: 1986     Quit date: 2022     Years since quittin.4    Smokeless tobacco: Never   Substance and Sexual Activity    Alcohol use: No     Alcohol/week: 0.0 standard drinks    Drug use: No    Sexual activity: Yes     Partners: Male     Comment:  since            Review of Systems:   Constitutional: Denies fever/chills  Eyes: Denies change in vision  ENT: Denies sore throat or rhinorrhea   Respiratory: Denies shortness of breath or cough  Cardiovascular: Denies chest pain or palpitations  Gastrointestinal: Denies abdominal pain, nausea, or vomiting  Genitourinary: Denies dysuria and flank pain  Skin: Denies new rash or skin lesions   Allergic/Immunologic: Denies adverse reactions to current medications  Neurological: Denies headaches or dizziness  Musculoskeletal: Denies arthralgias      Objective:     Physical Exam:  Vitals:    23 1046   BP: (!) 152/69   Pulse: 70       WD WN NAD  VSS  Normal resp effort  Incisions at bilateral breasts appear c/d/i       Assessment:       No diagnosis found.    Plan:   52 y.o. female status post bilateral  breast reduction   - Doing well, no issues  - Bilateral drains removed today   - Return to clinic in 2 weeks.     Instructed to continue taking pain medications as prescribed and cautioned to avoid exceeding 3000mg of Tylenol every 24 hours.      All questions were answered. The patient was advised to call the clinic with any questions or concerns prior to their next visit.       RUSLAN Cope MD  Plastic and Reconstruction Surgery Department  [phone] office

## 2023-01-26 ENCOUNTER — TELEPHONE (OUTPATIENT)
Dept: PLASTIC SURGERY | Facility: CLINIC | Age: 53
End: 2023-01-26
Payer: MEDICAID

## 2023-01-27 DIAGNOSIS — R59.9 PALPABLE LYMPH NODE: Primary | ICD-10-CM

## 2023-01-27 RX ORDER — PREGABALIN 75 MG/1
75 CAPSULE ORAL 2 TIMES DAILY
Qty: 60 CAPSULE | Refills: 0 | Status: SHIPPED | OUTPATIENT
Start: 2023-01-27 | End: 2023-02-15

## 2023-01-28 ENCOUNTER — TELEPHONE (OUTPATIENT)
Dept: SURGERY | Facility: HOSPITAL | Age: 53
End: 2023-01-28
Payer: MEDICAID

## 2023-01-28 NOTE — TELEPHONE ENCOUNTER
Patient called with concern regarding breast swelling bilaterally and poorly controlled pain.  She reports her pain has improved slightly over the past few days, however, her swelling has worsened.  Says surgibra is too tight to wear and that she is now out of percocet to control pain.  She denies any fevers, chills, nausea, vomiting, or any other symptoms concerning for infection.    Patient was advised to continue wearing surgibra and to add padding as needed.  Also encouraged patient to continue taking tylenol, and if pain remains uncontrolled, present to emergency department for further evaluation.

## 2023-01-29 ENCOUNTER — PATIENT MESSAGE (OUTPATIENT)
Dept: PLASTIC SURGERY | Facility: CLINIC | Age: 53
End: 2023-01-29
Payer: MEDICAID

## 2023-01-29 ENCOUNTER — TELEPHONE (OUTPATIENT)
Dept: SURGERY | Facility: HOSPITAL | Age: 53
End: 2023-01-29
Payer: MEDICAID

## 2023-01-29 RX ORDER — METHOCARBAMOL 500 MG/1
500 TABLET, FILM COATED ORAL 2 TIMES DAILY PRN
Qty: 20 TABLET | Refills: 0 | Status: SHIPPED | OUTPATIENT
Start: 2023-01-29 | End: 2023-02-13

## 2023-01-29 NOTE — TELEPHONE ENCOUNTER
Ms. Betts is a patient of Dr. Jara s/p breast reduction 1/16. Patient called complaining of ongoing incisional pain and allergic reaction with hives. It looks like she was prescribed lyrica yesterday and had a reaction to it. She was seen in the ED this morning and was given benadryl, which she states has helped with her allergies in the past. She is allergic to several medications - states hydrocodone helps with pain. Hydrocodone is on her list of allergies with anaphylaxis. Prescribed robaxin instead and told pt to call clinic tomorrow for further management. Advised to go to ED if she experiences symptoms of anaphylaxis such as SOB.

## 2023-01-31 ENCOUNTER — PATIENT MESSAGE (OUTPATIENT)
Dept: PLASTIC SURGERY | Facility: CLINIC | Age: 53
End: 2023-01-31
Payer: MEDICAID

## 2023-02-02 ENCOUNTER — TELEPHONE (OUTPATIENT)
Dept: PLASTIC SURGERY | Facility: HOSPITAL | Age: 53
End: 2023-02-02
Payer: MEDICAID

## 2023-02-02 NOTE — TELEPHONE ENCOUNTER
Spoke with patient regarding her recent ER visit. Stressed to pt that she has hypersensitivity of the nipples. Advised pt that she can come ttoday if needed otherwise will see pt as scheduled.

## 2023-02-03 ENCOUNTER — OFFICE VISIT (OUTPATIENT)
Dept: PLASTIC SURGERY | Facility: CLINIC | Age: 53
End: 2023-02-03
Payer: MEDICAID

## 2023-02-03 ENCOUNTER — HOSPITAL ENCOUNTER (EMERGENCY)
Facility: HOSPITAL | Age: 53
Discharge: HOME | End: 2023-02-03
Attending: EMERGENCY MEDICINE
Payer: MEDICAID

## 2023-02-03 ENCOUNTER — PATIENT MESSAGE (OUTPATIENT)
Dept: PLASTIC SURGERY | Facility: CLINIC | Age: 53
End: 2023-02-03
Payer: MEDICAID

## 2023-02-03 VITALS
BODY MASS INDEX: 37.89 KG/M2 | SYSTOLIC BLOOD PRESSURE: 172 MMHG | DIASTOLIC BLOOD PRESSURE: 73 MMHG | HEART RATE: 76 BPM | TEMPERATURE: 98 F | WEIGHT: 250 LBS | HEIGHT: 68 IN | OXYGEN SATURATION: 96 % | RESPIRATION RATE: 20 BRPM

## 2023-02-03 VITALS
BODY MASS INDEX: 39.4 KG/M2 | HEIGHT: 68 IN | DIASTOLIC BLOOD PRESSURE: 87 MMHG | SYSTOLIC BLOOD PRESSURE: 144 MMHG | WEIGHT: 260 LBS | HEART RATE: 87 BPM

## 2023-02-03 DIAGNOSIS — L50.9 URTICARIA: ICD-10-CM

## 2023-02-03 DIAGNOSIS — Z09 SURGERY FOLLOW-UP EXAMINATION: Primary | ICD-10-CM

## 2023-02-03 DIAGNOSIS — M79.642 PAIN IN BOTH HANDS: Primary | ICD-10-CM

## 2023-02-03 DIAGNOSIS — M79.641 PAIN IN BOTH HANDS: Primary | ICD-10-CM

## 2023-02-03 LAB
ALBUMIN SERPL BCP-MCNC: 3.6 G/DL (ref 3.5–5.2)
ALP SERPL-CCNC: 91 U/L (ref 55–135)
ALT SERPL W/O P-5'-P-CCNC: 15 U/L (ref 10–44)
ANION GAP SERPL CALC-SCNC: 11 MMOL/L (ref 8–16)
AST SERPL-CCNC: 23 U/L (ref 10–40)
BASOPHILS # BLD AUTO: 0.04 K/UL (ref 0–0.2)
BASOPHILS NFR BLD: 0.3 % (ref 0–1.9)
BILIRUB SERPL-MCNC: 0.4 MG/DL (ref 0.1–1)
BUN SERPL-MCNC: 20 MG/DL (ref 6–20)
CALCIUM SERPL-MCNC: 9.5 MG/DL (ref 8.7–10.5)
CHLORIDE SERPL-SCNC: 106 MMOL/L (ref 95–110)
CO2 SERPL-SCNC: 23 MMOL/L (ref 23–29)
CREAT SERPL-MCNC: 0.8 MG/DL (ref 0.5–1.4)
DIFFERENTIAL METHOD: ABNORMAL
EOSINOPHIL # BLD AUTO: 0.4 K/UL (ref 0–0.5)
EOSINOPHIL NFR BLD: 2.7 % (ref 0–8)
ERYTHROCYTE [DISTWIDTH] IN BLOOD BY AUTOMATED COUNT: 12 % (ref 11.5–14.5)
EST. GFR  (NO RACE VARIABLE): >60 ML/MIN/1.73 M^2
GLUCOSE SERPL-MCNC: 101 MG/DL (ref 70–110)
HCT VFR BLD AUTO: 38.9 % (ref 37–48.5)
HGB BLD-MCNC: 12.7 G/DL (ref 12–16)
IMM GRANULOCYTES # BLD AUTO: 0.07 K/UL (ref 0–0.04)
IMM GRANULOCYTES NFR BLD AUTO: 0.5 % (ref 0–0.5)
LYMPHOCYTES # BLD AUTO: 4 K/UL (ref 1–4.8)
LYMPHOCYTES NFR BLD: 26.8 % (ref 18–48)
MCH RBC QN AUTO: 30 PG (ref 27–31)
MCHC RBC AUTO-ENTMCNC: 32.6 G/DL (ref 32–36)
MCV RBC AUTO: 92 FL (ref 82–98)
MONOCYTES # BLD AUTO: 0.8 K/UL (ref 0.3–1)
MONOCYTES NFR BLD: 5.4 % (ref 4–15)
NEUTROPHILS # BLD AUTO: 9.6 K/UL (ref 1.8–7.7)
NEUTROPHILS NFR BLD: 64.3 % (ref 38–73)
NRBC BLD-RTO: 0 /100 WBC
PLATELET # BLD AUTO: 333 K/UL (ref 150–450)
PMV BLD AUTO: 8.8 FL (ref 9.2–12.9)
POTASSIUM SERPL-SCNC: 4 MMOL/L (ref 3.5–5.1)
PROT SERPL-MCNC: 7 G/DL (ref 6–8.4)
RBC # BLD AUTO: 4.24 M/UL (ref 4–5.4)
SODIUM SERPL-SCNC: 140 MMOL/L (ref 136–145)
WBC # BLD AUTO: 14.95 K/UL (ref 3.9–12.7)

## 2023-02-03 PROCEDURE — 25000003 PHARM REV CODE 250: Performed by: STUDENT IN AN ORGANIZED HEALTH CARE EDUCATION/TRAINING PROGRAM

## 2023-02-03 PROCEDURE — 63600175 PHARM REV CODE 636 W HCPCS: Performed by: STUDENT IN AN ORGANIZED HEALTH CARE EDUCATION/TRAINING PROGRAM

## 2023-02-03 PROCEDURE — 85025 COMPLETE CBC W/AUTO DIFF WBC: CPT | Performed by: STUDENT IN AN ORGANIZED HEALTH CARE EDUCATION/TRAINING PROGRAM

## 2023-02-03 PROCEDURE — 99024 PR POST-OP FOLLOW-UP VISIT: ICD-10-PCS | Mod: ,,, | Performed by: SURGERY

## 2023-02-03 PROCEDURE — 99284 PR EMERGENCY DEPT VISIT,LEVEL IV: ICD-10-PCS | Mod: ,,, | Performed by: EMERGENCY MEDICINE

## 2023-02-03 PROCEDURE — 99284 EMERGENCY DEPT VISIT MOD MDM: CPT | Mod: ,,, | Performed by: EMERGENCY MEDICINE

## 2023-02-03 PROCEDURE — 96374 THER/PROPH/DIAG INJ IV PUSH: CPT

## 2023-02-03 PROCEDURE — 99024 POSTOP FOLLOW-UP VISIT: CPT | Mod: ,,, | Performed by: SURGERY

## 2023-02-03 PROCEDURE — 99999 PR PBB SHADOW E&M-EST. PATIENT-LVL III: CPT | Mod: PBBFAC,,, | Performed by: SURGERY

## 2023-02-03 PROCEDURE — 99284 EMERGENCY DEPT VISIT MOD MDM: CPT | Mod: 25,27

## 2023-02-03 PROCEDURE — 80053 COMPREHEN METABOLIC PANEL: CPT | Performed by: STUDENT IN AN ORGANIZED HEALTH CARE EDUCATION/TRAINING PROGRAM

## 2023-02-03 PROCEDURE — 99999 PR PBB SHADOW E&M-EST. PATIENT-LVL III: ICD-10-PCS | Mod: PBBFAC,,, | Performed by: SURGERY

## 2023-02-03 PROCEDURE — 96375 TX/PRO/DX INJ NEW DRUG ADDON: CPT

## 2023-02-03 PROCEDURE — 99213 OFFICE O/P EST LOW 20 MIN: CPT | Mod: PBBFAC,25,27 | Performed by: SURGERY

## 2023-02-03 RX ORDER — FAMOTIDINE 10 MG/ML
20 INJECTION INTRAVENOUS
Status: DISCONTINUED | OUTPATIENT
Start: 2023-02-03 | End: 2023-02-03 | Stop reason: HOSPADM

## 2023-02-03 RX ORDER — ONDANSETRON 2 MG/ML
4 INJECTION INTRAMUSCULAR; INTRAVENOUS
Status: COMPLETED | OUTPATIENT
Start: 2023-02-03 | End: 2023-02-03

## 2023-02-03 RX ORDER — ACETAMINOPHEN 500 MG
1000 TABLET ORAL
Status: COMPLETED | OUTPATIENT
Start: 2023-02-03 | End: 2023-02-03

## 2023-02-03 RX ORDER — METHYLPREDNISOLONE 4 MG/1
TABLET ORAL
Qty: 21 EACH | Refills: 0 | Status: SHIPPED | OUTPATIENT
Start: 2023-02-03 | End: 2023-02-11 | Stop reason: SDUPTHER

## 2023-02-03 RX ORDER — DIPHENHYDRAMINE HYDROCHLORIDE 50 MG/ML
25 INJECTION INTRAMUSCULAR; INTRAVENOUS
Status: COMPLETED | OUTPATIENT
Start: 2023-02-03 | End: 2023-02-03

## 2023-02-03 RX ORDER — PANTOPRAZOLE SODIUM 40 MG/1
40 TABLET, DELAYED RELEASE ORAL 2 TIMES DAILY
COMMUNITY
End: 2023-03-01 | Stop reason: ALTCHOICE

## 2023-02-03 RX ORDER — METHYLPREDNISOLONE 4 MG/1
TABLET ORAL
Qty: 21 EACH | Refills: 0 | Status: SHIPPED | OUTPATIENT
Start: 2023-02-03 | End: 2023-02-03 | Stop reason: SDUPTHER

## 2023-02-03 RX ORDER — METHYLPREDNISOLONE SOD SUCC 125 MG
125 VIAL (EA) INJECTION
Status: COMPLETED | OUTPATIENT
Start: 2023-02-03 | End: 2023-02-03

## 2023-02-03 RX ADMIN — DIPHENHYDRAMINE HYDROCHLORIDE 25 MG: 50 INJECTION, SOLUTION INTRAMUSCULAR; INTRAVENOUS at 10:02

## 2023-02-03 RX ADMIN — ACETAMINOPHEN 1000 MG: 500 TABLET ORAL at 11:02

## 2023-02-03 RX ADMIN — ONDANSETRON 4 MG: 2 INJECTION INTRAMUSCULAR; INTRAVENOUS at 10:02

## 2023-02-03 RX ADMIN — METHYLPREDNISOLONE SODIUM SUCCINATE 125 MG: 125 INJECTION, POWDER, FOR SOLUTION INTRAMUSCULAR; INTRAVENOUS at 10:02

## 2023-02-03 NOTE — ED NOTES
ER MD reports OK to discharge patient with 172/73 BP. Pt states she took her BP meds today. Pt instructed to monitor at home.

## 2023-02-03 NOTE — ED NOTES
Physician Note from Clinic this morning;                 Patient presents to Plastic surgery Clinic after having a bilateral breast reduction on 01/17/2023 she began having itching 2 days after surgery and she was told to stop all antibiotics.  At that time she was also given Lyrica.  She began having a reaction and immediately stopped taking the Lyrica although this is not probably what has caused her allergic reaction.  She re-presented to the emergency room at [facility] with a rash across her whole body.  She was given a steroid injection at that time 75 mg of Pepcid and told to take Benadryl every 4 hours.  Patient states that she was good for 2 days.  The several days later she re-presented to the emergency room again with systemic symptoms rash across her whole body.  She was given 8 mg of Decadron 80 of Pepcid and 50 mg of Benadryl IV.  This worked for 2 days.  Last night she developed the systemic rash swelling in the hands when she presented today she said she felt like her throat was beginning to close.  Physical examination shows that her incisions her breast reduction healing well with the exception of the T on the left breast which has about a 4 mm area that probably will breakdown.  The previous tape which some patients have been allergic to in the past was removed proximally 1 week ago.  She has a rash across her whole body with welts.  Her fingers are swollen.    We will send her to the emergency room for acute care.

## 2023-02-03 NOTE — PROGRESS NOTES
Patient presents to Plastic surgery Clinic after having a bilateral breast reduction on 01/17/2023 she began having itching 2 days after surgery and she was told to stop all antibiotics.  At that time she was also given Lyrica.  She began having a reaction and immediately stopped taking the Lyrica although this is not probably what has caused her allergic reaction.  She re-presented to the emergency room at [facility] with a rash across her whole body.  She was given a steroid injection at that time 75 mg of Pepcid and told to take Benadryl every 4 hours.  Patient states that she was good for 2 days.  The several days later she re-presented to the emergency room again with systemic symptoms rash across her whole body.  She was given 8 mg of Decadron 80 of Pepcid and 50 mg of Benadryl IV.  This worked for 2 days.  Last night she developed the systemic rash swelling in the hands when she presented today she said she felt like her throat was beginning to close.  Physical examination shows that her incisions her breast reduction healing well with the exception of the T on the left breast which has about a 4 mm area that probably will breakdown.  The previous tape which some patients have been allergic to in the past was removed proximally 1 week ago.  She has a rash across her whole body with welts.  Her fingers are swollen.    We will send her to the emergency room for acute care.

## 2023-02-05 NOTE — ED PROVIDER NOTES
"Encounter Date: 2/3/2023       History     Chief Complaint   Patient presents with    Allergic Reaction     Pt states that she had breast reduction surgery Jan 17/ since, has experienced an allergic reaction (states patient)--- been taking Benadryl and Pepcid at home---has also received steroids and IV benadryl and IV pepcid (this past Tuesday)--- had relief for 3 days/ now throat feels tight, hives covering body, hand pain, itching     Patient is a 52-year-old female with a past medical history of anxiety, hypertension, hyperlipidemia and GERD presenting to the ED for an allergic reaction.  She reports that she has been having an allergic reaction intermittently since a bilateral breast reduction surgery on 01/17/2023.  She has been seeing a couple of times in the ED for the same complaint, when she has been given Benadryl, Pepcid and Solu-Medrol.  She does have improvement following the steroids, has relapsed multiple times and does not have any known environmental allergens.  No changes to any clothing, but sheets, soaps or detergents.  There was no chest pain, shortness of breath, abdominal pain, N/V/D or sensation of airway closing.    Review of patient's allergies indicates:   Allergen Reactions    Ketorolac Anaphylaxis and Shortness Of Breath     Other reaction(s): Throat swelling, Unknown    Morphine Anaphylaxis    Reglan [metoclopramide hcl] Other (See Comments)     Headaches and insomnia      Tramadol Shortness Of Breath     Other reaction(s): Throat swelling, Unknown    Gabapentin Other (See Comments)     Causes suicidal thoughts        Ibuprofen Other (See Comments)     G L Bleeding        Nsaids (non-steroidal anti-inflammatory drug) Other (See Comments)     GI BLEEDING        Clindamycin     Metoclopramide Other (See Comments)     Headaches and insomnia          Topiramate Other (See Comments)     Ulcers in the mouth and dry mouth  per pt, gets "fog brain"             Past Medical History:   Diagnosis " Date    Abnormal Pap smear of vagina     Anxiety     Cataract     Chronic pain     TMJ and abdomen    Colon polyp     Epilepsy     as a child    Essential (primary) hypertension     Gastroparesis     Idiopathic    GERD (gastroesophageal reflux disease)     H/O abnormal cervical Papanicolaou smear     Hypercholesteremia 06/21/2016    Hyperlipidemia     IC (interstitial cystitis)     Internal hemorrhoids     Interstitial cystitis     Irritable bowel syndrome (IBS)     Irritable bowel syndrome with diarrhea 06/21/2016    Rapid heart rate     Spastic colon     TMJ (temporomandibular joint disorder)     TMJ (temporomandibular joint syndrome) 06/21/2016     Past Surgical History:   Procedure Laterality Date    APPENDECTOMY      BREAST BIOPSY Bilateral     CATARACT EXTRACTION      CHOLECYSTECTOMY      COLONOSCOPY      COLONOSCOPY N/A 7/31/2018    Procedure: COLONOSCOPY;  Surgeon: Zack Lehman MD;  Location: [facility] (4TH FLR);  Service: Endoscopy;  Laterality: N/A;    CYSTOSCOPY N/A 8/25/2020    Procedure: CYSTOSCOPY;  Surgeon: Christal Khan MD;  [address];  Service: Urology;  Laterality: N/A;    ELBOW SURGERY      ENDOSCOPIC CARPAL TUNNEL RELEASE Right 10/7/2022    Procedure: RELEASE, CARPAL TUNNEL, ENDOSCOPIC;  Surgeon: William Laboy MD;  Location: [facility];  Service: Orthopedics;  Laterality: Right;    ESOPHAGOGASTRODUODENOSCOPY N/A 7/31/2018    Procedure: EGD (ESOPHAGOGASTRODUODENOSCOPY);  Surgeon: Zack Lehman MD;  Location: [facility] (4TH FLR);  Service: Endoscopy;  Laterality: N/A;  RUQ gastric pacemaker    Left upper arm PICC line    PONV    ESOPHAGOGASTRODUODENOSCOPY N/A 9/3/2019    Procedure: EGD (ESOPHAGOGASTRODUODENOSCOPY);  Surgeon: Zack Lehman MD;  Location: [facility] (4TH FLR);  Service: Endoscopy;  Laterality: N/A;  history of gastoparesis, per change in protocol, pt instructed to do full liquid diet x 3 days prior to procedure and clear liquids x 1 day prior to procedure-BB  pt  has gastric pacemaker, monitored by Dr. Lehman-BB  hx of epilepsy, last seizure during chi    ESOPHAGOGASTRODUODENOSCOPY N/A 9/10/2020    Procedure: ESOPHAGOGASTRODUODENOSCOPY (EGD);  Surgeon: Zack Lehman MD;  Location: [facility] (4TH FLR);  Service: Endoscopy;  Laterality: N/A;  3 days Full liquid diet/ 1 day Clear liquids  waiting for Pt to cb with date - ERW  Left upper arm -  PICC  COVID screening 9/7/20 [facility]- [facility]    ESOPHAGOGASTRODUODENOSCOPY N/A 11/3/2021    Procedure: EGD (ESOPHAGOGASTRODUODENOSCOPY);  Surgeon: Zack Lehman MD;  Location: [facility] (4TH FLR);  Service: Endoscopy;  Laterality: N/A;  10/15 fully vaccinated as of 7/15/21; gastric pacemaker-pt does not have remote;  pt has a port does not want IV started; 3 days full liquid diet; instr. to portal-st    GASTRIC STIMULATOR IMPLANT SURGERY      left side on 03/30/2020    gastric stimulator placement      LASIK Bilateral 2006    MANDIBLE SURGERY Bilateral 10/17/2016    MYELOGRAPHY N/A 8/22/2022    Procedure: Myelogram lumbar;  Surgeon: Malia Surgeon;  Location: [facility];  Service: Anesthesiology;  Laterality: N/A;    OOPHORECTOMY Bilateral     PYLOROPLASTY  03/2016    REDUCTION OF BOTH BREASTS Bilateral 1/17/2023    Procedure: MAMMOPLASTY, REDUCTION, BILATERAL;  Surgeon: Jan Jara MD;  [address];  Service: Plastics;  Laterality: Bilateral;    STOMACH SURGERY      gastric pacemaker    TEMPOROMANDIBULAR JOINT SURGERY  12/2016    TONSILLECTOMY      TOTAL ABDOMINAL HYSTERECTOMY  2005    EUGENIA/BSO, Trachelectomy  2012    UPPER GASTROINTESTINAL ENDOSCOPY      WRIST SURGERY       Family History   Problem Relation Age of Onset    Coronary artery disease Mother     Hodgkin's lymphoma Mother     Hypertension Mother     Coronary artery disease Father 43    Hypertension Father     Heart attacks under age 50 Father     Lymphoma Sister     Coronary artery disease Paternal Grandfather     Stroke Paternal Grandfather      Glaucoma Maternal Grandmother     Pancreatic cancer Paternal Aunt     Cancer Paternal Aunt         pancreatic    Stroke Maternal Grandfather     Colon cancer Neg Hx     Breast cancer Neg Hx     Ovarian cancer Neg Hx     Cervical cancer Neg Hx     Endometrial cancer Neg Hx     Vaginal cancer Neg Hx     Diabetes Neg Hx      Social History     Tobacco Use    Smoking status: Former     Packs/day: 1.00     Years: 20.00     Pack years: 20.00     Types: Cigarettes     Start date: 1986     Quit date: 2022     Years since quittin.4    Smokeless tobacco: Never   Substance Use Topics    Alcohol use: No     Alcohol/week: 0.0 standard drinks    Drug use: No     Review of Systems    Physical Exam     Initial Vitals [23 0956]   BP Pulse Resp Temp SpO2   (!) 169/85 76 20 98.4 °F (36.9 °C) 99 %      MAP       --         Physical Exam    Nursing note and vitals reviewed.  Constitutional: She appears well-developed. She is not diaphoretic. She is Obese . No distress.   Well-appearing.  Speaking full sentences.  No acute distress.   HENT:   Head: Normocephalic and atraumatic.   Right Ear: External ear normal.   Left Ear: External ear normal.   There is no oropharyngeal edema   Neck: Neck supple.   Cardiovascular:  Normal rate, regular rhythm, normal heart sounds and intact distal pulses.           Pulmonary/Chest: Breath sounds normal. No respiratory distress. She has no wheezes. She has no rhonchi. She has no rales.   Breath sounds are clear to auscultation bilaterally   Abdominal: Abdomen is soft. She exhibits no distension. There is no abdominal tenderness. There is no rebound and no guarding.   Musculoskeletal:      Cervical back: Neck supple.     Neurological: She is alert and oriented to person, place, and time. GCS score is 15. GCS eye subscore is 4. GCS verbal subscore is 5. GCS motor subscore is 6.   Skin: Skin is warm. Capillary refill takes less than 2 seconds. Rash noted. Rash is urticarial.   There is  "generalized erythema to bilateral forearms circumferentially.  She has urticaria on her trunk.   Psychiatric: She has a normal mood and affect.       ED Course   Procedures  Labs Reviewed   CBC W/ AUTO DIFFERENTIAL - Abnormal; Notable for the following components:       Result Value    WBC 14.95 (*)     MPV 8.8 (*)     Gran # (ANC) 9.6 (*)     Immature Grans (Abs) 0.07 (*)     All other components within normal limits   COMPREHENSIVE METABOLIC PANEL          Imaging Results    None          Medications   diphenhydrAMINE injection 25 mg (25 mg Intravenous Given 2/3/23 1039)   methylPREDNISolone sodium succinate injection 125 mg (125 mg Intravenous Given 2/3/23 1038)   ondansetron injection 4 mg (4 mg Intravenous Given 2/3/23 1038)   acetaminophen tablet 1,000 mg (1,000 mg Oral Given 2/3/23 1133)     Medical Decision Making:   History:   Old Medical Records: I decided to obtain old medical records.  Initial Assessment:   Emergent evaluation of an allergic reaction.  She is afebrile and hemodynamically stable.  Differential Diagnosis:   Allergic reaction, unlikely anaphylaxis, unlikely angioedema  Clinical Tests:   Lab Tests: Ordered and Reviewed  ED Management:  Labs are relatively unremarkable.  Leukocytosis likely secondary to her steroid use.  She declined IV Pepcid here, as she was told that her surgeon told her it would "make her worse".  Was agreeable to IV Benadryl and Solu-Medrol.  Improved on reassessment.  Plan to discharge her with Medrol Dosepak.  Discussed strict ED return precautions, and she expressed understanding.                        Clinical Impression:   Final diagnoses:  [M79.641, M79.642] Pain in both hands (Primary)  [L50.9] Urticaria        ED Disposition Condition    Discharge Stable          ED Prescriptions       Medication Sig Dispense Start Date End Date Auth. Provider    methylPREDNISolone (MEDROL DOSEPACK) 4 mg tablet  (Status: Discontinued) use as directed 21 each 2/3/2023 2/3/2023 " Aniket Meadows MD    methylPREDNISolone (MEDROL DOSEPACK) 4 mg tablet use as directed 21 each 2/3/2023 2/24/2023 Aniket Meadosw MD          Follow-up Information       Follow up With Specialties Details Why Contact Info Additional Information    [facility] - D[facility]at[facility] Schedule an appointment as soon as possible for a visit in 1 day If symptoms persist [address] [facility] [address] [phone] Dermatology - Main Building, Clinic 11th Floor Please park in [address]. Use Clinic elevators 12 & 13 to get to the 11th floor    [id number] Rheumatology Call in 1 day  [address] [facility] [address] [phone] [facility] - Main Building, 5th Floor Please park in [address] and use Atrium elevator    [facility] Int Med Primary Care [facility] Internal Medicine Call   [address] [facility] [address] [phone] [facility] Please park in surface lot and check in at central registration desk             Aniket Meadows MD  Resident  02/05/23 0110

## 2023-02-11 ENCOUNTER — PATIENT MESSAGE (OUTPATIENT)
Dept: PLASTIC SURGERY | Facility: CLINIC | Age: 53
End: 2023-02-11
Payer: MEDICAID

## 2023-02-11 RX ORDER — METHYLPREDNISOLONE 4 MG/1
TABLET ORAL
Qty: 21 EACH | Refills: 0 | Status: SHIPPED | OUTPATIENT
Start: 2023-02-11 | End: 2023-03-04

## 2023-02-13 ENCOUNTER — TELEPHONE (OUTPATIENT)
Dept: DERMATOLOGY | Facility: CLINIC | Age: 53
End: 2023-02-13
Payer: MEDICAID

## 2023-02-13 DIAGNOSIS — R21 EXCORIATED RASH: Primary | ICD-10-CM

## 2023-02-13 NOTE — TELEPHONE ENCOUNTER
----- Message from Mary Stout sent at 2/13/2023  4:21 PM CST -----  Contact: self @ [phone]  Pt was seen in the ED on 2-3-23.  Pt was given a referral to f/u with Derm.  She is requesting an appt on Wednesday 2-15-23 because she will be here to see Dr Jara and she lives in [facility].

## 2023-02-14 ENCOUNTER — PATIENT MESSAGE (OUTPATIENT)
Dept: PLASTIC SURGERY | Facility: CLINIC | Age: 53
End: 2023-02-14
Payer: MEDICAID

## 2023-02-15 ENCOUNTER — TELEPHONE (OUTPATIENT)
Dept: ALLERGY | Facility: CLINIC | Age: 53
End: 2023-02-15
Payer: MEDICAID

## 2023-02-15 ENCOUNTER — OFFICE VISIT (OUTPATIENT)
Dept: ALLERGY | Facility: CLINIC | Age: 53
End: 2023-02-15
Payer: MEDICAID

## 2023-02-15 ENCOUNTER — PATIENT MESSAGE (OUTPATIENT)
Dept: PLASTIC SURGERY | Facility: CLINIC | Age: 53
End: 2023-02-15

## 2023-02-15 ENCOUNTER — OFFICE VISIT (OUTPATIENT)
Dept: PLASTIC SURGERY | Facility: CLINIC | Age: 53
End: 2023-02-15
Payer: MEDICAID

## 2023-02-15 VITALS
DIASTOLIC BLOOD PRESSURE: 86 MMHG | BODY MASS INDEX: 38.04 KG/M2 | WEIGHT: 251 LBS | HEIGHT: 68 IN | SYSTOLIC BLOOD PRESSURE: 155 MMHG | OXYGEN SATURATION: 98 % | HEART RATE: 62 BPM

## 2023-02-15 VITALS
WEIGHT: 250 LBS | HEART RATE: 57 BPM | DIASTOLIC BLOOD PRESSURE: 79 MMHG | SYSTOLIC BLOOD PRESSURE: 169 MMHG | BODY MASS INDEX: 37.89 KG/M2 | HEIGHT: 68 IN

## 2023-02-15 DIAGNOSIS — Z09 SURGERY FOLLOW-UP EXAMINATION: Primary | ICD-10-CM

## 2023-02-15 DIAGNOSIS — L25.9 CONTACT DERMATITIS, UNSPECIFIED CONTACT DERMATITIS TYPE, UNSPECIFIED TRIGGER: Primary | ICD-10-CM

## 2023-02-15 PROCEDURE — 99999 PR PBB SHADOW E&M-EST. PATIENT-LVL V: ICD-10-PCS | Mod: PBBFAC,,, | Performed by: ALLERGY & IMMUNOLOGY

## 2023-02-15 PROCEDURE — 99999 PR PBB SHADOW E&M-EST. PATIENT-LVL V: CPT | Mod: PBBFAC,,, | Performed by: ALLERGY & IMMUNOLOGY

## 2023-02-15 PROCEDURE — 99203 PR OFFICE/OUTPT VISIT, NEW, LEVL III, 30-44 MIN: ICD-10-PCS | Mod: S$PBB,,, | Performed by: ALLERGY & IMMUNOLOGY

## 2023-02-15 PROCEDURE — 99999 PR PBB SHADOW E&M-EST. PATIENT-LVL V: CPT | Mod: PBBFAC,,, | Performed by: SURGERY

## 2023-02-15 PROCEDURE — 99024 POSTOP FOLLOW-UP VISIT: CPT | Mod: ,,, | Performed by: SURGERY

## 2023-02-15 PROCEDURE — 99215 OFFICE O/P EST HI 40 MIN: CPT | Mod: PBBFAC | Performed by: ALLERGY & IMMUNOLOGY

## 2023-02-15 PROCEDURE — 99024 PR POST-OP FOLLOW-UP VISIT: ICD-10-PCS | Mod: ,,, | Performed by: SURGERY

## 2023-02-15 PROCEDURE — 99999 PR PBB SHADOW E&M-EST. PATIENT-LVL V: ICD-10-PCS | Mod: PBBFAC,,, | Performed by: SURGERY

## 2023-02-15 PROCEDURE — 99203 OFFICE O/P NEW LOW 30 MIN: CPT | Mod: S$PBB,,, | Performed by: ALLERGY & IMMUNOLOGY

## 2023-02-15 PROCEDURE — 99215 OFFICE O/P EST HI 40 MIN: CPT | Mod: PBBFAC,27 | Performed by: SURGERY

## 2023-02-15 RX ORDER — BUTYROSPERMUM PARKII(SHEA BUTTER), SIMMONDSIA CHINENSIS (JOJOBA) SEED OIL, ALOE BARBADENSIS LEAF EXTRACT .01; 1; 3.5 G/100G; G/100G; G/100G
250 LIQUID TOPICAL 2 TIMES DAILY
COMMUNITY

## 2023-02-15 RX ORDER — PREDNISONE 10 MG/1
10 TABLET ORAL 2 TIMES DAILY
Qty: 60 TABLET | Refills: 1 | Status: SHIPPED | OUTPATIENT
Start: 2023-02-15 | End: 2023-04-12 | Stop reason: SDUPTHER

## 2023-02-15 NOTE — PROGRESS NOTES
"ALLERGY & IMMUNOLOGY CLINIC     HISTORY OF PRESENT ILLNESS     Patient ID: Nan Betts is a 52 y.o. female    Referral from: Dr. Jan MARTINEZ Baby*  CC:   Chief Complaint   Patient presents with    Other     Hives/rash after breast surgery on 1/17/23.  Has continued to be on steroids and benadryl and Pepcid BID 40mg.       HPI: Nan Betts is a 52 y.o. female who underwent breast reduction surgery January 17th. January 19th developed hives and deep red color of some skin (photos seen on telephone) and by that evening blisters forming on the shoulders and lesser extend on arms. All lesions intesnly itchy.    Next day arms and hands, ankles and feet began to swell. Turned red then purple by 22nd. This was initially itchy but then turned painful. Hurt so bad that it was hard to drive because of pressure on hands on steering wheel.    Went to ER on the 29th in [place]. Gave her steroid injection which helped for two days. Also 40mg of antiacid, famotidine. After 2 days, all symptoms returned.  Called plastic surgery     Back to ED on the 31st. Gave another steroid injection and benadryl injection. This helped for another two days then all symptoms back.     Driving to [facility] on February 3rd, help Benadryl for the drive and felt as though her throat was swelling. Was again at her worst. Labs drawn.      Took lyrica because of nipple pain. Symptoms continued to worsen, and lyrica discontinued in case it was making things worse. Took antibiotics for surgery but symptoms progressed after discontinuation.     PMH:   Ivory soap: causes "big red blotches" all over her body. Itches a little and disappears within an hour or two. Inhaled soap at work due to broken powder and this led to ED visit for problems breathing and skin rash, intensely itchy. Injections given on three consecutive days for this.     Shampoo (unknown type) from [employer], used to bathe dog. Next day swollen face, red, itchy.     Drug Allergies: " "  Review of patient's allergies indicates:   Allergen Reactions    Ketorolac Anaphylaxis and Shortness Of Breath     Other reaction(s): Throat swelling, Unknown    Morphine Anaphylaxis    Reglan [metoclopramide hcl] Other (See Comments)     Headaches and insomnia      Tramadol Shortness Of Breath     Other reaction(s): Throat swelling, Unknown    Gabapentin Other (See Comments)     Causes suicidal thoughts        Ibuprofen Other (See Comments)     G L Bleeding        Nsaids (non-steroidal anti-inflammatory drug) Other (See Comments)     GI BLEEDING        Clindamycin     Metoclopramide Other (See Comments)     Headaches and insomnia          Topiramate Other (See Comments)     Ulcers in the mouth and dry mouth  per pt, gets "fog brain"               MEDICAL HISTORY     MedHx:   Patient Active Problem List   Diagnosis    GERD (gastroesophageal reflux disease)    Gastroparesis    Urge urinary incontinence    Interstitial cystitis    Anxiety    Vaginal atrophy    Vaginal pain    Dyspareunia    Intractable nausea and vomiting    Hypercholesteremia    Irritable bowel syndrome with diarrhea    Left lower quadrant pain    Diarrhea    Presence of gastric pacemaker    H/O pyloroplasty    Bloating    Epigastric pain    Colon polyp    Macromastia       Medications:   Current Outpatient Medications on File Prior to Visit   Medication Sig Dispense Refill    (Magic mouthwash) 1:1:1 diphenhydrAMINE(Benadryl) 12.5mg/5ml liq, aluminum & magnesium hydroxide-simethicone (Maalox), LIDOcaine viscous 2% Swish and spit 5 mLs every 4 (four) hours as needed (mouth sores). for mouth sores 240 mL 3    ALPRAZolam (XANAX) 1 MG tablet Take 1 mg by mouth 2 (two) times daily as needed.       busPIRone (BUSPAR) 5 MG Tab Take 5 mg by mouth 2 (two) times daily. 10 mg twice daily      CREON 24,000-76,000 -120,000 unit capsule TAKE 1 CAPSULE BY MOUTH 3  TIMES DAILY WITH MEALS 500 capsule 3    diphenhydrAMINE (BENADRYL) 50 MG tablet Take 50 mg by mouth " every 4 (four) hours as needed for Itching.      estradioL (ESTRACE) 2 MG tablet Take 1.5 tablets (3 mg total) by mouth once daily. (Patient taking differently: Take 3 mg by mouth every evening.) 135 tablet 3    flavoxATE (URISPAS) 100 mg Tab 1 tablet      loperamide (IMODIUM) 2 mg capsule TAKE 2 CAPSULES BY MOUTH  TWICE DAILY AS NEEDED 360 capsule 1    meclizine (ANTIVERT) 25 mg tablet Take by mouth 3 (three) times daily as needed.      methylPREDNISolone (MEDROL DOSEPACK) 4 mg tablet use as directed 21 each 0    metoprolol succinate (TOPROL-XL) 25 MG 24 hr tablet       promethazine (PHENERGAN) 25 mg/mL injection Inject 25 mg into the vein every 4 (four) hours.      Saccharomyces boulardii (FLORASTOR) 250 mg capsule Take 250 mg by mouth 2 (two) times daily.      simvastatin (ZOCOR) 40 MG tablet Take 40 mg by mouth every evening.       SODIUM CHLORIDE 0.45 % IV Inject 1,000 mLs into the vein as needed.       sodium chloride 0.9% SolP 100 mL with pantoprazole 40 mg SolR 40 mg Inject 40 mg into the vein every 12 (twelve) hours. 60 vial 0    sucralfate (CARAFATE) 1 gram tablet TAKE 1 TABLET BY MOUTH 4  TIMES DAILY BEFORE MEALS  AND AT NIGHT 360 tablet 3    tiZANidine (ZANAFLEX) 4 MG tablet Take 4 mg by mouth 3 (three) times daily.      acetaminophen (TYLENOL) 500 MG tablet Take 1 tablet (500 mg total) by mouth every 6 (six) hours as needed for Pain. (Patient not taking: Reported on 2/15/2023) 28 tablet 0    fluticasone propionate (FLONASE) 50 mcg/actuation nasal spray 1 spray by Each Nostril route once daily.      LIDOcaine HCl 2% (LIDOCAINE VISCOUS) 2 % Soln SWISH AND SPIT 5 MLS BY MOUTH FOUR TIMES DAILY AS NEEDED FOR MOUTH SORES (Patient not taking: Reported on 2/15/2023) 80 mL 0    LIDOcaine HCl 2% (LIDOCAINE VISCOUS) 2 % Soln by Mucous Membrane route every 4 (four) hours. To mix with antacid (Patient not taking: Reported on 2/15/2023) 720 mL 1    methocarbamol (ROBAXIN-750 ORAL) Take by mouth.      pantoprazole  (PROTONIX) 40 MG tablet Take 40 mg by mouth 2 (two) times daily. Give IV      triamcinolone acetonide 0.1% (KENALOG) 0.1 % ointment AAA hands bid - may occlude qhs (Patient not taking: Reported on 2/15/2023) 454 g 3    [DISCONTINUED] fremanezumab-vfrm (AJOVY AUTOINJECTOR) 225 mg/1.5 mL autoinjector Inject 1.5 mLs (225 mg total) into the skin every 28 days. 1 each 10    [DISCONTINUED] oxyCODONE-acetaminophen (PERCOCET) 5-325 mg per tablet Take 1 tablet by mouth every 4 (four) hours as needed for Pain. 20 tablet 0    [DISCONTINUED] oxyCODONE-acetaminophen (PERCOCET) 5-325 mg per tablet Take 1 tablet by mouth every 6 (six) hours as needed for Pain. 14 tablet 0    [DISCONTINUED] pregabalin (LYRICA) 75 MG capsule Take 1 capsule (75 mg total) by mouth 2 (two) times daily. If you experience worsening of depressed mood or suicidal ideation, please contact your primary care physician or present to emergency department for further treatment. 60 capsule 0     Current Facility-Administered Medications on File Prior to Visit   Medication Dose Route Frequency Provider Last Rate Last Admin    fentaNYL 50 mcg/mL injection  mcg   mcg Intravenous PRN Ford Pan MD        LIDOcaine (PF) 10 mg/ml (1%) injection 10 mg  1 mL Intradermal Once Wes Stahl MD        midazolam (VERSED) 1 mg/mL injection 0.5-4 mg  0.5-4 mg Intravenous PRN Wes Stahl MD   2 mg at 10/07/22 0754    midazolam (VERSED) 1 mg/mL injection 0.5-4 mg  0.5-4 mg Intravenous PRN Ford Pan MD           SurgHx:  Past Surgical History:   Procedure Laterality Date    APPENDECTOMY      BREAST BIOPSY Bilateral     CATARACT EXTRACTION      CHOLECYSTECTOMY      COLONOSCOPY      COLONOSCOPY N/A 7/31/2018    Procedure: COLONOSCOPY;  Surgeon: Zack Lehman MD;  [facility] F[facility]);  Service: Endoscopy;  Laterality: N/A;    CYSTOSCOPY N/A 8/25/2020    Procedure: CYSTOSCOPY;  Surgeon: Christal Khan MD;  [address]   Service: Urology;  Laterality: N/A;    ELBOW SURGERY      ENDOSCOPIC CARPAL TUNNEL RELEASE Right 10/7/2022    Procedure: RELEASE, CARPAL TUNNEL, ENDOSCOPIC;  Surgeon: William Laboy MD;  Location: [facility] OR;  Service: Orthopedics;  Laterality: Right;    ESOPHAGOGASTRODUODENOSCOPY N/A 7/31/2018    Procedure: EGD (ESOPHAGOGASTRODUODENOSCOPY);  Surgeon: Zack Lehman MD;  Location: [facility] (4TH FLR);  Service: Endoscopy;  Laterality: N/A;  RUQ gastric pacemaker    Left upper arm PICC line    PONV    ESOPHAGOGASTRODUODENOSCOPY N/A 9/3/2019    Procedure: EGD (ESOPHAGOGASTRODUODENOSCOPY);  Surgeon: Zack Lehman MD;  Location: [facility] (4TH FLR);  Service: Endoscopy;  Laterality: N/A;  history of gastoparesis, per change in protocol, pt instructed to do full liquid diet x 3 days prior to procedure and clear liquids x 1 day prior to procedure-BB  pt has gastric pacemaker, monitored by Dr. Lehman-BB  hx of epilepsy, last seizure during chi    ESOPHAGOGASTRODUODENOSCOPY N/A 9/10/2020    Procedure: ESOPHAGOGASTRODUODENOSCOPY (EGD);  Surgeon: Zack Lehman MD;  Location: [facility] (4TH FLR);  Service: Endoscopy;  Laterality: N/A;  3 days Full liquid diet/ 1 day Clear liquids  waiting for Pt to cb with date - ERW  Left upper arm -  PICC  COVID screening 9/7/20 [facility]- [facility]    ESOPHAGOGASTRODUODENOSCOPY N/A 11/3/2021    Procedure: EGD (ESOPHAGOGASTRODUODENOSCOPY);  Surgeon: Zack Lehman MD;  Location: [facility] (4TH FLR);  Service: Endoscopy;  Laterality: N/A;  10/15 fully vaccinated as of 7/15/21; gastric pacemaker-pt does not have remote;  pt has a port does not want IV started; 3 days full liquid diet; instr. to portal-st    GASTRIC STIMULATOR IMPLANT SURGERY      left side on 03/30/2020    gastric stimulator placement      LASIK Bilateral 2006    MANDIBLE SURGERY Bilateral 10/17/2016    MYELOGRAPHY N/A 8/22/2022    Procedure: Myelogram lumbar;  Surgeon: Malia Surgeon;  Location: [facility];  Service:  "Anesthesiology;  Laterality: N/A;    OOPHORECTOMY Bilateral     PYLOROPLASTY  03/2016    REDUCTION OF BOTH BREASTS Bilateral 1/17/2023    Procedure: MAMMOPLASTY, REDUCTION, BILATERAL;  Surgeon: Jan Jara MD;  Location: [facility] OR [address] [place] [address];  Service: Plastics;  Laterality: Bilateral;    STOMACH SURGERY      gastric pacemaker    TEMPOROMANDIBULAR JOINT SURGERY  12/2016    TONSILLECTOMY      TOTAL ABDOMINAL HYSTERECTOMY  2005    EUGENIA/BSO, Trachelectomy  2012    UPPER GASTROINTESTINAL ENDOSCOPY      WRIST SURGERY          PHYSICAL EXAM     VS: BP (!) 155/86   Pulse 62   Ht 5' 8" (1.727 m)   Wt 113.9 kg (251 lb)   SpO2 98%   BMI 38.16 kg/m²   GENERAL: NAD, well-appearing, cooperative  EYES: no conjunctival injection, no discharge, no infraorbital shiners  LUNGS: no increased WOB  DERM: Very faint erythema at wrists and elsewhere consistent with sites of reaction seen in photographs. But skin is well hydrated, no blisters or significant sign of residual disease. N     ASSESSMENT & PLAN     Nan Betts is a 52 y.o. female with     Contact dermatitis, unspecified contact dermatitis type, unspecified trigger  -     predniSONE (DELTASONE) 10 MG tablet; Take 1 tablet (10 mg total) by mouth 2 (two) times daily.  Dispense: 60 tablet; Refill: 1    We reviewed the expected time course of a delayed type hypersensitivity reaction: starting 1-2 days after an exposure (probably during surgery, possibly a detergent/soap?) with haptenation of agent into the skin. Reactions will continue until completion of the skin cycle removing the antigen, generally 4-6 weeks. She is most of the way through this now. Severity on any given day will wax/wane, but over the course of weeks should be improving. Apparent flares are usually not due to re-exposure, but rather to the intrinsic variability of the inflammation. In this case a detergent is a more likely culprit than a delayed type reaction to a medication given the history of " similar (but less severe) reactions to detergents.     Corticosteroids are the best treatment to keep t-cells at bay until antigen is removed. Discussed risks and benefits of prednisone, and patient will adjust her dose as needed between 5mg and 20mg per day until she no longer needs corticosteroids.     We discussed the benefits of patch testing to identify the agent responsible. This will help her and her healthcare team to avoid contact allergens in the future. Patient to keep her appointment with dermatology to discuss patch testing.     Given the high doses and longer term use of corticosteroids we reviewed signs/symptoms of adrenal insufficiency and patient handout was provided so that she can refer to this once she has wean off of prednisone.     We reviewed the patient's significant life and health stressors. She is going through a lot right now. Until she is able to get into dermatology, I am happy to review any concerns about her rash that come up. Long term dermatology input would be needed to support the diagnosis and better direct treatment.

## 2023-02-15 NOTE — PATIENT INSTRUCTIONS
"It appears that you are having a "delayed-type allergy" also known as a "contact allergy."  The type of testing that will help you to figure out what ingredients cause these reactions you need a type of allergy testing called "patch testing" which is performed by dermatologists. Unfortunately we do not do patch testing in the allergy clinic. We only test for the "immediate type" allergies.     Delayed type allergies take awhile to start (can be days after coming into contact with the substance) but once they start they can last for a month or more. The type of cell that causes these reactions can be settled using steroids such as prednisone. I will prescribe you prednisone to use, 1 tablet, once or twice per day, until you no longer need them. I am hopeful that it will only be for two or three weeks.      You have been on a lot of steroids recently. Rarely, when patients are stopping their steroids such as prednisone or steroid shots, they can have problems with their adrenal glands. I've printed out a paper on what to look for in case you should have these problems.  If you develop signs of adrenal gland problems please seek medical care quickly.   "

## 2023-02-15 NOTE — PROGRESS NOTES
Patient presents to Plastic surgery Clinic after having a bilateral breast reduction on 01/17/2023. Shes been having recurrent bouts of rash seen in emergency rooms 3 or 4 times since surgery, most recently being treated in the ED with benadryl and steroids. She is currently on a steroid pack. Her rash is still present but is much improved compared to last visit.      Physical examination shows that her incisions her breast reduction healing well with the exception of a point lateral to theT on the left breast which has about a 2 cm site of wound breakdown, it was started to granulate but will take a little longer to heal while on steroids. It will heal with time however. There is no signs of infection and the wound bed is viable. She has a rash across her upper arms and shoulder but she is improved compared to last visit. There is no rash around the incisions.  We recommended she used Polymem dressing around the wound breakdown to encourage faster healing.       She reports having removed all potential inciting substances and materials to prevent the reaction but continues to worry when her next reaction might take place especially after she finishes her steroid dosing.     We will reach out to allergy medicine/derm/rheum to see her as soon as possible. Referrals have been place.     She is also going to look for allergy medicine doctor close to where she lives and she will reach out to us with any additional requests as they come up, if necessary.    Pt demonstrated understanding of this plan going forward with Dr. Jara.     However, afterwards, pt repeatedly stated that she did not want to leave clinic until there was a plan in place. Advised pt that we have contacted allergy, derm, and rheumatology. Pt said there is no doctors for her to see in [place], and she refuses to drive back without seeing a doctor from those departments today. I advised pt that she can call the various departments to see if she  will be seen today. She agreed.

## 2023-02-16 NOTE — TELEPHONE ENCOUNTER
----- Message from Nikkie Mojica sent at 2/15/2023 11:58 AM CST -----  Regarding: Referral  Good Morning      I spoke with your office yesterday about a referral for the above patient.  She would like someone from your office to reach out to her at your earliest convenience. She is not accepting that there is no sooner availability.     I apologize in advance.

## 2023-02-22 ENCOUNTER — TELEPHONE (OUTPATIENT)
Dept: GASTROENTEROLOGY | Facility: CLINIC | Age: 53
End: 2023-02-22
Payer: MEDICAID

## 2023-02-22 NOTE — TELEPHONE ENCOUNTER
Spoke to [facility] with [facility].   Aware  will be seeing patient on 3/1 and will discuss with her then regarding her insurance coverage.   Angela

## 2023-02-22 NOTE — TELEPHONE ENCOUNTER
----- Message from Vicky Lee sent at 2/15/2023 10:48 AM CST -----  Regarding: refill  Contact: Qi@ [phone]  Rec'd call from [facility] asking to speak with someone in Dr. Lehman's office regarding patient's hydration therapy, says  they spoke with the patient and her she no longer has insurance that will cover. Caller is asking that someone advise as to what the plan of care if for the patient's port maintenance. Please call.

## 2023-02-24 ENCOUNTER — TELEPHONE (OUTPATIENT)
Dept: PHARMACY | Facility: CLINIC | Age: 53
End: 2023-02-24
Payer: MEDICAID

## 2023-02-24 NOTE — TELEPHONE ENCOUNTER
----- Message from Ana Buchanan sent at 2/16/2023 10:54 AM CST -----    ----- Message -----  From: Eva Arroyo  Sent: 2/15/2023   3:08 PM CST  To: Pharmacy Patient Assistance Team    FROM [facility] EMAIL:      I was told to email you about the pharmacy assistance program. I am here at the [facility] today for appointments today and we are in need of help. We have lost all insurance but have been approved for 100% financial assistance for [facility]. Please call me as soon as possible [phone] thank you. Nan Betts

## 2023-02-28 ENCOUNTER — PATIENT MESSAGE (OUTPATIENT)
Dept: PLASTIC SURGERY | Facility: CLINIC | Age: 53
End: 2023-02-28
Payer: MEDICAID

## 2023-03-01 ENCOUNTER — PATIENT MESSAGE (OUTPATIENT)
Dept: DERMATOLOGY | Facility: CLINIC | Age: 53
End: 2023-03-01

## 2023-03-01 ENCOUNTER — OFFICE VISIT (OUTPATIENT)
Dept: GASTROENTEROLOGY | Facility: CLINIC | Age: 53
End: 2023-03-01
Payer: MEDICAID

## 2023-03-01 ENCOUNTER — TELEPHONE (OUTPATIENT)
Dept: DERMATOLOGY | Facility: CLINIC | Age: 53
End: 2023-03-01

## 2023-03-01 ENCOUNTER — OFFICE VISIT (OUTPATIENT)
Dept: DERMATOLOGY | Facility: CLINIC | Age: 53
End: 2023-03-01
Payer: MEDICAID

## 2023-03-01 ENCOUNTER — PATIENT MESSAGE (OUTPATIENT)
Dept: PHARMACY | Facility: CLINIC | Age: 53
End: 2023-03-01
Payer: MEDICAID

## 2023-03-01 VITALS
WEIGHT: 262.56 LBS | BODY MASS INDEX: 39.79 KG/M2 | SYSTOLIC BLOOD PRESSURE: 146 MMHG | HEART RATE: 93 BPM | DIASTOLIC BLOOD PRESSURE: 83 MMHG | HEIGHT: 68 IN

## 2023-03-01 DIAGNOSIS — L50.9 URTICARIA: ICD-10-CM

## 2023-03-01 DIAGNOSIS — K21.9 GASTROESOPHAGEAL REFLUX DISEASE WITHOUT ESOPHAGITIS: Primary | ICD-10-CM

## 2023-03-01 DIAGNOSIS — Z74.2 NEED FOR HOME HEALTH CARE: ICD-10-CM

## 2023-03-01 DIAGNOSIS — K31.84 GASTROPARESIS: ICD-10-CM

## 2023-03-01 DIAGNOSIS — R21 EXCORIATED RASH: ICD-10-CM

## 2023-03-01 DIAGNOSIS — L29.9 PRURITUS: ICD-10-CM

## 2023-03-01 DIAGNOSIS — K31.84 GASTROPARESIS: Primary | ICD-10-CM

## 2023-03-01 DIAGNOSIS — K58.0 IRRITABLE BOWEL SYNDROME WITH DIARRHEA: ICD-10-CM

## 2023-03-01 DIAGNOSIS — L23.9 ALLERGIC DERMATITIS: Primary | ICD-10-CM

## 2023-03-01 PROCEDURE — 99212 OFFICE O/P EST SF 10 MIN: CPT | Mod: PBBFAC

## 2023-03-01 PROCEDURE — 99215 PR OFFICE/OUTPT VISIT, EST, LEVL V, 40-54 MIN: ICD-10-PCS | Mod: S$PBB,,, | Performed by: INTERNAL MEDICINE

## 2023-03-01 PROCEDURE — 99214 OFFICE O/P EST MOD 30 MIN: CPT | Mod: S$PBB,,, | Performed by: DERMATOLOGY

## 2023-03-01 PROCEDURE — 99214 PR OFFICE/OUTPT VISIT, EST, LEVL IV, 30-39 MIN: ICD-10-PCS | Mod: S$PBB,,, | Performed by: DERMATOLOGY

## 2023-03-01 PROCEDURE — 99215 OFFICE O/P EST HI 40 MIN: CPT | Mod: S$PBB,,, | Performed by: INTERNAL MEDICINE

## 2023-03-01 PROCEDURE — 99999 PR PBB SHADOW E&M-EST. PATIENT-LVL II: CPT | Mod: PBBFAC,,,

## 2023-03-01 PROCEDURE — 99999 PR PBB SHADOW E&M-EST. PATIENT-LVL IV: ICD-10-PCS | Mod: PBBFAC,,, | Performed by: INTERNAL MEDICINE

## 2023-03-01 PROCEDURE — 99999 PR PBB SHADOW E&M-EST. PATIENT-LVL IV: CPT | Mod: PBBFAC,,, | Performed by: INTERNAL MEDICINE

## 2023-03-01 PROCEDURE — 99214 OFFICE O/P EST MOD 30 MIN: CPT | Mod: PBBFAC | Performed by: INTERNAL MEDICINE

## 2023-03-01 PROCEDURE — 99999 PR PBB SHADOW E&M-EST. PATIENT-LVL II: ICD-10-PCS | Mod: PBBFAC,,,

## 2023-03-01 RX ORDER — HEPARIN 100 UNIT/ML
500 SYRINGE INTRAVENOUS
OUTPATIENT
Start: 2023-03-08

## 2023-03-01 RX ORDER — METOPROLOL SUCCINATE 25 MG/1
25 TABLET, EXTENDED RELEASE ORAL DAILY
Qty: 30 TABLET | Refills: 0 | Status: SHIPPED | OUTPATIENT
Start: 2023-03-01 | End: 2023-03-01

## 2023-03-01 RX ORDER — METOPROLOL SUCCINATE 25 MG/1
TABLET, EXTENDED RELEASE ORAL
Qty: 90 TABLET | Refills: 0 | Status: SHIPPED | OUTPATIENT
Start: 2023-03-01

## 2023-03-01 RX ORDER — SODIUM CHLORIDE 450 MG/100ML
INJECTION, SOLUTION INTRAVENOUS
Start: 2023-03-01 | End: 2023-03-01

## 2023-03-01 RX ORDER — SODIUM CHLORIDE 9 MG/ML
10 INJECTION, SOLUTION INTRAMUSCULAR; INTRAVENOUS; SUBCUTANEOUS
OUTPATIENT
Start: 2023-03-08

## 2023-03-01 RX ORDER — DEXLANSOPRAZOLE 60 MG/1
60 CAPSULE, DELAYED RELEASE ORAL 2 TIMES DAILY
Qty: 180 CAPSULE | Refills: 11 | Status: SHIPPED | OUTPATIENT
Start: 2023-03-01 | End: 2024-02-29

## 2023-03-01 RX ORDER — HYDROXYZINE HYDROCHLORIDE 25 MG/1
50 TABLET, FILM COATED ORAL EVERY 4 HOURS PRN
Qty: 120 TABLET | Refills: 3 | Status: SHIPPED | OUTPATIENT
Start: 2023-03-01

## 2023-03-01 NOTE — PROGRESS NOTES
Subjective:       Patient ID: Nan Betts is a 53 y.o. female.    Chief Complaint: Follow-up (Gastric Pacemaker Check)    Follow-up visit.  Gastroparesis patient with very complicated history status post pyloromyotomy and gastric stimulator.  Recent replacement of stimulator battery.  Also has severe reflux related to the gastroparesis.  Reflux symptoms have gotten progressively worse.  Pantoprazole twice a day is not providing adequate relief with breakthrough pyrosis and regurgitation.  Part of that is because of steroids which she has received.  She recently had breast reduction surgery and and has had a problem with rashes afterwards and is receiving several courses of steroids.  The steroids have increased reflux increased nausea.      She does have limited p.o. intake because of the gastroparesis and she does have chronic diarrhea which she takes Imodium.  Because of that she requires home IV fluids which he gets on a daily basis.  She also uses home IV Phenergan.    Follow-up  Associated symptoms include fatigue.     Past Medical History:   Diagnosis Date    Abnormal Pap smear of vagina     Anxiety     Cataract     Chronic pain     TMJ and abdomen    Colon polyp     Epilepsy     as a child    Essential (primary) hypertension     Gastroparesis     Idiopathic    GERD (gastroesophageal reflux disease)     H/O abnormal cervical Papanicolaou smear     Hypercholesteremia 06/21/2016    Hyperlipidemia     IC (interstitial cystitis)     Internal hemorrhoids     Interstitial cystitis     Irritable bowel syndrome (IBS)     Irritable bowel syndrome with diarrhea 06/21/2016    Rapid heart rate     Spastic colon     TMJ (temporomandibular joint disorder)     TMJ (temporomandibular joint syndrome) 06/21/2016       Review of patient's allergies indicates:   Allergen Reactions    Ketorolac Anaphylaxis and Shortness Of Breath     Other reaction(s): Throat swelling, Unknown    Morphine Anaphylaxis    Reglan [metoclopramide  "hcl] Other (See Comments)     Headaches and insomnia      Tramadol Shortness Of Breath     Other reaction(s): Throat swelling, Unknown    Gabapentin Other (See Comments)     Causes suicidal thoughts        Ibuprofen Other (See Comments)     G L Bleeding        Nsaids (non-steroidal anti-inflammatory drug) Other (See Comments)     GI BLEEDING        Clindamycin     Metoclopramide Other (See Comments)     Headaches and insomnia          Topiramate Other (See Comments)     Ulcers in the mouth and dry mouth  per pt, gets "fog brain"                Family History   Problem Relation Age of Onset    Coronary artery disease Mother     Hodgkin's lymphoma Mother     Hypertension Mother     Coronary artery disease Father 43    Hypertension Father     Heart attacks under age 50 Father     Lymphoma Sister     Coronary artery disease Paternal Grandfather     Stroke Paternal Grandfather     Glaucoma Maternal Grandmother     Pancreatic cancer Paternal Aunt     Cancer Paternal Aunt         pancreatic    Stroke Maternal Grandfather     Colon cancer Neg Hx     Breast cancer Neg Hx     Ovarian cancer Neg Hx     Cervical cancer Neg Hx     Endometrial cancer Neg Hx     Vaginal cancer Neg Hx     Diabetes Neg Hx        Social History     Tobacco Use    Smoking status: Former     Packs/day: 1.00     Years: 20.00     Pack years: 20.00     Types: Cigarettes     Start date: 1986     Quit date: 2022     Years since quittin.4    Smokeless tobacco: Never   Substance Use Topics    Alcohol use: No     Alcohol/week: 0.0 standard drinks    Drug use: No        Review of Systems   Constitutional:  Positive for fatigue.   Allergic/Immunologic:        Rash, itching     CMP   Lab Results   Component Value Date     2023    K 4.0 2023     2023    CO2 23 2023     2023    BUN 20 2023    CREATININE 0.8 2023    CALCIUM 9.5 2023    PROT 7.0 2023    ALBUMIN 3.6 2023    " BILITOT 0.4 02/03/2023    ALKPHOS 91 02/03/2023    AST 23 02/03/2023    ALT 15 02/03/2023    ANIONGAP 11 02/03/2023    and CBC   Lab Results   Component Value Date    WBC 14.95 (H) 02/03/2023    HGB 12.7 02/03/2023    HCT 38.9 02/03/2023     02/03/2023       No results found for this or any previous visit from the past 365 days.             Objective:      Physical Exam  Abdominal:      General: Abdomen is protuberant. Bowel sounds are normal. There is no distension. There are no signs of injury.      Palpations: Abdomen is soft. There is no shifting dullness, hepatomegaly or mass.      Tenderness: There is no abdominal tenderness.           Assessment & Plan:       Gastroesophageal reflux disease without esophagitis    Gastroparesis    Irritable bowel syndrome with diarrhea    Need for home health care     Assessment.  1. Gastroesophageal reflux.  Inadequate symptom control with pantoprazole twice a day.  I have never seen gross esophagitis on endoscopy.  Of all the medicines she is had Dexilant helps the most.  I will rewrite a prescription for the Dexilant and she feels like there is a system in place where she can get assistance and get that filled now.  2. Gastroparesis.  Chronic severe symptoms related to this including severe nausea requiring IV Phenergan and home IV fluids.  3. IBS with diarrhea.  In the future I want to try cholestyramine for this right now I think there are other issues more important to try to control   4. Coordination of home health care.  Because she is changing the provider I will need to be rewriting all of the orders for her home IV care    Recommendation  1. A 1 time refill of her blood pressure medicine until she can get in contact with her primary care doctor  2. Refill for Dexilant to use in place of pantoprazole   3. Rewrite the orders for home IV therapy     Total appointment time including face-to-face counseling as well as coordination of home care, 1 hour  This note  was created with voice recognition dictation technology.  There may be errors that I did not see, detect or correct.          Zack Lehman MD

## 2023-03-01 NOTE — TELEPHONE ENCOUNTER
Spoke with patient regarding obtaining lab work from her home infusion center. Patient demonstrated understanding for importance of labs and will message information for where to send labs.

## 2023-03-01 NOTE — TELEPHONE ENCOUNTER
Spoke with patient.  Aware the [facility] is not licensed in [place]. Aware there referral needs to be sent to a [place] pharmacy.   said he would be happy to forward the orders but it would be her responsibility to find an infusion center in [place].  Patient expressed understanding.   Angela

## 2023-03-01 NOTE — PROGRESS NOTES
Subjective:       Patient ID:  Nan Betts is a 53 y.o. female who presents for   Chief Complaint   Patient presents with    Rash    Itching     Rash    Itching    Patient presents for management and evaluation of widespread pruritic rash. She was previously seen in the dermatology clinic 4/22 for irritant contact dermatitis for which she was started on topical TAC as well as stucco keratoses for which she was started on amlactin.   Today she notes having widespread pruritus with intermittent rash. States that it started two days after breast reduction surgery on 1/17/23. In the post-op period she was started on clindamycin as well as percoset. Of note, patient describes having a morphine allergy and was taking percoset with benadryl until the beginning of February in the post-op period. She presented to her local ED 1/29/23 for this pruritus at which time she was given IV steroids which briefly resolved her symptoms however states that they recurred 2 days later and she re-presented to ED on 1/31/23 at which time she was again given IV steroids. On 2/3/23 she noted having throat swelling, presented to the ED at which time she was started on IV steroids as well as a medrol dose pack with minimal improvement. Since that time, she has been on oral prednisone with varying doses.    At this visit, she states that she started to develop itching red rash predominantly on her arms. She shows pictures on her phone demonstrating widespread poorly demarcated erythematous patches on the bilateral arms, chest, and back as well as several urticarial plaques on her arms.   Patient saw Allergy Immunology 2/15/23 who believe her rash is due to a contact dermatitis potentially due to a soap that was used during her surgery.    Review of Systems   Constitutional:  Negative for fever, chills, fatigue and night sweats.   Respiratory:  Negative for cough and shortness of breath.    Cardiovascular:  Negative for chest pain.    Musculoskeletal:  Negative for myalgias, arthralgias and muscle weakness.   Skin:  Positive for itching and rash.   Hematologic/Lymphatic: Negative for adenopathy. Does not bruise/bleed easily.      Objective:    Physical Exam   Constitutional: She appears well-developed and well-nourished. She is obese.    Neurological: She is alert and oriented to person, place, and time.   Skin:               Diagram Legend     Erythematous scaling macule/papule c/w actinic keratosis       Vascular papule c/w angioma      Pigmented verrucoid papule/plaque c/w seborrheic keratosis      Yellow umbilicated papule c/w sebaceous hyperplasia      Irregularly shaped tan macule c/w lentigo     1-2 mm smooth white papules consistent with Milia      Movable subcutaneous cyst with punctum c/w epidermal inclusion cyst      Subcutaneous movable cyst c/w pilar cyst      Firm pink to brown papule c/w dermatofibroma      Pedunculated fleshy papule(s) c/w skin tag(s)      Evenly pigmented macule c/w junctional nevus     Mildly variegated pigmented, slightly irregular-bordered macule c/w mildly atypical nevus      Flesh colored to evenly pigmented papule c/w intradermal nevus       Pink pearly papule/plaque c/w basal cell carcinoma      Erythematous hyperkeratotic cursted plaque c/w SCC      Surgical scar with no sign of skin cancer recurrence      Open and closed comedones      Inflammatory papules and pustules      Verrucoid papule consistent consistent with wart     Erythematous eczematous patches and plaques     Dystrophic onycholytic nail with subungual debris c/w onychomycosis     Umbilicated papule    Erythematous-base heme-crusted tan verrucoid plaque consistent with inflamed seborrheic keratosis     Erythematous Silvery Scaling Plaque c/w Psoriasis     See annotation      Assessment / Plan:        Allergic dermatitis  -     Patch Testing; Future  -     hydrOXYzine HCL (ATARAX) 25 MG tablet; Take 2 tablets (50 mg total) by mouth every 4  (four) hours as needed for Itching.  Dispense: 120 tablet; Refill: 3  -     CBC Auto Differential; Future; Expected date: 03/01/2023  -     COMPREHENSIVE METABOLIC PANEL; Future; Expected date: 03/01/2023    Urticaria  -     Ambulatory referral/consult to Dermatology  -     Patch Testing; Future  -     hydrOXYzine HCL (ATARAX) 25 MG tablet; Take 2 tablets (50 mg total) by mouth every 4 (four) hours as needed for Itching.  Dispense: 120 tablet; Refill: 3  -     CBC Auto Differential; Future; Expected date: 03/01/2023  -     COMPREHENSIVE METABOLIC PANEL; Future; Expected date: 03/01/2023    Excoriated rash  -     Ambulatory referral/consult to Allergy    Pruritus  -     CBC Auto Differential; Future; Expected date: 03/01/2023  -     COMPREHENSIVE METABOLIC PANEL; Future; Expected date: 03/01/2023    Comments:  Patient presents with likely drug-induced rash with urticarial component, most likely due to allergic dermatitis.  -Counseled patient to start daily zyrtec or xyzal as non-sedating antihistamine  -Start hydroxizine 200mg q4 PRN for itch; counseled on side effects  -Obtain CBC and CMP to assess for potential signs of DRESS; unlikely given current presentation as well as initial urticarial presentation   -Continue prednisone, as per Allergy Immunology  - Scheduled patient for patch testing in May         No follow-ups on file.    Alan Gonzalez MD  PGY2  [facility]

## 2023-03-02 ENCOUNTER — TELEPHONE (OUTPATIENT)
Dept: INFECTIOUS DISEASES | Facility: HOSPITAL | Age: 53
End: 2023-03-02
Payer: MEDICAID

## 2023-03-02 ENCOUNTER — PATIENT MESSAGE (OUTPATIENT)
Dept: INFECTIOUS DISEASES | Facility: HOSPITAL | Age: 53
End: 2023-03-02
Payer: MEDICAID

## 2023-03-02 NOTE — TELEPHONE ENCOUNTER
I have spoken with Nan Betts and informed her of the PAP application process and what's required to apply.  Nan Betts will provide the following documents: Proof of household Income( such as social security statement, 1099 form, pension statement or 3 consecutive pay stubs and [employer] & Patient Authorization Forms      I will follow up with the patient in 5 business days.

## 2023-03-02 NOTE — TELEPHONE ENCOUNTER
[facility] had [facility] for lab draw ordered by dermatology. Orders needed revision and staff on duty were not able to accomplish - patient left without labs     I called to offer my sincerest apologies and asked for patient to contact us to try to resolve the issue.

## 2023-04-13 ENCOUNTER — PATIENT MESSAGE (OUTPATIENT)
Dept: ALLERGY | Facility: CLINIC | Age: 53
End: 2023-04-13
Payer: MEDICAID

## 2023-05-04 ENCOUNTER — PATIENT MESSAGE (OUTPATIENT)
Dept: DERMATOLOGY | Facility: CLINIC | Age: 53
End: 2023-05-04
Payer: MEDICAID

## 2023-05-24 ENCOUNTER — PATIENT MESSAGE (OUTPATIENT)
Dept: GASTROENTEROLOGY | Facility: CLINIC | Age: 53
End: 2023-05-24
Payer: MEDICAID

## 2023-05-31 ENCOUNTER — PATIENT MESSAGE (OUTPATIENT)
Dept: GASTROENTEROLOGY | Facility: CLINIC | Age: 53
End: 2023-05-31
Payer: MEDICAID

## 2023-05-31 ENCOUNTER — PATIENT MESSAGE (OUTPATIENT)
Dept: ALLERGY | Facility: CLINIC | Age: 53
End: 2023-05-31
Payer: MEDICAID

## 2023-06-02 ENCOUNTER — PATIENT MESSAGE (OUTPATIENT)
Dept: ALLERGY | Facility: CLINIC | Age: 53
End: 2023-06-02
Payer: MEDICAID

## 2023-06-07 ENCOUNTER — PATIENT MESSAGE (OUTPATIENT)
Dept: GASTROENTEROLOGY | Facility: CLINIC | Age: 53
End: 2023-06-07
Payer: MEDICAID

## 2023-06-07 ENCOUNTER — TELEPHONE (OUTPATIENT)
Dept: GASTROENTEROLOGY | Facility: CLINIC | Age: 53
End: 2023-06-07
Payer: MEDICAID

## 2023-06-07 NOTE — TELEPHONE ENCOUNTER
----- Message from Shirley Lehman sent at 6/7/2023 10:19 AM CDT -----  Regarding: RE: APPEAL DENIED  Yes we did!  ----- Message -----  From: Bernice Blackburn MA  Sent: 6/7/2023   7:21 AM CDT  To: Shirley Lehman  Subject: FW: APPEAL DENIED                                  ----- Message -----  From: Zack Lehman MD  Sent: 6/6/2023   4:42 PM CDT  To: Bernice Blackburn MA  Subject: RE: APPEAL DENIED                                We tried our best, she will have to do one of the other PPI's  ----- Message -----  From: Bernice Blackburn MA  Sent: 6/5/2023   1:15 PM CDT  To: Zack Lehman MD  Subject: FW: APPEAL DENIED                                  ----- Message -----  From: Shirley Lehman  Sent: 6/5/2023  12:59 PM CDT  To: Bernice Blackburn MA  Subject: APPEAL DENIED                                    Hello,     The APPEAL has been DENIED for dexlansoprazole 60 mg.    I have the option to fill out a pharmacy reconsideration form, we can see if that works. Otherwise we have run out of options.    Thank you,  Cornel Lehman, [facility]  [facility]

## 2023-06-14 ENCOUNTER — PATIENT MESSAGE (OUTPATIENT)
Dept: DERMATOLOGY | Facility: CLINIC | Age: 53
End: 2023-06-14
Payer: MEDICAID

## 2023-06-15 ENCOUNTER — TELEPHONE (OUTPATIENT)
Dept: ALLERGY | Facility: CLINIC | Age: 53
End: 2023-06-15
Payer: MEDICAID

## 2023-06-15 NOTE — TELEPHONE ENCOUNTER
----- Message from Darren Almendarez MD sent at 6/14/2023 10:25 AM CDT -----  Regarding: Follow up virtual visit  Chung Johnson, when you get the chance would you reach out to this patient and help set her up with a virtual visit on a Wednesday afternoon? Thank you!

## 2023-06-15 NOTE — TELEPHONE ENCOUNTER
Phone call to patient. Patient scheduled for a virtual appointment on 6/28/2023 @ 3:30 pm per Dr. Almendarez

## 2023-06-28 ENCOUNTER — OFFICE VISIT (OUTPATIENT)
Dept: ALLERGY | Facility: CLINIC | Age: 53
End: 2023-06-28
Payer: MEDICAID

## 2023-06-28 DIAGNOSIS — R60.9 EDEMA, UNSPECIFIED TYPE: ICD-10-CM

## 2023-06-28 DIAGNOSIS — B99.9 RECURRENT INFECTIONS: Primary | ICD-10-CM

## 2023-06-28 PROCEDURE — 99214 OFFICE O/P EST MOD 30 MIN: CPT | Mod: GT,,, | Performed by: ALLERGY & IMMUNOLOGY

## 2023-06-28 PROCEDURE — 99214 PR OFFICE/OUTPT VISIT, EST, LEVL IV, 30-39 MIN: ICD-10-PCS | Mod: GT,,, | Performed by: ALLERGY & IMMUNOLOGY

## 2023-06-29 ENCOUNTER — PATIENT MESSAGE (OUTPATIENT)
Dept: ALLERGY | Facility: CLINIC | Age: 53
End: 2023-06-29
Payer: MEDICAID

## 2023-06-30 NOTE — PROGRESS NOTES
ALLERGY & IMMUNOLOGY CLINIC     HISTORY OF PRESENT ILLNESS     Referral from: No ref. provider found    HPI: Nan Betts is a 53 y.o. female following up with an overall deterioration in health since her last visit. She describes swelling, weakness, tiredness. She has been referred to rheumatology for consideration of an autoimmune etiology. In addition she seems to have a number of infections that have been resistant to antibiotic management.   She does not seem to have the contact dermatitis reactions for which I originally saw her. However her skin generally has had problems with rash and infection.      MEDICAL HISTORY   MedHx:   Patient Active Problem List   Diagnosis    GERD (gastroesophageal reflux disease)    Gastroparesis    Urge urinary incontinence    Interstitial cystitis    Anxiety    Vaginal atrophy    Vaginal pain    Dyspareunia    Intractable nausea and vomiting    Hypercholesteremia    Irritable bowel syndrome with diarrhea    Left lower quadrant pain    Diarrhea    Presence of gastric pacemaker    H/O pyloroplasty    Bloating    Epigastric pain    Colon polyp    Macromastia       Medications:   Current Outpatient Medications on File Prior to Visit   Medication Sig Dispense Refill    (Magic mouthwash) 1:1:1 diphenhydrAMINE(Benadryl) 12.5mg/5ml liq, aluminum & magnesium hydroxide-simethicone (Maalox), LIDOcaine viscous 2% Swish and spit 5 mLs every 4 (four) hours as needed (mouth sores). for mouth sores 240 mL 3    acetaminophen (TYLENOL) 500 MG tablet Take 1 tablet (500 mg total) by mouth every 6 (six) hours as needed for Pain. 28 tablet 0    ALPRAZolam (XANAX) 1 MG tablet Take 1 mg by mouth 2 (two) times daily as needed.       busPIRone (BUSPAR) 5 MG Tab Take 5 mg by mouth 2 (two) times daily. 10 mg twice daily      CREON 24,000-76,000 -120,000 unit capsule TAKE 1 CAPSULE BY MOUTH 3  TIMES DAILY WITH MEALS 500 capsule 3    dexlansoprazole (DEXILANT) 60 mg capsule Take 1 capsule (60 mg total) by  mouth 2 (two) times a day. 180 capsule 11    diphenhydrAMINE (BENADRYL) 50 MG tablet Take 50 mg by mouth every 4 (four) hours as needed for Itching.      estradioL (ESTRACE) 2 MG tablet Take 1.5 tablets (3 mg total) by mouth once daily. (Patient taking differently: Take 3 mg by mouth every evening.) 135 tablet 3    flavoxATE (URISPAS) 100 mg Tab 1 tablet      fluticasone propionate (FLONASE) 50 mcg/actuation nasal spray 1 spray by Each Nostril route once daily.      hydrOXYzine HCL (ATARAX) 25 MG tablet Take 2 tablets (50 mg total) by mouth every 4 (four) hours as needed for Itching. 120 tablet 3    LIDOcaine HCl 2% (LIDOCAINE VISCOUS) 2 % Soln SWISH AND SPIT 5 MLS BY MOUTH FOUR TIMES DAILY AS NEEDED FOR MOUTH SORES 80 mL 0    LIDOcaine HCl 2% (LIDOCAINE VISCOUS) 2 % Soln by Mucous Membrane route every 4 (four) hours. To mix with antacid 720 mL 1    loperamide (IMODIUM) 2 mg capsule TAKE 2 CAPSULES BY MOUTH  TWICE DAILY AS NEEDED 360 capsule 1    meclizine (ANTIVERT) 25 mg tablet Take by mouth 3 (three) times daily as needed.      methocarbamol (ROBAXIN-750 ORAL) Take by mouth.      metoprolol succinate (TOPROL-XL) 25 MG 24 hr tablet TAKE 1 TABLET(25 MG) BY MOUTH EVERY DAY 90 tablet 0    predniSONE (DELTASONE) 10 MG tablet Take 1 tablet (10 mg total) by mouth 2 (two) times daily. 60 tablet 1    promethazine (PHENERGAN) 25 mg/mL injection Inject 25 mg into the vein every 4 (four) hours.      Saccharomyces boulardii (FLORASTOR) 250 mg capsule Take 250 mg by mouth 2 (two) times daily.      simvastatin (ZOCOR) 40 MG tablet Take 40 mg by mouth every evening.       SODIUM CHLORIDE 0.45 % IV Inject 1,000 mLs into the vein as needed.       sodium chloride 0.9% SolP 100 mL with pantoprazole 40 mg SolR 40 mg Inject 40 mg into the vein every 12 (twelve) hours. 60 vial 0    sucralfate (CARAFATE) 1 gram tablet TAKE 1 TABLET BY MOUTH 4  TIMES DAILY BEFORE MEALS  AND AT NIGHT 360 tablet 3    tiZANidine (ZANAFLEX) 4 MG tablet Take  4 mg by mouth 3 (three) times daily.      triamcinolone acetonide 0.1% (KENALOG) 0.1 % ointment AAA hands bid - may occlude qhs 454 g 3     Current Facility-Administered Medications on File Prior to Visit   Medication Dose Route Frequency Provider Last Rate Last Admin    fentaNYL 50 mcg/mL injection  mcg   mcg Intravenous PRN Ford Pan MD        LIDOcaine (PF) 10 mg/ml (1%) injection 10 mg  1 mL Intradermal Once Wes Stahl MD        midazolam (VERSED) 1 mg/mL injection 0.5-4 mg  0.5-4 mg Intravenous PRN Wes Stahl MD   2 mg at 10/07/22 0754    midazolam (VERSED) 1 mg/mL injection 0.5-4 mg  0.5-4 mg Intravenous PRN Ford Pan MD           SurgHx:  Past Surgical History:   Procedure Laterality Date    APPENDECTOMY      BREAST BIOPSY Bilateral     CATARACT EXTRACTION      CHOLECYSTECTOMY      COLONOSCOPY      COLONOSCOPY N/A 7/31/2018    Procedure: COLONOSCOPY;  Surgeon: Zack Lehman MD;  Location: [facility] (4TH FLR);  Service: Endoscopy;  Laterality: N/A;    CYSTOSCOPY N/A 8/25/2020    Procedure: CYSTOSCOPY;  Surgeon: Christal Khan MD;  Location: [facility] 1ST FLR;  Service: Urology;  Laterality: N/A;    ELBOW SURGERY      ENDOSCOPIC CARPAL TUNNEL RELEASE Right 10/7/2022    Procedure: RELEASE, CARPAL TUNNEL, ENDOSCOPIC;  Surgeon: William Laboy MD;  Location: [facility];  Service: Orthopedics;  Laterality: Right;    ESOPHAGOGASTRODUODENOSCOPY N/A 7/31/2018    Procedure: EGD (ESOPHAGOGASTRODUODENOSCOPY);  Surgeon: Zack Lehman MD;  Location: [facility] (4TH FLR);  Service: Endoscopy;  Laterality: N/A;  RUQ gastric pacemaker    Left upper arm PICC line    PONV    ESOPHAGOGASTRODUODENOSCOPY N/A 9/3/2019    Procedure: EGD (ESOPHAGOGASTRODUODENOSCOPY);  Surgeon: Zack Lehman MD;  Location: [facility] (4TH FLR);  Service: Endoscopy;  Laterality: N/A;  history of gastoparesis, per change in protocol, pt instructed to do full liquid diet x 3 days prior to procedure and clear  liquids x 1 day prior to procedure-BB  pt has gastric pacemaker, monitored by Dr. Lehman-BB  hx of epilepsy, last seizure during chi    ESOPHAGOGASTRODUODENOSCOPY N/A 9/10/2020    Procedure: ESOPHAGOGASTRODUODENOSCOPY (EGD);  Surgeon: Zack Lehman MD;  Location: [facility] (4TH FLR);  Service: Endoscopy;  Laterality: N/A;  3 days Full liquid diet/ 1 day Clear liquids  waiting for Pt to cb with date - ERW  Left upper arm -  PICC  COVID screening 9/7/20 [place] [facility]- ERW    ESOPHAGOGASTRODUODENOSCOPY N/A 11/3/2021    Procedure: EGD (ESOPHAGOGASTRODUODENOSCOPY);  Surgeon: Zack Lehman MD;  Location: [facility] (4TH FLR);  Service: Endoscopy;  Laterality: N/A;  10/15 fully vaccinated as of 7/15/21; gastric pacemaker-pt does not have remote;  pt has a port does not want IV started; 3 days full liquid diet; instr. to portal-st    GASTRIC STIMULATOR IMPLANT SURGERY      left side on 03/30/2020    gastric stimulator placement      LASIK Bilateral 2006    MANDIBLE SURGERY Bilateral 10/17/2016    MYELOGRAPHY N/A 8/22/2022    Procedure: Myelogram lumbar;  Surgeon: Malia Surgeon;  Location: [facility];  Service: Anesthesiology;  Laterality: N/A;    OOPHORECTOMY Bilateral     PYLOROPLASTY  03/2016    REDUCTION OF BOTH BREASTS Bilateral 1/17/2023    Procedure: MAMMOPLASTY, REDUCTION, BILATERAL;  Surgeon: Jan Jara MD;  [address]  Service: Plastics;  Laterality: Bilateral;    STOMACH SURGERY      gastric pacemaker    TEMPOROMANDIBULAR JOINT SURGERY  12/2016    TONSILLECTOMY      TOTAL ABDOMINAL HYSTERECTOMY  2005    EUGENIA/BSO, Trachelectomy  2012    UPPER GASTROINTESTINAL ENDOSCOPY      WRIST SURGERY        PHYSICAL EXAM   GENERAL: Alert, NAD, well-appearing, cooperative  EYES: no conjunctival injection, no infraorbital shiners  LUNGS: no increased WOB     ASSESSMENT & PLAN     Nan Betts is a 53 y.o. female with     Recurrent infections    Edema, unspecified type    The patients health has  over-all markedly deteriorated since our last visit. It is unclear what the drivers are of this deterioration and she has a team of physicians working to come up with treatment options. I agree with her PCP that autoimmune disease may be a possibility. I do not think that hypersensitivity disorders would cause this constellation of symptoms that include swelling and pain.    The infections that she seems to be getting are worrisome. With her next blood draw it would be reasonable to verify whether or not her immune system is intact by getting:  Total IgG,   Total IgM,   Total IgA  Pneumococcal titers (any of the standard panels will work)    Total immunoglobulin levels should be WNL and 70% of the pneumococcal titers should be 1.3 or greater in a normal adult    A humoral immunodeficiency would not explain all of what is going on with the patient, but could be a secondary effect from an underlying disorder.

## 2023-08-17 ENCOUNTER — PATIENT MESSAGE (OUTPATIENT)
Dept: GASTROENTEROLOGY | Facility: CLINIC | Age: 53
End: 2023-08-17
Payer: MEDICAID

## 2023-08-17 ENCOUNTER — TELEPHONE (OUTPATIENT)
Dept: GASTROENTEROLOGY | Facility: CLINIC | Age: 53
End: 2023-08-17
Payer: MEDICAID

## 2023-08-17 NOTE — TELEPHONE ENCOUNTER
----- Message from Johanna Moore sent at 8/15/2023 11:49 AM CDT -----  Nan Betts calling regarding Patient Advice (message) [facility] calling asking can they get a updated order for the pt to come get her port flush and a order for her to have monthly lab done Call back# [phone] fax#[phone] boris

## 2023-09-12 ENCOUNTER — TELEPHONE (OUTPATIENT)
Dept: ENDOSCOPY | Facility: HOSPITAL | Age: 53
End: 2023-09-12
Payer: MEDICAID

## 2023-09-12 ENCOUNTER — PATIENT MESSAGE (OUTPATIENT)
Dept: DERMATOLOGY | Facility: CLINIC | Age: 53
End: 2023-09-12
Payer: MEDICAID

## 2023-09-12 ENCOUNTER — PATIENT MESSAGE (OUTPATIENT)
Dept: GASTROENTEROLOGY | Facility: CLINIC | Age: 53
End: 2023-09-12
Payer: MEDICAID

## 2023-09-12 NOTE — TELEPHONE ENCOUNTER
----- Message from Missy Ramon sent at 9/12/2023 10:11 AM CDT -----  Type: General Call Back     Name of Caller:HIRAM BERMUDEZLESLIE HANNAH [[id number]]  Would the patient rather a call back or a response via [facility]? Call back  Best Call Back Number:[phone]  Additional Information: Patient indicates she is a new [place] medicaid member. Patient indicates she was with a Provider until being switched to new insurance. Patient has been notified she needs a referral in order to schedule. Patient indicates she was told to speak to someone in regards to seeing about Providers accepting new medicaid patients with open panels. Patient indicates she would like to talk to someone in the department directly. Patient indicates if you cannot get in touch to please leave a voice message. Please call back with further assistance and more information.        Satisfactory

## 2023-09-14 NOTE — TELEPHONE ENCOUNTER
Spoke with patient.  Aware I spoke with the infusion center she requested.  It was stated because of [place], [place], the order for the infusion would have to come from a [place]provider.  Patient expressed understanding.  She stated she will look into another infusion company to see what they can do for her. I have told her, as well as , that  cannot continue seeing her because of her insurance.  She has been encouraged for several months to seek care in [place]with [facility] GI provider.   Patient stated she spoke with someone at [facility] and they were going to get her in to see a GI provider that excepts Medicaid.  Patient stated she would get back with our office.   Angela

## 2023-10-05 ENCOUNTER — TELEPHONE (OUTPATIENT)
Dept: ENDOSCOPY | Facility: HOSPITAL | Age: 53
End: 2023-10-05
Payer: MEDICAID

## 2023-10-05 NOTE — TELEPHONE ENCOUNTER
Spoke with patient. Informed her that no providers at [facility] take out of state Medicaid.

## 2023-10-05 NOTE — TELEPHONE ENCOUNTER
----- Message from Damari Pandey sent at 10/5/2023  2:58 PM CDT -----  Regarding: Appt requested  Contact: [phone]  Hi, pt called to request a call back to discuss getting an appt. Pt was seeing Dr Lehman, but now has [place] Medicaid and was told to call and see which provider would take her insurance. Pt ask for the call to be tomorrow.

## 2023-12-06 ENCOUNTER — PATIENT MESSAGE (OUTPATIENT)
Dept: GASTROENTEROLOGY | Facility: CLINIC | Age: 53
End: 2023-12-06
Payer: MEDICAID

## 2025-02-05 NOTE — PROGRESS NOTES
CHIEF COMPLAINT:    Mrs. Betts is a 48 y.o. female presenting as a self referred patient for interstitial cystitis.    PRESENTING ILLNESS:    Nan Betts is a 48 y.o. female who has a long history of interstitial cystitis.  She is treated with flavoxate and Elmiron.  She states that she has cystoscopy with hydrodistension in the past and it works well for her.  The last time she had one done was 2-3 years ago.  She states she only needs them occasionally.  When she has a flare, she has bladder pain, frequency increases and nocturia is > 1.  When she does not have a flare, she has nocturia x 1.  She cannot quantify her frequency.  She states that it varies from day to day.  She lives in [place], [place] and yesterday her pain was bad but today she is doing well.  She finds that stress and heavy lifting bring on her symptoms.    She has gastroparesis, status post gastric stimulator and she has to have a PICC line for IV fluids.  She states these treatments help her considerably as she is able to manage her life much better.  She states when she is getting a liter of fluids, she has to urinate 3 times.     G0, hysterectomy for endometriosis, not sexually active (both she and her  have had medical issues), she has diarrhea predominant IBS.      REVIEW OF SYSTEMS:    Review of Systems   Constitutional: Negative.    HENT: Negative.    Eyes: Negative.    Respiratory: Negative.    Cardiovascular: Negative.    Gastrointestinal: Positive for diarrhea and nausea.   Genitourinary: Positive for frequency and urgency.        Bladder pain   Skin: Negative.    Neurological: Negative.    Endo/Heme/Allergies: Negative.    Psychiatric/Behavioral: Negative.        PATIENT HISTORY:    Past Medical History:   Diagnosis Date    Abnormal Pap smear of vagina     Anxiety     Cataract     Chronic pain     TMJ and abdomen    Colon polyp     Epilepsy     as a child    Gastroparesis     Idiopathic    GERD (gastroesophageal reflux  Result viewed via livewell   disease)     H/O abnormal cervical Papanicolaou smear     Hypercholesteremia 6/21/2016    Hyperlipidemia     IC (interstitial cystitis)     Internal hemorrhoids     Interstitial cystitis     Irritable bowel syndrome (IBS)     Irritable bowel syndrome with diarrhea 6/21/2016    Spastic colon     TMJ (temporomandibular joint disorder)     TMJ (temporomandibular joint syndrome) 6/21/2016       Past Surgical History:   Procedure Laterality Date    APPENDECTOMY      BREAST BIOPSY Bilateral     CATARACT EXTRACTION      CHOLECYSTECTOMY      COLONOSCOPY      COLONOSCOPY N/A 7/31/2018    Procedure: COLONOSCOPY;  Surgeon: Zack Lehman MD;  Location: [facility] (4TH FLR);  Service: Endoscopy;  Laterality: N/A;    COLONOSCOPY N/A 7/31/2018    Performed by Zack Lehman MD at [facility] (4TH FLR)    CYSTOSCOPY WITH HYDRODISTENSION N/A 6/13/2016    Performed by Bethany John MD at [facility] OR 2ND FLR    EGD (ESOPHAGOGASTRODUODENOSCOPY) N/A 7/31/2018    Performed by Zack Lehman MD at [facility] (4TH FLR)    ESOPHAGOGASTRODUODENOSCOPY N/A 7/31/2018    Procedure: EGD (ESOPHAGOGASTRODUODENOSCOPY);  Surgeon: Zack Lehman MD;  Location: [facility] (4TH FLR);  Service: Endoscopy;  Laterality: N/A;  RUQ gastric pacemaker    Left upper arm PICC line    PONV    ESOPHAGOGASTRODUODENOSCOPY (EGD) N/A 5/2/2017    Performed by Zack Lehman MD at [facility] (4TH FLR)    ESOPHAGOGASTRODUODENOSCOPY (EGD) N/A 11/15/2016    Performed by Zack Lehman MD at [facility] (4TH FLR)    ESOPHAGOGASTRODUODENOSCOPY (EGD) intraop N/A 11/23/2016    Performed by David Celeste MD at [facility] OR 2ND FLR    ESOPHAGOGASTRODUODENOSCOPY (EGD) w/ poss. pyloric stenosis N/A 8/31/2017    Performed by Zack Lehman MD at [facility] (4TH FLR)    Exchange of interstim battery N/A 5/15/2017    Performed by David Celeste MD at [facility] OR 2ND FLR    EXCHANGE-STIMULATOR-GASTRIC N/A 5/15/2017    Performed by David [facility]este MD at  [facility] OR 2ND FLR    gastric stimulator placement      INSERTION-INTERSTIM STAGE I LEAD IMPLANT N/A 11/23/2016    Performed by David Celeste MD at [facility] OR 2ND FLR    INSERTION-INTERSTIM STAGE II GENERATOR IMPLANT N/A 11/23/2016    Performed by David Celeste MD at [facility] OR 2ND FLR    LAPAROSCOPIC PLACEMENT GASTRIC NEUROSTIMULATOR N/A 11/23/2016    Performed by [facility] MD at [facility] OR 2ND FLR    LAPAROSCOPY-DIAGNOSTIC N/A 3/30/2016    Performed by Alan Rivera MD at [facility] OR 2ND FLR    LASIK Bilateral 2006    MANDIBLE SURGERY Bilateral 10/17/2016    PYLOROPLASTY  03/2016    ROBOTIC ASSISTED LAPAROSCOPIC PYLOROPLASTY N/A 3/30/2016    Performed by David Celeste MD at [facility] OR 2ND FLR    SIGMOIDOSCOPY-FLEXIBLE N/A 6/22/2016    Performed by Zack Lehman MD at [facility] ENDO (2ND FLR)    STOMACH SURGERY      gastric pacemaker    TEMPOROMANDIBULAR JOINT SURGERY  12/2016    TONSILLECTOMY      TOTAL ABDOMINAL HYSTERECTOMY  2005    EUGENIA/BSO, Trachelectomy 7 years ago    UPPER GASTROINTESTINAL ENDOSCOPY         Family History   Problem Relation Age of Onset    Coronary artery disease Mother     Hodgkin's lymphoma Mother     Coronary artery disease Father     Lymphoma Sister     Coronary artery disease Paternal Grandfather     Glaucoma Maternal Grandmother     Pancreatic cancer Paternal Aunt      Socioeconomic History    Marital status:    Tobacco Use    Smoking status: Current Every Day Smoker     Packs/day: 1.00     Years: 20.00     Pack years: 20.00    Smokeless tobacco: Never Used   Substance and Sexual Activity    Alcohol use: No     Alcohol/week: 0.0 oz    Drug use: No    Sexual activity: Yes     Partners: Male       Allergies:  Morphine; Ultram [tramadol]; Buprenorphine hcl; Codeine; Flexeril [cyclobenzaprine]; Hydrocodone; Hydrocodone-acetaminophen; Ibuprofen; Metoclopramide; Nsaids (non-steroidal anti-inflammatory drug); Reglan [metoclopramide hcl];  and Topamax [topiramate]    Medications:  Outpatient Encounter Medications as of 11/14/2018   Medication Sig Dispense Refill    carisoprodol (SOMA) 350 MG tablet Take 350 mg by mouth 3 (three) times daily as needed for Muscle spasms.      dexlansoprazole (DEXILANT) 60 mg capsule Take 1 capsule (60 mg total) by mouth 2 (two) times daily. 180 capsule 3    estradiol (ESTRACE) 1 MG tablet Take 3 tablets (3 mg total) by mouth once daily. Take one 1mg pill daily along with the 2mg pill daily for total of 3mg daily 90 tablet 3    estradiol (ESTRACE) 2 MG tablet Take 1 tablet (2 mg total) by mouth once daily. Take one 2mg tab and one 1mg tab for total of 3 daily 90 tablet 3    flavoxATE (URISPAS) 100 mg Tab 100 mg 3 (three) times daily as needed.       loperamide (IMODIUM) 2 mg capsule Take 2 capsules (4 mg total) by mouth daily as needed. 180 capsule 3    mometasone (NASONEX) 50 mcg/actuation nasal spray       pentosan polysulfate (ELMIRON) 100 mg Cap Take 100 mg by mouth 3 (three) times daily.      simvastatin (ZOCOR) 40 MG tablet       SODIUM CHLORIDE 0.45 % IV Inject 1,000 mLs into the vein as needed.       sodium chloride 0.9% 0.9 % SolP 50 mL with promethazine 25 mg/mL Soln Inject 25 mg into the vein every 4 (four) hours. Thru IV      tiZANidine 4 mg Cap Take 4 mg by mouth once daily.      albuterol (PROVENTIL) 2.5 mg /3 mL (0.083 %) nebulizer solution       diphenhydrAMINE (BENADRYL) 50 MG tablet Take 50 mg by mouth every 4 (four) hours as needed for Itching.      nystatin (MYCOSTATIN) 100,000 unit/mL suspension nightly as needed.       PROAIR HFA 90 mcg/actuation inhaler every 6 (six) hours as needed.   0    [DISCONTINUED] diazePAM (VALIUM) 5 MG tablet Take once 1 hour prior to procedure.  May repeat x 1 after 30 min if needed. 2 tablet 0    [DISCONTINUED] nabumetone (RELAFEN) 500 MG tablet Take 1 tablet (500 mg total) by mouth 2 (two) times daily as needed for Pain. 30 tablet 3    [DISCONTINUED]  nortriptyline (PAMELOR) 25 MG capsule Take 1 capsule (25 mg total) by mouth every evening. 30 capsule 3    [DISCONTINUED] pregabalin (LYRICA) 25 MG capsule Take 1 capsule (25 mg total) by mouth 3 (three) times daily. 90 capsule 5     No facility-administered encounter medications on file as of 11/14/2018.          PHYSICAL EXAMINATION:    The patient generally appears in good health, is appropriately interactive, and is in no apparent distress.    Skin: No lesions.    Mental: Cooperative with normal affect.    Neuro: Grossly intact.    HEENT: Normal. No evidence of lymphadenopathy.    Chest:  normal inspiratory effort.    Abdomen:  Soft, non-tender. No masses or organomegaly. Bladder is not palpable. No evidence of flank discomfort. No evidence of inguinal hernia.    Extremities: No clubbing, cyanosis, or edema    Normal external female genitalia  Urethral meatus is normal  Urethra is normal.  Bladder is tender to bimanual exam  Well supported anteriorly and posteriorly   Uterus and cervix are surgically absent  No adnexal masses    LABS:    Lab Results   Component Value Date    BUN 21 (H) 12/06/2017    CREATININE 0.8 12/06/2017     UA 1.025, pH 5, otherwise, negative    IMPRESSION:    Encounter Diagnoses   Name Primary?    Interstitial cystitis Yes       PLAN:    1.  Discussed InterStim for urgency, frequency symptoms.  Brochure given (she had asked if there were any other treatments for IC.  2.  Consent signed for cystoscopy, hydrodistention, possible biopsy and fulguration  3.  Information from [facility] given on the AVS  4.  She states she has refills of the Elmiron and flavoxate for now.

## 2025-05-15 NOTE — TELEPHONE ENCOUNTER
----- Message from Odessa Rojas sent at 8/15/2022  4:56 PM CDT -----  Regarding: Requesting call back  Contact: [id number] [phone]  Baljeet([facility] )is returning a call to kalin     No

## (undated) DEVICE — BANDAGE ESMARK ELASTIC ST 4X9

## (undated) DEVICE — ELECTRODE BLADE INSULATED 1 IN

## (undated) DEVICE — APPLIER CLIP LIAGCLIP 9.375IN

## (undated) DEVICE — PACK UNIVERSAL SPLIT II

## (undated) DEVICE — CONTAINER SPECIMEN STRL 4OZ

## (undated) DEVICE — SEE MEDLINE ITEM 157128

## (undated) DEVICE — GLOVE BIOGEL 7.5

## (undated) DEVICE — SEE MEDLINE ITEM 152622

## (undated) DEVICE — GLOVE BIOGEL PI MICRO SZ 7.5

## (undated) DEVICE — GAUZE SPONGE 4X4 12PLY

## (undated) DEVICE — TRAY MINOR GEN SURG

## (undated) DEVICE — DRAPE HALF SURGICAL 40X58IN

## (undated) DEVICE — LUBRICANT SURGILUBE 2 OZ

## (undated) DEVICE — SOL IRR WATER STRL 3000 ML

## (undated) DEVICE — SPONGE LAP 18X18 PREWASHED

## (undated) DEVICE — COVER PROBE NL STRL 3.6X96IN

## (undated) DEVICE — Device

## (undated) DEVICE — APPLIER LIGACLIP MED 11IN

## (undated) DEVICE — SUT MONOCRYL 3-0 PS-2 UND

## (undated) DEVICE — ADHESIVE MASTISOL VIAL 48/BX

## (undated) DEVICE — DRAPE ABDOMINAL TIBURON 14X11

## (undated) DEVICE — DRAIN CHANNEL ROUND 15FR

## (undated) DEVICE — UNDERGLOVES BIOGEL PI SIZE 8

## (undated) DEVICE — DRESSING TRANS 4X4 TEGADERM

## (undated) DEVICE — STAPLER SKIN ROTATING HEAD

## (undated) DEVICE — SUT VICRYL 4-0 RB1 27IN UD

## (undated) DEVICE — REMOVER STAPLE SKIN STERILE

## (undated) DEVICE — SET CYSTO IRRIGATION UNIV SPIK

## (undated) DEVICE — GOWN AERO CHROME W/ TOWEL XL

## (undated) DEVICE — TRAY CATH FOL SIL URIMTR 16FR

## (undated) DEVICE — SUT VICRYL 3-0 27 SH

## (undated) DEVICE — STRIP STERI REIN CLSR 1/2X2IN

## (undated) DEVICE — SKINMARKER & RULER REGULAR X-F

## (undated) DEVICE — DRAPE INCISE IOBAN 2 23X23IN

## (undated) DEVICE — PACK CYSTO

## (undated) DEVICE — DRESSING TELFA STRL 4X3 LF

## (undated) DEVICE — BLADE SURG #15 CARBON STEEL

## (undated) DEVICE — SUT 0 VICRYL / UR6 (J603)

## (undated) DEVICE — TIP YANKAUERS BULB NO VENT

## (undated) DEVICE — SYR 30CC LUER LOCK

## (undated) DEVICE — CORD FOR BIPOLAR FORCEPS 12

## (undated) DEVICE — BLADE SURG CARBON STEEL #10

## (undated) DEVICE — SYR 10CC LUER LOCK

## (undated) DEVICE — SUT SILK 3-0 SH 18IN BLACK

## (undated) DEVICE — EVACUATOR WOUND BULB 100CC

## (undated) DEVICE — BRA POST SURGICAL WHT 48-50IN

## (undated) DEVICE — COVERS PROBE NR-48 STERILE

## (undated) DEVICE — SOL 0.9% NACL IRRI.IN STERIL

## (undated) DEVICE — KIT ANTIFOG W/SPONG & FLUID

## (undated) DEVICE — APPLICATOR CHLORAPREP ORN 26ML

## (undated) DEVICE — MANIFOLD 4 PORT

## (undated) DEVICE — TOURNIQUET SB QC DP 18X4IN

## (undated) DEVICE — SUT 4-0 ETHILON 18 PS-2

## (undated) DEVICE — BANDAGE MATRIX HK LOOP 2IN 5YD

## (undated) DEVICE — STOCKINETTE DBL PLY ST 4X

## (undated) DEVICE — ELECTRODE EXTENDED BLADE

## (undated) DEVICE — TOWEL OR DISP STRL BLUE 4/PK

## (undated) DEVICE — APPLIER LIGACLIP LG 13IN

## (undated) DEVICE — SUT SILK 2-0 PS 18IN BLACK

## (undated) DEVICE — SUT 5/0 18IN PLAIN FAST AB

## (undated) DEVICE — GOWN SURGICAL X-LARGE

## (undated) DEVICE — SUT MCRYL PLUS 4-0 PS2 27IN

## (undated) DEVICE — SYS CLSR DERMABOND PRINEO 22CM

## (undated) DEVICE — NDL SPINAL SPINOCAN 22GX3.5

## (undated) DEVICE — TRAY CYSTO BASIN

## (undated) DEVICE — SET CYSTO IRR DRP CHMBR 84IN

## (undated) DEVICE — SUT 2/0 30IN SILK BLK BRAI

## (undated) DEVICE — STAPLER SKIN PROXIMATE WIDE

## (undated) DEVICE — BOVIE SUCTION

## (undated) DEVICE — BINDER ABDOMINAL 9 46-62

## (undated) DEVICE — NDL 18GA X1 1/2 REG BEVEL

## (undated) DEVICE — ELECTRODE REM PLYHSV RETURN 9

## (undated) DEVICE — SUT PDS II 1 TP-1 VIL

## (undated) DEVICE — GOWN SMARTGOWN LVL4 X-LONG XL

## (undated) DEVICE — DRESSING XEROFORM FOIL PK 1X8

## (undated) DEVICE — DRAPE U SPLIT SHEET 54X76IN

## (undated) DEVICE — SUT VICRYL PLUS 4-0 P3 18IN

## (undated) DEVICE — SLING ARM LARGE FOAM STRAP

## (undated) DEVICE — SEE MEDLINE ITEM 157117

## (undated) DEVICE — SPONGE DERMACEA GAUZE 4X4

## (undated) DEVICE — BANDAGE KERLIX P/P 2.25IN STER

## (undated) DEVICE — GOWN X-LG STERILE BACK

## (undated) DEVICE — PAD CAST 2 IN X 4YDS STERILE

## (undated) DEVICE — TRAY MINOR GEN SURG OMC

## (undated) DEVICE — DRAPE STERI INSTRUMENT 1018

## (undated) DEVICE — SEE L#120831

## (undated) DEVICE — PAD ABD 8X10 STERILE

## (undated) DEVICE — SEE MEDLINE ITEM 152487

## (undated) DEVICE — KIT ENDO CARPEL TUNNAL SINGLE

## (undated) DEVICE — GAUZE FLUFF XXLG 36X36 2 PLY